# Patient Record
Sex: FEMALE | Race: WHITE | NOT HISPANIC OR LATINO | Employment: FULL TIME | URBAN - METROPOLITAN AREA
[De-identification: names, ages, dates, MRNs, and addresses within clinical notes are randomized per-mention and may not be internally consistent; named-entity substitution may affect disease eponyms.]

---

## 2021-06-19 ENCOUNTER — OFFICE VISIT (OUTPATIENT)
Dept: URGENT CARE | Facility: CLINIC | Age: 56
End: 2021-06-19
Payer: COMMERCIAL

## 2021-06-19 VITALS
OXYGEN SATURATION: 95 % | RESPIRATION RATE: 20 BRPM | HEIGHT: 70 IN | WEIGHT: 220 LBS | DIASTOLIC BLOOD PRESSURE: 90 MMHG | TEMPERATURE: 97 F | HEART RATE: 84 BPM | BODY MASS INDEX: 31.5 KG/M2 | SYSTOLIC BLOOD PRESSURE: 125 MMHG

## 2021-06-19 DIAGNOSIS — H60.502 ACUTE OTITIS EXTERNA OF LEFT EAR, UNSPECIFIED TYPE: Primary | ICD-10-CM

## 2021-06-19 PROCEDURE — 3008F BODY MASS INDEX DOCD: CPT | Mod: CPTII,S$GLB,, | Performed by: NURSE PRACTITIONER

## 2021-06-19 PROCEDURE — 99203 OFFICE O/P NEW LOW 30 MIN: CPT | Mod: S$GLB,,, | Performed by: NURSE PRACTITIONER

## 2021-06-19 PROCEDURE — 99203 PR OFFICE/OUTPT VISIT, NEW, LEVL III, 30-44 MIN: ICD-10-PCS | Mod: S$GLB,,, | Performed by: NURSE PRACTITIONER

## 2021-06-19 PROCEDURE — 3008F PR BODY MASS INDEX (BMI) DOCUMENTED: ICD-10-PCS | Mod: CPTII,S$GLB,, | Performed by: NURSE PRACTITIONER

## 2021-06-19 RX ORDER — METFORMIN HYDROCHLORIDE EXTENDED-RELEASE TABLETS 500 MG/1
500 TABLET, FILM COATED, EXTENDED RELEASE ORAL
COMMUNITY
End: 2022-01-13 | Stop reason: SDUPTHER

## 2021-06-19 RX ORDER — CIPROFLOXACIN AND DEXAMETHASONE 3; 1 MG/ML; MG/ML
4 SUSPENSION/ DROPS AURICULAR (OTIC) 2 TIMES DAILY
Qty: 7.5 ML | Refills: 0 | Status: SHIPPED | OUTPATIENT
Start: 2021-06-19 | End: 2021-06-26

## 2021-06-19 RX ORDER — SEMAGLUTIDE 1.34 MG/ML
1 INJECTION, SOLUTION SUBCUTANEOUS
COMMUNITY
End: 2022-01-18 | Stop reason: SDUPTHER

## 2021-06-19 RX ORDER — ROSUVASTATIN CALCIUM 10 MG/1
5 TABLET, COATED ORAL DAILY
COMMUNITY
End: 2021-11-08 | Stop reason: SDUPTHER

## 2021-06-19 RX ORDER — ALBUTEROL SULFATE 0.63 MG/3ML
0.63 SOLUTION RESPIRATORY (INHALATION) EVERY 6 HOURS PRN
COMMUNITY
End: 2022-01-18 | Stop reason: SDUPTHER

## 2021-06-19 RX ORDER — BISOPROLOL FUMARATE 10 MG/1
10 TABLET, FILM COATED ORAL DAILY
COMMUNITY
End: 2021-07-30

## 2021-07-30 ENCOUNTER — OFFICE VISIT (OUTPATIENT)
Dept: INTERNAL MEDICINE | Facility: CLINIC | Age: 56
End: 2021-07-30
Payer: COMMERCIAL

## 2021-07-30 VITALS
WEIGHT: 226 LBS | BODY MASS INDEX: 32.35 KG/M2 | DIASTOLIC BLOOD PRESSURE: 78 MMHG | HEART RATE: 98 BPM | HEIGHT: 70 IN | SYSTOLIC BLOOD PRESSURE: 116 MMHG

## 2021-07-30 DIAGNOSIS — Z12.39 ENCOUNTER FOR SCREENING FOR MALIGNANT NEOPLASM OF BREAST, UNSPECIFIED SCREENING MODALITY: ICD-10-CM

## 2021-07-30 DIAGNOSIS — Z76.89 ENCOUNTER TO ESTABLISH CARE: ICD-10-CM

## 2021-07-30 DIAGNOSIS — E11.9 TYPE 2 DIABETES MELLITUS WITHOUT COMPLICATION, WITHOUT LONG-TERM CURRENT USE OF INSULIN: ICD-10-CM

## 2021-07-30 DIAGNOSIS — I10 ESSENTIAL HYPERTENSION: Primary | ICD-10-CM

## 2021-07-30 PROBLEM — D12.2 ADENOMATOUS POLYP OF ASCENDING COLON: Status: ACTIVE | Noted: 2020-06-22

## 2021-07-30 PROBLEM — E78.49 OTHER HYPERLIPIDEMIA: Status: ACTIVE | Noted: 2020-06-22

## 2021-07-30 PROCEDURE — 99204 PR OFFICE/OUTPT VISIT, NEW, LEVL IV, 45-59 MIN: ICD-10-PCS | Mod: S$GLB,,, | Performed by: STUDENT IN AN ORGANIZED HEALTH CARE EDUCATION/TRAINING PROGRAM

## 2021-07-30 PROCEDURE — 3078F PR MOST RECENT DIASTOLIC BLOOD PRESSURE < 80 MM HG: ICD-10-PCS | Mod: CPTII,S$GLB,, | Performed by: STUDENT IN AN ORGANIZED HEALTH CARE EDUCATION/TRAINING PROGRAM

## 2021-07-30 PROCEDURE — 3074F PR MOST RECENT SYSTOLIC BLOOD PRESSURE < 130 MM HG: ICD-10-PCS | Mod: CPTII,S$GLB,, | Performed by: STUDENT IN AN ORGANIZED HEALTH CARE EDUCATION/TRAINING PROGRAM

## 2021-07-30 PROCEDURE — 3008F PR BODY MASS INDEX (BMI) DOCUMENTED: ICD-10-PCS | Mod: CPTII,S$GLB,, | Performed by: STUDENT IN AN ORGANIZED HEALTH CARE EDUCATION/TRAINING PROGRAM

## 2021-07-30 PROCEDURE — 99204 OFFICE O/P NEW MOD 45 MIN: CPT | Mod: S$GLB,,, | Performed by: STUDENT IN AN ORGANIZED HEALTH CARE EDUCATION/TRAINING PROGRAM

## 2021-07-30 PROCEDURE — 1159F PR MEDICATION LIST DOCUMENTED IN MEDICAL RECORD: ICD-10-PCS | Mod: CPTII,S$GLB,, | Performed by: STUDENT IN AN ORGANIZED HEALTH CARE EDUCATION/TRAINING PROGRAM

## 2021-07-30 PROCEDURE — 1160F PR REVIEW ALL MEDS BY PRESCRIBER/CLIN PHARMACIST DOCUMENTED: ICD-10-PCS | Mod: CPTII,S$GLB,, | Performed by: STUDENT IN AN ORGANIZED HEALTH CARE EDUCATION/TRAINING PROGRAM

## 2021-07-30 PROCEDURE — 1160F RVW MEDS BY RX/DR IN RCRD: CPT | Mod: CPTII,S$GLB,, | Performed by: STUDENT IN AN ORGANIZED HEALTH CARE EDUCATION/TRAINING PROGRAM

## 2021-07-30 PROCEDURE — 99999 PR PBB SHADOW E&M-EST. PATIENT-LVL III: ICD-10-PCS | Mod: PBBFAC,,, | Performed by: STUDENT IN AN ORGANIZED HEALTH CARE EDUCATION/TRAINING PROGRAM

## 2021-07-30 PROCEDURE — 3008F BODY MASS INDEX DOCD: CPT | Mod: CPTII,S$GLB,, | Performed by: STUDENT IN AN ORGANIZED HEALTH CARE EDUCATION/TRAINING PROGRAM

## 2021-07-30 PROCEDURE — 1126F AMNT PAIN NOTED NONE PRSNT: CPT | Mod: CPTII,S$GLB,, | Performed by: STUDENT IN AN ORGANIZED HEALTH CARE EDUCATION/TRAINING PROGRAM

## 2021-07-30 PROCEDURE — 3074F SYST BP LT 130 MM HG: CPT | Mod: CPTII,S$GLB,, | Performed by: STUDENT IN AN ORGANIZED HEALTH CARE EDUCATION/TRAINING PROGRAM

## 2021-07-30 PROCEDURE — 3078F DIAST BP <80 MM HG: CPT | Mod: CPTII,S$GLB,, | Performed by: STUDENT IN AN ORGANIZED HEALTH CARE EDUCATION/TRAINING PROGRAM

## 2021-07-30 PROCEDURE — 1159F MED LIST DOCD IN RCRD: CPT | Mod: CPTII,S$GLB,, | Performed by: STUDENT IN AN ORGANIZED HEALTH CARE EDUCATION/TRAINING PROGRAM

## 2021-07-30 PROCEDURE — 1126F PR PAIN SEVERITY QUANTIFIED, NO PAIN PRESENT: ICD-10-PCS | Mod: CPTII,S$GLB,, | Performed by: STUDENT IN AN ORGANIZED HEALTH CARE EDUCATION/TRAINING PROGRAM

## 2021-07-30 PROCEDURE — 99999 PR PBB SHADOW E&M-EST. PATIENT-LVL III: CPT | Mod: PBBFAC,,, | Performed by: STUDENT IN AN ORGANIZED HEALTH CARE EDUCATION/TRAINING PROGRAM

## 2021-07-30 RX ORDER — LOSARTAN POTASSIUM 25 MG/1
1 TABLET ORAL DAILY
COMMUNITY
Start: 2020-10-29 | End: 2021-07-30

## 2021-07-30 RX ORDER — CARVEDILOL 25 MG/1
25 TABLET ORAL 2 TIMES DAILY WITH MEALS
Qty: 180 TABLET | Refills: 3 | Status: SHIPPED | OUTPATIENT
Start: 2021-07-30 | End: 2022-07-30 | Stop reason: SDUPTHER

## 2021-08-03 ENCOUNTER — PATIENT OUTREACH (OUTPATIENT)
Dept: ADMINISTRATIVE | Facility: HOSPITAL | Age: 56
End: 2021-08-03

## 2021-08-04 ENCOUNTER — PATIENT MESSAGE (OUTPATIENT)
Dept: INTERNAL MEDICINE | Facility: CLINIC | Age: 56
End: 2021-08-04

## 2021-08-04 ENCOUNTER — HOSPITAL ENCOUNTER (OUTPATIENT)
Dept: RADIOLOGY | Facility: OTHER | Age: 56
Discharge: HOME | End: 2021-08-04
Attending: STUDENT IN AN ORGANIZED HEALTH CARE EDUCATION/TRAINING PROGRAM
Payer: COMMERCIAL

## 2021-08-04 DIAGNOSIS — Z12.31 ENCOUNTER FOR SCREENING MAMMOGRAM FOR BREAST CANCER: ICD-10-CM

## 2021-08-04 DIAGNOSIS — Z00.00 HEALTH CARE MAINTENANCE: Primary | ICD-10-CM

## 2021-08-04 DIAGNOSIS — Z12.39 ENCOUNTER FOR SCREENING FOR MALIGNANT NEOPLASM OF BREAST, UNSPECIFIED SCREENING MODALITY: ICD-10-CM

## 2021-08-04 PROCEDURE — 77067 SCR MAMMO BI INCL CAD: CPT | Mod: TC

## 2021-08-04 PROCEDURE — 77067 MAMMO DIGITAL SCREENING BILAT WITH TOMO: ICD-10-PCS | Mod: 26,,, | Performed by: RADIOLOGY

## 2021-08-04 PROCEDURE — 77067 SCR MAMMO BI INCL CAD: CPT | Mod: 26,,, | Performed by: RADIOLOGY

## 2021-08-04 PROCEDURE — 77063 BREAST TOMOSYNTHESIS BI: CPT | Mod: 26,,, | Performed by: RADIOLOGY

## 2021-08-04 PROCEDURE — 77063 MAMMO DIGITAL SCREENING BILAT WITH TOMO: ICD-10-PCS | Mod: 26,,, | Performed by: RADIOLOGY

## 2021-08-12 ENCOUNTER — PATIENT MESSAGE (OUTPATIENT)
Dept: INTERNAL MEDICINE | Facility: CLINIC | Age: 56
End: 2021-08-12

## 2021-08-12 ENCOUNTER — OFFICE VISIT (OUTPATIENT)
Dept: OPTOMETRY | Facility: CLINIC | Age: 56
End: 2021-08-12
Payer: COMMERCIAL

## 2021-08-12 DIAGNOSIS — H52.4 PRESBYOPIA OF BOTH EYES: ICD-10-CM

## 2021-08-12 DIAGNOSIS — E11.9 TYPE 2 DIABETES MELLITUS WITHOUT COMPLICATION, WITHOUT LONG-TERM CURRENT USE OF INSULIN: ICD-10-CM

## 2021-08-12 DIAGNOSIS — H35.363 MACULAR DRUSEN, BILATERAL: ICD-10-CM

## 2021-08-12 DIAGNOSIS — E11.9 DIABETES MELLITUS TYPE 2 WITHOUT RETINOPATHY: Primary | ICD-10-CM

## 2021-08-12 DIAGNOSIS — H25.13 NUCLEAR SCLEROTIC CATARACT, BILATERAL: ICD-10-CM

## 2021-08-12 PROCEDURE — 92134 CPTRZ OPH DX IMG PST SGM RTA: CPT | Mod: S$GLB,,, | Performed by: OPTOMETRIST

## 2021-08-12 PROCEDURE — 3044F PR MOST RECENT HEMOGLOBIN A1C LEVEL <7.0%: ICD-10-PCS | Mod: CPTII,S$GLB,, | Performed by: OPTOMETRIST

## 2021-08-12 PROCEDURE — 1159F PR MEDICATION LIST DOCUMENTED IN MEDICAL RECORD: ICD-10-PCS | Mod: CPTII,S$GLB,, | Performed by: OPTOMETRIST

## 2021-08-12 PROCEDURE — 1159F MED LIST DOCD IN RCRD: CPT | Mod: CPTII,S$GLB,, | Performed by: OPTOMETRIST

## 2021-08-12 PROCEDURE — 1160F PR REVIEW ALL MEDS BY PRESCRIBER/CLIN PHARMACIST DOCUMENTED: ICD-10-PCS | Mod: CPTII,S$GLB,, | Performed by: OPTOMETRIST

## 2021-08-12 PROCEDURE — 2023F DILAT RTA XM W/O RTNOPTHY: CPT | Mod: CPTII,S$GLB,, | Performed by: OPTOMETRIST

## 2021-08-12 PROCEDURE — 92004 PR EYE EXAM, NEW PATIENT,COMPREHESV: ICD-10-PCS | Mod: S$GLB,,, | Performed by: OPTOMETRIST

## 2021-08-12 PROCEDURE — 1160F RVW MEDS BY RX/DR IN RCRD: CPT | Mod: CPTII,S$GLB,, | Performed by: OPTOMETRIST

## 2021-08-12 PROCEDURE — 99999 PR PBB SHADOW E&M-EST. PATIENT-LVL II: ICD-10-PCS | Mod: PBBFAC,,, | Performed by: OPTOMETRIST

## 2021-08-12 PROCEDURE — 3044F HG A1C LEVEL LT 7.0%: CPT | Mod: CPTII,S$GLB,, | Performed by: OPTOMETRIST

## 2021-08-12 PROCEDURE — 2023F PR DILATED RETINAL EXAM W/O EVID OF RETINOPATHY: ICD-10-PCS | Mod: CPTII,S$GLB,, | Performed by: OPTOMETRIST

## 2021-08-12 PROCEDURE — 92134 OCT, RETINA - OU - BOTH EYES: ICD-10-PCS | Mod: S$GLB,,, | Performed by: OPTOMETRIST

## 2021-08-12 PROCEDURE — 99999 PR PBB SHADOW E&M-EST. PATIENT-LVL II: CPT | Mod: PBBFAC,,, | Performed by: OPTOMETRIST

## 2021-08-12 PROCEDURE — 92004 COMPRE OPH EXAM NEW PT 1/>: CPT | Mod: S$GLB,,, | Performed by: OPTOMETRIST

## 2021-08-12 RX ORDER — CEFDINIR 300 MG/1
300 CAPSULE ORAL EVERY 12 HOURS
COMMUNITY
Start: 2021-06-01 | End: 2022-01-31

## 2021-08-12 RX ORDER — IBUPROFEN 200 MG
200 TABLET ORAL
COMMUNITY
End: 2022-09-16

## 2021-08-12 RX ORDER — EPINEPHRINE 0.22MG
200 AEROSOL WITH ADAPTER (ML) INHALATION
COMMUNITY
End: 2023-07-12

## 2021-08-17 ENCOUNTER — PATIENT MESSAGE (OUTPATIENT)
Dept: OPTOMETRY | Facility: CLINIC | Age: 56
End: 2021-08-17

## 2021-09-20 ENCOUNTER — TELEPHONE (OUTPATIENT)
Dept: INTERNAL MEDICINE | Facility: CLINIC | Age: 56
End: 2021-09-20

## 2021-09-30 ENCOUNTER — IMMUNIZATION (OUTPATIENT)
Dept: INTERNAL MEDICINE | Facility: CLINIC | Age: 56
End: 2021-09-30
Payer: COMMERCIAL

## 2021-09-30 DIAGNOSIS — Z23 NEED FOR VACCINATION: Primary | ICD-10-CM

## 2021-09-30 PROCEDURE — 91300 COVID-19, MRNA, LNP-S, PF, 30 MCG/0.3 ML DOSE VACCINE: CPT | Mod: PBBFAC | Performed by: INTERNAL MEDICINE

## 2021-09-30 PROCEDURE — 0003A COVID-19, MRNA, LNP-S, PF, 30 MCG/0.3 ML DOSE VACCINE: CPT | Mod: PBBFAC | Performed by: INTERNAL MEDICINE

## 2021-10-01 ENCOUNTER — IMMUNIZATION (OUTPATIENT)
Dept: PHARMACY | Facility: CLINIC | Age: 56
End: 2021-10-01
Payer: COMMERCIAL

## 2021-10-13 DIAGNOSIS — E11.9 TYPE 2 DIABETES MELLITUS WITHOUT COMPLICATION: ICD-10-CM

## 2021-10-13 DIAGNOSIS — Z12.11 COLON CANCER SCREENING: ICD-10-CM

## 2021-11-08 ENCOUNTER — PATIENT MESSAGE (OUTPATIENT)
Dept: INTERNAL MEDICINE | Facility: CLINIC | Age: 56
End: 2021-11-08
Payer: COMMERCIAL

## 2021-11-08 DIAGNOSIS — E78.2 MIXED HYPERLIPIDEMIA: Primary | ICD-10-CM

## 2021-11-08 RX ORDER — ROSUVASTATIN CALCIUM 5 MG/1
5 TABLET, COATED ORAL DAILY
Qty: 90 TABLET | Refills: 3 | Status: SHIPPED | OUTPATIENT
Start: 2021-11-08 | End: 2022-07-30 | Stop reason: SDUPTHER

## 2021-11-23 ENCOUNTER — PATIENT MESSAGE (OUTPATIENT)
Dept: INTERNAL MEDICINE | Facility: CLINIC | Age: 56
End: 2021-11-23
Payer: COMMERCIAL

## 2021-11-23 DIAGNOSIS — Z12.11 COLON CANCER SCREENING: Primary | ICD-10-CM

## 2021-12-29 ENCOUNTER — PATIENT MESSAGE (OUTPATIENT)
Dept: INTERNAL MEDICINE | Facility: CLINIC | Age: 56
End: 2021-12-29
Payer: COMMERCIAL

## 2022-01-04 DIAGNOSIS — Z12.11 COLON CANCER SCREENING: Primary | ICD-10-CM

## 2022-01-04 LAB — NONINV COLON CA DNA+OCC BLD SCRN STL QL: NORMAL

## 2022-01-13 ENCOUNTER — PATIENT MESSAGE (OUTPATIENT)
Dept: INTERNAL MEDICINE | Facility: CLINIC | Age: 57
End: 2022-01-13
Payer: COMMERCIAL

## 2022-01-13 DIAGNOSIS — E11.9 TYPE 2 DIABETES MELLITUS WITHOUT COMPLICATION, WITHOUT LONG-TERM CURRENT USE OF INSULIN: Primary | ICD-10-CM

## 2022-01-13 RX ORDER — METFORMIN HYDROCHLORIDE EXTENDED-RELEASE TABLETS 500 MG/1
500 TABLET, FILM COATED, EXTENDED RELEASE ORAL
Qty: 90 TABLET | Refills: 3 | Status: SHIPPED | OUTPATIENT
Start: 2022-01-13 | End: 2022-07-30 | Stop reason: SDUPTHER

## 2022-01-16 ENCOUNTER — PATIENT MESSAGE (OUTPATIENT)
Dept: INTERNAL MEDICINE | Facility: CLINIC | Age: 57
End: 2022-01-16
Payer: COMMERCIAL

## 2022-01-18 ENCOUNTER — PATIENT MESSAGE (OUTPATIENT)
Dept: INTERNAL MEDICINE | Facility: CLINIC | Age: 57
End: 2022-01-18
Payer: COMMERCIAL

## 2022-01-18 RX ORDER — ALBUTEROL SULFATE 0.63 MG/3ML
0.63 SOLUTION RESPIRATORY (INHALATION) EVERY 6 HOURS PRN
Qty: 75 ML | Refills: 2 | Status: SHIPPED | OUTPATIENT
Start: 2022-01-18 | End: 2022-01-31

## 2022-01-18 NOTE — TELEPHONE ENCOUNTER
No new care gaps identified.  Powered by [employer]. Reference number: [id number].   1/18/2022 4:34:04 PM CST

## 2022-01-18 NOTE — TELEPHONE ENCOUNTER
Care Due:                  Date            Visit Type   Department     Provider  --------------------------------------------------------------------------------                                             [facility] INTERNAL  Last Visit: 07-      None         MEDICINE       Tawana Staton                                           [facility] INTERNAL  Next Visit: 01-      [facility]       Tawana Staton                                                            Last  Test          Frequency    Reason                     Performed    Due Date  --------------------------------------------------------------------------------    HBA1C.......  6 months...  metFORMIN................  08- 01-    Powered by [employer]. Reference number: [id number].   1/18/2022 2:01:23 PM CST

## 2022-01-19 ENCOUNTER — PATIENT MESSAGE (OUTPATIENT)
Dept: ADMINISTRATIVE | Facility: HOSPITAL | Age: 57
End: 2022-01-19
Payer: COMMERCIAL

## 2022-01-19 RX ORDER — SEMAGLUTIDE 1.34 MG/ML
1 INJECTION, SOLUTION SUBCUTANEOUS
Qty: 1 PEN | Refills: 3 | Status: SHIPPED | OUTPATIENT
Start: 2022-01-19 | End: 2022-01-20

## 2022-01-20 ENCOUNTER — TELEPHONE (OUTPATIENT)
Dept: INTERNAL MEDICINE | Facility: CLINIC | Age: 57
End: 2022-01-20
Payer: COMMERCIAL

## 2022-01-20 DIAGNOSIS — E11.9 TYPE 2 DIABETES MELLITUS WITHOUT COMPLICATION, WITHOUT LONG-TERM CURRENT USE OF INSULIN: Primary | ICD-10-CM

## 2022-01-20 NOTE — TELEPHONE ENCOUNTER
[facility] missing[facility]illegible information on prescription.    [facility] Ozempic dosage either 0.25 or 0.50 and how much to inject once a week.

## 2022-01-24 ENCOUNTER — PATIENT MESSAGE (OUTPATIENT)
Dept: INTERNAL MEDICINE | Facility: CLINIC | Age: 57
End: 2022-01-24
Payer: COMMERCIAL

## 2022-01-24 ENCOUNTER — TELEPHONE (OUTPATIENT)
Dept: INTERNAL MEDICINE | Facility: CLINIC | Age: 57
End: 2022-01-24
Payer: COMMERCIAL

## 2022-01-24 RX ORDER — ALBUTEROL SULFATE 90 UG/1
2 AEROSOL, METERED RESPIRATORY (INHALATION) EVERY 6 HOURS PRN
Qty: 18 G | Refills: 0 | Status: SHIPPED | OUTPATIENT
Start: 2022-01-24 | End: 2022-01-31 | Stop reason: SDUPTHER

## 2022-01-24 NOTE — TELEPHONE ENCOUNTER
----- Message from Nataliia Soliman sent at 1/24/2022  2:55 PM CST -----  Contact: [facility]/Chris/  Chris said that she is calling in regards to the doctor sent a rx for albuterol (ACCUNEB) 0.63 mg/3 mL Nebu but pt does not use this the pharmacy needs a new rx for the rescue inhaler for pt pt is asking if the rx can be sent today because she has been waiting for the inhaler for a couple of days. Please advise

## 2022-01-24 NOTE — TELEPHONE ENCOUNTER
Such confusion she wants rx for albuterol inhaler not for the nebulizer. I don't see this on her list?

## 2022-01-25 RX ORDER — ALBUTEROL SULFATE 0.63 MG/3ML
0.63 SOLUTION RESPIRATORY (INHALATION) EVERY 6 HOURS PRN
Qty: 75 ML | Refills: 2 | OUTPATIENT
Start: 2022-01-25

## 2022-01-26 ENCOUNTER — HOSPITAL ENCOUNTER (EMERGENCY)
Facility: OTHER | Age: 57
Discharge: HOME | End: 2022-01-26
Attending: EMERGENCY MEDICINE
Payer: COMMERCIAL

## 2022-01-26 ENCOUNTER — NURSE TRIAGE (OUTPATIENT)
Dept: ADMINISTRATIVE | Facility: CLINIC | Age: 57
End: 2022-01-26
Payer: COMMERCIAL

## 2022-01-26 VITALS
TEMPERATURE: 98 F | BODY MASS INDEX: 31.84 KG/M2 | WEIGHT: 215 LBS | DIASTOLIC BLOOD PRESSURE: 95 MMHG | HEART RATE: 75 BPM | HEIGHT: 69 IN | RESPIRATION RATE: 20 BRPM | OXYGEN SATURATION: 96 % | SYSTOLIC BLOOD PRESSURE: 162 MMHG

## 2022-01-26 DIAGNOSIS — R06.02 SHORTNESS OF BREATH: ICD-10-CM

## 2022-01-26 DIAGNOSIS — R91.1 LUNG NODULE: ICD-10-CM

## 2022-01-26 DIAGNOSIS — J20.9 ACUTE BRONCHITIS, UNSPECIFIED ORGANISM: Primary | ICD-10-CM

## 2022-01-26 LAB
ALBUMIN SERPL BCP-MCNC: 3.7 G/DL (ref 3.5–5.2)
ALP SERPL-CCNC: 99 U/L (ref 55–135)
ALT SERPL W/O P-5'-P-CCNC: 21 U/L (ref 10–44)
ANION GAP SERPL CALC-SCNC: 10 MMOL/L (ref 8–16)
AST SERPL-CCNC: 22 U/L (ref 10–40)
BASOPHILS # BLD AUTO: 0.03 K/UL (ref 0–0.2)
BASOPHILS NFR BLD: 0.4 % (ref 0–1.9)
BILIRUB SERPL-MCNC: 0.6 MG/DL (ref 0.1–1)
BNP SERPL-MCNC: <10 PG/ML (ref 0–99)
BUN SERPL-MCNC: 11 MG/DL (ref 6–20)
CALCIUM SERPL-MCNC: 9.7 MG/DL (ref 8.7–10.5)
CHLORIDE SERPL-SCNC: 105 MMOL/L (ref 95–110)
CO2 SERPL-SCNC: 24 MMOL/L (ref 23–29)
CREAT SERPL-MCNC: 0.7 MG/DL (ref 0.5–1.4)
CTP QC/QA: YES
CTP QC/QA: YES
DIFFERENTIAL METHOD: NORMAL
EOSINOPHIL # BLD AUTO: 0.3 K/UL (ref 0–0.5)
EOSINOPHIL NFR BLD: 3.5 % (ref 0–8)
ERYTHROCYTE [DISTWIDTH] IN BLOOD BY AUTOMATED COUNT: 12.1 % (ref 11.5–14.5)
EST. GFR  (AFRICAN AMERICAN): >60 ML/MIN/1.73 M^2
EST. GFR  (NON AFRICAN AMERICAN): >60 ML/MIN/1.73 M^2
GLUCOSE SERPL-MCNC: 173 MG/DL (ref 70–110)
HCT VFR BLD AUTO: 41.9 % (ref 37–48.5)
HCV AB SERPL QL IA: NEGATIVE
HGB BLD-MCNC: 14 G/DL (ref 12–16)
HIV 1+2 AB+HIV1 P24 AG SERPL QL IA: NEGATIVE
IMM GRANULOCYTES # BLD AUTO: 0.02 K/UL (ref 0–0.04)
IMM GRANULOCYTES NFR BLD AUTO: 0.3 % (ref 0–0.5)
LYMPHOCYTES # BLD AUTO: 1.7 K/UL (ref 1–4.8)
LYMPHOCYTES NFR BLD: 22.2 % (ref 18–48)
MCH RBC QN AUTO: 30.6 PG (ref 27–31)
MCHC RBC AUTO-ENTMCNC: 33.4 G/DL (ref 32–36)
MCV RBC AUTO: 92 FL (ref 82–98)
MONOCYTES # BLD AUTO: 0.7 K/UL (ref 0.3–1)
MONOCYTES NFR BLD: 8.8 % (ref 4–15)
NEUTROPHILS # BLD AUTO: 5 K/UL (ref 1.8–7.7)
NEUTROPHILS NFR BLD: 64.8 % (ref 38–73)
NRBC BLD-RTO: 0 /100 WBC
PLATELET # BLD AUTO: 277 K/UL (ref 150–450)
PMV BLD AUTO: 9.3 FL (ref 9.2–12.9)
POC MOLECULAR INFLUENZA A AGN: NEGATIVE
POC MOLECULAR INFLUENZA B AGN: NEGATIVE
POTASSIUM SERPL-SCNC: 3.9 MMOL/L (ref 3.5–5.1)
PROT SERPL-MCNC: 6.5 G/DL (ref 6–8.4)
RBC # BLD AUTO: 4.58 M/UL (ref 4–5.4)
SARS-COV-2 RDRP RESP QL NAA+PROBE: NEGATIVE
SODIUM SERPL-SCNC: 139 MMOL/L (ref 136–145)
TROPONIN I SERPL DL<=0.01 NG/ML-MCNC: <0.006 NG/ML (ref 0–0.03)
WBC # BLD AUTO: 7.71 K/UL (ref 3.9–12.7)

## 2022-01-26 PROCEDURE — 85025 COMPLETE CBC W/AUTO DIFF WBC: CPT | Performed by: EMERGENCY MEDICINE

## 2022-01-26 PROCEDURE — 83880 ASSAY OF NATRIURETIC PEPTIDE: CPT | Performed by: EMERGENCY MEDICINE

## 2022-01-26 PROCEDURE — 63600175 PHARM REV CODE 636 W HCPCS: Performed by: EMERGENCY MEDICINE

## 2022-01-26 PROCEDURE — 96374 THER/PROPH/DIAG INJ IV PUSH: CPT | Mod: 59

## 2022-01-26 PROCEDURE — 25500020 PHARM REV CODE 255: Performed by: EMERGENCY MEDICINE

## 2022-01-26 PROCEDURE — 80053 COMPREHEN METABOLIC PANEL: CPT | Performed by: EMERGENCY MEDICINE

## 2022-01-26 PROCEDURE — U0002 COVID-19 LAB TEST NON-CDC: HCPCS | Performed by: EMERGENCY MEDICINE

## 2022-01-26 PROCEDURE — 93005 ELECTROCARDIOGRAM TRACING: CPT

## 2022-01-26 PROCEDURE — 84484 ASSAY OF TROPONIN QUANT: CPT | Performed by: EMERGENCY MEDICINE

## 2022-01-26 PROCEDURE — 87389 HIV-1 AG W/HIV-1&-2 AB AG IA: CPT | Performed by: EMERGENCY MEDICINE

## 2022-01-26 PROCEDURE — 86803 HEPATITIS C AB TEST: CPT | Performed by: EMERGENCY MEDICINE

## 2022-01-26 PROCEDURE — 99285 EMERGENCY DEPT VISIT HI MDM: CPT | Mod: 25

## 2022-01-26 PROCEDURE — 25000242 PHARM REV CODE 250 ALT 637 W/ HCPCS: Performed by: EMERGENCY MEDICINE

## 2022-01-26 RX ORDER — ALBUTEROL SULFATE 90 UG/1
1-2 AEROSOL, METERED RESPIRATORY (INHALATION) EVERY 4 HOURS PRN
Qty: 6.7 G | Refills: 11 | Status: SHIPPED | OUTPATIENT
Start: 2022-01-26 | End: 2022-04-04 | Stop reason: SDUPTHER

## 2022-01-26 RX ORDER — PREDNISONE 20 MG/1
40 TABLET ORAL DAILY
Qty: 6 TABLET | Refills: 0 | Status: SHIPPED | OUTPATIENT
Start: 2022-01-26 | End: 2022-01-29

## 2022-01-26 RX ORDER — METHYLPREDNISOLONE SOD SUCC 125 MG
125 VIAL (EA) INJECTION
Status: COMPLETED | OUTPATIENT
Start: 2022-01-26 | End: 2022-01-26

## 2022-01-26 RX ORDER — IPRATROPIUM BROMIDE AND ALBUTEROL SULFATE 2.5; .5 MG/3ML; MG/3ML
3 SOLUTION RESPIRATORY (INHALATION)
Status: COMPLETED | OUTPATIENT
Start: 2022-01-26 | End: 2022-01-26

## 2022-01-26 RX ADMIN — IOHEXOL 100 ML: 350 INJECTION, SOLUTION INTRAVENOUS at 02:01

## 2022-01-26 RX ADMIN — IPRATROPIUM BROMIDE AND ALBUTEROL SULFATE 3 ML: 2.5; .5 SOLUTION RESPIRATORY (INHALATION) at 11:01

## 2022-01-26 RX ADMIN — METHYLPREDNISOLONE SODIUM SUCCINATE 125 MG: 125 INJECTION, POWDER, FOR SOLUTION INTRAMUSCULAR; INTRAVENOUS at 11:01

## 2022-01-26 RX ADMIN — IPRATROPIUM BROMIDE AND ALBUTEROL SULFATE 3 ML: 2.5; .5 SOLUTION RESPIRATORY (INHALATION) at 10:01

## 2022-01-26 NOTE — ED PROVIDER NOTES
I, Blake Soliman, scribed for, and in the presence of, Dr. Costa. I performed the scribed service and the documentation accurately describes the services I performed. I attest to the accuracy of the note.       Source of History:  The patient. Wife.     Chief complaint:  Shortness of Breath (Patient complains of shortness of breath accompanied by chest tightness for the past week. Patient states that she noticed her pulse oximetry read at 88 this morning and was advised by a triage nurse over the phone to come to the emergency room. )      HPI:  Nathalie Krueger is a 56 y.o. female with a history of type II DM and HTN presenting with SOB onset this week. The patient called triage nurse when she noticed her pulse ox of 88 PTA. She states having a persistent cough from 2 weeks ago that has worsened with light mucous. No rhinorrhea. She mentions moving from a home with mold issues. She has been using her inhaler more now--every 6 hours without relief. The patient reports associated chest tightness that exacerbates with inspiration, waking her up at night. Her symptoms worsen when wearing a mask and walking at an incline. No wheezing or fever. She has mild nausea but no abdominal pain, vomiting, or diarrhea. Her wife mentions she has leg cramps often. No leg swelling, trauma or injury. The patient is a former smoker and quit January 2020. She is vaccinated against COVID-19 with booster. No known sick contacts.       This is the extent to the patients complaints today here in the emergency department.    ROS: As per HPI and below:  General: No fever.  No chills.  Eyes: No visual changes.  ENT: No sore throat. No ear pain. No rhinorrhea.   Head: No headache.    Respiratory: Positive shortness of breath and cough. No wheezing.   Cardiovascular: Positive chest tightness. No leg swelling.   Abdomen: No abdominal pain.  Positive nausea. No vomiting. No diarrhea.  Genito-Urinary: No abnormal urination.  Neurologic: No focal  "weakness.  No numbness.  MSK: Positive leg cramps. no back pain.  Integument: No rashes or lesions.  Hematologic: No easy bruising.  Endocrine: No excessive thirst or urination.      Review of patient's allergies indicates:   Allergen Reactions    Atorvastatin Other (See Comments)     Dry cough      Lisinopril      Other reaction(s): Cough    Meperidine hcl Nausea Only       PMH:  As per HPI and below:  Past Medical History:   Diagnosis Date    Diabetes mellitus, type 2     Hypertension      Past Surgical History:   Procedure Laterality Date    ABLATION, FIBROID, UTERUS, LAPAROSCOPIC, WITH US GUIDANCE      APPENDECTOMY  1982    right rotator cuff  1998    TONSILLECTOMY  1977       Social History     Tobacco Use    Smoking status: Never Smoker    Smokeless tobacco: Never Used   Substance Use Topics    Alcohol use: Never    Drug use: Never       Physical Exam:    BP (!) 162/95 (BP Location: Left arm, Patient Position: Lying)   Pulse 75   Temp 97.5 °F (36.4 °C) (Oral)   Resp 20   Ht 5' 9" (1.753 m)   Wt 97.5 kg (215 lb)   SpO2 96%   BMI 31.75 kg/m²   Nursing note and vital signs reviewed.    Appearance: No acute distress.  Eyes: No conjunctival injection.  Neck: No deformity.   ENT: Oropharynx clear.  No stridor.   Chest/ Respiratory: Frequent cough and diminished breath sounds throughout.  No wheezes.  No rhonchi. No rales. No accessory muscle use.  Cardiovascular: Regular rate and rhythm.  No murmurs. No gallops. No rubs.  Abdomen: Soft.  Not distended.  Nontender.  No guarding.  No rebound. Non-peritoneal.  Musculoskeletal: Good range of motion all joints.  No deformities.  Neck supple.  No meningismus.  Skin: No rashes seen.  Good turgor.  No abrasions.  No ecchymoses.  Neurologic: Motor intact.  Sensation intact.  Cerebellar intact.  Cranial nerves intact.  Mental Status:  Alert and oriented x 3.  Appropriate, conversant.      EKG:  I independently reviewed and interpreted the EKG and my " findings are as follows:     Normal sinus rhythm with a rate of 90. No ST abnormality.     CXR Interpretation:  CXR independently interpreted by myself shows normal heart size. No infiltrate. No bony deformity. No acute disease.       Initial Impression  56 y.o. female with frequent cough, increasing SOB, and hypoxia. History of long standing cigarette use. Suspect COPD. Plan: labs, including cardiac enzymes, BMP, chest x-ray, rule out for COVID-19 and flu.     Differential Dx:  Bronchitis, URI, allergies, irritants, pulmonary edema, GERD, laryngitis, tracheitis, asthma, sinusitis, pneumonia, viral, COPD, medication side effect      MDM:    Results for orders placed or performed during the hospital encounter of 01/26/22   HIV 1/2 Ag/Ab (4th Gen)   Result Value Ref Range    HIV 1/2 Ag/Ab Negative Negative   Hepatitis C Antibody   Result Value Ref Range    Hepatitis C Ab Negative Negative   CBC auto differential   Result Value Ref Range    WBC 7.71 3.90 - 12.70 K/uL    RBC 4.58 4.00 - 5.40 M/uL    Hemoglobin 14.0 12.0 - 16.0 g/dL    Hematocrit 41.9 37.0 - 48.5 %    MCV 92 82 - 98 fL    MCH 30.6 27.0 - 31.0 pg    MCHC 33.4 32.0 - 36.0 g/dL    RDW 12.1 11.5 - 14.5 %    Platelets 277 150 - 450 K/uL    MPV 9.3 9.2 - 12.9 fL    Immature Granulocytes 0.3 0.0 - 0.5 %    Gran # (ANC) 5.0 1.8 - 7.7 K/uL    Immature Grans (Abs) 0.02 0.00 - 0.04 K/uL    Lymph # 1.7 1.0 - 4.8 K/uL    Mono # 0.7 0.3 - 1.0 K/uL    Eos # 0.3 0.0 - 0.5 K/uL    Baso # 0.03 0.00 - 0.20 K/uL    nRBC 0 0 /100 WBC    Gran % 64.8 38.0 - 73.0 %    Lymph % 22.2 18.0 - 48.0 %    Mono % 8.8 4.0 - 15.0 %    Eosinophil % 3.5 0.0 - 8.0 %    Basophil % 0.4 0.0 - 1.9 %    Differential Method Automated    Comprehensive metabolic panel   Result Value Ref Range    Sodium 139 136 - 145 mmol/L    Potassium 3.9 3.5 - 5.1 mmol/L    Chloride 105 95 - 110 mmol/L    CO2 24 23 - 29 mmol/L    Glucose 173 (H) 70 - 110 mg/dL    BUN 11 6 - 20 mg/dL    Creatinine 0.7 0.5 - 1.4  mg/dL    Calcium 9.7 8.7 - 10.5 mg/dL    Total Protein 6.5 6.0 - 8.4 g/dL    Albumin 3.7 3.5 - 5.2 g/dL    Total Bilirubin 0.6 0.1 - 1.0 mg/dL    Alkaline Phosphatase 99 55 - 135 U/L    AST 22 10 - 40 U/L    ALT 21 10 - 44 U/L    Anion Gap 10 8 - 16 mmol/L    eGFR if African American >60 >60 mL/min/1.73 m^2    eGFR if non African American >60 >60 mL/min/1.73 m^2   Troponin I   Result Value Ref Range    Troponin I <0.006 0.000 - 0.026 ng/mL   Brain natriuretic peptide   Result Value Ref Range    BNP <10 0 - 99 pg/mL   POCT COVID-19 Rapid Screening   Result Value Ref Range    POC Rapid COVID Negative Negative     Acceptable Yes    POCT Influenza A/B Molecular   Result Value Ref Range    POC Molecular Influenza A Ag Negative Negative, Not Reported    POC Molecular Influenza B Ag Negative Negative, Not Reported     Acceptable Yes        CTA Chest Non-Coronary (PE Study)   Final Result      1. No evidence of pulmonary thromboembolism or pulmonary infarction.   2. Bilateral mild nonspecific bronchiectasis without associated significant peribronchial thickening, which can be seen with recent or remote small airways process.  No consolidation.   3. Right upper lobe 3 mm ground-glass nodule.  For a ground glass nodule <6 mm, Fleischner Society 2017 guidelines recommend no routine follow up. However, suspicious features could warrant follow up with non-contrast chest CT at 2 years and 4 years after discovery.   4. Few additional incidental findings as above.         Electronically signed by: Alexi Ball MD   Date:    01/26/2022   Time:    15:57      X-Ray Chest 1 View   Final Result      No convincing evidence of acute cardiopulmonary disease.         Electronically signed by: Gino Shetty   Date:    01/26/2022   Time:    10:01               Patient improved with treatment in the emergency department and comfortable going home. Discussed reasons to return and importance of followup.  Patient  understands that the emergency visit today is primarily to address immediate concerns and to rule out emergent cause of symptoms and that they may require further workup and evaluation as an outpatient. All questions addressed and patient given discharge instructions and followup information.               Physician Attestation for scribe, I Mackenzie Costa MD, reviewed documentation as scribed in my presence, which is both accurate and complete.    Diagnostic Impression:    1. Acute bronchitis, unspecified organism    2. Shortness of breath    3. Lung nodule         ED Disposition Condition    Discharge Good          ED Prescriptions     Medication Sig Dispense Start Date End Date Auth. Provider    albuterol (PROVENTIL/VENTOLIN HFA) 90 mcg/actuation inhaler Inhale 1-2 puffs into the lungs every 4 (four) hours as needed for Wheezing or Shortness of Breath. Rescue 6.7 g 2022 Efra Torres MD    predniSONE (DELTASONE) 20 MG tablet () Take 2 tablets (40 mg total) by mouth once daily. for 3 days 6 tablet 2022 Efra Torres MD        Follow-up Information     Follow up With Specialties Details Why Contact Info    Tawana Staton MD Internal Medicine In 2 days  [address] [facility] [address]  [phone]      [facility] PULMONARY MEDICINE Pulmonology In 2 days  [facility] [facility] [id number] [phone]             Mackenzie Costa MD  [id number]     smokes cannabis several times a month, last time was 2 weeks ago

## 2022-01-26 NOTE — TELEPHONE ENCOUNTER
Pt calling with SOB, coughing fits, chest tightness and pulse ox hovering at 88-92% despite taking her inhaler. Per protocol advised of ED NOW. Verbalized understanding.     Reason for Disposition   Difficulty breathing    Additional Information   Negative: SEVERE difficulty breathing (e.g., struggling for each breath, speaks in single words)   Negative: Passed out (i.e., fainted, collapsed and was not responding)   Negative: Difficult to awaken or acting confused (e.g., disoriented, slurred speech)   Negative: Shock suspected (e.g., cold/pale/clammy skin, too weak to stand, low BP, rapid pulse)   Negative: Chest pain lasting longer than 5 minutes and ANY of the following:* Over 44 years old* Over 30 years old and at least one cardiac risk factor (e.g., diabetes mellitus, high blood pressure, high cholesterol, smoker, or strong family history of heart disease)* History of heart disease (i.e., angina, heart attack, heart failure, bypass surgery, takes nitroglycerin)* Pain is crushing, pressure-like, or heavy   Negative: Heart beating < 50 beats per minute OR > 140 beats per minute   Negative: Visible sweat on face or sweat dripping down face   Negative: Sounds like a life-threatening emergency to the triager   Negative: SEVERE chest pain   Negative: Pain also in shoulder(s) or arm(s) or jaw    Protocols used: CHEST PAIN-A-OH

## 2022-01-26 NOTE — ED NOTES
Care patient taken over by me at 3:00 p.m..  Patient is a 56-year-old female with complaint of shortness of breath.  Patient is afebrile, nontoxic-appearing stable vital signs.  Patient in no acute respiratory distress.  Concern for possible pulmonary embolus.  No acute findings on CTA of the chest PE protocol except for 3 mm lung nodule and bilateral mild nonspecific bronchiectasis.  No evidence of pulmonary embolus.  The patient is extensively counseled on their diagnosis and treatment including all diagnostic, laboratory and physical exam findings.  The patient is discharged good condition and directed follow-up with their PCP and pulmonology in the next 24-48 hours.     Efra Torres MD  01/26/22 [id number]

## 2022-01-27 LAB — NONINV COLON CA DNA+OCC BLD SCRN STL QL: NEGATIVE

## 2022-01-28 DIAGNOSIS — R06.02 SHORTNESS OF BREATH: ICD-10-CM

## 2022-01-28 DIAGNOSIS — R06.02 SOB (SHORTNESS OF BREATH): Primary | ICD-10-CM

## 2022-01-31 ENCOUNTER — OFFICE VISIT (OUTPATIENT)
Dept: INTERNAL MEDICINE | Facility: CLINIC | Age: 57
End: 2022-01-31
Payer: COMMERCIAL

## 2022-01-31 ENCOUNTER — LAB VISIT (OUTPATIENT)
Dept: LAB | Facility: HOSPITAL | Age: 57
End: 2022-01-31
Attending: STUDENT IN AN ORGANIZED HEALTH CARE EDUCATION/TRAINING PROGRAM
Payer: COMMERCIAL

## 2022-01-31 ENCOUNTER — PATIENT OUTREACH (OUTPATIENT)
Dept: ADMINISTRATIVE | Facility: HOSPITAL | Age: 57
End: 2022-01-31
Payer: COMMERCIAL

## 2022-01-31 VITALS
DIASTOLIC BLOOD PRESSURE: 90 MMHG | HEIGHT: 69 IN | OXYGEN SATURATION: 93 % | SYSTOLIC BLOOD PRESSURE: 130 MMHG | WEIGHT: 225.31 LBS | BODY MASS INDEX: 33.37 KG/M2 | HEART RATE: 82 BPM | RESPIRATION RATE: 14 BRPM

## 2022-01-31 DIAGNOSIS — I10 ESSENTIAL HYPERTENSION: ICD-10-CM

## 2022-01-31 DIAGNOSIS — J40 BRONCHITIS: Primary | ICD-10-CM

## 2022-01-31 DIAGNOSIS — E11.9 TYPE 2 DIABETES MELLITUS WITHOUT COMPLICATION, WITHOUT LONG-TERM CURRENT USE OF INSULIN: ICD-10-CM

## 2022-01-31 LAB
ESTIMATED AVG GLUCOSE: 140 MG/DL (ref 68–131)
HBA1C MFR BLD: 6.5 % (ref 4–5.6)

## 2022-01-31 PROCEDURE — 1159F MED LIST DOCD IN RCRD: CPT | Mod: CPTII,S$GLB,, | Performed by: STUDENT IN AN ORGANIZED HEALTH CARE EDUCATION/TRAINING PROGRAM

## 2022-01-31 PROCEDURE — 99214 PR OFFICE/OUTPT VISIT, EST, LEVL IV, 30-39 MIN: ICD-10-PCS | Mod: S$GLB,,, | Performed by: STUDENT IN AN ORGANIZED HEALTH CARE EDUCATION/TRAINING PROGRAM

## 2022-01-31 PROCEDURE — 3075F PR MOST RECENT SYSTOLIC BLOOD PRESS GE 130-139MM HG: ICD-10-PCS | Mod: CPTII,S$GLB,, | Performed by: STUDENT IN AN ORGANIZED HEALTH CARE EDUCATION/TRAINING PROGRAM

## 2022-01-31 PROCEDURE — 1159F PR MEDICATION LIST DOCUMENTED IN MEDICAL RECORD: ICD-10-PCS | Mod: CPTII,S$GLB,, | Performed by: STUDENT IN AN ORGANIZED HEALTH CARE EDUCATION/TRAINING PROGRAM

## 2022-01-31 PROCEDURE — 1160F PR REVIEW ALL MEDS BY PRESCRIBER/CLIN PHARMACIST DOCUMENTED: ICD-10-PCS | Mod: CPTII,S$GLB,, | Performed by: STUDENT IN AN ORGANIZED HEALTH CARE EDUCATION/TRAINING PROGRAM

## 2022-01-31 PROCEDURE — 3075F SYST BP GE 130 - 139MM HG: CPT | Mod: CPTII,S$GLB,, | Performed by: STUDENT IN AN ORGANIZED HEALTH CARE EDUCATION/TRAINING PROGRAM

## 2022-01-31 PROCEDURE — 3080F DIAST BP >= 90 MM HG: CPT | Mod: CPTII,S$GLB,, | Performed by: STUDENT IN AN ORGANIZED HEALTH CARE EDUCATION/TRAINING PROGRAM

## 2022-01-31 PROCEDURE — 99214 OFFICE O/P EST MOD 30 MIN: CPT | Mod: S$GLB,,, | Performed by: STUDENT IN AN ORGANIZED HEALTH CARE EDUCATION/TRAINING PROGRAM

## 2022-01-31 PROCEDURE — 99999 PR PBB SHADOW E&M-EST. PATIENT-LVL III: CPT | Mod: PBBFAC,,, | Performed by: STUDENT IN AN ORGANIZED HEALTH CARE EDUCATION/TRAINING PROGRAM

## 2022-01-31 PROCEDURE — 3080F PR MOST RECENT DIASTOLIC BLOOD PRESSURE >= 90 MM HG: ICD-10-PCS | Mod: CPTII,S$GLB,, | Performed by: STUDENT IN AN ORGANIZED HEALTH CARE EDUCATION/TRAINING PROGRAM

## 2022-01-31 PROCEDURE — 3072F PR LOW RISK FOR RETINOPATHY: ICD-10-PCS | Mod: CPTII,S$GLB,, | Performed by: STUDENT IN AN ORGANIZED HEALTH CARE EDUCATION/TRAINING PROGRAM

## 2022-01-31 PROCEDURE — 36415 COLL VENOUS BLD VENIPUNCTURE: CPT | Performed by: STUDENT IN AN ORGANIZED HEALTH CARE EDUCATION/TRAINING PROGRAM

## 2022-01-31 PROCEDURE — 3008F PR BODY MASS INDEX (BMI) DOCUMENTED: ICD-10-PCS | Mod: CPTII,S$GLB,, | Performed by: STUDENT IN AN ORGANIZED HEALTH CARE EDUCATION/TRAINING PROGRAM

## 2022-01-31 PROCEDURE — 99999 PR PBB SHADOW E&M-EST. PATIENT-LVL III: ICD-10-PCS | Mod: PBBFAC,,, | Performed by: STUDENT IN AN ORGANIZED HEALTH CARE EDUCATION/TRAINING PROGRAM

## 2022-01-31 PROCEDURE — 83036 HEMOGLOBIN GLYCOSYLATED A1C: CPT | Performed by: STUDENT IN AN ORGANIZED HEALTH CARE EDUCATION/TRAINING PROGRAM

## 2022-01-31 PROCEDURE — 3072F LOW RISK FOR RETINOPATHY: CPT | Mod: CPTII,S$GLB,, | Performed by: STUDENT IN AN ORGANIZED HEALTH CARE EDUCATION/TRAINING PROGRAM

## 2022-01-31 PROCEDURE — 3008F BODY MASS INDEX DOCD: CPT | Mod: CPTII,S$GLB,, | Performed by: STUDENT IN AN ORGANIZED HEALTH CARE EDUCATION/TRAINING PROGRAM

## 2022-01-31 PROCEDURE — 1160F RVW MEDS BY RX/DR IN RCRD: CPT | Mod: CPTII,S$GLB,, | Performed by: STUDENT IN AN ORGANIZED HEALTH CARE EDUCATION/TRAINING PROGRAM

## 2022-01-31 NOTE — PROGRESS NOTES
Health Maintenance Due   Topic Date Due    Diabetes Urine Screening  Never done    Foot Exam  Never done    Shingles Vaccine (2 of 2) 12/24/2020    Hemoglobin A1c  02/04/2022         HM updated. Immunizations reconciled.      Anne Patel LPN   Clinical Care Coordinator  Primary Care and Wellness

## 2022-01-31 NOTE — PROGRESS NOTES
INTERNAL MEDICINE ESTABLISHED PATIENT VISIT NOTE        Assessment/Plan     1. Bronchitis  Reviewed recent ER visit and combed through past paper records. Patient with rare inhaler need - seems to be triggered by pollen, other environmental factors (mold). Doing well with PRN inhaler. Last PFTs from outside hospital with mild restrictive pattern. Encouraged patient to bring to pulmonology appt.     2. Type 2 diabetes mellitus without complication, without long-term current use of insulin  Well controlled on current therapy. Due for follow up.   - Hemoglobin A1C; Future  - MICROALBUMIN / CREATININE RATIO URINE; Future    3. Essential hypertension  Well controlled, at goal.     Health Maintenance reviewed and discussed with patient.   RTC in 1 yr, sooner if needed.    Subjective:     Chief Complaint: Follow-up and Cough       Patient ID: Nathalie Krueger is a 56 y.o. female with HTN, TIIDM, HLD, who is here to discuss follow up.       Was in the ER last week for bronchitis - reports her breathing is much better today. Had spent some time in a house with mold she was renting from. Has since moved, and has been using her inhaler once on Thursday and once on Friday. None since then. No nighttime symptoms. She has since finished the PO steroids.     Took COVID tests and was negative.     She had gotten PFTs done a few years ago, but all I can see is the comment about her having mild restrictive lung disease from 10/2020.     She comes in today with a large stack of outside records.   Reviewed all in detail - PFTs and several mammograms were found and will be scanned into her chart. Also has discs of prior imaging, will need sent to medical records.     Past Medical History:  Past Medical History:   Diagnosis Date    Diabetes mellitus, type 2     Hypertension        Home Medications:  Prior to Admission medications    Medication Sig Start Date End Date Taking? Authorizing Provider   albuterol (PROVENTIL/VENTOLIN HFA) 90  mcg/actuation inhaler Inhale 1-2 puffs into the lungs every 4 (four) hours as needed for Wheezing or Shortness of Breath. Rescue 1/26/22 1/26/23 Yes Efra Torres MD   carvediloL (COREG) 25 MG tablet Take 1 tablet (25 mg total) by mouth 2 (two) times daily with meals. 7/30/21 7/25/22 Yes Tawana Staton MD   coenzyme Q10 100 mg capsule Take 200 mg by mouth.   Yes Historical Provider   ibuprofen (ADVIL,MOTRIN) 200 MG tablet Take 200 mg by mouth.   Yes Historical Provider   metFORMIN (FORTAMET) 500 mg 24hr tablet Take 1 tablet (500 mg total) by mouth daily with breakfast. 1/13/22  Yes Tawana Staton MD   multivitamin capsule Take 1 capsule by mouth once daily.   Yes Historical Provider   rosuvastatin (CRESTOR) 5 MG tablet Take 1 tablet (5 mg total) by mouth once daily. 11/8/21  Yes Tawana Staton MD   semaglutide (OZEMPIC) 1 mg/dose (2 mg/1.5 mL) PnIj Inject 1 mg into the skin every 7 days. 1/20/22 1/20/23 Yes Tawana Staton MD   albuterol (ACCUNEB) 0.63 mg/3 mL Nebu Take 3 mLs (0.63 mg total) by nebulization every 6 (six) hours as needed (wheezing, shortness of breath). Rescue 1/18/22   Tawana Staton MD   albuterol (VENTOLIN HFA) 90 mcg/actuation inhaler Inhale 2 puffs into the lungs every 6 (six) hours as needed for Wheezing. Rescue 1/24/22 1/24/23  Tawana Staton MD   cefdinir (OMNICEF) 300 MG capsule Take 300 mg by mouth every 12 (twelve) hours. 6/1/21   Historical Provider       Allergies:  Review of patient's allergies indicates:   Allergen Reactions    Atorvastatin Other (See Comments)     Dry cough      Lisinopril      Other reaction(s): Cough    Meperidine hcl Nausea Only       Social History:  Social History     Tobacco Use    Smoking status: Never Smoker    Smokeless tobacco: Never Used   Substance Use Topics    Alcohol use: Never    Drug use: Never        Review of Systems   Constitutional: Negative for fever and unexpected weight change.   HENT: Negative for sinus pressure and sore  "throat.    Eyes: Negative for visual disturbance.   Respiratory: Negative for cough and shortness of breath.    Cardiovascular: Negative for chest pain and palpitations.   Gastrointestinal: Negative for constipation, diarrhea, nausea and vomiting.   Endocrine: Negative for polyuria.   Genitourinary: Negative for dysuria and urgency.   Musculoskeletal: Negative for arthralgias and myalgias.   Skin: Negative for rash.   Allergic/Immunologic: Negative for environmental allergies.   Neurological: Negative for dizziness, light-headedness and headaches.   Hematological: Does not bruise/bleed easily.   Psychiatric/Behavioral: Negative for agitation and decreased concentration. The patient is not nervous/anxious.          Health Maintenance:     Health Maintenance Due   Topic Date Due    Diabetes Urine Screening  Never done    Foot Exam  Never done    Hemoglobin A1c  02/04/2022       Objective:   BP (!) 130/90 (BP Location: Right arm, Patient Position: Sitting, BP Method: Large (Automatic))   Pulse 82   Resp 14   Ht 5' 9" (1.753 m)   Wt 102.2 kg (225 lb 5 oz)   SpO2 (!) 93%   BMI 33.27 kg/m²        General: AAO x3, no apparent distress  HEENT: PERRL, OP clear  Neck: Supple   CV: RRR, no m/r/g  Pulm: Lungs CTAB, no crackles, no wheezes  Abd: s/NT/ND +BS  Extremities: appears warm and well-perfused  Skin: no rashes, lesions, or tears    Labs:     Lab Results   Component Value Date    WBC 7.71 01/26/2022    HGB 14.0 01/26/2022    HCT 41.9 01/26/2022     01/26/2022    CHOL 122 08/04/2021    TRIG 154 (H) 08/04/2021    HDL 39 (L) 08/04/2021    ALT 21 01/26/2022    AST 22 01/26/2022     01/26/2022    K 3.9 01/26/2022     01/26/2022    CREATININE 0.7 01/26/2022    BUN 11 01/26/2022    CO2 24 01/26/2022    HGBA1C 6.3 (H) 08/04/2021      CMP  Sodium   Date Value Ref Range Status   01/26/2022 139 136 - 145 mmol/L Final     Potassium   Date Value Ref Range Status   01/26/2022 3.9 3.5 - 5.1 mmol/L Final "     Chloride   Date Value Ref Range Status   01/26/2022 105 95 - 110 mmol/L Final     CO2   Date Value Ref Range Status   01/26/2022 24 23 - 29 mmol/L Final     Glucose   Date Value Ref Range Status   01/26/2022 173 (H) 70 - 110 mg/dL Final     BUN   Date Value Ref Range Status   01/26/2022 11 6 - 20 mg/dL Final     Creatinine   Date Value Ref Range Status   01/26/2022 0.7 0.5 - 1.4 mg/dL Final     Calcium   Date Value Ref Range Status   01/26/2022 9.7 8.7 - 10.5 mg/dL Final     Total Protein   Date Value Ref Range Status   01/26/2022 6.5 6.0 - 8.4 g/dL Final     Albumin   Date Value Ref Range Status   01/26/2022 3.7 3.5 - 5.2 g/dL Final     Total Bilirubin   Date Value Ref Range Status   01/26/2022 0.6 0.1 - 1.0 mg/dL Final     Comment:     For infants and newborns, interpretation of results should be based  on gestational age, weight and in agreement with clinical  observations.    Premature Infant recommended reference ranges:  Up to 24 hours.............<8.0 mg/dL  Up to 48 hours............<12.0 mg/dL  3-5 days..................<15.0 mg/dL  6-29 days.................<15.0 mg/dL       Alkaline Phosphatase   Date Value Ref Range Status   01/26/2022 99 55 - 135 U/L Final     AST   Date Value Ref Range Status   01/26/2022 22 10 - 40 U/L Final     ALT   Date Value Ref Range Status   01/26/2022 21 10 - 44 U/L Final     Anion Gap   Date Value Ref Range Status   01/26/2022 10 8 - 16 mmol/L Final     eGFR if    Date Value Ref Range Status   01/26/2022 >60 >60 mL/min/1.73 m^2 Final     eGFR if non    Date Value Ref Range Status   01/26/2022 >60 >60 mL/min/1.73 m^2 Final     Comment:     Calculation used to obtain the estimated glomerular filtration  rate (eGFR) is the CKD-EPI equation.        Lab Results   Component Value Date    HGBA1C 6.3 (H) 08/04/2021     No results found for: TSH          Tawana Staton MD   [facility]  Department of Internal Medicine    01/31/2022

## 2022-02-01 ENCOUNTER — OFFICE VISIT (OUTPATIENT)
Dept: PULMONOLOGY | Facility: CLINIC | Age: 57
End: 2022-02-01
Payer: COMMERCIAL

## 2022-02-01 VITALS
DIASTOLIC BLOOD PRESSURE: 84 MMHG | OXYGEN SATURATION: 92 % | BODY MASS INDEX: 33.5 KG/M2 | WEIGHT: 226.19 LBS | HEART RATE: 107 BPM | SYSTOLIC BLOOD PRESSURE: 130 MMHG | HEIGHT: 69 IN

## 2022-02-01 DIAGNOSIS — R91.1 LUNG NODULE: ICD-10-CM

## 2022-02-01 DIAGNOSIS — J20.9 ACUTE BRONCHITIS, UNSPECIFIED ORGANISM: ICD-10-CM

## 2022-02-01 DIAGNOSIS — R06.02 SHORTNESS OF BREATH: ICD-10-CM

## 2022-02-01 PROCEDURE — 99999 PR PBB SHADOW E&M-EST. PATIENT-LVL III: ICD-10-PCS | Mod: PBBFAC,,, | Performed by: INTERNAL MEDICINE

## 2022-02-01 PROCEDURE — 99203 OFFICE O/P NEW LOW 30 MIN: CPT | Mod: S$GLB,,, | Performed by: INTERNAL MEDICINE

## 2022-02-01 PROCEDURE — 99999 PR PBB SHADOW E&M-EST. PATIENT-LVL III: CPT | Mod: PBBFAC,,, | Performed by: INTERNAL MEDICINE

## 2022-02-01 PROCEDURE — 99203 PR OFFICE/OUTPT VISIT, NEW, LEVL III, 30-44 MIN: ICD-10-PCS | Mod: S$GLB,,, | Performed by: INTERNAL MEDICINE

## 2022-02-01 NOTE — PROGRESS NOTES
[facility] - 9th Floor Pulmonology [facility]    Subjective:       Patient ID: Nathalie Krueger is a 56 y.o. female.    Chief Complaint: Cough and fatigue for 1 week, improved    Ms. Nathalie Krueger is a 56 year old woman with a history of HTN, HLD, DM2 who presents to clinic today after being evaluated in the ED for fatigue and cough for a week. At that time, reportedly symptoms worsened over a week. Described the cough as nonproductive, associated with fatigue and limitations in her activity. Baseline she can perform ADLs without assistance, works, can exercise without limitations. She stated that she took 3 days of prednisone and PRN albuterol with improvement to her baseline. 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 She had a formal workup with PFTs done at that time, however no further imaging or workup had been done since. She stated those symptoms lasted for 1-2 days and she reportedly returned to baseline. Smoked for ~25 years on and off (total 10pack years). She quit prior to COVID.    She was able to bring her records from [place], reviewed her PFTs and discussed with her the results. Discussed repeating the PFTs as the diffusing capacity was moderately reduced and concerns for potential underlying process that can/is causing her any issues. Overall we discussed options and she is electing to pursue waiting if she becomes symptomatic again prior to workup (due to cost). We also discussed monitoring her for any symptoms, any signs of fatigue, decreased activity, cough, productive sputum which can be new and different which would prompt us to do further evaluation/workup due to her previous history and she agreed with the plan. Understands the risk of deferring PFTs at this time and that it may find something out sooner that can be treated or potentially reversible.    Review of Systems   Constitutional: Negative for  "fever, activity change, appetite change, fatigue and weakness.   HENT: Negative for sinus pressure, sore throat, trouble swallowing, voice change, congestion and hearing loss.    Eyes: Negative for redness and itching.   Respiratory: Negative for cough, choking, shortness of breath, wheezing and dyspnea on extertion.    Cardiovascular: Negative for chest pain, palpitations and leg swelling.   Genitourinary: Negative for difficulty urinating and hematuria.   Endocrine: Negative for cold intolerance and heat intolerance.    Musculoskeletal: Negative for arthralgias and back pain.   Skin: Negative for rash.   Gastrointestinal: Negative for nausea, abdominal pain and acid reflux.   Neurological: Negative for dizziness and headaches.   Hematological: Negative for adenopathy. Does not bruise/bleed easily and no excessive bruising.   Psychiatric/Behavioral: Negative for confusion and sleep disturbance. The patient is not nervous/anxious.        Objective:       Vitals:    02/01/22 1519   BP: 130/84   BP Location: Right arm   Patient Position: Sitting   Pulse: 107   SpO2: (!) 92%   Weight: 102.6 kg (226 lb 3.1 oz)   Height: 5' 9" (1.753 m)   Ambulated in clinic without issues, saturations maintained 93%    Physical Exam   Constitutional: She is oriented to person, place, and time. She appears well-developed and well-nourished. No distress.   HENT:   Head: Normocephalic.   Nose: Nose normal.   Mouth/Throat: Oropharynx is clear and moist. Normal dentition.   Neck: No thyromegaly present.   Cardiovascular: Normal rate, regular rhythm, normal heart sounds and intact distal pulses.   No murmur heard.  Pulmonary/Chest: Normal expansion, symmetric chest wall expansion, effort normal and breath sounds normal. She exhibits no tenderness.   Abdominal: Soft. Bowel sounds are normal. She exhibits no distension. There is no abdominal tenderness.   Musculoskeletal:         General: No edema. Normal range of motion.      Cervical back: " Normal range of motion and neck supple.   Lymphadenopathy: No supraclavicular adenopathy is present.     She has no cervical adenopathy.     She has no axillary adenopathy.   Neurological: She is alert and oriented to person, place, and time. She has normal reflexes. Gait normal.   Skin: Skin is warm and dry. No rash noted. She is not diaphoretic. No erythema.   Psychiatric: She has a normal mood and affect. Her behavior is normal. Judgment and thought content normal.     Personal Diagnostic Review  10/30/2020 PFT (performed at OSH)  FVC 2.91 (72%)  FEV1 2.51 (79%)  FEV1/FVC 86  TLC 4.48 (75%)  DLCO 17.07 (52%)    1/26/2022 CT Chest (PE Study)  1. No evidence of pulmonary thromboembolism or pulmonary infarction.  2. Bilateral mild nonspecific bronchiectasis without associated significant peribronchial thickening, which can be seen with recent or remote small airways process.  No consolidation.  3. Right upper lobe 3 mm ground-glass nodule.  For a ground glass nodule <6 mm, Fleischner Society 2017 guidelines recommend no routine follow up. However, suspicious features could warrant follow up with non-contrast chest CT at 2 years and 4 years after discovery.    I have personally reviewed the above labs and imaging      Assessment:       Ms. Nathalie Krueger is a 56 year old woman with a history of HTN, HLD, DM2, former tobacco use who presents to clinic today after being evaluated in the ED for fatigue and cough for a week, found to have questionable bronchiectasis and a 3mm nodule. She also reportedly had an episode similar to this after exposure to cleaning a chicken coop and had a formal workup done at that time which showed mild restriction and moderate reduction in diffusing capacity. Currently feeling asymptomatic.    Plan:       #Acute Bronchiectasis with dyspnea  #History of moderately reduced diffusing capacity and mild restriction on PFTs  - Reportedly 2 episodes of dyspnea, nonproductive sputum, and fatigue.  Once occurring in 2020 after exposure to cleaning a chicken coop/corn harvesting season, symptoms lasted ~1-2 days without needing intervention. Most recently was 1/2022 (a week prior) where she endorsed progressive dyspnea for a week that improved with 3 days of prednisone  - Reviewed PFT and noted mild restriction with moderate DLCO, noted that this was obtained when she was asymptomatic and a week after her 2020 incident. These findings could be seen in patients with CTEPH, early fibrosis/ILD, emphysema  - Recommended repeating 6MWT, PFT however patient declined at this time. Expressed concerns with cost, we discussed that understanding that in her situation she is currently asymptomatic --- ideally it would be the best time to obtain these tests when she is at her baseline. Discussed that re-evaluation can potentially provide us some answers in regards to her symptoms and ultimately can provide us the opportunity for early intervention. She is aware, wants to defer unless she becomes symptomatic again. Expressed that if she were to have any symptomatology at all such as fatigue, dyspnea, cough, decreased activity or not able to do as much from her baseline, please notify us.  - Will plan to obtain PFT and 6MWT, potentially ECHO if this is unrevealing if she is to return to clinic or request workup.    #Pulmonary Nodule  #Former Tobacco Use  - former smoker (quit in early 2020), smoked ~10 pack years (roughly over 25 years)   - CT imaging noted 3mm nodule, discussed repeat imaging in 1 year.    Nacho Swanson MD  Pulmonary & Critical Care Medicine Fellow

## 2022-02-04 ENCOUNTER — PATIENT OUTREACH (OUTPATIENT)
Dept: ADMINISTRATIVE | Facility: HOSPITAL | Age: 57
End: 2022-02-04
Payer: COMMERCIAL

## 2022-03-16 ENCOUNTER — PATIENT MESSAGE (OUTPATIENT)
Dept: ADMINISTRATIVE | Facility: HOSPITAL | Age: 57
End: 2022-03-16
Payer: COMMERCIAL

## 2022-04-04 ENCOUNTER — PATIENT MESSAGE (OUTPATIENT)
Dept: INTERNAL MEDICINE | Facility: CLINIC | Age: 57
End: 2022-04-04
Payer: COMMERCIAL

## 2022-04-04 RX ORDER — ALBUTEROL SULFATE 90 UG/1
1-2 AEROSOL, METERED RESPIRATORY (INHALATION) EVERY 4 HOURS PRN
Qty: 6.7 G | Refills: 1 | Status: SHIPPED | OUTPATIENT
Start: 2022-04-04 | End: 2022-05-06

## 2022-04-04 RX ORDER — ALBUTEROL SULFATE 90 UG/1
1-2 AEROSOL, METERED RESPIRATORY (INHALATION) EVERY 4 HOURS PRN
Qty: 6.7 G | Refills: 1 | OUTPATIENT
Start: 2022-04-04 | End: 2022-04-04 | Stop reason: SDUPTHER

## 2022-04-04 NOTE — TELEPHONE ENCOUNTER
No new care gaps identified.  Powered by [employer] Reference number: [id number].   4/04/2022 8:50:07 AM CDT

## 2022-05-26 ENCOUNTER — TELEPHONE (OUTPATIENT)
Dept: INTERNAL MEDICINE | Facility: CLINIC | Age: 57
End: 2022-05-26
Payer: COMMERCIAL

## 2022-05-26 VITALS — SYSTOLIC BLOOD PRESSURE: 134 MMHG | DIASTOLIC BLOOD PRESSURE: 80 MMHG

## 2022-08-01 ENCOUNTER — PATIENT MESSAGE (OUTPATIENT)
Dept: INTERNAL MEDICINE | Facility: CLINIC | Age: 57
End: 2022-08-01
Payer: COMMERCIAL

## 2022-08-01 ENCOUNTER — TELEPHONE (OUTPATIENT)
Dept: PULMONOLOGY | Facility: CLINIC | Age: 57
End: 2022-08-01
Payer: COMMERCIAL

## 2022-08-01 NOTE — TELEPHONE ENCOUNTER
----- Message from Joshua Churchill sent at 8/1/2022 10:22 AM CDT -----  Type: Patient Call Back    Who called:Self    What is the request in detail: Pt is requesting to be seen with Dr. Rodas. Pt was informed that Dr. Rodas does not have any availability. Pt would like to speak with nurse. Please call. Pt states it is not covid related.     Can the clinic reply by [facility]? no    Would the patient rather a call back or a response via [facility]? Call back    Best call back number: [phone] (home)

## 2022-08-01 NOTE — TELEPHONE ENCOUNTER
I spoke with patient to let her know at this time I do not have any availability for Dr Rodas. I can follow up with patient once September, 2022 calendar is available and/or if Dr Rodas has any cancellations. Patient verbalized understanding.

## 2022-08-02 RX ORDER — METFORMIN HYDROCHLORIDE 500 MG/1
500 TABLET ORAL
Qty: 90 TABLET | Refills: 3 | Status: SHIPPED | OUTPATIENT
Start: 2022-08-02 | End: 2022-09-16 | Stop reason: SDUPTHER

## 2022-08-02 RX ORDER — METFORMIN HYDROCHLORIDE 500 MG/1
TABLET ORAL
Qty: 90 TABLET | Refills: 3 | OUTPATIENT
Start: 2022-08-02

## 2022-08-02 NOTE — TELEPHONE ENCOUNTER
No new care gaps identified.  [facility] Embedded Care Gaps. Reference number: [id number]. 8/02/2022   8:39:40 AM CDT

## 2022-08-02 NOTE — TELEPHONE ENCOUNTER
Refill Decision Note   Nathalie Krueger  is requesting a refill authorization.  Brief Assessment and Rationale for Refill:  Quick Discontinue     Medication Therapy Plan:  ORDERED 8/2/22    Medication Reconciliation Completed: No   Comments:     No Care Gaps recommended.     Note composed:8:55 AM 08/02/2022

## 2022-08-04 ENCOUNTER — PATIENT MESSAGE (OUTPATIENT)
Dept: INTERNAL MEDICINE | Facility: CLINIC | Age: 57
End: 2022-08-04
Payer: COMMERCIAL

## 2022-08-08 ENCOUNTER — PATIENT MESSAGE (OUTPATIENT)
Dept: PULMONOLOGY | Facility: CLINIC | Age: 57
End: 2022-08-08

## 2022-08-08 ENCOUNTER — PATIENT MESSAGE (OUTPATIENT)
Dept: INTERNAL MEDICINE | Facility: CLINIC | Age: 57
End: 2022-08-08
Payer: COMMERCIAL

## 2022-08-08 ENCOUNTER — OFFICE VISIT (OUTPATIENT)
Dept: PULMONOLOGY | Facility: CLINIC | Age: 57
End: 2022-08-08
Payer: COMMERCIAL

## 2022-08-08 VITALS
BODY MASS INDEX: 32.92 KG/M2 | HEIGHT: 69 IN | DIASTOLIC BLOOD PRESSURE: 86 MMHG | WEIGHT: 222.25 LBS | SYSTOLIC BLOOD PRESSURE: 119 MMHG | OXYGEN SATURATION: 87 % | HEART RATE: 91 BPM

## 2022-08-08 DIAGNOSIS — J47.1 BRONCHIECTASIS WITH ACUTE EXACERBATION: Primary | ICD-10-CM

## 2022-08-08 DIAGNOSIS — G47.30 SLEEP-RELATED BREATHING DISORDER: ICD-10-CM

## 2022-08-08 DIAGNOSIS — K21.9 GASTROESOPHAGEAL REFLUX DISEASE, UNSPECIFIED WHETHER ESOPHAGITIS PRESENT: ICD-10-CM

## 2022-08-08 DIAGNOSIS — J96.11 CHRONIC RESPIRATORY FAILURE WITH HYPOXIA: ICD-10-CM

## 2022-08-08 PROCEDURE — 3079F DIAST BP 80-89 MM HG: CPT | Mod: CPTII,S$GLB,, | Performed by: NURSE PRACTITIONER

## 2022-08-08 PROCEDURE — 1159F PR MEDICATION LIST DOCUMENTED IN MEDICAL RECORD: ICD-10-PCS | Mod: CPTII,S$GLB,, | Performed by: NURSE PRACTITIONER

## 2022-08-08 PROCEDURE — 3044F HG A1C LEVEL LT 7.0%: CPT | Mod: CPTII,S$GLB,, | Performed by: NURSE PRACTITIONER

## 2022-08-08 PROCEDURE — 3074F SYST BP LT 130 MM HG: CPT | Mod: CPTII,S$GLB,, | Performed by: NURSE PRACTITIONER

## 2022-08-08 PROCEDURE — 3079F PR MOST RECENT DIASTOLIC BLOOD PRESSURE 80-89 MM HG: ICD-10-PCS | Mod: CPTII,S$GLB,, | Performed by: NURSE PRACTITIONER

## 2022-08-08 PROCEDURE — 1160F RVW MEDS BY RX/DR IN RCRD: CPT | Mod: CPTII,S$GLB,, | Performed by: NURSE PRACTITIONER

## 2022-08-08 PROCEDURE — 3008F BODY MASS INDEX DOCD: CPT | Mod: CPTII,S$GLB,, | Performed by: NURSE PRACTITIONER

## 2022-08-08 PROCEDURE — 3061F NEG MICROALBUMINURIA REV: CPT | Mod: CPTII,S$GLB,, | Performed by: NURSE PRACTITIONER

## 2022-08-08 PROCEDURE — 99999 PR PBB SHADOW E&M-EST. PATIENT-LVL IV: CPT | Mod: PBBFAC,,, | Performed by: NURSE PRACTITIONER

## 2022-08-08 PROCEDURE — 3066F PR DOCUMENTATION OF TREATMENT FOR NEPHROPATHY: ICD-10-PCS | Mod: CPTII,S$GLB,, | Performed by: NURSE PRACTITIONER

## 2022-08-08 PROCEDURE — 3061F PR NEG MICROALBUMINURIA RESULT DOCUMENTED/REVIEW: ICD-10-PCS | Mod: CPTII,S$GLB,, | Performed by: NURSE PRACTITIONER

## 2022-08-08 PROCEDURE — 3044F PR MOST RECENT HEMOGLOBIN A1C LEVEL <7.0%: ICD-10-PCS | Mod: CPTII,S$GLB,, | Performed by: NURSE PRACTITIONER

## 2022-08-08 PROCEDURE — 3072F PR LOW RISK FOR RETINOPATHY: ICD-10-PCS | Mod: CPTII,S$GLB,, | Performed by: NURSE PRACTITIONER

## 2022-08-08 PROCEDURE — 1159F MED LIST DOCD IN RCRD: CPT | Mod: CPTII,S$GLB,, | Performed by: NURSE PRACTITIONER

## 2022-08-08 PROCEDURE — 99214 PR OFFICE/OUTPT VISIT, EST, LEVL IV, 30-39 MIN: ICD-10-PCS | Mod: S$GLB,,, | Performed by: NURSE PRACTITIONER

## 2022-08-08 PROCEDURE — 1160F PR REVIEW ALL MEDS BY PRESCRIBER/CLIN PHARMACIST DOCUMENTED: ICD-10-PCS | Mod: CPTII,S$GLB,, | Performed by: NURSE PRACTITIONER

## 2022-08-08 PROCEDURE — 99999 PR PBB SHADOW E&M-EST. PATIENT-LVL IV: ICD-10-PCS | Mod: PBBFAC,,, | Performed by: NURSE PRACTITIONER

## 2022-08-08 PROCEDURE — 99214 OFFICE O/P EST MOD 30 MIN: CPT | Mod: S$GLB,,, | Performed by: NURSE PRACTITIONER

## 2022-08-08 PROCEDURE — 3074F PR MOST RECENT SYSTOLIC BLOOD PRESSURE < 130 MM HG: ICD-10-PCS | Mod: CPTII,S$GLB,, | Performed by: NURSE PRACTITIONER

## 2022-08-08 PROCEDURE — 3008F PR BODY MASS INDEX (BMI) DOCUMENTED: ICD-10-PCS | Mod: CPTII,S$GLB,, | Performed by: NURSE PRACTITIONER

## 2022-08-08 PROCEDURE — 3072F LOW RISK FOR RETINOPATHY: CPT | Mod: CPTII,S$GLB,, | Performed by: NURSE PRACTITIONER

## 2022-08-08 PROCEDURE — 3066F NEPHROPATHY DOC TX: CPT | Mod: CPTII,S$GLB,, | Performed by: NURSE PRACTITIONER

## 2022-08-08 RX ORDER — PREDNISONE 50 MG/1
50 TABLET ORAL DAILY
Qty: 5 TABLET | Refills: 0 | Status: SHIPPED | OUTPATIENT
Start: 2022-08-08 | End: 2022-08-29

## 2022-08-08 RX ORDER — DOXYCYCLINE 100 MG/1
100 CAPSULE ORAL EVERY 12 HOURS
Qty: 20 CAPSULE | Refills: 0 | Status: SHIPPED | OUTPATIENT
Start: 2022-08-08 | End: 2022-08-18

## 2022-08-08 RX ORDER — SODIUM CHLORIDE FOR INHALATION 3 %
VIAL, NEBULIZER (ML) INHALATION
Qty: 360 ML | Refills: 3 | Status: SHIPPED | OUTPATIENT
Start: 2022-08-08 | End: 2022-10-02

## 2022-08-08 RX ORDER — OMEPRAZOLE 20 MG/1
20 CAPSULE, DELAYED RELEASE ORAL 2 TIMES DAILY
Qty: 60 CAPSULE | Refills: 5 | Status: SHIPPED | OUTPATIENT
Start: 2022-08-08 | End: 2022-08-30

## 2022-08-08 RX ORDER — IPRATROPIUM BROMIDE AND ALBUTEROL SULFATE 2.5; .5 MG/3ML; MG/3ML
3 SOLUTION RESPIRATORY (INHALATION) 2 TIMES DAILY
Qty: 540 ML | Refills: 1 | Status: SHIPPED | OUTPATIENT
Start: 2022-08-08 | End: 2024-01-05

## 2022-08-08 NOTE — PROGRESS NOTES
CHIEF COMPLAINT:    Chief Complaint   Patient presents with    bad cough       HISTORY OF PRESENT ILLNESS: Nathalie Krueger is a 57 y.o. female with  has a past medical history of Bronchiectasis, Diabetes mellitus, type 2, GERD (gastroesophageal reflux disease), and Hypertension. is here for pulmonary evaluation. Patient with symptoms of progressive worsening cough and shortness of breath on exertion and when she gets hot.  Symptoms was noted January 2022 and has since progressed.  She reports a feeling of something in her upper chest that she cannot get out.  No prior history of lung problem except for isolated brief episode that occurred 2-3 years ago when she helped family clean out a chicken coop. Cough and SHANNON has been worsening past 1 month.    SpO2 93% rest on RA. Patient was walked in the clinic for 6 minutes with SpO2 oxygen desaturation  lowest 83% improved 90% on 2 L NC during walk. Pt's oxygen greater 92% on RA at rest.     Hospitalization: ED 1/26/2022 improved following treatment with prednisone and nebulizer treatments, CTA without PE, nonspecific bronchiectasis  Past treatments:ventolin twice daily without significant improvement  Diuretics: no  Controller use:no  Occupation mentor [employer]      REVIEW OF SYSTEMS:   Review of Systems   Constitutional: Negative for fatigue.   HENT: Positive for congestion (flonase prn helps).    Respiratory: Positive for snoring (wake up snorting when on back), cough (wake up at night during sleep, few times a week), chest tightness (upper chest), shortness of breath (warm must have air), wheezing (tslking wheezing), dyspnea on extertion (walk, warm, 1 flight of steps winded and coughing) and use of rescue inhaler (no sig improvement twice daily). Negative for somnolence and Paroxysmal Nocturnal Dyspnea.         Restricted when cough upper sternum   Cardiovascular: Positive for leg swelling (ankles rare). Negative for chest pain and palpitations.    Gastrointestinal: Positive for acid reflux (raere tums).   Neurological: Positive for headaches (rare).   Psychiatric/Behavioral: Positive for sleep disturbance (sleep mostly through night, rested).         DATA  PERSONALLY REVIEWED:    PFT 10/30/2020: ratio 86 fev1 2.51 (79) fvc 2.91 (72) tlc 4.48 (75) dl/va 4.13 (76%) dlco 17 (52%)     CTA chest 1/26/2022:Heart is mildly enlarged without significant pericardial fluid.  No cardiac thrombus seen.     Proximal airways are patent.  The lungs are well expanded.  Minimal dependent atelectasis.  No consolidation, pleural effusion or pneumothorax.  There is a 3 mm ground-glass nodule within the lateral aspect of the right upper lobe.  There is mild bronchiectasis noted bilaterally without significant peribronchial thickening, which can be seen with recent or remote small airways process.  Small pulmonary cyst within the superior segment right lower lobe and suspected small pulmonary cyst within the left lower lobe.    Sleep study:NA    ECHO:NA    Cardiac stress:NA    Labs:  Eos:0.3%      PAST MEDICAL HISTORY:    Active Ambulatory Problems     Diagnosis Date Noted    Adenomatous polyp of ascending colon 06/22/2020    Essential hypertension 06/22/2020    Other hyperlipidemia 06/22/2020    Type 2 diabetes mellitus without complication, without long-term current use of insulin 06/22/2020    Lung nodule 02/01/2022    Acute bronchitis 02/01/2022    Shortness of breath 02/01/2022    Chronic respiratory failure with hypoxia 08/09/2022    Bronchiectasis with acute exacerbation 08/09/2022    Gastroesophageal reflux disease 08/09/2022    Sleep-related breathing disorder 08/09/2022     Resolved Ambulatory Problems     Diagnosis Date Noted    No Resolved Ambulatory Problems     Past Medical History:   Diagnosis Date    Bronchiectasis     Diabetes mellitus, type 2     GERD (gastroesophageal reflux disease)     Hypertension                 PAST SURGICAL HISTORY:     Past Surgical History:   Procedure Laterality Date    ABLATION, FIBROID, UTERUS, LAPAROSCOPIC, WITH US GUIDANCE      APPENDECTOMY  1982    right rotator cuff  1998    TONSILLECTOMY  1977         FAMILY HISTORY:                Family History   Problem Relation Age of Onset    Diabetes Mother     Hypertension Mother     Heart disease Mother     Diabetes Father        SOCIAL HISTORY:          Tobacco:   Social History     Tobacco Use   Smoking Status Never Smoker   Smokeless Tobacco Never Used       alcohol use:    Social History     Substance and Sexual Activity   Alcohol Use Never                   ALLERGIES:    Review of patient's allergies indicates:   Allergen Reactions    Atorvastatin Other (See Comments)     Dry cough      Lisinopril      Other reaction(s): Cough    Meperidine hcl Nausea Only       CURRENT MEDICATIONS:    Current Outpatient Medications   Medication Sig Dispense Refill    albuterol (PROVENTIL/VENTOLIN HFA) 90 mcg/actuation inhaler Inhale 1-2 puffs into the lungs every 4 (four) hours as needed for Wheezing or Shortness of Breath. Rescue 54 g 5    carvediloL (COREG) 25 MG tablet Take 1 tablet (25 mg total) by mouth 2 (two) times daily with meals. 180 tablet 3    coenzyme Q10 100 mg capsule Take 200 mg by mouth.      ibuprofen (ADVIL,MOTRIN) 200 MG tablet Take 200 mg by mouth.      metFORMIN (GLUCOPHAGE) 500 MG tablet Take 1 tablet (500 mg total) by mouth daily with breakfast. 90 tablet 3    multivitamin capsule Take 1 capsule by mouth once daily.      rosuvastatin (CRESTOR) 5 MG tablet Take 1 tablet (5 mg total) by mouth once daily. 90 tablet 3    semaglutide (OZEMPIC) 1 mg/dose (2 mg/1.5 mL) PnIj Inject 1 mg into the skin every 7 days. 2 pen 11    albuterol-ipratropium (DUO-NEB) 2.5 mg-0.5 mg/3 mL nebulizer solution Take 3 mLs by nebulization 2 (two) times daily. Rescue 540 mL 1    doxycycline (VIBRAMYCIN) 100 MG Cap Take 1 capsule (100 mg total) by mouth every 12 (twelve)  "hours. for 10 days 20 capsule 0    omeprazole (PRILOSEC) 20 MG capsule Take 1 capsule (20 mg total) by mouth 2 (two) times daily. 60 capsule 5    predniSONE (DELTASONE) 50 MG Tab Take 1 tablet (50 mg total) by mouth once daily. for 5 days 5 tablet 0    sodium chloride 3% 3 % nebulizer solution 3 cc of 3% saline into nebs twice daily prn 360 mL 3     No current facility-administered medications for this visit.                       PHYSICAL EXAM:  Vitals:    08/08/22 1126   BP: 119/86   Pulse: 91   SpO2: (!) 87% after walking to clnic, 90s at rest on RA   Weight: 100.8 kg (222 lb 3.6 oz)   Height: 5' 9" (1.753 m)   PainSc: 0-No pain     Body mass index is 32.82 kg/m².   Physical Exam   Constitutional: She is oriented to person, place, and time. She appears well-developed. No distress. She is obese.   HENT:   Head: Normocephalic.   Cardiovascular: Normal rate, regular rhythm and normal heart sounds.   Pulmonary/Chest: Normal expansion, effort normal and breath sounds normal. No respiratory distress.   Musculoskeletal:         General: No edema.      Cervical back: Neck supple.   Neurological: She is alert and oriented to person, place, and time. Gait normal.   Skin: Skin is warm. She is not diaphoretic.   Psychiatric: She has a normal mood and affect. Her behavior is normal. Judgment and thought content normal.            No results found for: TSH   Lab Results   Component Value Date    WBC 7.71 01/26/2022    HGB 14.0 01/26/2022    HCT 41.9 01/26/2022    MCV 92 01/26/2022     01/26/2022     BMP  Lab Results   Component Value Date     01/26/2022    K 3.9 01/26/2022     01/26/2022    CO2 24 01/26/2022    BUN 11 01/26/2022    CREATININE 0.7 01/26/2022    CALCIUM 9.7 01/26/2022    ANIONGAP 10 01/26/2022    ESTGFRAFRICA >60 01/26/2022    EGFRNONAA >60 01/26/2022     Lab Results   Component Value Date    HGBA1C 6.5 (H) 01/31/2022        ASSESSMENT    ICD-10-CM ICD-9-CM    1. Bronchiectasis with acute " exacerbation  J47.1 494.1 predniSONE (DELTASONE) 50 MG Tab      doxycycline (VIBRAMYCIN) 100 MG Cap      albuterol-ipratropium (DUO-NEB) 2.5 mg-0.5 mg/3 mL nebulizer solution      sodium chloride 3% 3 % nebulizer solution      NEBULIZER FOR HOME USE      OXYGEN FOR HOME USE      NEBULIZER KIT (SUPPLIES) FOR HOME USE    tx with prednisone  and doxy for acute exacerbation, f/u in 2 weeks  airway clearance- nebs follow by saline follow aerobika   2. Chronic respiratory failure with hypoxia  J96.11 518.83 OXYGEN FOR HOME USE     799.02    3. Gastroesophageal reflux disease, unspecified whether esophagitis present  K21.9 530.81 omeprazole (PRILOSEC) 20 MG capsule   4. Sleep-related breathing disorder  G47.30 780.59 Polysomnogram (CPAP will be added if patient meets diagnostic criteria.)       PLAN:    Problem List Items Addressed This Visit        Unprioritized    Bronchiectasis with acute exacerbation - Primary    Overview     tx with prednisone  and doxy for acute exacerbation, f/u in 2 weeks  airway clearance- nebs follow by saline follow aerobika           Relevant Medications    predniSONE (DELTASONE) 50 MG Tab    doxycycline (VIBRAMYCIN) 100 MG Cap    albuterol-ipratropium (DUO-NEB) 2.5 mg-0.5 mg/3 mL nebulizer solution    sodium chloride 3% 3 % nebulizer solution    Other Relevant Orders    NEBULIZER FOR HOME USE    OXYGEN FOR HOME USE    NEBULIZER KIT (SUPPLIES) FOR HOME USE    Chronic respiratory failure with hypoxia    Overview     Patient was walked in the clinic for 6 minutes with SpO2 oxygen desaturation  lowest 83% improved 90% on 2 L NC during walk. Pt's oxygen greater 92% on RA at rest.              Relevant Orders    OXYGEN FOR HOME USE    Gastroesophageal reflux disease    Overview     Recommend PPI, weight loss, avoid eating close to bedtime, laying down after eating, raising HOB, eval ORLIN, diet modification,  on potential RAD trigger. Consider GI or ENT workup           Relevant Medications     omeprazole (PRILOSEC) 20 MG capsule    Sleep-related breathing disorder    Overview     Per wife, patient snores and snorts, coughs during the night, recommend in lab sleep evaluation           Relevant Orders    Polysomnogram (CPAP will be added if patient meets diagnostic criteria.)          Patient will Follow up in about 2 weeks (around 8/22/2022), or if symptoms worsen or fail to improve in 3-5 days.

## 2022-08-09 ENCOUNTER — TELEPHONE (OUTPATIENT)
Dept: INTERNAL MEDICINE | Facility: CLINIC | Age: 57
End: 2022-08-09
Payer: COMMERCIAL

## 2022-08-09 PROBLEM — J96.11 CHRONIC RESPIRATORY FAILURE WITH HYPOXIA: Status: ACTIVE | Noted: 2022-08-09

## 2022-08-09 PROBLEM — G47.30 SLEEP-RELATED BREATHING DISORDER: Status: ACTIVE | Noted: 2022-08-09

## 2022-08-09 PROBLEM — K21.9 GASTROESOPHAGEAL REFLUX DISEASE: Status: ACTIVE | Noted: 2022-08-09

## 2022-08-09 PROBLEM — J47.1 BRONCHIECTASIS WITH ACUTE EXACERBATION: Status: ACTIVE | Noted: 2022-08-09

## 2022-08-09 NOTE — TELEPHONE ENCOUNTER
----- Message from Bhavesh Bynum sent at 8/9/2022  2:47 PM CDT -----  Contact: Patient  The pt called and would like to schedule an appt     She was referred by Dr. Staton    The pt can be reached at [phone]

## 2022-08-10 DIAGNOSIS — E11.9 TYPE 2 DIABETES MELLITUS WITHOUT COMPLICATION: ICD-10-CM

## 2022-08-11 ENCOUNTER — PATIENT MESSAGE (OUTPATIENT)
Dept: PULMONOLOGY | Facility: CLINIC | Age: 57
End: 2022-08-11
Payer: COMMERCIAL

## 2022-08-12 ENCOUNTER — PATIENT MESSAGE (OUTPATIENT)
Dept: PULMONOLOGY | Facility: CLINIC | Age: 57
End: 2022-08-12
Payer: COMMERCIAL

## 2022-08-15 ENCOUNTER — TELEPHONE (OUTPATIENT)
Dept: SLEEP MEDICINE | Facility: OTHER | Age: 57
End: 2022-08-15
Payer: COMMERCIAL

## 2022-08-17 ENCOUNTER — PATIENT MESSAGE (OUTPATIENT)
Dept: PULMONOLOGY | Facility: CLINIC | Age: 57
End: 2022-08-17
Payer: COMMERCIAL

## 2022-08-19 ENCOUNTER — TELEPHONE (OUTPATIENT)
Dept: PULMONOLOGY | Facility: CLINIC | Age: 57
End: 2022-08-19
Payer: COMMERCIAL

## 2022-08-19 NOTE — TELEPHONE ENCOUNTER
Spoke with patient about this matter already.                  ----- Message from Radha Teresa MA sent at 8/16/2022 11:11 AM CDT -----  Farz[facility] Monzon Staff  Caller: Unspecified (Today, 11:07 AM)  .Type: Patient Call Back     Who called: Kayleen     What is the request in detail: needs info regarding oxygen and nebulizer prior auth. If pre auth is required please call number below. Can we reach out to the patient and let her know status [phone] Nathalie. Please call     [facility]     Would the patient rather a call back or a response via [facility]?  Call     Best call back number: [phone]

## 2022-08-22 ENCOUNTER — TELEPHONE (OUTPATIENT)
Dept: SLEEP MEDICINE | Facility: OTHER | Age: 57
End: 2022-08-22
Payer: COMMERCIAL

## 2022-08-22 ENCOUNTER — OFFICE VISIT (OUTPATIENT)
Dept: PULMONOLOGY | Facility: CLINIC | Age: 57
End: 2022-08-22
Payer: COMMERCIAL

## 2022-08-22 VITALS
DIASTOLIC BLOOD PRESSURE: 110 MMHG | BODY MASS INDEX: 33.39 KG/M2 | HEIGHT: 69 IN | OXYGEN SATURATION: 90 % | HEART RATE: 112 BPM | SYSTOLIC BLOOD PRESSURE: 163 MMHG | WEIGHT: 225.44 LBS

## 2022-08-22 DIAGNOSIS — R06.02 SOBOE (SHORTNESS OF BREATH ON EXERTION): ICD-10-CM

## 2022-08-22 DIAGNOSIS — J47.9 BRONCHIECTASIS WITHOUT COMPLICATION: ICD-10-CM

## 2022-08-22 DIAGNOSIS — J96.11 CHRONIC RESPIRATORY FAILURE WITH HYPOXIA: Primary | ICD-10-CM

## 2022-08-22 PROCEDURE — 3066F PR DOCUMENTATION OF TREATMENT FOR NEPHROPATHY: ICD-10-PCS | Mod: CPTII,S$GLB,, | Performed by: NURSE PRACTITIONER

## 2022-08-22 PROCEDURE — 3072F LOW RISK FOR RETINOPATHY: CPT | Mod: CPTII,S$GLB,, | Performed by: NURSE PRACTITIONER

## 2022-08-22 PROCEDURE — 3008F PR BODY MASS INDEX (BMI) DOCUMENTED: ICD-10-PCS | Mod: CPTII,S$GLB,, | Performed by: NURSE PRACTITIONER

## 2022-08-22 PROCEDURE — 3072F PR LOW RISK FOR RETINOPATHY: ICD-10-PCS | Mod: CPTII,S$GLB,, | Performed by: NURSE PRACTITIONER

## 2022-08-22 PROCEDURE — 1159F MED LIST DOCD IN RCRD: CPT | Mod: CPTII,S$GLB,, | Performed by: NURSE PRACTITIONER

## 2022-08-22 PROCEDURE — 3077F SYST BP >= 140 MM HG: CPT | Mod: CPTII,S$GLB,, | Performed by: NURSE PRACTITIONER

## 2022-08-22 PROCEDURE — 99214 OFFICE O/P EST MOD 30 MIN: CPT | Mod: S$GLB,,, | Performed by: NURSE PRACTITIONER

## 2022-08-22 PROCEDURE — 3044F PR MOST RECENT HEMOGLOBIN A1C LEVEL <7.0%: ICD-10-PCS | Mod: CPTII,S$GLB,, | Performed by: NURSE PRACTITIONER

## 2022-08-22 PROCEDURE — 3061F PR NEG MICROALBUMINURIA RESULT DOCUMENTED/REVIEW: ICD-10-PCS | Mod: CPTII,S$GLB,, | Performed by: NURSE PRACTITIONER

## 2022-08-22 PROCEDURE — 99999 PR PBB SHADOW E&M-EST. PATIENT-LVL IV: CPT | Mod: PBBFAC,,, | Performed by: NURSE PRACTITIONER

## 2022-08-22 PROCEDURE — 3080F DIAST BP >= 90 MM HG: CPT | Mod: CPTII,S$GLB,, | Performed by: NURSE PRACTITIONER

## 2022-08-22 PROCEDURE — 99214 PR OFFICE/OUTPT VISIT, EST, LEVL IV, 30-39 MIN: ICD-10-PCS | Mod: S$GLB,,, | Performed by: NURSE PRACTITIONER

## 2022-08-22 PROCEDURE — 3044F HG A1C LEVEL LT 7.0%: CPT | Mod: CPTII,S$GLB,, | Performed by: NURSE PRACTITIONER

## 2022-08-22 PROCEDURE — 3066F NEPHROPATHY DOC TX: CPT | Mod: CPTII,S$GLB,, | Performed by: NURSE PRACTITIONER

## 2022-08-22 PROCEDURE — 3061F NEG MICROALBUMINURIA REV: CPT | Mod: CPTII,S$GLB,, | Performed by: NURSE PRACTITIONER

## 2022-08-22 PROCEDURE — 3077F PR MOST RECENT SYSTOLIC BLOOD PRESSURE >= 140 MM HG: ICD-10-PCS | Mod: CPTII,S$GLB,, | Performed by: NURSE PRACTITIONER

## 2022-08-22 PROCEDURE — 1159F PR MEDICATION LIST DOCUMENTED IN MEDICAL RECORD: ICD-10-PCS | Mod: CPTII,S$GLB,, | Performed by: NURSE PRACTITIONER

## 2022-08-22 PROCEDURE — 3080F PR MOST RECENT DIASTOLIC BLOOD PRESSURE >= 90 MM HG: ICD-10-PCS | Mod: CPTII,S$GLB,, | Performed by: NURSE PRACTITIONER

## 2022-08-22 PROCEDURE — 99999 PR PBB SHADOW E&M-EST. PATIENT-LVL IV: ICD-10-PCS | Mod: PBBFAC,,, | Performed by: NURSE PRACTITIONER

## 2022-08-22 PROCEDURE — 3008F BODY MASS INDEX DOCD: CPT | Mod: CPTII,S$GLB,, | Performed by: NURSE PRACTITIONER

## 2022-08-22 RX ORDER — FLUTICASONE PROPIONATE 110 UG/1
1 AEROSOL, METERED RESPIRATORY (INHALATION) 2 TIMES DAILY
Qty: 12 G | Refills: 5 | Status: SHIPPED | OUTPATIENT
Start: 2022-08-22 | End: 2022-09-15

## 2022-08-22 NOTE — PROGRESS NOTES
CHIEF COMPLAINT:    Chief Complaint   Patient presents with    Bronchiectasis       HISTORY OF PRESENT ILLNESS: Nathalie Krueger is a 57 y.o. female with  has a past medical history of Bronchiectasis, Diabetes mellitus, type 2, GERD (gastroesophageal reflux disease), and Hypertension. is here for follow up SOB.  Following our last visit on 8/8/2022, patient reports significant symptom improvement after taking the prednisone.    Patient with symptoms of progressive worsening cough and shortness of breath on exertion and when she gets hot.  Symptoms was noted January 2022 and has since progressed.  She reports a feeling of something in her upper chest that she cannot get out.  No prior history of lung problem except for isolated brief episode that occurred 2-3 years ago when she helped family clean out a chicken coop. Cough and SHANNON has been worsening past 1 month.  She has been walking on a treadmill for 30 minutes a day in gets a bit sweaty.  She was walk today for 6 minutes and her oxygen  continued to desaturate after a couple minutes to 87 %.  Her oxygen is 95% at rest.  She will obtain her home oxygen tomorrow.  She has received her nebulizer and does find that it is helpful.  She has used her albuterol MDI twice a week and now finds it helpful as well.  Reports a lot of productive cough which was dry prior to her treatment    Hospitalization: ED 1/26/2022 improved following treatment with prednisone and nebulizer treatments, CTA without PE, nonspecific bronchiectasis  Past treatments:ventolin twice a week, helps  Diuretics: no  Controller use:no  Occupation [employer]      REVIEW OF SYSTEMS:   Review of Systems   Constitutional: Negative for fatigue.   HENT: Positive for sore throat and congestion (flonase prn helps).    Respiratory: Positive for snoring (wake up snorting when on back), cough (wake up at night during sleep, few times a week, coughingmore dry , wet productive), sputum production,  wheezing (tslking wheezing) and use of rescue inhaler (1-2 week which seems to help now). Negative for chest tightness, shortness of breath (warm must have air), dyspnea on extertion (walk, warm, 1 flight of steps winded and coughing, on treadmill starting sweat), somnolence and Paroxysmal Nocturnal Dyspnea.         Sore chest   Cardiovascular: Positive for leg swelling (ankles rare). Negative for chest pain and palpitations.   Gastrointestinal: Positive for acid reflux (raere tums).   Neurological: Positive for headaches (rare).   Psychiatric/Behavioral: Positive for sleep disturbance (sleep mostly through night, rested).         DATA  PERSONALLY REVIEWED:    PFT 10/30/2020: ratio 86 fev1 2.51 (79) fvc 2.91 (72) tlc 4.48 (75) dl/va 4.13 (76%) dlco 17 (52%)     CTA chest 1/26/2022:Heart is mildly enlarged without significant pericardial fluid.  No cardiac thrombus seen.     Proximal airways are patent.  The lungs are well expanded.  Minimal dependent atelectasis.  No consolidation, pleural effusion or pneumothorax.  There is a 3 mm ground-glass nodule within the lateral aspect of the right upper lobe.  There is mild bronchiectasis noted bilaterally without significant peribronchial thickening, which can be seen with recent or remote small airways process.  Small pulmonary cyst within the superior segment right lower lobe and suspected small pulmonary cyst within the left lower lobe.    Sleep study:NA    ECHO:NA    Cardiac stress:NA    Labs:  Eos:0.3%      PAST MEDICAL HISTORY:    Active Ambulatory Problems     Diagnosis Date Noted    Adenomatous polyp of ascending colon 06/22/2020    Essential hypertension 06/22/2020    Other hyperlipidemia 06/22/2020    Type 2 diabetes mellitus without complication, without long-term current use of insulin 06/22/2020    Lung nodule 02/01/2022    Acute bronchitis 02/01/2022    SOBOE (shortness of breath on exertion) 02/01/2022    Chronic respiratory failure with hypoxia  08/09/2022    Bronchiectasis without complication 08/09/2022    Gastroesophageal reflux disease 08/09/2022    Sleep-related breathing disorder 08/09/2022     Resolved Ambulatory Problems     Diagnosis Date Noted    No Resolved Ambulatory Problems     Past Medical History:   Diagnosis Date    Bronchiectasis     Diabetes mellitus, type 2     GERD (gastroesophageal reflux disease)     Hypertension                 PAST SURGICAL HISTORY:    Past Surgical History:   Procedure Laterality Date    ABLATION, FIBROID, UTERUS, LAPAROSCOPIC, WITH US GUIDANCE      APPENDECTOMY  1982    right rotator cuff  1998    TONSILLECTOMY  1977         FAMILY HISTORY:                Family History   Problem Relation Age of Onset    Diabetes Mother     Hypertension Mother     Heart disease Mother     Diabetes Father        SOCIAL HISTORY:          Tobacco:   Social History     Tobacco Use   Smoking Status Never Smoker   Smokeless Tobacco Never Used       alcohol use:    Social History     Substance and Sexual Activity   Alcohol Use Never                   ALLERGIES:    Review of patient's allergies indicates:   Allergen Reactions    Atorvastatin Other (See Comments)     Dry cough      Lisinopril      Other reaction(s): Cough    Meperidine hcl Nausea Only       CURRENT MEDICATIONS:    Current Outpatient Medications   Medication Sig Dispense Refill    albuterol (PROVENTIL/VENTOLIN HFA) 90 mcg/actuation inhaler Inhale 1-2 puffs into the lungs every 4 (four) hours as needed for Wheezing or Shortness of Breath. Rescue 54 g 5    albuterol-ipratropium (DUO-NEB) 2.5 mg-0.5 mg/3 mL nebulizer solution Take 3 mLs by nebulization 2 (two) times daily. Rescue 540 mL 1    carvediloL (COREG) 25 MG tablet Take 1 tablet (25 mg total) by mouth 2 (two) times daily with meals. 180 tablet 3    coenzyme Q10 100 mg capsule Take 200 mg by mouth.      ibuprofen (ADVIL,MOTRIN) 200 MG tablet Take 200 mg by mouth.      metFORMIN (GLUCOPHAGE) 500  "MG tablet Take 1 tablet (500 mg total) by mouth daily with breakfast. 90 tablet 3    multivitamin capsule Take 1 capsule by mouth once daily.      omeprazole (PRILOSEC) 20 MG capsule Take 1 capsule (20 mg total) by mouth 2 (two) times daily. 60 capsule 5    rosuvastatin (CRESTOR) 5 MG tablet Take 1 tablet (5 mg total) by mouth once daily. 90 tablet 3    semaglutide (OZEMPIC) 1 mg/dose (2 mg/1.5 mL) PnIj Inject 1 mg into the skin every 7 days. 2 pen 11    sodium chloride 3% 3 % nebulizer solution 3 cc of 3% saline into nebs twice daily prn 360 mL 3    fluticasone propionate (FLOVENT HFA) 110 mcg/actuation inhaler Inhale 1 puff into the lungs 2 (two) times daily. Controller 12 g 5     No current facility-administered medications for this visit.                       PHYSICAL EXAM:  Vitals:    08/22/22 1611   BP: (!) 163/110   Pulse: (!) 112   SpO2: (!) 90%   Weight: 102.3 kg (225 lb 6.7 oz)   Height: 5' 9" (1.753 m)     Body mass index is 33.29 kg/m².   Physical Exam   Constitutional: She is oriented to person, place, and time. She appears well-developed. No distress. She is obese.   HENT:   Head: Normocephalic.   Cardiovascular: Normal rate, regular rhythm and normal heart sounds.   Pulmonary/Chest: Normal expansion, effort normal and breath sounds normal. No respiratory distress.   Musculoskeletal:         General: No edema.      Cervical back: Neck supple.   Neurological: She is alert and oriented to person, place, and time. Gait normal.   Skin: Skin is warm. She is not diaphoretic.   Psychiatric: She has a normal mood and affect. Her behavior is normal. Judgment and thought content normal.        Walk 87%     No results found for: TSH   Lab Results   Component Value Date    WBC 7.71 01/26/2022    HGB 14.0 01/26/2022    HCT 41.9 01/26/2022    MCV 92 01/26/2022     01/26/2022     BMP  Lab Results   Component Value Date     01/26/2022    K 3.9 01/26/2022     01/26/2022    CO2 24 01/26/2022    " BUN 11 01/26/2022    CREATININE 0.7 01/26/2022    CALCIUM 9.7 01/26/2022    ANIONGAP 10 01/26/2022    ESTGFRAFRICA >60 01/26/2022    EGFRNONAA >60 01/26/2022     Lab Results   Component Value Date    HGBA1C 6.5 (H) 01/31/2022        ASSESSMENT    ICD-10-CM ICD-9-CM    1. Chronic respiratory failure with hypoxia  J96.11 518.83 CT Chest Without Contrast     799.02    2. SOBOE (shortness of breath on exertion)  R06.02 786.05 Echo      CT Chest Without Contrast   3. Bronchiectasis without complication  J47.9 494.0 Complete PFT with bronchodilator      CT Chest Without Contrast      fluticasone propionate (FLOVENT HFA) 110 mcg/actuation inhaler       PLAN:    Problem List Items Addressed This Visit        Unprioritized    Bronchiectasis without complication    Overview     Continue airway clearance- nebs follow by saline follow aerobika  flovent for maintenance tx           Relevant Medications    fluticasone propionate (FLOVENT HFA) 110 mcg/actuation inhaler    Other Relevant Orders    Complete PFT with bronchodilator    CT Chest Without Contrast    Chronic respiratory failure with hypoxia - Primary    Overview     Patient was walked in the clinic for 6 minutes with SpO2 oxygen desaturation  lowest 83% improved 90% on 2 L NC during walk. Pt's oxygen greater 92% on RA at rest.     Pt continues to desaturate on exertion. Recommend supplemental oxygen with activity             Relevant Orders    CT Chest Without Contrast    SOBOE (shortness of breath on exertion)    Overview     Will repeat CT to assess disease progression and obtain PFT and ECHO for further evaluation           Relevant Orders    Echo    CT Chest Without Contrast          Patient will Follow up for will contact you to return via [facility].

## 2022-08-23 ENCOUNTER — HOSPITAL ENCOUNTER (OUTPATIENT)
Dept: SLEEP MEDICINE | Facility: OTHER | Age: 57
Discharge: HOME | End: 2022-08-23
Payer: COMMERCIAL

## 2022-08-23 ENCOUNTER — TELEPHONE (OUTPATIENT)
Dept: SLEEP MEDICINE | Facility: OTHER | Age: 57
End: 2022-08-23
Payer: COMMERCIAL

## 2022-08-23 DIAGNOSIS — G47.30 SLEEP-RELATED BREATHING DISORDER: ICD-10-CM

## 2022-08-23 PROBLEM — J47.9 BRONCHIECTASIS WITHOUT COMPLICATION: Status: ACTIVE | Noted: 2022-08-09

## 2022-08-23 PROCEDURE — 95810 POLYSOM 6/> YRS 4/> PARAM: CPT | Mod: 26,,, | Performed by: INTERNAL MEDICINE

## 2022-08-23 PROCEDURE — 95810 PR POLYSOMNOGRAPHY, 4 OR MORE: ICD-10-PCS | Mod: 26,,, | Performed by: INTERNAL MEDICINE

## 2022-08-23 PROCEDURE — 95810 POLYSOM 6/> YRS 4/> PARAM: CPT

## 2022-08-24 NOTE — PROGRESS NOTES
Patient was educated about the purpose of the wires and what data was being collected.   Patient informed that the technician may need to enter the room during the night to fix leads or make adjustments to the equipment.              Follow up instructions were provided to the patient

## 2022-08-30 DIAGNOSIS — K21.9 GASTROESOPHAGEAL REFLUX DISEASE, UNSPECIFIED WHETHER ESOPHAGITIS PRESENT: ICD-10-CM

## 2022-08-30 RX ORDER — OMEPRAZOLE 20 MG/1
CAPSULE, DELAYED RELEASE ORAL
Qty: 90 CAPSULE | Refills: 1 | Status: SHIPPED | OUTPATIENT
Start: 2022-08-30 | End: 2022-10-04

## 2022-08-30 NOTE — PROCEDURES
"Dear Provider,     You have ordered sleep LAB services to perform the sleep study for Nathalie Krueger.  The sleep study that you ordered is complete.      Please find Sleep Study result in "Chart Review" under the "Media tab."      As the ordering provider, you are responsible for reviewing the results and implementing a treatment plan with your patient.    If you need a Sleep Medicine provider to explain the sleep study findings and arrange treatment for the patient, please refer patient for consultation to our Sleep Clinic via [facility] with Ambulatory Consult Sleep.    To do that please place an order for an  "Ambulatory Consult Sleep" - it will go to our clinic work queue for our Medical Assistant to contact the patient for an appointment.     For any questions, please contact our clinic staff at [phone] to talk to clinical staff.   "

## 2022-08-31 ENCOUNTER — HOSPITAL ENCOUNTER (OUTPATIENT)
Dept: CARDIOLOGY | Facility: HOSPITAL | Age: 57
Discharge: HOME | End: 2022-08-31
Attending: NURSE PRACTITIONER
Payer: COMMERCIAL

## 2022-08-31 ENCOUNTER — HOSPITAL ENCOUNTER (OUTPATIENT)
Dept: RESPIRATORY THERAPY | Facility: HOSPITAL | Age: 57
Discharge: HOME | End: 2022-08-31
Attending: NURSE PRACTITIONER
Payer: COMMERCIAL

## 2022-08-31 ENCOUNTER — PATIENT MESSAGE (OUTPATIENT)
Dept: PULMONOLOGY | Facility: CLINIC | Age: 57
End: 2022-08-31
Payer: COMMERCIAL

## 2022-08-31 ENCOUNTER — HOSPITAL ENCOUNTER (OUTPATIENT)
Dept: RADIOLOGY | Facility: HOSPITAL | Age: 57
Discharge: HOME | End: 2022-08-31
Attending: NURSE PRACTITIONER
Payer: COMMERCIAL

## 2022-08-31 VITALS — OXYGEN SATURATION: 98 % | RESPIRATION RATE: 18 BRPM | HEART RATE: 98 BPM

## 2022-08-31 DIAGNOSIS — R06.02 SOBOE (SHORTNESS OF BREATH ON EXERTION): ICD-10-CM

## 2022-08-31 DIAGNOSIS — J47.9 BRONCHIECTASIS WITHOUT COMPLICATION: ICD-10-CM

## 2022-08-31 DIAGNOSIS — J96.11 CHRONIC RESPIRATORY FAILURE WITH HYPOXIA: ICD-10-CM

## 2022-08-31 LAB
ASCENDING AORTA: 3.66 CM
AV INDEX (PROSTH): 0.7
AV MEAN GRADIENT: 5 MMHG
AV PEAK GRADIENT: 7 MMHG
AV VALVE AREA: 3 CM2
AV VELOCITY RATIO: 0.64
BRPFT: NORMAL
CV ECHO LV RWT: 0.53 CM
DLCO ADJ PRE: 9.22 ML/(MIN*MMHG)
DLCO SINGLE BREATH LLN: 20.34
DLCO SINGLE BREATH PRE REF: 35.3 %
DLCO SINGLE BREATH REF: 26.08
DLCOC SBVA LLN: 3.26
DLCOC SBVA PRE REF: 69.2 %
DLCOC SBVA REF: 4.53
DLCOC SINGLE BREATH LLN: 20.34
DLCOC SINGLE BREATH PRE REF: 35.3 %
DLCOC SINGLE BREATH REF: 26.08
DLCOVA LLN: 3.26
DLCOVA PRE REF: 69.2 %
DLCOVA PRE: 3.13 ML/(MIN*MMHG*L)
DLCOVA REF: 4.53
DLVAADJ PRE: 3.13 ML/(MIN*MMHG*L)
DOP CALC AO PEAK VEL: 1.36 M/S
DOP CALC AO VTI: 24.7 CM
DOP CALC LVOT AREA: 4.3 CM2
DOP CALC LVOT DIAMETER: 2.34 CM
DOP CALC LVOT PEAK VEL: 0.87 M/S
DOP CALC LVOT STROKE VOLUME: 74.1 CM3
DOP CALCLVOT PEAK VEL VTI: 17.24 CM
E WAVE DECELERATION TIME: 176.73 MSEC
E/A RATIO: 1.21
E/E' RATIO: 13.08 M/S
ECHO LV POSTERIOR WALL: 1.16 CM (ref 0.6–1.1)
EJECTION FRACTION: 65 %
ERVN2 LLN: -16449.12
ERVN2 PRE REF: 83.9 %
ERVN2 PRE: 0.74 L
ERVN2 REF: 0.88
FEF 25 75 CHG: -1.6 %
FEF 25 75 LLN: 1.36
FEF 25 75 POST REF: 99.6 %
FEF 25 75 PRE REF: 101.2 %
FEF 25 75 REF: 2.61
FET100 CHG: 30.9 %
FEV1 CHG: 7.5 %
FEV1 FVC CHG: -1.7 %
FEV1 FVC LLN: 67
FEV1 FVC POST REF: 105 %
FEV1 FVC PRE REF: 106.9 %
FEV1 FVC REF: 79
FEV1 LLN: 2.27
FEV1 POST REF: 67.2 %
FEV1 PRE REF: 62.5 %
FEV1 REF: 2.98
FRACTIONAL SHORTENING: 40 % (ref 28–44)
FRCN2 LLN: 2.16
FRCN2 PRE REF: 57.8 %
FRCN2 REF: 2.98
FVC CHG: 9.4 %
FVC LLN: 2.91
FVC POST REF: 63.4 %
FVC PRE REF: 58 %
FVC REF: 3.81
INTERVENTRICULAR SEPTUM: 1.21 CM (ref 0.6–1.1)
IVC PRE: 2.1 L
IVC SINGLE BREATH LLN: 2.91
IVC SINGLE BREATH PRE REF: 55.1 %
IVC SINGLE BREATH REF: 3.81
IVRT: 83.73 MSEC
LA MAJOR: 5.31 CM
LA MINOR: 5.31 CM
LA WIDTH: 4.29 CM
LEFT ATRIUM SIZE: 4.57 CM
LEFT ATRIUM VOLUME: 88.49 CM3
LEFT INTERNAL DIMENSION IN SYSTOLE: 2.65 CM (ref 2.1–4)
LEFT VENTRICLE DIASTOLIC VOLUME: 88.18 ML
LEFT VENTRICLE SYSTOLIC VOLUME: 25.84 ML
LEFT VENTRICULAR INTERNAL DIMENSION IN DIASTOLE: 4.41 CM (ref 3.5–6)
LEFT VENTRICULAR MASS: 188.55 G
LV LATERAL E/E' RATIO: 14.17 M/S
LV SEPTAL E/E' RATIO: 12.14 M/S
MV PEAK A VEL: 0.7 M/S
MV PEAK E VEL: 0.85 M/S
MV STENOSIS PRESSURE HALF TIME: 51.25 MS
MV VALVE AREA P 1/2 METHOD: 4.29 CM2
PEF CHG: -8.2 %
PEF LLN: 5.2
PEF POST REF: 102 %
PEF PRE REF: 111.1 %
PEF REF: 7.18
PISA TR MAX VEL: 2.43 M/S
POST FEF 25 75: 2.6 L/S
POST FET 100: 10.41 SEC
POST FEV1 FVC: 82.87 %
POST FEV1: 2 L
POST FVC: 2.42 L
POST PEF: 7.33 L/S
PRE DLCO: 9.22 ML/(MIN*MMHG)
PRE FEF 25 75: 2.64 L/S
PRE FET 100: 7.95 SEC
PRE FEV1 FVC: 84.34 %
PRE FEV1: 1.86 L
PRE FRC N2: 1.72 L
PRE FVC: 2.21 L
PRE PEF: 7.98 L/S
PULM VEIN S/D RATIO: 1.47
PV PEAK D VEL: 0.38 M/S
PV PEAK S VEL: 0.56 M/S
PV PEAK VELOCITY: 0.91 CM/S
RA MAJOR: 4.85 CM
RA PRESSURE: 3 MMHG
RA WIDTH: 3.36 CM
RIGHT VENTRICULAR END-DIASTOLIC DIMENSION: 3.38 CM
RV TISSUE DOPPLER FREE WALL SYSTOLIC VELOCITY 1 (APICAL 4 CHAMBER VIEW): 11.26 CM/S
RVN2 LLN: 1.52
RVN2 PRE REF: 46.8 %
RVN2 PRE: 0.98 L
RVN2 REF: 2.1
RVN2TLCN2 LLN: 29.09
RVN2TLCN2 PRE REF: 78.2 %
RVN2TLCN2 PRE: 30.25 %
RVN2TLCN2 REF: 38.68
STJ: 2.81 CM
TDI LATERAL: 0.06 M/S
TDI SEPTAL: 0.07 M/S
TDI: 0.07 M/S
TLCN2 LLN: 4.77
TLCN2 PRE REF: 56.3 %
TLCN2 PRE: 3.24 L
TLCN2 REF: 5.76
TR MAX PG: 24 MMHG
TRICUSPID ANNULAR PLANE SYSTOLIC EXCURSION: 2.08 CM
TV REST PULMONARY ARTERY PRESSURE: 27 MMHG
VA PRE: 2.94 L
VA SINGLE BREATH LLN: 5.61
VA SINGLE BREATH PRE REF: 52.5 %
VA SINGLE BREATH REF: 5.61
VCMAXN2 LLN: 2.91
VCMAXN2 PRE REF: 59.3 %
VCMAXN2 PRE: 2.26 L
VCMAXN2 REF: 3.81

## 2022-08-31 PROCEDURE — 71250 CT CHEST WITHOUT CONTRAST: ICD-10-PCS | Mod: 26,,, | Performed by: RADIOLOGY

## 2022-08-31 PROCEDURE — 93306 ECHO (CUPID ONLY): ICD-10-PCS | Mod: 26,,, | Performed by: INTERNAL MEDICINE

## 2022-08-31 PROCEDURE — 93306 TTE W/DOPPLER COMPLETE: CPT | Mod: 26,,, | Performed by: INTERNAL MEDICINE

## 2022-08-31 PROCEDURE — 25000242 PHARM REV CODE 250 ALT 637 W/ HCPCS: Performed by: NURSE PRACTITIONER

## 2022-08-31 PROCEDURE — 71250 CT THORAX DX C-: CPT | Mod: TC

## 2022-08-31 PROCEDURE — 71250 CT THORAX DX C-: CPT | Mod: 26,,, | Performed by: RADIOLOGY

## 2022-08-31 PROCEDURE — 93306 TTE W/DOPPLER COMPLETE: CPT

## 2022-08-31 RX ORDER — ALBUTEROL SULFATE 2.5 MG/.5ML
2.5 SOLUTION RESPIRATORY (INHALATION) ONCE
Status: COMPLETED | OUTPATIENT
Start: 2022-08-31 | End: 2022-08-31

## 2022-08-31 RX ADMIN — ALBUTEROL SULFATE 2.5 MG: 2.5 SOLUTION RESPIRATORY (INHALATION) at 09:08

## 2022-09-02 ENCOUNTER — PATIENT MESSAGE (OUTPATIENT)
Dept: PULMONOLOGY | Facility: CLINIC | Age: 57
End: 2022-09-02
Payer: COMMERCIAL

## 2022-09-04 ENCOUNTER — PATIENT MESSAGE (OUTPATIENT)
Dept: PULMONOLOGY | Facility: CLINIC | Age: 57
End: 2022-09-04
Payer: COMMERCIAL

## 2022-09-06 ENCOUNTER — PATIENT MESSAGE (OUTPATIENT)
Dept: PULMONOLOGY | Facility: CLINIC | Age: 57
End: 2022-09-06
Payer: COMMERCIAL

## 2022-09-07 ENCOUNTER — TELEPHONE (OUTPATIENT)
Dept: PULMONOLOGY | Facility: CLINIC | Age: 57
End: 2022-09-07
Payer: COMMERCIAL

## 2022-09-07 DIAGNOSIS — J96.11 CHRONIC RESPIRATORY FAILURE WITH HYPOXIA: ICD-10-CM

## 2022-09-07 DIAGNOSIS — G47.33 OSA (OBSTRUCTIVE SLEEP APNEA): ICD-10-CM

## 2022-09-07 DIAGNOSIS — J47.9 ACQUIRED BRONCHIECTASIS: Primary | ICD-10-CM

## 2022-09-07 DIAGNOSIS — G47.34 NOCTURNAL HYPOXIA: ICD-10-CM

## 2022-09-07 RX ORDER — TIOTROPIUM BROMIDE AND OLODATEROL 3.124; 2.736 UG/1; UG/1
2 SPRAY, METERED RESPIRATORY (INHALATION) DAILY
Qty: 4 G | Refills: 0 | Status: SHIPPED | OUTPATIENT
Start: 2022-09-07 | End: 2022-10-02

## 2022-09-07 RX ORDER — DOXYCYCLINE 100 MG/1
100 CAPSULE ORAL EVERY 12 HOURS
Qty: 20 CAPSULE | Refills: 0 | Status: SHIPPED | OUTPATIENT
Start: 2022-09-07 | End: 2022-09-17

## 2022-09-07 RX ORDER — PREDNISONE 50 MG/1
50 TABLET ORAL DAILY
Qty: 5 TABLET | Refills: 0 | Status: SHIPPED | OUTPATIENT
Start: 2022-09-07 | End: 2022-09-12

## 2022-09-07 NOTE — TELEPHONE ENCOUNTER
Pt with SHANNON with hypoxia. Pt reports duoneb + aerobika helpful and very productive. Results of test reviewed with pt. Recommend pt obtains a D-dimer,will also check systemic autoimmune disease, change flovent to stiolto, obtain AFB cultures, and consult cardiology due to severe reduced gas diffusion capacity and hypoxia to evaluate comorbid cardiac problems. Pt also needs cpap titration for sleep apnea. Pt verbalize understanding of plan of care. Pt also aware of dilated aorta and will need to monitor this and recommended to control her BP. Pt will f/u after testing. Pt reports significant improvement symptoms after steroid/antibiotic burst treatment. Will reorder for flare up after tx. Pt to take after she completes collection AFB/respiratory cultures. Pt to  paperwork Friday and will  cups

## 2022-09-09 ENCOUNTER — LAB VISIT (OUTPATIENT)
Dept: LAB | Facility: HOSPITAL | Age: 57
End: 2022-09-09
Attending: NURSE PRACTITIONER
Payer: COMMERCIAL

## 2022-09-09 ENCOUNTER — TELEPHONE (OUTPATIENT)
Dept: PULMONOLOGY | Facility: CLINIC | Age: 57
End: 2022-09-09
Payer: COMMERCIAL

## 2022-09-09 DIAGNOSIS — J96.11 CHRONIC RESPIRATORY FAILURE WITH HYPOXIA: ICD-10-CM

## 2022-09-09 LAB — D DIMER PPP IA.FEU-MCNC: 0.46 MG/L FEU

## 2022-09-09 PROCEDURE — 86235 NUCLEAR ANTIGEN ANTIBODY: CPT | Mod: 59 | Performed by: NURSE PRACTITIONER

## 2022-09-09 PROCEDURE — 86038 ANTINUCLEAR ANTIBODIES: CPT | Performed by: NURSE PRACTITIONER

## 2022-09-09 PROCEDURE — 86235 NUCLEAR ANTIGEN ANTIBODY: CPT | Performed by: NURSE PRACTITIONER

## 2022-09-09 PROCEDURE — 85379 FIBRIN DEGRADATION QUANT: CPT | Performed by: NURSE PRACTITIONER

## 2022-09-09 PROCEDURE — 86039 ANTINUCLEAR ANTIBODIES (ANA): CPT | Performed by: NURSE PRACTITIONER

## 2022-09-09 PROCEDURE — 86431 RHEUMATOID FACTOR QUANT: CPT | Performed by: NURSE PRACTITIONER

## 2022-09-09 NOTE — TELEPHONE ENCOUNTER
Patient has made an appt for a cardio doctor on 09/15 at [facility].                  ----- Message from Lacho Melton MA sent at 9/8/2022  7:20 AM CDT -----  Please have pt follow up for cardiology evaluation. Thanks Na Martinez NP 13 hours ago (5:20 PM)    Pt with SHANNON with hypoxia. Pt reports duoneb + aerobika helpful and very productive. Results of test reviewed with pt. Recommend pt obtains a D-dimer,will also check systemic autoimmune disease, change flovent to stiolto, obtain AFB cultures, and consult cardiology due to severe reduced gas diffusion capacity and hypoxia to evaluate comorbid cardiac problems. Pt also needs cpap titration for sleep apnea. Pt verbalize understanding of plan of care. Pt also aware of dilated aorta and will need to monitor this and recommended to control her BP. Pt will f/u after testing. Pt reports significant improvement symptoms after steroid/antibiotic burst treatment. Will reorder for flare up after tx. Pt to take after she completes collection AFB/respiratory cultures. Pt to  paperwork Friday and will  cups

## 2022-09-12 ENCOUNTER — LAB VISIT (OUTPATIENT)
Dept: LAB | Facility: OTHER | Age: 57
End: 2022-09-12
Attending: NURSE PRACTITIONER
Payer: COMMERCIAL

## 2022-09-12 DIAGNOSIS — J47.9 ACQUIRED BRONCHIECTASIS: ICD-10-CM

## 2022-09-12 LAB
ANA PATTERN 1: NORMAL
ANA SER QL IF: POSITIVE
ANA TITR SER IF: NORMAL {TITER}
RHEUMATOID FACT SERPL-ACNC: <13 IU/ML (ref 0–15)

## 2022-09-12 PROCEDURE — 87205 SMEAR GRAM STAIN: CPT | Performed by: NURSE PRACTITIONER

## 2022-09-12 PROCEDURE — 87070 CULTURE OTHR SPECIMN AEROBIC: CPT | Performed by: NURSE PRACTITIONER

## 2022-09-13 ENCOUNTER — TELEPHONE (OUTPATIENT)
Dept: PULMONOLOGY | Facility: CLINIC | Age: 57
End: 2022-09-13
Payer: COMMERCIAL

## 2022-09-13 ENCOUNTER — LAB VISIT (OUTPATIENT)
Dept: LAB | Facility: OTHER | Age: 57
End: 2022-09-13
Attending: NURSE PRACTITIONER
Payer: COMMERCIAL

## 2022-09-13 DIAGNOSIS — J47.9 ACQUIRED BRONCHIECTASIS: ICD-10-CM

## 2022-09-13 LAB
ANTI SM ANTIBODY: 0.07 RATIO (ref 0–0.99)
ANTI SM/RNP ANTIBODY: 0.05 RATIO (ref 0–0.99)
ANTI-SM INTERPRETATION: NEGATIVE
ANTI-SM/RNP INTERPRETATION: NEGATIVE
ANTI-SSA ANTIBODY: 0.06 RATIO (ref 0–0.99)
ANTI-SSA ANTIBODY: 0.06 RATIO (ref 0–0.99)
ANTI-SSA INTERPRETATION: NEGATIVE
ANTI-SSA INTERPRETATION: NEGATIVE
ANTI-SSB ANTIBODY: 0.04 RATIO (ref 0–0.99)
ANTI-SSB ANTIBODY: 0.04 RATIO (ref 0–0.99)
ANTI-SSB INTERPRETATION: NEGATIVE
ANTI-SSB INTERPRETATION: NEGATIVE
DSDNA AB SER-ACNC: NORMAL [IU]/ML

## 2022-09-13 PROCEDURE — 87116 MYCOBACTERIA CULTURE: CPT | Performed by: NURSE PRACTITIONER

## 2022-09-13 PROCEDURE — 87206 SMEAR FLUORESCENT/ACID STAI: CPT | Performed by: NURSE PRACTITIONER

## 2022-09-13 PROCEDURE — 87015 SPECIMEN INFECT AGNT CONCNTJ: CPT | Performed by: NURSE PRACTITIONER

## 2022-09-13 NOTE — TELEPHONE ENCOUNTER
"Returned call back to number provided no answer.                      ----- Message from Lacho Melton MA sent at 9/13/2022 10:34 AM CDT -----  Allyn Monzon Staff  Caller: Unspecified (Today, 10:18 AM)  .Type: Patient Call Back     Who called: Zandra "[facility]"     What is the request in detail: Requesting to get the two lab order placed back in the system A&B culture     Can the clinic reply by [facility]?     Would the patient rather a call back or a response via [facility]? Call back     Best call back number: [phone]       "

## 2022-09-14 ENCOUNTER — TELEPHONE (OUTPATIENT)
Dept: SLEEP MEDICINE | Facility: OTHER | Age: 57
End: 2022-09-14
Payer: COMMERCIAL

## 2022-09-14 ENCOUNTER — PATIENT MESSAGE (OUTPATIENT)
Dept: PULMONOLOGY | Facility: CLINIC | Age: 57
End: 2022-09-14
Payer: COMMERCIAL

## 2022-09-14 LAB — ENA SCL70 AB SER-ACNC: 3 UNITS

## 2022-09-15 ENCOUNTER — OFFICE VISIT (OUTPATIENT)
Dept: CARDIOLOGY | Facility: CLINIC | Age: 57
End: 2022-09-15
Payer: COMMERCIAL

## 2022-09-15 ENCOUNTER — PATIENT MESSAGE (OUTPATIENT)
Dept: PULMONOLOGY | Facility: CLINIC | Age: 57
End: 2022-09-15
Payer: COMMERCIAL

## 2022-09-15 VITALS
DIASTOLIC BLOOD PRESSURE: 82 MMHG | BODY MASS INDEX: 33.48 KG/M2 | HEART RATE: 105 BPM | SYSTOLIC BLOOD PRESSURE: 128 MMHG | OXYGEN SATURATION: 94 % | WEIGHT: 226.69 LBS

## 2022-09-15 DIAGNOSIS — J96.11 CHRONIC RESPIRATORY FAILURE WITH HYPOXIA: ICD-10-CM

## 2022-09-15 DIAGNOSIS — I20.89 STABLE ANGINA: Primary | ICD-10-CM

## 2022-09-15 LAB
BACTERIA SPEC AEROBE CULT: NORMAL
BACTERIA SPEC AEROBE CULT: NORMAL
GRAM STN SPEC: NORMAL

## 2022-09-15 PROCEDURE — 3066F PR DOCUMENTATION OF TREATMENT FOR NEPHROPATHY: ICD-10-PCS | Mod: CPTII,S$GLB,, | Performed by: INTERNAL MEDICINE

## 2022-09-15 PROCEDURE — 1160F RVW MEDS BY RX/DR IN RCRD: CPT | Mod: CPTII,S$GLB,, | Performed by: INTERNAL MEDICINE

## 2022-09-15 PROCEDURE — 3072F LOW RISK FOR RETINOPATHY: CPT | Mod: CPTII,S$GLB,, | Performed by: INTERNAL MEDICINE

## 2022-09-15 PROCEDURE — 3079F PR MOST RECENT DIASTOLIC BLOOD PRESSURE 80-89 MM HG: ICD-10-PCS | Mod: CPTII,S$GLB,, | Performed by: INTERNAL MEDICINE

## 2022-09-15 PROCEDURE — 3066F NEPHROPATHY DOC TX: CPT | Mod: CPTII,S$GLB,, | Performed by: INTERNAL MEDICINE

## 2022-09-15 PROCEDURE — 3044F HG A1C LEVEL LT 7.0%: CPT | Mod: CPTII,S$GLB,, | Performed by: INTERNAL MEDICINE

## 2022-09-15 PROCEDURE — 1159F PR MEDICATION LIST DOCUMENTED IN MEDICAL RECORD: ICD-10-PCS | Mod: CPTII,S$GLB,, | Performed by: INTERNAL MEDICINE

## 2022-09-15 PROCEDURE — 1159F MED LIST DOCD IN RCRD: CPT | Mod: CPTII,S$GLB,, | Performed by: INTERNAL MEDICINE

## 2022-09-15 PROCEDURE — 3008F PR BODY MASS INDEX (BMI) DOCUMENTED: ICD-10-PCS | Mod: CPTII,S$GLB,, | Performed by: INTERNAL MEDICINE

## 2022-09-15 PROCEDURE — 99999 PR PBB SHADOW E&M-EST. PATIENT-LVL III: ICD-10-PCS | Mod: PBBFAC,,, | Performed by: INTERNAL MEDICINE

## 2022-09-15 PROCEDURE — 3061F NEG MICROALBUMINURIA REV: CPT | Mod: CPTII,S$GLB,, | Performed by: INTERNAL MEDICINE

## 2022-09-15 PROCEDURE — 3072F PR LOW RISK FOR RETINOPATHY: ICD-10-PCS | Mod: CPTII,S$GLB,, | Performed by: INTERNAL MEDICINE

## 2022-09-15 PROCEDURE — 99999 PR PBB SHADOW E&M-EST. PATIENT-LVL III: CPT | Mod: PBBFAC,,, | Performed by: INTERNAL MEDICINE

## 2022-09-15 PROCEDURE — 3061F PR NEG MICROALBUMINURIA RESULT DOCUMENTED/REVIEW: ICD-10-PCS | Mod: CPTII,S$GLB,, | Performed by: INTERNAL MEDICINE

## 2022-09-15 PROCEDURE — 3044F PR MOST RECENT HEMOGLOBIN A1C LEVEL <7.0%: ICD-10-PCS | Mod: CPTII,S$GLB,, | Performed by: INTERNAL MEDICINE

## 2022-09-15 PROCEDURE — 93010 EKG 12-LEAD: ICD-10-PCS | Mod: S$GLB,,, | Performed by: INTERNAL MEDICINE

## 2022-09-15 PROCEDURE — 93005 ELECTROCARDIOGRAM TRACING: CPT

## 2022-09-15 PROCEDURE — 99205 PR OFFICE/OUTPT VISIT, NEW, LEVL V, 60-74 MIN: ICD-10-PCS | Mod: S$GLB,,, | Performed by: INTERNAL MEDICINE

## 2022-09-15 PROCEDURE — 3074F PR MOST RECENT SYSTOLIC BLOOD PRESSURE < 130 MM HG: ICD-10-PCS | Mod: CPTII,S$GLB,, | Performed by: INTERNAL MEDICINE

## 2022-09-15 PROCEDURE — 93010 ELECTROCARDIOGRAM REPORT: CPT | Mod: S$GLB,,, | Performed by: INTERNAL MEDICINE

## 2022-09-15 PROCEDURE — 1160F PR REVIEW ALL MEDS BY PRESCRIBER/CLIN PHARMACIST DOCUMENTED: ICD-10-PCS | Mod: CPTII,S$GLB,, | Performed by: INTERNAL MEDICINE

## 2022-09-15 PROCEDURE — 3008F BODY MASS INDEX DOCD: CPT | Mod: CPTII,S$GLB,, | Performed by: INTERNAL MEDICINE

## 2022-09-15 PROCEDURE — 3079F DIAST BP 80-89 MM HG: CPT | Mod: CPTII,S$GLB,, | Performed by: INTERNAL MEDICINE

## 2022-09-15 PROCEDURE — 99205 OFFICE O/P NEW HI 60 MIN: CPT | Mod: S$GLB,,, | Performed by: INTERNAL MEDICINE

## 2022-09-15 PROCEDURE — 3074F SYST BP LT 130 MM HG: CPT | Mod: CPTII,S$GLB,, | Performed by: INTERNAL MEDICINE

## 2022-09-15 NOTE — PROGRESS NOTES
[facility]    Chief Complaint  Chief Complaint   Patient presents with    Shortness of Breath       HPI:    Patient presents for cardiac evaluation because of dyspnea.  It has been present since January.  Worsened and around May.  Pulmonary workup has been unrevealing thus far.  She reports she is dyspnea both so with exertion.  Reports her oxygen saturations drop.  Had a 6 minute walk test which showed saturations greater than 92% at rest and also with good recovery.  May be some fullness in her chest with this sensation.  No paroxysmal nocturnal dyspnea or orthopnea.  Chest imaging has not shown any pulmonary edema.  Echocardiogram shows normal left ventricular contractility.  A BNP in January at onset of her symptoms was normal.  She feels better when she is on steroids and antibiotics.    Medications  Current Outpatient Medications   Medication Sig Dispense Refill    albuterol (PROVENTIL/VENTOLIN HFA) 90 mcg/actuation inhaler Inhale 1-2 puffs into the lungs every 4 (four) hours as needed for Wheezing or Shortness of Breath. Rescue 54 g 5    albuterol-ipratropium (DUO-NEB) 2.5 mg-0.5 mg/3 mL nebulizer solution Take 3 mLs by nebulization 2 (two) times daily. Rescue 540 mL 1    calcium carbonate/vitamin D3 (CALCIUM WITH VITAMIN D ORAL) Take 1 tablet by mouth once daily.      carvediloL (COREG) 25 MG tablet Take 1 tablet (25 mg total) by mouth 2 (two) times daily with meals. 180 tablet 3    coenzyme Q10 100 mg capsule Take 200 mg by mouth.      doxycycline (VIBRAMYCIN) 100 MG Cap Take 1 capsule (100 mg total) by mouth every 12 (twelve) hours. for 10 days 20 capsule 0    ibuprofen (ADVIL,MOTRIN) 200 MG tablet Take 200 mg by mouth.      metFORMIN (GLUCOPHAGE) 500 MG tablet Take 1 tablet (500 mg total) by mouth daily with breakfast. 90 tablet 3    multivitamin capsule Take 1 capsule by mouth once daily.      omeprazole (PRILOSEC) 20 MG capsule TAKE 1 CAPSULE BY MOUTH TWICE A DAY 90 capsule 1     rosuvastatin (CRESTOR) 5 MG tablet Take 1 tablet (5 mg total) by mouth once daily. 90 tablet 3    semaglutide (OZEMPIC) 1 mg/dose (2 mg/1.5 mL) PnIj Inject 1 mg into the skin every 7 days. 2 pen 11    sodium chloride 3% 3 % nebulizer solution 3 cc of 3% saline into nebs twice daily prn 360 mL 3    tiotropium-olodateroL (STIOLTO RESPIMAT) 2.5-2.5 mcg/actuation Mist Inhale 2 puffs into the lungs once daily. Controller (hold flovent) 4 g 0     No current facility-administered medications for this visit.        History  Past Medical History:   Diagnosis Date    Bronchiectasis     Diabetes mellitus, type 2     GERD (gastroesophageal reflux disease)     Hypertension      Past Surgical History:   Procedure Laterality Date    ABLATION, FIBROID, UTERUS, LAPAROSCOPIC, WITH US GUIDANCE      APPENDECTOMY  1982    right rotator cuff  1998    TONSILLECTOMY  1977     Social History     Socioeconomic History    Marital status:    Tobacco Use    Smoking status: Former     Years: 25.00     Types: Cigarettes     Quit date: 2020     Years since quittin.7    Smokeless tobacco: Never   Substance and Sexual Activity    Alcohol use: Never    Drug use: Never    Sexual activity: Yes     Partners: Female     Birth control/protection: None     Comment: spouse -  14 years      Family History   Problem Relation Age of Onset    Diabetes Mother     Hypertension Mother     Heart disease Mother     Diabetes Father         Allergies  Review of patient's allergies indicates:   Allergen Reactions    Atorvastatin Other (See Comments)     Dry cough      Lisinopril      Other reaction(s): Cough    Meperidine hcl Nausea Only       Review of Systems   Review of Systems   Constitutional: Negative for malaise/fatigue, weight gain and weight loss.   Eyes:  Negative for visual disturbance.   Cardiovascular:  Positive for dyspnea on exertion. Negative for chest pain, claudication, cyanosis, irregular heartbeat, leg swelling, near-syncope,  orthopnea, palpitations, paroxysmal nocturnal dyspnea and syncope.   Respiratory:  Positive for cough and shortness of breath. Negative for hemoptysis, sleep disturbances due to breathing and wheezing.    Hematologic/Lymphatic: Negative for bleeding problem. Does not bruise/bleed easily.   Skin:  Negative for poor wound healing.   Musculoskeletal:  Negative for muscle cramps and myalgias.   Gastrointestinal:  Negative for abdominal pain, anorexia, diarrhea, heartburn, hematemesis, hematochezia, melena, nausea and vomiting.   Genitourinary:  Negative for hematuria and nocturia.   Neurological:  Negative for excessive daytime sleepiness, dizziness, focal weakness, light-headedness and weakness.     Physical Exam  Vitals:    09/15/22 1430   BP: 128/82   Pulse: 105     Wt Readings from Last 1 Encounters:   09/15/22 102.8 kg (226 lb 11.2 oz)     Physical Exam  Vitals and nursing note reviewed.   Constitutional:       General: She is not in acute distress.     Appearance: She is not toxic-appearing or diaphoretic.   HENT:      Head: Normocephalic and atraumatic.      Mouth/Throat:      Lips: Pink.      Mouth: Mucous membranes are moist.   Eyes:      General: No scleral icterus.     Conjunctiva/sclera: Conjunctivae normal.   Neck:      Thyroid: No thyromegaly.      Vascular: No carotid bruit, hepatojugular reflux or JVD.      Trachea: Trachea normal.   Cardiovascular:      Rate and Rhythm: Normal rate and regular rhythm. No extrasystoles are present.     Chest Wall: PMI is not displaced.      Pulses:           Carotid pulses are 2+ on the right side and 2+ on the left side.       Radial pulses are 2+ on the right side and 2+ on the left side.        Dorsalis pedis pulses are 2+ on the right side and 2+ on the left side.        Posterior tibial pulses are 2+ on the right side and 2+ on the left side.      Heart sounds: S1 normal and S2 normal. No murmur heard.    No friction rub. No S3 or S4 sounds.   Pulmonary:       Effort: Pulmonary effort is normal. No accessory muscle usage or respiratory distress.      Breath sounds: Normal breath sounds and air entry. No decreased breath sounds, wheezing, rhonchi or rales.   Abdominal:      General: Bowel sounds are normal. There is no distension or abdominal bruit.      Palpations: Abdomen is soft. There is no hepatomegaly, splenomegaly or pulsatile mass.      Tenderness: There is no abdominal tenderness.   Musculoskeletal:         General: No tenderness or deformity.      Right lower leg: No edema.      Left lower leg: No edema.   Skin:     General: Skin is warm and dry.      Capillary Refill: Capillary refill takes less than 2 seconds.      Coloration: Skin is not cyanotic or pale.      Nails: There is no clubbing.   Neurological:      General: No focal deficit present.      Mental Status: She is alert and oriented to person, place, and time.   Psychiatric:         Attention and Perception: Attention normal.         Mood and Affect: Mood normal.         Speech: Speech normal.         Behavior: Behavior normal. Behavior is cooperative.       Labs  Lab Visit on 09/13/2022   Component Date Value Ref Range Status    AFB Culture & Smear 09/13/2022 Culture in progress   Preliminary    AFB CULTURE STAIN 09/13/2022 No acid fast bacilli seen.   Final   Lab Visit on 09/12/2022   Component Date Value Ref Range Status    Respiratory Culture 09/12/2022 Normal respiratory mayra   Final    Respiratory Culture 09/12/2022 No S aureus or Pseudomonas isolated.   Final    Gram Stain (Respiratory) 09/12/2022 <10 epithelial cells per low power field.   Final    Gram Stain (Respiratory) 09/12/2022 Few WBC's   Final    Gram Stain (Respiratory) 09/12/2022 Few Gram positive cocci   Final    Gram Stain (Respiratory) 09/12/2022 Rare Gram negative rods   Final   Lab Visit on 09/09/2022   Component Date Value Ref Range Status    D-Dimer 09/09/2022 0.46  <0.50 mg/L FEU Final    Comment: The quantitative D-dimer assay  should be used as an aid in   the diagnosis of deep vein thrombosis and pulmonary embolism  in patients with the appropriate presentation and clinical  history. The upper limit of the reference interval and the clinical   cut off   point are identical. Causes of a positive (>0.50 mg/L FEU) D-Dimer   test  include, but are not limited to: DVT, PE, DIC, thrombolytic   therapy, anticoagulant therapy, recent surgery, trauma, or   pregnancy, disseminated malignancy, aortic aneurysm, cirrhosis,  and severe infection. False negative results may occur in   patients with distal DVT.      DAYTON Screen 09/09/2022 Positive (A)  Negative <1:80 Final    DAYTON test was performed by Immunofluorescence on HEP2 cells.       Anti-SSA Antibody 09/09/2022 0.06  0.00 - 0.99 Ratio Final    Anti-SSA Interpretation 09/09/2022 Negative  Negative Final    Scleroderma SCL- 09/09/2022 3  <20 UNITS Final    Comment: Interpretation: Negative    Test performed at [place],  [address] [id number] [phone]  Delfino Barrow MD  - Medical Director      Anti-SSB Antibody 09/09/2022 0.04  0.00 - 0.99 Ratio Final    Anti-SSB Interpretation 09/09/2022 Negative  Negative Final    Rheumatoid Factor 09/09/2022 <13.0  0.0 - 15.0 IU/mL Final    DAYTON PATTERN 1 09/09/2022 Homogeneous   Final    Anti Sm Antibody 09/09/2022 0.07  0.00 - 0.99 Ratio Final    Anti-Sm Interpretation 09/09/2022 Negative  Negative Final    Anti-SSA Antibody 09/09/2022 0.06  0.00 - 0.99 Ratio Final    Anti-SSA Interpretation 09/09/2022 Negative  Negative Final    Anti-SSB Antibody 09/09/2022 0.04  0.00 - 0.99 Ratio Final    Anti-SSB Interpretation 09/09/2022 Negative  Negative Final    ds DNA Ab 09/09/2022 Negative 1:10  Negative 1:10 Final    Performed by fluorescent crithidia assay.    Anti Sm/RNP Antibody 09/09/2022 0.05  0.00 - 0.99 Ratio Final    Anti-Sm/RNP Interpretation 09/09/2022 Negative  Negative Final    DAYTON Titer 1 09/09/2022 1:80    Final   Hospital Outpatient Visit on 08/31/2022   Component Date Value Ref Range Status    Interpretation 08/31/2022    Final                    Value:Spirometry shows a reduced vital capacity suggesting restriction. Spirometry remains unimproved following bronchodilator. Lung volumes show moderate restriction is present. DLCO is severely decreased.   Â   Notes:  Â   The failure to demonstrate improvement in spirometry does not preclude a clinical response to a trial of bronchodilators. DLCO interpretation assumes a normal hemoglobin level.      Post FVC 08/31/2022 2.42  L Final    Post FEV1 08/31/2022 2.00  L Final    Post FEV1 FVC 08/31/2022 82.87  % Final    Post FEF 25 75 08/31/2022 2.60  L/s Final    Post PEF 08/31/2022 7.33  L/s Final    Post  08/31/2022 10.41  sec Final    Pre DLCO 08/31/2022 9.22  ml/(min*mmHg) Final    DLCO ADJ PRE 08/31/2022 9.22  ml/(min*mmHg) Final    DLCOVA PRE 08/31/2022 3.13  ml/(min*mmHg*L) Final    DLVAAdj PRE 08/31/2022 3.13  ml/(min*mmHg*L) Final    VA PRE 08/31/2022 2.94  L Final    IVC PRE 08/31/2022 2.10  L Final    TLCN2 PRE 08/31/2022 3.24  L Final    VCMAXN2 PRE 08/31/2022 2.26  L Final    Pre FRC N2 08/31/2022 1.72  L Final    ERVN2 PRE 08/31/2022 0.74  L Final    RVN2 PRE 08/31/2022 0.98  L Final    [id number] PRE 08/31/2022 30.25  % Final    Pre FVC 08/31/2022 2.21  L Final    Pre FEV1 08/31/2022 1.86  L Final    Pre FEV1 FVC 08/31/2022 84.34  % Final    Pre FEF 25 75 08/31/2022 2.64  L/s Final    Pre PEF 08/31/2022 7.98  L/s Final    Pre  08/31/2022 7.95  sec Final    FVC Ref 08/31/2022 3.81   Final    FVC LLN 08/31/2022 2.91   Final    FVC Pre Ref 08/31/2022 58.0  % Final    FVC Post Ref 08/31/2022 63.4  % Final    FVC Chg 08/31/2022 9.4  % Final    FEV1 Ref 08/31/2022 2.98   Final    FEV1 N 08/31/2022 2.27   Final    FEV1 Pre Ref 08/31/2022 62.5  % Final    FEV1 Post Ref 08/31/2022 67.2  % Final    FEV1 Chg 08/31/2022 7.5  % Final    FEV1 FVC Ref  08/31/2022 79   Final    FEV1 FVC LLN 08/31/2022 67   Final    FEV1 FVC Pre Ref 08/31/2022 106.9  % Final    FEV1 FVC Post Ref 08/31/2022 105.0  % Final    FEV1 FVC Chg 08/31/2022 -1.7  % Final    FEF 25 75 Ref 08/31/2022 2.61   Final    FEF 25 75 LLN 08/31/2022 1.36   Final    FEF 25 75 Pre Ref 08/31/2022 101.2  % Final    FEF 25 75 Post Ref 08/31/2022 99.6  % Final    FEF 25 75 Chg 08/31/2022 -1.6  % Final    PEF Ref 08/31/2022 7.18   Final    PEF LLN 08/31/2022 5.20   Final    PEF Pre Ref 08/31/2022 111.1  % Final    PEF Post Ref 08/31/2022 102.0  % Final    PEF Chg 08/31/2022 -8.2  % Final    VYR426 Chg 08/31/2022 30.9  % Final    TLCN2 Ref 08/31/2022 5.76   Final    TLCN2 LLN 08/31/2022 4.77   Final    TLCN2 Pre Ref 08/31/2022 56.3  % Final    VCMAXN2 Ref 08/31/2022 3.81   Final    VCMAXN2 LLN 08/31/2022 2.91   Final    VCMAXN2 Pre Ref 08/31/2022 59.3  % Final    FRCN2 Ref 08/31/2022 2.98   Final    FRCN2 LLN 08/31/2022 2.16   Final    FRCN2 Pre Ref 08/31/2022 57.8  % Final    ERVN2 Ref 08/31/2022 0.88   Final    ERVN2 LLN 08/31/2022 -66245.12   Final    ERVN2 Pre Ref 08/31/2022 83.9  % Final    RVN2 Ref 08/31/2022 2.10   Final    RVN2 LLN 08/31/2022 1.52   Final    RVN2 Pre Ref 08/31/2022 46.8  % Final    PYP5PNFO4 Ref 08/31/2022 38.68   Final    [id number] LLN 08/31/2022 29.09   Final    [id number] Pre Ref 08/31/2022 78.2  % Final    DLCO Single Breath Ref 08/31/2022 26.08   Final    DLCO Single Breath LLN 08/31/2022 20.34   Final    DLCO Single Breath Pre Ref 08/31/2022 35.3  % Final    DLCOc Single Breath Ref 08/31/2022 26.08   Final    DLCOc Single Breath LLN 08/31/2022 20.34   Final    DLCOc Single Breath Pre Ref 08/31/2022 35.3  % Final    DLCOVA Ref 08/31/2022 4.53   Final    DLCOVA LLN 08/31/2022 3.26   Final    DLCOVA Pre Ref 08/31/2022 69.2  % Final    DLCOc SBVA Ref 08/31/2022 4.53   Final    DLCOc SBVA LLN 08/31/2022 3.26   Final    DLCOc SBVA Pre Ref 08/31/2022 69.2  % Final    VA Single Breath Ref  08/31/2022 5.61   Final    VA Single Breath LLN 08/31/2022 5.61   Final    VA Single Breath Pre Ref 08/31/2022 52.5  % Final    IVC Single Breath Ref 08/31/2022 3.81   Final    IVC Single Breath LLN 08/31/2022 2.91   Final    IVC Single Breath Pre Ref 08/31/2022 55.1  % Final   Hospital Outpatient Visit on 08/31/2022   Component Date Value Ref Range Status    Ascending aorta 08/31/2022 3.66  cm Final    STJ 08/31/2022 2.81  cm Final    AV mean gradient 08/31/2022 5  mmHg Final    Ao peak elvin 08/31/2022 1.36  m/s Final    Ao VTI 08/31/2022 24.70  cm Final    IVRT 08/31/2022 83.73  msec Final    IVS 08/31/2022 1.21 (A)  0.6 - 1.1 cm Final    LA size 08/31/2022 4.57  cm Final    Left Atrium Major Axis 08/31/2022 5.31  cm Final    Left Atrium Minor Axis 08/31/2022 5.31  cm Final    LVIDd 08/31/2022 4.41  3.5 - 6.0 cm Final    LVIDs 08/31/2022 2.65  2.1 - 4.0 cm Final    LVOT diameter 08/31/2022 2.34  cm Final    LVOT peak VTI 08/31/2022 17.24  cm Final    Posterior Wall 08/31/2022 1.16 (A)  0.6 - 1.1 cm Final    MV Peak A Elvin 08/31/2022 0.70  m/s Final    E wave deceleration time 08/31/2022 176.73  msec Final    MV Peak E Elvin 08/31/2022 0.85  m/s Final    PV Peak D Elvin 08/31/2022 0.38  m/s Final    PV Peak S Elvin 08/31/2022 0.56  m/s Final    RA Major Axis 08/31/2022 4.85  cm Final    RA Width 08/31/2022 3.36  cm Final    RVDD 08/31/2022 3.38  cm Final    TAPSE 08/31/2022 2.08  cm Final    TR Max Elvin 08/31/2022 2.43  m/s Final    LA WIDTH 08/31/2022 4.29  cm Final    PV PEAK VELOCITY 08/31/2022 0.91  cm/s Final    MV stenosis pressure 1/2 time 08/31/2022 51.25  ms Final    LV Diastolic Volume 08/31/2022 88.18  mL Final    LV Systolic Volume 08/31/2022 25.84  mL Final    LVOT peak elvin 08/31/2022 0.87  m/s Final    TDI LATERAL 08/31/2022 0.06  m/s Final    TDI SEPTAL 08/31/2022 0.07  m/s Final    RV S' 08/31/2022 11.26  cm/s Final    LV LATERAL E/E' RATIO 08/31/2022 14.17  m/s Final    LV SEPTAL E/E' RATIO 08/31/2022 12.14  " m/s Final    FS 08/31/2022 40  % Final    LA volume 08/31/2022 88.49  cm3 Final    LV mass 08/31/2022 188.55  g Final    Left Ventricle Relative Wall Thick* 08/31/2022 0.53  cm Final    AV valve area 08/31/2022 3.00  cm2 Final    AV Velocity Ratio 08/31/2022 0.64   Final    AV index (prosthetic) 08/31/2022 0.70   Final    MV valve area p 1/2 method 08/31/2022 4.29  cm2 Final    E/A ratio 08/31/2022 1.21   Final    Mean e' 08/31/2022 0.07  m/s Final    Pulm vein S/D ratio 08/31/2022 1.47   Final    LVOT area 08/31/2022 4.3  cm2 Final    LVOT stroke volume 08/31/2022 74.10  cm3 Final    AV peak gradient 08/31/2022 7  mmHg Final    E/E' ratio 08/31/2022 13.08  m/s Final    Triscuspid Valve Regurgitation Pea* 08/31/2022 24  mmHg Final    Right Atrial Pressure (from IVC) 08/31/2022 3  mmHg Final    EF 08/31/2022 65  % Final    TV rest pulmonary artery pressure 08/31/2022 27  mmHg Final       Imaging  CT Chest Without Contrast    Result Date: 8/31/2022  EXAMINATION: CT CHEST WITHOUT CONTRAST CLINICAL HISTORY: "Dyspnea, chronic, unclear etiology;" COMPARISON: 01/26/2022 CTA chest TECHNIQUE: Volumetric data acquisition of the chest from the lung apices to the adrenals was obtained without intravenous contrast. Sagittal and coronal multiplanar reconstructions were performed. Lack of IV contrast material limits the assessment of mediastinal and abdominal structures. FINDINGS: Lungs and large airways: Trachea and proximal airways are patent. 3 mm more solid-appearing nodule, right upper lobe series 4, image 174, previously subsolid 3 mm.  3 mm solid nodule lingular region series 4, image 336, in retrospect, unchanged.  No new nodule.  No focal airspace disease. Subtle diffuse bronchiectasis, unchanged.  No acute inflammatory change seen. Pleura: No pleural effusion or thickening. Heart and pericardium: Heart size is normal. No pericardial effusion.  No significant calcified atherosclerosis of the coronaries. Mediastinum and " jenny: No lymphadenopathy.  The thyroid gland is homogeneous, normal in size. Chest wall and lower neck: Unremarkable. Vessels: The ascending aorta is mildly dilated measuring 4.1 cm series 2, image 54, the descending thoracic aorta measures 2.4 cm, this could be associated with chronic hypertension.  There is no significant atherosclerotic plaque of the thoracic aorta. Bones: Degenerative changes.  No acute fracture.  No suspicious lytic or sclerotic lesion. Upper abdomen: The visualized upper abdominal structures appear grossly normal.     Two unchanged small pulmonary nodules, For multiple solid nodules all <6 mm, Fleischner Society 2017 guidelines recommend no routine follow up for a low risk patient, or follow up with non-contrast chest CT at 12 months after discovery in a high risk patient. Electronically signed by: Nataliia Gillette MD Date:    08/31/2022 Time:    09:23    Echo    Result Date: 8/31/2022  · The left ventricle is normal in size with mild concentric hypertrophy and normal systolic function. · The estimated ejection fraction is 65%. · Normal left ventricular diastolic function. · Normal right ventricular size with normal right ventricular systolic function. · Normal central venous pressure (3 mmHg). · The estimated PA systolic pressure is 27 mmHg. · There is no pulmonary hypertension. · The ascending aorta is mildly dilated. 3.7cm.        Assessment  1. Chronic respiratory failure with hypoxia  - Ambulatory referral/consult to Cardiology    2. Stable angina  His exertional dyspnea anginal equivalent?  - Exercise Stress - EKG; Future      Plan and Discussion    Seems unlikely to be cardiac in etiology.  No evidence of heart failure.  But given exertional component and her risk factors, worthwhile ruling out coronary artery disease.  Will get a stress test.  Further planning based on those results.    The ASCVD Risk score (Sterling DK, et al., 2019) failed to calculate for the following reasons:     The valid total cholesterol range is 130 to 320 mg/dL     Follow Up  Follow up if symptoms worsen or fail to improve.      Lex Christopher MD

## 2022-09-16 ENCOUNTER — OFFICE VISIT (OUTPATIENT)
Dept: FAMILY MEDICINE | Facility: CLINIC | Age: 57
End: 2022-09-16
Payer: COMMERCIAL

## 2022-09-16 ENCOUNTER — LAB VISIT (OUTPATIENT)
Dept: LAB | Facility: HOSPITAL | Age: 57
End: 2022-09-16
Payer: COMMERCIAL

## 2022-09-16 VITALS
DIASTOLIC BLOOD PRESSURE: 72 MMHG | OXYGEN SATURATION: 96 % | SYSTOLIC BLOOD PRESSURE: 124 MMHG | BODY MASS INDEX: 33.79 KG/M2 | HEART RATE: 74 BPM | WEIGHT: 228.81 LBS

## 2022-09-16 DIAGNOSIS — Z78.9 ON SUPPLEMENTAL OXYGEN BY NASAL CANNULA: ICD-10-CM

## 2022-09-16 DIAGNOSIS — E11.9 TYPE 2 DIABETES MELLITUS WITHOUT COMPLICATION, WITHOUT LONG-TERM CURRENT USE OF INSULIN: ICD-10-CM

## 2022-09-16 DIAGNOSIS — J47.9 BRONCHIECTASIS WITHOUT COMPLICATION: ICD-10-CM

## 2022-09-16 DIAGNOSIS — R10.9 RIGHT FLANK PAIN: ICD-10-CM

## 2022-09-16 DIAGNOSIS — E11.9 TYPE 2 DIABETES MELLITUS WITHOUT COMPLICATION, WITHOUT LONG-TERM CURRENT USE OF INSULIN: Primary | ICD-10-CM

## 2022-09-16 DIAGNOSIS — I10 ESSENTIAL HYPERTENSION: ICD-10-CM

## 2022-09-16 PROBLEM — J20.9 ACUTE BRONCHITIS: Status: RESOLVED | Noted: 2022-02-01 | Resolved: 2022-09-16

## 2022-09-16 LAB
ALBUMIN SERPL BCP-MCNC: 4 G/DL (ref 3.5–5.2)
ALBUMIN/CREAT UR: 9 UG/MG (ref 0–30)
ALP SERPL-CCNC: 116 U/L (ref 55–135)
ALT SERPL W/O P-5'-P-CCNC: 17 U/L (ref 10–44)
ANION GAP SERPL CALC-SCNC: 6 MMOL/L (ref 8–16)
AST SERPL-CCNC: 17 U/L (ref 10–40)
BILIRUB SERPL-MCNC: 0.3 MG/DL (ref 0.1–1)
BUN SERPL-MCNC: 12 MG/DL (ref 6–20)
CALCIUM SERPL-MCNC: 9.8 MG/DL (ref 8.7–10.5)
CHLORIDE SERPL-SCNC: 103 MMOL/L (ref 95–110)
CO2 SERPL-SCNC: 29 MMOL/L (ref 23–29)
CREAT SERPL-MCNC: 0.8 MG/DL (ref 0.5–1.4)
CREAT UR-MCNC: 111 MG/DL (ref 15–325)
EST. GFR  (NO RACE VARIABLE): >60 ML/MIN/1.73 M^2
ESTIMATED AVG GLUCOSE: 166 MG/DL (ref 68–131)
GLUCOSE SERPL-MCNC: 181 MG/DL (ref 70–110)
HBA1C MFR BLD: 7.4 % (ref 4–5.6)
MICROALBUMIN UR DL<=1MG/L-MCNC: 10 UG/ML
POTASSIUM SERPL-SCNC: 3.8 MMOL/L (ref 3.5–5.1)
PROT SERPL-MCNC: 7 G/DL (ref 6–8.4)
SODIUM SERPL-SCNC: 138 MMOL/L (ref 136–145)

## 2022-09-16 PROCEDURE — 36415 COLL VENOUS BLD VENIPUNCTURE: CPT | Mod: PO | Performed by: STUDENT IN AN ORGANIZED HEALTH CARE EDUCATION/TRAINING PROGRAM

## 2022-09-16 PROCEDURE — 3066F PR DOCUMENTATION OF TREATMENT FOR NEPHROPATHY: ICD-10-PCS | Mod: CPTII,S$GLB,, | Performed by: STUDENT IN AN ORGANIZED HEALTH CARE EDUCATION/TRAINING PROGRAM

## 2022-09-16 PROCEDURE — 83036 HEMOGLOBIN GLYCOSYLATED A1C: CPT | Performed by: STUDENT IN AN ORGANIZED HEALTH CARE EDUCATION/TRAINING PROGRAM

## 2022-09-16 PROCEDURE — 3008F PR BODY MASS INDEX (BMI) DOCUMENTED: ICD-10-PCS | Mod: CPTII,S$GLB,, | Performed by: STUDENT IN AN ORGANIZED HEALTH CARE EDUCATION/TRAINING PROGRAM

## 2022-09-16 PROCEDURE — 3061F NEG MICROALBUMINURIA REV: CPT | Mod: CPTII,S$GLB,, | Performed by: STUDENT IN AN ORGANIZED HEALTH CARE EDUCATION/TRAINING PROGRAM

## 2022-09-16 PROCEDURE — 3074F SYST BP LT 130 MM HG: CPT | Mod: CPTII,S$GLB,, | Performed by: STUDENT IN AN ORGANIZED HEALTH CARE EDUCATION/TRAINING PROGRAM

## 2022-09-16 PROCEDURE — 3074F PR MOST RECENT SYSTOLIC BLOOD PRESSURE < 130 MM HG: ICD-10-PCS | Mod: CPTII,S$GLB,, | Performed by: STUDENT IN AN ORGANIZED HEALTH CARE EDUCATION/TRAINING PROGRAM

## 2022-09-16 PROCEDURE — 3044F HG A1C LEVEL LT 7.0%: CPT | Mod: CPTII,S$GLB,, | Performed by: STUDENT IN AN ORGANIZED HEALTH CARE EDUCATION/TRAINING PROGRAM

## 2022-09-16 PROCEDURE — 3072F LOW RISK FOR RETINOPATHY: CPT | Mod: CPTII,S$GLB,, | Performed by: STUDENT IN AN ORGANIZED HEALTH CARE EDUCATION/TRAINING PROGRAM

## 2022-09-16 PROCEDURE — 1159F MED LIST DOCD IN RCRD: CPT | Mod: CPTII,S$GLB,, | Performed by: STUDENT IN AN ORGANIZED HEALTH CARE EDUCATION/TRAINING PROGRAM

## 2022-09-16 PROCEDURE — 1159F PR MEDICATION LIST DOCUMENTED IN MEDICAL RECORD: ICD-10-PCS | Mod: CPTII,S$GLB,, | Performed by: STUDENT IN AN ORGANIZED HEALTH CARE EDUCATION/TRAINING PROGRAM

## 2022-09-16 PROCEDURE — 3008F BODY MASS INDEX DOCD: CPT | Mod: CPTII,S$GLB,, | Performed by: STUDENT IN AN ORGANIZED HEALTH CARE EDUCATION/TRAINING PROGRAM

## 2022-09-16 PROCEDURE — 80053 COMPREHEN METABOLIC PANEL: CPT | Performed by: STUDENT IN AN ORGANIZED HEALTH CARE EDUCATION/TRAINING PROGRAM

## 2022-09-16 PROCEDURE — 99999 PR PBB SHADOW E&M-EST. PATIENT-LVL III: ICD-10-PCS | Mod: PBBFAC,,, | Performed by: STUDENT IN AN ORGANIZED HEALTH CARE EDUCATION/TRAINING PROGRAM

## 2022-09-16 PROCEDURE — 99999 PR PBB SHADOW E&M-EST. PATIENT-LVL III: CPT | Mod: PBBFAC,,, | Performed by: STUDENT IN AN ORGANIZED HEALTH CARE EDUCATION/TRAINING PROGRAM

## 2022-09-16 PROCEDURE — 3044F PR MOST RECENT HEMOGLOBIN A1C LEVEL <7.0%: ICD-10-PCS | Mod: CPTII,S$GLB,, | Performed by: STUDENT IN AN ORGANIZED HEALTH CARE EDUCATION/TRAINING PROGRAM

## 2022-09-16 PROCEDURE — 99214 OFFICE O/P EST MOD 30 MIN: CPT | Mod: S$GLB,,, | Performed by: STUDENT IN AN ORGANIZED HEALTH CARE EDUCATION/TRAINING PROGRAM

## 2022-09-16 PROCEDURE — 3072F PR LOW RISK FOR RETINOPATHY: ICD-10-PCS | Mod: CPTII,S$GLB,, | Performed by: STUDENT IN AN ORGANIZED HEALTH CARE EDUCATION/TRAINING PROGRAM

## 2022-09-16 PROCEDURE — 82570 ASSAY OF URINE CREATININE: CPT | Performed by: STUDENT IN AN ORGANIZED HEALTH CARE EDUCATION/TRAINING PROGRAM

## 2022-09-16 PROCEDURE — 82043 UR ALBUMIN QUANTITATIVE: CPT | Performed by: STUDENT IN AN ORGANIZED HEALTH CARE EDUCATION/TRAINING PROGRAM

## 2022-09-16 PROCEDURE — 3061F PR NEG MICROALBUMINURIA RESULT DOCUMENTED/REVIEW: ICD-10-PCS | Mod: CPTII,S$GLB,, | Performed by: STUDENT IN AN ORGANIZED HEALTH CARE EDUCATION/TRAINING PROGRAM

## 2022-09-16 PROCEDURE — 3078F DIAST BP <80 MM HG: CPT | Mod: CPTII,S$GLB,, | Performed by: STUDENT IN AN ORGANIZED HEALTH CARE EDUCATION/TRAINING PROGRAM

## 2022-09-16 PROCEDURE — 3078F PR MOST RECENT DIASTOLIC BLOOD PRESSURE < 80 MM HG: ICD-10-PCS | Mod: CPTII,S$GLB,, | Performed by: STUDENT IN AN ORGANIZED HEALTH CARE EDUCATION/TRAINING PROGRAM

## 2022-09-16 PROCEDURE — 3066F NEPHROPATHY DOC TX: CPT | Mod: CPTII,S$GLB,, | Performed by: STUDENT IN AN ORGANIZED HEALTH CARE EDUCATION/TRAINING PROGRAM

## 2022-09-16 PROCEDURE — 99214 PR OFFICE/OUTPT VISIT, EST, LEVL IV, 30-39 MIN: ICD-10-PCS | Mod: S$GLB,,, | Performed by: STUDENT IN AN ORGANIZED HEALTH CARE EDUCATION/TRAINING PROGRAM

## 2022-09-16 RX ORDER — METFORMIN HYDROCHLORIDE 500 MG/1
500 TABLET ORAL
Qty: 90 TABLET | Refills: 3 | Status: SHIPPED | OUTPATIENT
Start: 2022-09-16 | End: 2023-10-02

## 2022-09-16 NOTE — PROGRESS NOTES
"Name: Nathalie Krueger  MRN: [id number]  : 1965    HPI  Here to establish care. Moving here from [facility] in December.   Established with pulmonology for chronic respiratory of unknown etiology - current working diagnosis is autoimmune vs infectious.   Has DM2 - checks blood sugars in morning after coffee and a scone - usually range 140-150.     Review of Systems   Constitutional:  Positive for fatigue (improved on oxygen). Negative for chills and fever.   HENT:  Negative for hearing loss.    Eyes:  Negative for visual disturbance.   Respiratory:  Positive for cough, chest tightness and shortness of breath.    Cardiovascular:  Negative for chest pain.   Gastrointestinal:  Positive for constipation (improved with miralax). Negative for diarrhea, nausea and vomiting.   Musculoskeletal:  Negative for arthralgias and back pain.        Flank pain for a week and a half. Feels as if "something is pressing on something else." Unresponsive to yoga, icing, massage. Associated with a feeling of having to go to the bathroom.      Patient Active Problem List   Diagnosis    Adenomatous polyp of ascending colon    Essential hypertension    Other hyperlipidemia    Type 2 diabetes mellitus without complication, without long-term current use of insulin    Lung nodule    Acute bronchitis    SOBOE (shortness of breath on exertion)    Chronic respiratory failure with hypoxia    Bronchiectasis without complication    Gastroesophageal reflux disease    Sleep-related breathing disorder       Current Outpatient Medications on File Prior to Visit   Medication Sig Dispense Refill    albuterol (PROVENTIL/VENTOLIN HFA) 90 mcg/actuation inhaler Inhale 1-2 puffs into the lungs every 4 (four) hours as needed for Wheezing or Shortness of Breath. Rescue 54 g 5    albuterol-ipratropium (DUO-NEB) 2.5 mg-0.5 mg/3 mL nebulizer solution Take 3 mLs by nebulization 2 (two) times daily. Rescue 540 mL 1    calcium carbonate/vitamin D3 (CALCIUM WITH VITAMIN D " ORAL) Take 1 tablet by mouth once daily.      carvediloL (COREG) 25 MG tablet Take 1 tablet (25 mg total) by mouth 2 (two) times daily with meals. 180 tablet 3    coenzyme Q10 100 mg capsule Take 200 mg by mouth.      doxycycline (VIBRAMYCIN) 100 MG Cap Take 1 capsule (100 mg total) by mouth every 12 (twelve) hours. for 10 days 20 capsule 0    ibuprofen (ADVIL,MOTRIN) 200 MG tablet Take 200 mg by mouth.      metFORMIN (GLUCOPHAGE) 500 MG tablet Take 1 tablet (500 mg total) by mouth daily with breakfast. 90 tablet 3    multivitamin capsule Take 1 capsule by mouth once daily.      omeprazole (PRILOSEC) 20 MG capsule TAKE 1 CAPSULE BY MOUTH TWICE A DAY 90 capsule 1    rosuvastatin (CRESTOR) 5 MG tablet Take 1 tablet (5 mg total) by mouth once daily. 90 tablet 3    semaglutide (OZEMPIC) 1 mg/dose (2 mg/1.5 mL) PnIj Inject 1 mg into the skin every 7 days. 2 pen 11    sodium chloride 3% 3 % nebulizer solution 3 cc of 3% saline into nebs twice daily prn 360 mL 3    tiotropium-olodateroL (STIOLTO RESPIMAT) 2.5-2.5 mcg/actuation Mist Inhale 2 puffs into the lungs once daily. Controller (hold flovent) 4 g 0     No current facility-administered medications on file prior to visit.       Past Medical History:   Diagnosis Date    Bronchiectasis     Diabetes mellitus, type 2     GERD (gastroesophageal reflux disease)     Hypertension        Past Surgical History:   Procedure Laterality Date    ABLATION, FIBROID, UTERUS, LAPAROSCOPIC, WITH US GUIDANCE      APPENDECTOMY  1982    right rotator cuff  1998    TONSILLECTOMY  1977        Family History   Problem Relation Age of Onset    Diabetes Mother     Hypertension Mother     Heart disease Mother     Diabetes Father        Social History     Socioeconomic History    Marital status:    Tobacco Use    Smoking status: Former     Years: 25.00     Types: Cigarettes     Quit date: 2020     Years since quittin.7    Smokeless tobacco: Never   Substance and Sexual Activity     Alcohol use: Never    Drug use: Never    Sexual activity: Yes     Partners: Female     Birth control/protection: None     Comment: spouse -  14 years        Review of patient's allergies indicates:   Allergen Reactions    Atorvastatin Other (See Comments)     Dry cough      Lisinopril      Other reaction(s): Cough    Meperidine hcl Nausea Only        Vitals:    09/16/22 1500   BP: 124/72   Pulse: 74        Physical Exam  Constitutional:       General: She is not in acute distress.     Appearance: Normal appearance. She is well-developed.   HENT:      Head: Normocephalic and atraumatic.      Right Ear: External ear normal.      Left Ear: External ear normal.   Eyes:      Conjunctiva/sclera: Conjunctivae normal.   Cardiovascular:      Rate and Rhythm: Normal rate and regular rhythm.      Heart sounds: No murmur heard.    No friction rub. No gallop.   Pulmonary:      Effort: Pulmonary effort is normal. No respiratory distress.      Breath sounds: No wheezing, rhonchi or rales.   Abdominal:      General: Abdomen is flat. There is no distension.      Tenderness: There is abdominal tenderness in the right upper quadrant. There is no right CVA tenderness or left CVA tenderness.   Musculoskeletal:         General: No swelling or deformity.      Right lower leg: No edema.      Left lower leg: No edema.   Skin:     General: Skin is warm and dry.      Coloration: Skin is not jaundiced.   Neurological:      Mental Status: She is alert and oriented to person, place, and time. Mental status is at baseline.   Psychiatric:         Attention and Perception: Attention and perception normal.         Mood and Affect: Mood normal.         Speech: Speech normal.         Behavior: Behavior normal. Behavior is cooperative.         Thought Content: Thought content normal.         Cognition and Memory: Cognition normal.         Judgment: Judgment normal.      Protective Sensation (w/ 10 gram monofilament):  Right: Intact  Left:  Intact    Visual Inspection:  Normal -  Bilateral    Pedal Pulses:   Right: Present  Left: Present    Posterior tibialis:   Right:Present  Left: Present      1. Type 2 diabetes mellitus without complication, without long-term current use of insulin  Assessment & Plan:  Stable based on previous labs. New lab work today.    Orders:  -     Hemoglobin A1C  -     Microalbumin/Creatinine Ratio, Urine  -     Ambulatory referral/consult to Ophthalmology  -     semaglutide (OZEMPIC) 1 mg/dose (2 mg/1.5 mL) PnIj  -     metFORMIN (GLUCOPHAGE) 500 MG tablet    2. On supplemental oxygen by nasal cannula    3. Right flank pain  Assessment & Plan:  Likely MSK. Will get basic labwork to assure no liver or gallbladder pathology.    Orders:  -     COMPREHENSIVE METABOLIC PANEL    4. Bronchiectasis without complication  Assessment & Plan:  Currently stable. Continue to follow with pulmonology.      5. Essential hypertension  Assessment & Plan:  Stable. Continue carvedilol.        Follow up in 6 months.    Tyron Flannery MD  09/16/2022

## 2022-09-19 ENCOUNTER — HOSPITAL ENCOUNTER (OUTPATIENT)
Dept: SLEEP MEDICINE | Facility: OTHER | Age: 57
Discharge: HOME | End: 2022-09-19
Payer: COMMERCIAL

## 2022-09-19 ENCOUNTER — PATIENT MESSAGE (OUTPATIENT)
Dept: SLEEP MEDICINE | Facility: OTHER | Age: 57
End: 2022-09-19

## 2022-09-19 ENCOUNTER — TELEPHONE (OUTPATIENT)
Dept: SLEEP MEDICINE | Facility: OTHER | Age: 57
End: 2022-09-19
Payer: COMMERCIAL

## 2022-09-19 ENCOUNTER — TELEPHONE (OUTPATIENT)
Dept: FAMILY MEDICINE | Facility: CLINIC | Age: 57
End: 2022-09-19
Payer: COMMERCIAL

## 2022-09-19 DIAGNOSIS — G47.34 NOCTURNAL HYPOXIA: ICD-10-CM

## 2022-09-19 DIAGNOSIS — G47.33 OSA (OBSTRUCTIVE SLEEP APNEA): ICD-10-CM

## 2022-09-19 DIAGNOSIS — J96.11 CHRONIC RESPIRATORY FAILURE WITH HYPOXIA: ICD-10-CM

## 2022-09-19 PROCEDURE — 95811 PR POLYSOMNOGRAPHY W/CPAP: ICD-10-PCS | Mod: 26,,, | Performed by: INTERNAL MEDICINE

## 2022-09-19 PROCEDURE — 95811 POLYSOM 6/>YRS CPAP 4/> PARM: CPT | Mod: 26,,, | Performed by: INTERNAL MEDICINE

## 2022-09-19 PROCEDURE — 95811 POLYSOM 6/>YRS CPAP 4/> PARM: CPT

## 2022-09-20 NOTE — PROGRESS NOTES
Nathalie Krueger to [facility] on 9/19/2022 for an overnight CPAP titration study. Tech went over night time bed routine with patient. Then educated pt on patient set up procedures: the electrodes,EMG wires,pulse oximeter, RIP belts, nasal cannula+thermistor duties and their placement. Pt also educated on CPAP therapy. All of patients questions were answered prior  to the start of the study.       Pt on continuous 02/2L at home, started study without 02.Pt did not qualify to use supplemental 02.  Pt wearing a M nasal mask, HH @ 4, no chin strap,  Post study information along with Thank you letter given to pt in AM

## 2022-09-21 ENCOUNTER — PATIENT MESSAGE (OUTPATIENT)
Dept: PULMONOLOGY | Facility: CLINIC | Age: 57
End: 2022-09-21
Payer: COMMERCIAL

## 2022-09-22 ENCOUNTER — IMMUNIZATION (OUTPATIENT)
Dept: INTERNAL MEDICINE | Facility: CLINIC | Age: 57
End: 2022-09-22
Payer: COMMERCIAL

## 2022-09-22 DIAGNOSIS — Z23 NEED FOR VACCINATION: Primary | ICD-10-CM

## 2022-09-22 PROCEDURE — 91312 COVID-19, MRNA, LNP-S, BIVALENT BOOSTER, PF, 30 MCG/0.3 ML DOSE: CPT | Mod: S$GLB,,, | Performed by: INTERNAL MEDICINE

## 2022-09-22 PROCEDURE — 0124A COVID-19, MRNA, LNP-S, BIVALENT BOOSTER, PF, 30 MCG/0.3 ML DOSE: CPT | Mod: PBBFAC | Performed by: INTERNAL MEDICINE

## 2022-09-22 PROCEDURE — 91312 COVID-19, MRNA, LNP-S, BIVALENT BOOSTER, PF, 30 MCG/0.3 ML DOSE: ICD-10-PCS | Mod: S$GLB,,, | Performed by: INTERNAL MEDICINE

## 2022-09-28 ENCOUNTER — PATIENT MESSAGE (OUTPATIENT)
Dept: PULMONOLOGY | Facility: CLINIC | Age: 57
End: 2022-09-28
Payer: COMMERCIAL

## 2022-09-28 ENCOUNTER — TELEPHONE (OUTPATIENT)
Dept: PULMONOLOGY | Facility: CLINIC | Age: 57
End: 2022-09-28
Payer: COMMERCIAL

## 2022-09-28 NOTE — TELEPHONE ENCOUNTER
----- Message from Lacho Melton MA sent at 9/27/2022  9:13 AM CDT -----  Please put in order for a cpap machine.

## 2022-09-30 ENCOUNTER — PATIENT MESSAGE (OUTPATIENT)
Dept: PULMONOLOGY | Facility: CLINIC | Age: 57
End: 2022-09-30
Payer: COMMERCIAL

## 2022-10-02 ENCOUNTER — PATIENT MESSAGE (OUTPATIENT)
Dept: PULMONOLOGY | Facility: CLINIC | Age: 57
End: 2022-10-02
Payer: COMMERCIAL

## 2022-10-04 RX ORDER — OMEPRAZOLE 40 MG/1
40 CAPSULE, DELAYED RELEASE ORAL DAILY
Qty: 30 CAPSULE | Refills: 11 | Status: SHIPPED | OUTPATIENT
Start: 2022-10-04 | End: 2022-11-05

## 2022-10-05 ENCOUNTER — HOSPITAL ENCOUNTER (OUTPATIENT)
Dept: CARDIOLOGY | Facility: OTHER | Age: 57
Discharge: HOME | End: 2022-10-05
Attending: INTERNAL MEDICINE
Payer: COMMERCIAL

## 2022-10-05 VITALS
HEIGHT: 69 IN | BODY MASS INDEX: 33.77 KG/M2 | WEIGHT: 228 LBS | DIASTOLIC BLOOD PRESSURE: 80 MMHG | SYSTOLIC BLOOD PRESSURE: 122 MMHG | HEART RATE: 77 BPM

## 2022-10-05 DIAGNOSIS — I20.89 STABLE ANGINA: ICD-10-CM

## 2022-10-05 LAB
CV STRESS BASE HR: 77 BPM
DIASTOLIC BLOOD PRESSURE: 80 MMHG
OHS CV CPX 85 PERCENT MAX PREDICTED HEART RATE MALE: 132
OHS CV CPX ESTIMATED METS: 7
OHS CV CPX MAX PREDICTED HEART RATE: 155
OHS CV CPX PATIENT IS FEMALE: 1
OHS CV CPX PATIENT IS MALE: 0
OHS CV CPX PEAK DIASTOLIC BLOOD PRESSURE: 94 MMHG
OHS CV CPX PEAK HEAR RATE: 123 BPM
OHS CV CPX PEAK RATE PRESSURE PRODUCT: NORMAL
OHS CV CPX PEAK SYSTOLIC BLOOD PRESSURE: 218 MMHG
OHS CV CPX PERCENT MAX PREDICTED HEART RATE ACHIEVED: 79
OHS CV CPX RATE PRESSURE PRODUCT PRESENTING: 9394
STRESS ECHO POST EXERCISE DUR MIN: 6 MINUTES
STRESS ECHO POST EXERCISE DUR SEC: 3 SECONDS
SYSTOLIC BLOOD PRESSURE: 122 MMHG

## 2022-10-05 PROCEDURE — 93017 CV STRESS TEST TRACING ONLY: CPT

## 2022-10-05 PROCEDURE — 93018 EXERCISE STRESS - EKG (CUPID ONLY): ICD-10-PCS | Mod: ,,, | Performed by: INTERNAL MEDICINE

## 2022-10-05 PROCEDURE — 93016 EXERCISE STRESS - EKG (CUPID ONLY): ICD-10-PCS | Mod: ,,, | Performed by: INTERNAL MEDICINE

## 2022-10-05 PROCEDURE — 93016 CV STRESS TEST SUPVJ ONLY: CPT | Mod: ,,, | Performed by: INTERNAL MEDICINE

## 2022-10-05 PROCEDURE — 93018 CV STRESS TEST I&R ONLY: CPT | Mod: ,,, | Performed by: INTERNAL MEDICINE

## 2022-10-07 ENCOUNTER — PATIENT MESSAGE (OUTPATIENT)
Dept: PULMONOLOGY | Facility: CLINIC | Age: 57
End: 2022-10-07
Payer: COMMERCIAL

## 2022-10-10 NOTE — PROCEDURES
[facility]  Sleep Center  Tel: [phone]    CPAP TITRATION       Patient Name: [facility] #: [id number]   Sex: Female Study Date: 2022   : 1965 Clinic #: [id number]   Age: 57 Referring Physician: MD HO   Height: 69.0 in Referring Physician #    Weight: 225.0 lbs Sleep Specialist:    B.M.I.: 33.2 Sleep Specialist #    Hypopnea rule: AASM 1A Scoring Tech: THAIS BeckerGT   Total AHI: 2.2 Recording Tech OLIMPIA LINT   Lowest O2 sat: 86.0% Recording Location: [facility]     Sleep architecture: This is a CPAP titration study. At light's out, the patient fell asleep in 20.0 minutes. Sleep efficiency was 69.0%. Total sleep time (TST) was 276.6 minutes. Slow wave sleep proportion of TST was reduced and REM stage sleep proportion of TST was reduced. REM latency was 150.0 minutes.    Motor movement / Parasomnia: There were no significant limb movements of sleep noted.    Cardiac: single lead EKG revealed normal sinus rhythm    CPAP titration:  The mask used in the study was nasal mask, size medium  CPAP pressures 5 to 11 cwp were explored.  CPAP = 7 cwp was largely effective in supine NREM sleep.  CPAP = 10 cwp was largely effective in lateral stage REM sleep.    LIMITATIONS: No stage REM sleep was seen in the supine position.    Oxygenation:  At therapeutic levels of PAP therapy, there was no baseline hypoxemia on room air.    Impression:  -obstructive sleep apnea     Recommendations:    -auto-CPAP with CPAP min = 7 cwp  and CPAP max =  20 cwp is recommended  -pressures should be adjusted to CPAP min = 10 cwp depending on pressure tolerance  -the patient has follow up with Sleep Medicine        Herbert Sutton MD    (This Sleep Study was interpreted by a Board Certified Sleep Specialist who conducted an epoch-by-epoch review of the entire raw data recording.)  (The indication for this sleep study was reviewed and deemed appropriate by AASM Practice Parameters or  other reasons by a Board Certified Sleep Specialist.)        TABLES

## 2022-10-11 ENCOUNTER — PATIENT MESSAGE (OUTPATIENT)
Dept: PULMONOLOGY | Facility: CLINIC | Age: 57
End: 2022-10-11
Payer: COMMERCIAL

## 2022-10-11 ENCOUNTER — TELEPHONE (OUTPATIENT)
Dept: PULMONOLOGY | Facility: CLINIC | Age: 57
End: 2022-10-11
Payer: COMMERCIAL

## 2022-10-11 DIAGNOSIS — G47.33 OBSTRUCTIVE SLEEP APNEA: Primary | ICD-10-CM

## 2022-10-13 ENCOUNTER — PATIENT MESSAGE (OUTPATIENT)
Dept: PULMONOLOGY | Facility: CLINIC | Age: 57
End: 2022-10-13
Payer: COMMERCIAL

## 2022-10-17 ENCOUNTER — PATIENT MESSAGE (OUTPATIENT)
Dept: ADMINISTRATIVE | Facility: HOSPITAL | Age: 57
End: 2022-10-17
Payer: COMMERCIAL

## 2022-10-31 ENCOUNTER — PATIENT MESSAGE (OUTPATIENT)
Dept: PULMONOLOGY | Facility: CLINIC | Age: 57
End: 2022-10-31
Payer: COMMERCIAL

## 2022-10-31 DIAGNOSIS — Z78.9 ON SUPPLEMENTAL OXYGEN BY NASAL CANNULA: Primary | ICD-10-CM

## 2022-11-01 ENCOUNTER — OFFICE VISIT (OUTPATIENT)
Dept: PULMONOLOGY | Facility: CLINIC | Age: 57
End: 2022-11-01
Payer: COMMERCIAL

## 2022-11-01 ENCOUNTER — LAB VISIT (OUTPATIENT)
Dept: LAB | Facility: HOSPITAL | Age: 57
End: 2022-11-01
Payer: COMMERCIAL

## 2022-11-01 VITALS
HEIGHT: 69 IN | BODY MASS INDEX: 33.57 KG/M2 | SYSTOLIC BLOOD PRESSURE: 122 MMHG | HEART RATE: 96 BPM | DIASTOLIC BLOOD PRESSURE: 85 MMHG | OXYGEN SATURATION: 95 % | WEIGHT: 226.63 LBS

## 2022-11-01 DIAGNOSIS — J47.9 BRONCHIECTASIS WITHOUT COMPLICATION: ICD-10-CM

## 2022-11-01 DIAGNOSIS — J96.11 CHRONIC RESPIRATORY FAILURE WITH HYPOXIA: ICD-10-CM

## 2022-11-01 DIAGNOSIS — J96.11 CHRONIC RESPIRATORY FAILURE WITH HYPOXIA: Primary | ICD-10-CM

## 2022-11-01 LAB
ACID FAST MOD KINY STN SPEC: NORMAL
BASOPHILS # BLD AUTO: 0.04 K/UL (ref 0–0.2)
BASOPHILS NFR BLD: 0.4 % (ref 0–1.9)
CRP SERPL-MCNC: 0.5 MG/L (ref 0–8.2)
DIFFERENTIAL METHOD: NORMAL
EOSINOPHIL # BLD AUTO: 0.3 K/UL (ref 0–0.5)
EOSINOPHIL NFR BLD: 3 % (ref 0–8)
ERYTHROCYTE [DISTWIDTH] IN BLOOD BY AUTOMATED COUNT: 11.8 % (ref 11.5–14.5)
ERYTHROCYTE [SEDIMENTATION RATE] IN BLOOD BY PHOTOMETRIC METHOD: 8 MM/HR (ref 0–36)
HCT VFR BLD AUTO: 42.7 % (ref 37–48.5)
HGB BLD-MCNC: 14.1 G/DL (ref 12–16)
IGA SERPL-MCNC: 205 MG/DL (ref 40–350)
IGE SERPL-ACNC: <35 IU/ML (ref 0–100)
IGG SERPL-MCNC: 882 MG/DL (ref 650–1600)
IGM SERPL-MCNC: 17 MG/DL (ref 50–300)
IMM GRANULOCYTES # BLD AUTO: 0.03 K/UL (ref 0–0.04)
IMM GRANULOCYTES NFR BLD AUTO: 0.3 % (ref 0–0.5)
LYMPHOCYTES # BLD AUTO: 2 K/UL (ref 1–4.8)
LYMPHOCYTES NFR BLD: 21.1 % (ref 18–48)
MCH RBC QN AUTO: 30.7 PG (ref 27–31)
MCHC RBC AUTO-ENTMCNC: 33 G/DL (ref 32–36)
MCV RBC AUTO: 93 FL (ref 82–98)
MONOCYTES # BLD AUTO: 0.8 K/UL (ref 0.3–1)
MONOCYTES NFR BLD: 8.1 % (ref 4–15)
MYCOBACTERIUM SPEC QL CULT: NORMAL
NEUTROPHILS # BLD AUTO: 6.3 K/UL (ref 1.8–7.7)
NEUTROPHILS NFR BLD: 67.1 % (ref 38–73)
NRBC BLD-RTO: 0 /100 WBC
PLATELET # BLD AUTO: 288 K/UL (ref 150–450)
PMV BLD AUTO: 10.3 FL (ref 9.2–12.9)
RBC # BLD AUTO: 4.59 M/UL (ref 4–5.4)
WBC # BLD AUTO: 9.39 K/UL (ref 3.9–12.7)

## 2022-11-01 PROCEDURE — 3072F PR LOW RISK FOR RETINOPATHY: ICD-10-PCS | Mod: CPTII,S$GLB,, | Performed by: INTERNAL MEDICINE

## 2022-11-01 PROCEDURE — 1159F PR MEDICATION LIST DOCUMENTED IN MEDICAL RECORD: ICD-10-PCS | Mod: CPTII,S$GLB,, | Performed by: INTERNAL MEDICINE

## 2022-11-01 PROCEDURE — 82784 ASSAY IGA/IGD/IGG/IGM EACH: CPT | Performed by: SURGERY

## 2022-11-01 PROCEDURE — 86140 C-REACTIVE PROTEIN: CPT | Performed by: SURGERY

## 2022-11-01 PROCEDURE — 99215 OFFICE O/P EST HI 40 MIN: CPT | Mod: S$GLB,,, | Performed by: INTERNAL MEDICINE

## 2022-11-01 PROCEDURE — 3061F NEG MICROALBUMINURIA REV: CPT | Mod: CPTII,S$GLB,, | Performed by: INTERNAL MEDICINE

## 2022-11-01 PROCEDURE — 85025 COMPLETE CBC W/AUTO DIFF WBC: CPT | Performed by: SURGERY

## 2022-11-01 PROCEDURE — 3051F HG A1C>EQUAL 7.0%<8.0%: CPT | Mod: CPTII,S$GLB,, | Performed by: INTERNAL MEDICINE

## 2022-11-01 PROCEDURE — 82785 ASSAY OF IGE: CPT | Performed by: SURGERY

## 2022-11-01 PROCEDURE — 3066F PR DOCUMENTATION OF TREATMENT FOR NEPHROPATHY: ICD-10-PCS | Mod: CPTII,S$GLB,, | Performed by: INTERNAL MEDICINE

## 2022-11-01 PROCEDURE — 85652 RBC SED RATE AUTOMATED: CPT | Performed by: SURGERY

## 2022-11-01 PROCEDURE — 99215 PR OFFICE/OUTPT VISIT, EST, LEVL V, 40-54 MIN: ICD-10-PCS | Mod: S$GLB,,, | Performed by: INTERNAL MEDICINE

## 2022-11-01 PROCEDURE — 3061F PR NEG MICROALBUMINURIA RESULT DOCUMENTED/REVIEW: ICD-10-PCS | Mod: CPTII,S$GLB,, | Performed by: INTERNAL MEDICINE

## 2022-11-01 PROCEDURE — 3051F PR MOST RECENT HEMOGLOBIN A1C LEVEL 7.0 - < 8.0%: ICD-10-PCS | Mod: CPTII,S$GLB,, | Performed by: INTERNAL MEDICINE

## 2022-11-01 PROCEDURE — 3072F LOW RISK FOR RETINOPATHY: CPT | Mod: CPTII,S$GLB,, | Performed by: INTERNAL MEDICINE

## 2022-11-01 PROCEDURE — 3008F PR BODY MASS INDEX (BMI) DOCUMENTED: ICD-10-PCS | Mod: CPTII,S$GLB,, | Performed by: INTERNAL MEDICINE

## 2022-11-01 PROCEDURE — 3008F BODY MASS INDEX DOCD: CPT | Mod: CPTII,S$GLB,, | Performed by: INTERNAL MEDICINE

## 2022-11-01 PROCEDURE — 3066F NEPHROPATHY DOC TX: CPT | Mod: CPTII,S$GLB,, | Performed by: INTERNAL MEDICINE

## 2022-11-01 PROCEDURE — 3074F PR MOST RECENT SYSTOLIC BLOOD PRESSURE < 130 MM HG: ICD-10-PCS | Mod: CPTII,S$GLB,, | Performed by: INTERNAL MEDICINE

## 2022-11-01 PROCEDURE — 3079F DIAST BP 80-89 MM HG: CPT | Mod: CPTII,S$GLB,, | Performed by: INTERNAL MEDICINE

## 2022-11-01 PROCEDURE — 1159F MED LIST DOCD IN RCRD: CPT | Mod: CPTII,S$GLB,, | Performed by: INTERNAL MEDICINE

## 2022-11-01 PROCEDURE — 3074F SYST BP LT 130 MM HG: CPT | Mod: CPTII,S$GLB,, | Performed by: INTERNAL MEDICINE

## 2022-11-01 PROCEDURE — 3079F PR MOST RECENT DIASTOLIC BLOOD PRESSURE 80-89 MM HG: ICD-10-PCS | Mod: CPTII,S$GLB,, | Performed by: INTERNAL MEDICINE

## 2022-11-01 PROCEDURE — 36415 COLL VENOUS BLD VENIPUNCTURE: CPT | Performed by: SURGERY

## 2022-11-01 RX ORDER — PREDNISONE 20 MG/1
TABLET ORAL
Qty: 25 TABLET | Refills: 0 | Status: SHIPPED | OUTPATIENT
Start: 2022-11-01 | End: 2022-11-21

## 2022-11-01 NOTE — PROGRESS NOTES
Subjective:       Patient ID: Nathalie Krueger is a 57 y.o. female.    Chief Complaint: Shortness of Breath    56 year old woman with a history of HTN, HLD, DM2 who presents to pulmonary follow up for persistent cough and need for oxygen. She had an acute decompensation early January 2022 and has been on oxygen since August 2022 for persistent hypoxemia. Initial PFTs showed restriction with diffusion deficit and CT scans were noted for mild bronchiectasis.    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 She had a formal workup with PFTs done at that time, however no further imaging or workup had been done since. She stated those symptoms lasted for 1-2 days and she reportedly returned to baseline. Smoked for ~25 years on and off (total 10pack years). She quit prior to COVID.    The patient has been undergoing work-up with Juan due to her persistent hypoxemia and continues to be frustrated at her need for oxygen. She remains on DuoNebs BID, Stiolto daily, hypertonic saline nebs, Aerobika. In the interim she has also been diagnosed with sleep apnea with AHI 13 (CPAP is being shipped to her). TTE was also done that did not show elevated right sided pressures. PFTs done on 8/31 show worsening diffusion.   Review of Systems   Constitutional:  Positive for fatigue. Negative for weight loss.   Respiratory:  Positive for sputum production (clear, mild), shortness of breath, asthma nighttime symptoms and dyspnea on extertion. Negative for cough and wheezing.    Cardiovascular:  Negative for chest pain and leg swelling.   Endocrine: endocrine negative    Neurological: Negative.    Psychiatric/Behavioral: Negative.       Objective:      Physical Exam   Constitutional: She is oriented to person, place, and time. She appears well-developed and well-nourished.   95% on 2L NC  90% resting on RA   HENT:   Head: Normocephalic.    Cardiovascular: Normal rate, regular rhythm and normal heart sounds.   No murmur heard.  Pulmonary/Chest: Normal expansion, symmetric chest wall expansion, effort normal and breath sounds normal. She has no wheezes. She has no rhonchi. She has no rales.   Neurological: She is alert and oriented to person, place, and time.   Skin: Skin is warm and dry.   Psychiatric: She has a normal mood and affect. Her behavior is normal.   Nursing note and vitals reviewed.  Personal Diagnostic Review    CT of chest performed on 8/31/22 without contrast revealed FINDINGS:  Lungs and large airways: Trachea and proximal airways are patent.  3 mm more solid-appearing nodule, right upper lobe series 4, image 174, previously subsolid 3 mm.  3 mm solid nodule lingular region series 4, image 336, in retrospect, unchanged.  No new nodule.  No focal airspace disease.  Subtle diffuse bronchiectasis, unchanged.  No acute inflammatory change seen.  Pleura: No pleural effusion or thickening.  Heart and pericardium: Heart size is normal. No pericardial effusion.  No significant calcified atherosclerosis of the coronaries.  Mediastinum and jenny: No lymphadenopathy.  The thyroid gland is homogeneous, normal in size.  Chest wall and lower neck: Unremarkable.  Vessels: The ascending aorta is mildly dilated measuring 4.1 cm series 2, image 54, the descending thoracic aorta measures 2.4 cm, this could be associated with chronic hypertension.  There is no significant atherosclerotic plaque of the thoracic aorta..    Echocardiogram 8/31/22:  The left ventricle is normal in size with mild concentric hypertrophy and normal systolic function.  The estimated ejection fraction is 65%.  Normal left ventricular diastolic function.  Normal right ventricular size with normal right ventricular systolic function.  Normal central venous pressure (3 mmHg).  The estimated PA systolic pressure is 27 mmHg.  There is no pulmonary hypertension.  The ascending aorta is  mildly dilated. 3.7cm.    PFTs   22   FVC 2.21   FEV % 58%   FEV 1 1.86   FEV 1 % 62.5%       FEV1/FVC 84   LLN 67   TLC 3.24   TLC % 56%   DLCO 9.22   DLCO % 35.3%   VA 2.94   IVC 2.10     Pulse oximetry, restin% on 2L NC; 90% on RA    No flowsheet data found.      Assessment:       No diagnosis found.    Outpatient Encounter Medications as of 2022   Medication Sig Dispense Refill    albuterol (PROVENTIL/VENTOLIN HFA) 90 mcg/actuation inhaler Inhale 1-2 puffs into the lungs every 4 (four) hours as needed for Wheezing or Shortness of Breath. Rescue 54 g 5    albuterol-ipratropium (DUO-NEB) 2.5 mg-0.5 mg/3 mL nebulizer solution Take 3 mLs by nebulization 2 (two) times daily. Rescue 540 mL 1    calcium carbonate/vitamin D3 (CALCIUM WITH VITAMIN D ORAL) Take 1 tablet by mouth once daily.      carvediloL (COREG) 25 MG tablet Take 1 tablet (25 mg total) by mouth 2 (two) times daily with meals. 180 tablet 3    coenzyme Q10 100 mg capsule Take 200 mg by mouth.      metFORMIN (GLUCOPHAGE) 500 MG tablet Take 1 tablet (500 mg total) by mouth daily with breakfast. 90 tablet 3    multivitamin capsule Take 1 capsule by mouth once daily.      omeprazole (PRILOSEC) 40 MG capsule Take 1 capsule (40 mg total) by mouth once daily. 30 capsule 11    rosuvastatin (CRESTOR) 5 MG tablet Take 1 tablet (5 mg total) by mouth once daily. 90 tablet 3    semaglutide (OZEMPIC) 1 mg/dose (2 mg/1.5 mL) PnIj Inject 1 mg into the skin every 7 days. 2 pen 11    sodium chloride 3% 3 % nebulizer solution 3 CC OF 3% SALINE INTO NEBS TWICE DAILY AS NEEDED 750 mL 1    tiotropium-olodateroL (STIOLTO RESPIMAT) 2.5-2.5 mcg/actuation Mist INHALE 2 PUFFS INTO THE LUNGS ONCE DAILY. CONTROLLER (HOLD FLOVENT) 4 g 11     No facility-administered encounter medications on file as of 2022.     No orders of the defined types were placed in this encounter.      Plan:       Problem List Items Addressed This Visit          Pulmonary    Chronic  respiratory failure with hypoxia - Primary    Overview     Pt now requires oxygen with exertion and occasionally with rest. Currently 2L NC with exertion. Unknown etiology. Moderate to severe diffusion deficit.            Current Assessment & Plan     Still unknown etiology but a severe deficit in diffusion capacity with mild restriction again noted on her PFTs. We continue the workup with immunoglobulin levels, IgE, and CBC with eosinophilia for bronchiectasis. Will start a trial with inhaled steroids (Flovent) and do a steroid taper (20-day taper) followed by PFTs at the end of the steroid taper. If there is no improvement in her symptoms and/or decrease in her PFTs again, we will consider referral to the advanced lung disease service.   - ESR, CRP, Immunoglobulins, IgE, CBC  - start PO Prednisone Taper (40/30/20/10 over 20 days)  - start inhaled steroids Flovent (patient already has meds at home)  - [place] [facility] [place]         Relevant Medications    predniSONE (DELTASONE) 20 MG tablet    Other Relevant Orders    Complete PFT without bronchodilator - Hospital    Bronchiectasis without complication    Overview     Repeated CT scans note mild bronchiectasis         Current Assessment & Plan     Pt with very indiscrete bronchiectasis on repeat CT scan non-contrast. She has only mild sputum production and has had no real change with aggressive pulmonary toilet at home (hypertonic saline, Duo Nebs, Aerobika). She has also had no significant clinical changes with inhaled PAYAL, and inhaled LAMA-LABA. Etiology at the moment remains unclear but could represent early bronchiolitis with initial inciting exposure noted in 2020. Will continue to monitor as above and continue current therapy to prevent infection.  - hypertonic saline BID  - DuoNebs BID  - Aerobika following the above treatments         Relevant Medications    predniSONE (DELTASONE) 20 MG tablet    Other Relevant Orders    Complete PFT  without bronchodilator - Hospital

## 2022-11-02 ENCOUNTER — PATIENT MESSAGE (OUTPATIENT)
Dept: PULMONOLOGY | Facility: CLINIC | Age: 57
End: 2022-11-02
Payer: COMMERCIAL

## 2022-11-02 NOTE — ASSESSMENT & PLAN NOTE
Still unknown etiology but a severe deficit in diffusion capacity with mild restriction again noted on her PFTs. We continue the workup with immunoglobulin levels, IgE, and CBC with eosinophilia for bronchiectasis. Will start a trial with inhaled steroids (Flovent) and do a steroid taper (20-day taper) followed by PFTs at the end of the steroid taper. If there is no improvement in her symptoms and/or decrease in her PFTs again, we will consider referral to the advanced lung disease service.   - ESR, CRP, Immunoglobulins, IgE, CBC  - start PO Prednisone Taper (40/30/20/10 over 20 days)  - start inhaled steroids Flovent (patient already has meds at home)  - follow up PFTs in 1 month (at [facility] [place])

## 2022-11-02 NOTE — ASSESSMENT & PLAN NOTE
Pt with very indiscrete bronchiectasis on repeat CT scan non-contrast. She has only mild sputum production and has had no real change with aggressive pulmonary toilet at home (hypertonic saline, Duo Nebs, Aerobika). She has also had no significant clinical changes with inhaled PAYAL, and inhaled LAMA-LABA. Etiology at the moment remains unclear but could represent early bronchiolitis with initial inciting exposure noted in 2020. Will continue to monitor as above and continue current therapy to prevent infection.  - hypertonic saline BID  - DuoNebs BID  - Aerobika following the above treatments

## 2022-11-03 LAB
ACID FAST MOD KINY STN SPEC: NORMAL
ACID FAST MOD KINY STN SPEC: NORMAL
MYCOBACTERIUM SPEC QL CULT: NORMAL
MYCOBACTERIUM SPEC QL CULT: NORMAL

## 2022-11-08 ENCOUNTER — PATIENT MESSAGE (OUTPATIENT)
Dept: PULMONOLOGY | Facility: CLINIC | Age: 57
End: 2022-11-08
Payer: COMMERCIAL

## 2022-11-15 ENCOUNTER — PATIENT MESSAGE (OUTPATIENT)
Dept: PULMONOLOGY | Facility: CLINIC | Age: 57
End: 2022-11-15
Payer: COMMERCIAL

## 2022-11-17 ENCOUNTER — HOSPITAL ENCOUNTER (OUTPATIENT)
Dept: RESPIRATORY THERAPY | Facility: HOSPITAL | Age: 57
Discharge: HOME | End: 2022-11-17
Attending: NURSE PRACTITIONER
Payer: COMMERCIAL

## 2022-11-17 DIAGNOSIS — J96.11 CHRONIC RESPIRATORY FAILURE WITH HYPOXIA: ICD-10-CM

## 2022-11-17 DIAGNOSIS — J47.9 BRONCHIECTASIS WITHOUT COMPLICATION: ICD-10-CM

## 2022-11-17 PROCEDURE — 94727 GAS DIL/WSHOT DETER LNG VOL: CPT

## 2022-11-17 PROCEDURE — 94729 DIFFUSING CAPACITY: CPT | Mod: 26,,, | Performed by: INTERNAL MEDICINE

## 2022-11-17 PROCEDURE — 94729 DIFFUSING CAPACITY: CPT

## 2022-11-17 PROCEDURE — 94727 PR PULM FUNCTION TEST BY GAS: ICD-10-PCS | Mod: 26,,, | Performed by: INTERNAL MEDICINE

## 2022-11-17 PROCEDURE — 94729 PR C02/MEMBANE DIFFUSE CAPACITY: ICD-10-PCS | Mod: 26,,, | Performed by: INTERNAL MEDICINE

## 2022-11-17 PROCEDURE — 94010 BREATHING CAPACITY TEST: ICD-10-PCS | Mod: 26,,, | Performed by: INTERNAL MEDICINE

## 2022-11-17 PROCEDURE — 94010 BREATHING CAPACITY TEST: CPT

## 2022-11-17 PROCEDURE — 94010 BREATHING CAPACITY TEST: CPT | Mod: 26,,, | Performed by: INTERNAL MEDICINE

## 2022-11-17 PROCEDURE — 94727 GAS DIL/WSHOT DETER LNG VOL: CPT | Mod: 26,,, | Performed by: INTERNAL MEDICINE

## 2022-11-19 LAB
BRPFT: NORMAL
DLCO ADJ PRE: 9.1 ML/(MIN*MMHG)
DLCO SINGLE BREATH LLN: 20.34
DLCO SINGLE BREATH PRE REF: 35.6 %
DLCO SINGLE BREATH REF: 26.08
DLCOC SBVA LLN: 3.26
DLCOC SBVA PRE REF: 62.7 %
DLCOC SBVA REF: 4.53
DLCOC SINGLE BREATH LLN: 20.34
DLCOC SINGLE BREATH PRE REF: 34.9 %
DLCOC SINGLE BREATH REF: 26.08
DLCOVA LLN: 3.26
DLCOVA PRE REF: 64 %
DLCOVA PRE: 2.9 ML/(MIN*MMHG*L)
DLCOVA REF: 4.53
DLVAADJ PRE: 2.84 ML/(MIN*MMHG*L)
ERVN2 LLN: -16449.12
ERVN2 PRE REF: 53.3 %
ERVN2 PRE: 0.47 L
ERVN2 REF: 0.88
FEF 25 75 LLN: 1.36
FEF 25 75 PRE REF: 93.4 %
FEF 25 75 REF: 2.6
FEV1 FVC LLN: 67
FEV1 FVC PRE REF: 105.9 %
FEV1 FVC REF: 79
FEV1 LLN: 2.26
FEV1 PRE REF: 67.3 %
FEV1 REF: 2.97
FRCN2 LLN: 2.16
FRCN2 PRE REF: 47 %
FRCN2 REF: 2.98
FVC LLN: 2.9
FVC PRE REF: 63 %
FVC REF: 3.8
IVC PRE: 2.31 L
IVC SINGLE BREATH LLN: 2.9
IVC SINGLE BREATH PRE REF: 60.7 %
IVC SINGLE BREATH REF: 3.8
PEF LLN: 5.2
PEF PRE REF: 91.8 %
PEF REF: 7.18
PRE DLCO: 9.29 ML/(MIN*MMHG)
PRE FEF 25 75: 2.43 L/S
PRE FET 100: 8.31 SEC
PRE FEV1 FVC: 83.52 %
PRE FEV1: 2 L
PRE FRC N2: 1.4 L
PRE FVC: 2.4 L
PRE PEF: 6.59 L/S
RVN2 LLN: 1.52
RVN2 PRE REF: 44.4 %
RVN2 PRE: 0.93 L
RVN2 REF: 2.1
RVN2TLCN2 LLN: 29.09
RVN2TLCN2 PRE REF: 73.1 %
RVN2TLCN2 PRE: 28.27 %
RVN2TLCN2 REF: 38.68
TLCN2 LLN: 4.77
TLCN2 PRE REF: 57.1 %
TLCN2 PRE: 3.29 L
TLCN2 REF: 5.76
VA PRE: 3.21 L
VA SINGLE BREATH LLN: 5.61
VA SINGLE BREATH PRE REF: 57.3 %
VA SINGLE BREATH REF: 5.61
VCMAXN2 LLN: 2.9
VCMAXN2 PRE REF: 62.1 %
VCMAXN2 PRE: 2.36 L
VCMAXN2 REF: 3.8

## 2022-11-21 ENCOUNTER — PATIENT MESSAGE (OUTPATIENT)
Dept: PULMONOLOGY | Facility: CLINIC | Age: 57
End: 2022-11-21
Payer: COMMERCIAL

## 2022-11-22 ENCOUNTER — TELEPHONE (OUTPATIENT)
Dept: PULMONOLOGY | Facility: CLINIC | Age: 57
End: 2022-11-22
Payer: COMMERCIAL

## 2022-11-22 DIAGNOSIS — R06.02 SOB (SHORTNESS OF BREATH): Primary | ICD-10-CM

## 2022-11-25 ENCOUNTER — PATIENT MESSAGE (OUTPATIENT)
Dept: PULMONOLOGY | Facility: CLINIC | Age: 57
End: 2022-11-25
Payer: COMMERCIAL

## 2022-11-29 DIAGNOSIS — J47.9 BRONCHIECTASIS WITHOUT COMPLICATION: Primary | ICD-10-CM

## 2022-11-29 RX ORDER — AZITHROMYCIN 250 MG/1
TABLET, FILM COATED ORAL
Qty: 6 TABLET | Refills: 0 | Status: SHIPPED | OUTPATIENT
Start: 2022-11-29 | End: 2022-12-06

## 2022-12-06 ENCOUNTER — HOSPITAL ENCOUNTER (OUTPATIENT)
Dept: PULMONOLOGY | Facility: CLINIC | Age: 57
Discharge: HOME | End: 2022-12-06
Payer: COMMERCIAL

## 2022-12-06 ENCOUNTER — OFFICE VISIT (OUTPATIENT)
Dept: PULMONOLOGY | Facility: CLINIC | Age: 57
End: 2022-12-06
Payer: COMMERCIAL

## 2022-12-06 VITALS
SYSTOLIC BLOOD PRESSURE: 145 MMHG | DIASTOLIC BLOOD PRESSURE: 84 MMHG | WEIGHT: 225 LBS | OXYGEN SATURATION: 98 % | WEIGHT: 225 LBS | HEIGHT: 69 IN | HEART RATE: 83 BPM | BODY MASS INDEX: 33.33 KG/M2 | BODY MASS INDEX: 33.33 KG/M2 | HEIGHT: 69 IN

## 2022-12-06 DIAGNOSIS — J47.9 BRONCHIECTASIS WITHOUT COMPLICATION: ICD-10-CM

## 2022-12-06 DIAGNOSIS — J96.11 CHRONIC RESPIRATORY FAILURE WITH HYPOXIA: ICD-10-CM

## 2022-12-06 DIAGNOSIS — R06.02 SOB (SHORTNESS OF BREATH): ICD-10-CM

## 2022-12-06 DIAGNOSIS — G47.33 MILD OBSTRUCTIVE SLEEP APNEA: ICD-10-CM

## 2022-12-06 PROCEDURE — 3008F PR BODY MASS INDEX (BMI) DOCUMENTED: ICD-10-PCS | Mod: CPTII,S$GLB,, | Performed by: INTERNAL MEDICINE

## 2022-12-06 PROCEDURE — 3079F PR MOST RECENT DIASTOLIC BLOOD PRESSURE 80-89 MM HG: ICD-10-PCS | Mod: CPTII,S$GLB,, | Performed by: INTERNAL MEDICINE

## 2022-12-06 PROCEDURE — 3077F PR MOST RECENT SYSTOLIC BLOOD PRESSURE >= 140 MM HG: ICD-10-PCS | Mod: CPTII,S$GLB,, | Performed by: INTERNAL MEDICINE

## 2022-12-06 PROCEDURE — 99999 PR PBB SHADOW E&M-EST. PATIENT-LVL III: CPT | Mod: PBBFAC,,, | Performed by: INTERNAL MEDICINE

## 2022-12-06 PROCEDURE — 99213 PR OFFICE/OUTPT VISIT, EST, LEVL III, 20-29 MIN: ICD-10-PCS | Mod: 25,S$GLB,, | Performed by: INTERNAL MEDICINE

## 2022-12-06 PROCEDURE — 99213 OFFICE O/P EST LOW 20 MIN: CPT | Mod: 25,S$GLB,, | Performed by: INTERNAL MEDICINE

## 2022-12-06 PROCEDURE — 3077F SYST BP >= 140 MM HG: CPT | Mod: CPTII,S$GLB,, | Performed by: INTERNAL MEDICINE

## 2022-12-06 PROCEDURE — 3008F BODY MASS INDEX DOCD: CPT | Mod: CPTII,S$GLB,, | Performed by: INTERNAL MEDICINE

## 2022-12-06 PROCEDURE — 94618 PULMONARY STRESS TESTING: ICD-10-PCS | Mod: S$GLB,,, | Performed by: INTERNAL MEDICINE

## 2022-12-06 PROCEDURE — 94618 PULMONARY STRESS TESTING: CPT | Mod: S$GLB,,, | Performed by: INTERNAL MEDICINE

## 2022-12-06 PROCEDURE — 99999 PR PBB SHADOW E&M-EST. PATIENT-LVL III: ICD-10-PCS | Mod: PBBFAC,,, | Performed by: INTERNAL MEDICINE

## 2022-12-06 PROCEDURE — 3079F DIAST BP 80-89 MM HG: CPT | Mod: CPTII,S$GLB,, | Performed by: INTERNAL MEDICINE

## 2022-12-06 RX ORDER — AZITHROMYCIN 250 MG/1
250 TABLET, FILM COATED ORAL
Qty: 15 TABLET | Refills: 6 | Status: SHIPPED | OUTPATIENT
Start: 2022-12-06 | End: 2022-12-09 | Stop reason: SDUPTHER

## 2022-12-06 NOTE — PROCEDURES
Nathalie Krueger is a 57 y.o.  female patient, who presents for a 6 minute walk test ordered by MD Adrianna.  The diagnosis is Shortness of Breath; Bronchiectasis.  The patient's BMI is 33.2 kg/m2.  Predicted distance (lower limit of normal) is 332.69 meters.      Test Results:    The test was completed without stopping.  The total time walked was 360 seconds.  During walking, the patient reported:  No complaints.  The patient used supplemental oxygen during testing.     12/06/2022---------Distance: 365.76 meters (1200 feet)     O2 Sat % Supplemental Oxygen Heart Rate Blood Pressure Ashlie Scale   Pre-exercise  (Resting) 98 % 2 L/M 83 bpm 145/84 mmHg 0   During Exercise 84 % 2 L/M 96 bpm 160/79 mmHg 0.5   Post-exercise  (Recovery) 95 % 2 L/M  87 bpm       Recovery Time: 90 seconds    Performing nurse/tech: Veronica TOBAR      PREVIOUS STUDY:   The patient has not had a previous study.      CLINICAL INTERPRETATION:  Six minute walk distance is 365.76 meters (1200 feet) with very, very light dyspnea.  During exercise, there was significant desaturation while breathing supplemental oxygen.  Both blood pressure and heart rate remained stable with walking.  The patient did not report non-pulmonary symptoms during exercise.  No previous study performed.  Based upon age and body mass index, exercise capacity is normal.

## 2022-12-13 PROBLEM — E66.09 CLASS 1 OBESITY DUE TO EXCESS CALORIES WITH BODY MASS INDEX (BMI) OF 34.0 TO 34.9 IN ADULT: Status: ACTIVE | Noted: 2022-12-13

## 2022-12-13 PROBLEM — E66.811 CLASS 1 OBESITY DUE TO EXCESS CALORIES WITH BODY MASS INDEX (BMI) OF 34.0 TO 34.9 IN ADULT: Status: ACTIVE | Noted: 2022-12-13

## 2022-12-13 PROBLEM — G47.33 MILD OBSTRUCTIVE SLEEP APNEA: Status: ACTIVE | Noted: 2022-12-13

## 2022-12-26 ENCOUNTER — PATIENT MESSAGE (OUTPATIENT)
Dept: FAMILY MEDICINE | Facility: CLINIC | Age: 57
End: 2022-12-26
Payer: COMMERCIAL

## 2022-12-26 DIAGNOSIS — E11.9 TYPE 2 DIABETES MELLITUS WITHOUT COMPLICATION, WITHOUT LONG-TERM CURRENT USE OF INSULIN: Primary | ICD-10-CM

## 2022-12-28 RX ORDER — DULAGLUTIDE 1.5 MG/.5ML
1.5 INJECTION, SOLUTION SUBCUTANEOUS
Qty: 4 PEN | Refills: 11 | Status: SHIPPED | OUTPATIENT
Start: 2022-12-28 | End: 2023-03-16

## 2023-01-02 ENCOUNTER — PATIENT MESSAGE (OUTPATIENT)
Dept: FAMILY MEDICINE | Facility: CLINIC | Age: 58
End: 2023-01-02
Payer: COMMERCIAL

## 2023-01-04 NOTE — PROGRESS NOTES
Subjective:       Patient ID: Nathalie Krueger is a 57 y.o. female.    Chief Complaint: No chief complaint on file.    HPI   The patient was seen/evaluated in person at this visit on 12/06/2022.      Ms. Krueger completed the course of steroids started at her last Pulmonary Clinic visit in early November.  She reported increased cough/congestion around Thanksgiving at the same time that her partner was having similar respiratory symptoms.  I sent her a prescription for a Z-janna last week for possible bronchitis.  Since that time, she feels that she is returning to her respiratory baseline with improvement in cough and less dyspnea.  She is still using CPAP and prn oxygen.  She returns at this visit for interval follow up and reassessment of pulmonary physiology.  Please see my addendum to that clinic visit note for further details regarding her past pulmonary history.  Based upon the prior test results, I wonder if she has an element of bronchiectasis or bronchiolitis from her past inhalational exposure.    She is moving to the [facility] and has noted some increase in dyspnea with the physical activity associated with packing/moving boxes.  She also reports being ready to ramp up exercise activity in hopes of improving her respiratory reserve.  She is planning to follow with Pulmonary on the [facility].  She was seen by Dr. Haque on 12/13/2022.          Review of Systems   Constitutional:  Negative for fever, chills, activity change and fatigue.   Respiratory:  Positive for cough, shortness of breath and dyspnea on extertion. Negative for hemoptysis, wheezing and asthma nighttime symptoms.    Cardiovascular:  Negative for chest pain, palpitations and leg swelling.       Objective:      Physical Exam   Constitutional: She is oriented to person, place, and time. She appears well-developed and well-nourished. No distress.   Cardiovascular: Normal rate, regular rhythm and normal heart sounds. Exam reveals no gallop.   No  murmur heard.  Pulmonary/Chest: Normal expansion, symmetric chest wall expansion, effort normal and breath sounds normal. No stridor. No respiratory distress. She has no decreased breath sounds. She has no wheezes. She has no rhonchi. She has no rales.   Musculoskeletal:         General: No edema.   Neurological: She is alert and oriented to person, place, and time. Gait normal.   Skin: No cyanosis. Nails show no clubbing.   Psychiatric: She has a normal mood and affect. Judgment normal.   Nursing note and vitals reviewed.    Personal Diagnostic Review    PFTs repeated after course of steroids show no change in restriction or DLCO impairment.  6MWT shows exercise capacity above the predicted LLN but with significant oxygen desaturation with exertion despite supplemental oxygen at 2 L/M.  HR and BP responded appropriately to exercise.    PFTs 10/30/2020  [place], [place] 8/31/2022  [facility] 11/17/2022  [facility]   FVC  (pre-BD) 2.91 2.21 2.40   FVC%  72% 58% 63%   FEV1 (pre-BD) 2.51 1.86 2.00   FEV1%  79% 63% 67%   FEV1/FVC  86 84 84   FVC (post-BD) 3.13 2.42    FVC% 77% 63%    FEV1 (post-BD) 2.69 2.00    FEV1% 85% 67%    FEV1/FVC 86 83    SVC 3.01 2.26 2.36   SVC % 74% 59% 62%   TLC  4.48 3.24 3.29   TLC%  75% 56% 57%   RV  1.47 0.98 0.93   RV%  66% 47% 44%   DLCO   (uncorr) 17.07 9.22 9.29   DLCO   (john)   9.10   DLCO%  52% 35% 35%   VA 4.13 2.94 3.21   IVC   2.10 2.31   6MWT   12/06/2022   Distance   366 meters   SpO2 bartolome   84 %   SpO2 delta   -14%   Oxygen   2 L/M       Assessment:       1. Bronchiectasis without complication    2. Chronic respiratory failure with hypoxia    3. Mild obstructive sleep apnea          Outpatient Encounter Medications as of 12/6/2022   Medication Sig Dispense Refill    albuterol (PROVENTIL/VENTOLIN HFA) 90 mcg/actuation inhaler Inhale 1-2 puffs into the lungs every 4 (four) hours as needed for Wheezing or Shortness of Breath. Rescue 54 g 5    albuterol-ipratropium (DUO-NEB) 2.5 mg-0.5  mg/3 mL nebulizer solution Take 3 mLs by nebulization 2 (two) times daily. Rescue 540 mL 1    calcium carbonate/vitamin D3 (CALCIUM WITH VITAMIN D ORAL) Take 1 tablet by mouth once daily.      carvediloL (COREG) 25 MG tablet Take 1 tablet (25 mg total) by mouth 2 (two) times daily with meals. 180 tablet 3    coenzyme Q10 100 mg capsule Take 200 mg by mouth.      metFORMIN (GLUCOPHAGE) 500 MG tablet Take 1 tablet (500 mg total) by mouth daily with breakfast. 90 tablet 3    multivitamin capsule Take 1 capsule by mouth once daily.      omeprazole (PRILOSEC) 40 MG capsule TAKE 1 CAPSULE BY MOUTH EVERY DAY 90 capsule 0    rosuvastatin (CRESTOR) 5 MG tablet Take 1 tablet (5 mg total) by mouth once daily. 90 tablet 3    sodium chloride 3% 3 % nebulizer solution 3 CC OF 3% SALINE INTO NEBS TWICE DAILY AS NEEDED 750 mL 1    tiotropium-olodateroL (STIOLTO RESPIMAT) 2.5-2.5 mcg/actuation Mist INHALE 2 PUFFS INTO THE LUNGS ONCE DAILY. CONTROLLER (HOLD FLOVENT) 4 g 11    [DISCONTINUED] azithromycin (Z-MASTER) 250 MG tablet Take 2 tablets by mouth on day 1; Take 1 tablet by mouth on days 2-5 6 tablet 0    [DISCONTINUED] semaglutide (OZEMPIC) 1 mg/dose (2 mg/1.5 mL) PnIj Inject 1 mg into the skin every 7 days. 3 pen 1    [DISCONTINUED] azithromycin (Z-MASTER) 250 MG tablet Take 1 tablet (250 mg total) by mouth 3 (three) times a week. Take 2 tablets by mouth on day 1; Take 1 tablet by mouth on days 2-5 15 tablet 6     No facility-administered encounter medications on file as of 12/6/2022.     No orders of the defined types were placed in this encounter.      Plan:     Problem List Items Addressed This Visit       Bronchiectasis without complication    Overview     Repeated CT scans note mild bronchiectasis         Current Assessment & Plan     Immunoglobulins normal except for reduced IgM => doubtful that this contributor to current pulmonary status.  Repeat PFTs show no change in lung physiology despite recent course of  steroids/antibiotics.  6MWT with exercise-associated desaturation  Continue bronchodilator  Continued justification for supplemental oxygen during exertion         Chronic respiratory failure with hypoxia    Overview     Pt now requires oxygen with exertion and occasionally with rest. Currently 2L NC with exertion. Unknown etiology. Moderate to severe diffusion deficit.            Current Assessment & Plan     Continues to desaturate with 6MWT         Mild obstructive sleep apnea    Overview     8/23/22 AHI 13, desats to 76%         Current Assessment & Plan     Compliant with CPAP based upon August 2022 polysomnogram.            Should be okay to resume more physical activity for re-conditioning.  Will need ongoing monitoring of PFTs and 6MWT.      Patient will be transferring Pulmonary care to [facility] after moving to the [facility].        Enmanuel Rodas MD  Pulmonary/Critical Care Medicine

## 2023-01-10 ENCOUNTER — PATIENT MESSAGE (OUTPATIENT)
Dept: OPHTHALMOLOGY | Facility: CLINIC | Age: 58
End: 2023-01-10
Payer: COMMERCIAL

## 2023-01-10 PROBLEM — J98.4 RESTRICTIVE LUNG DISEASE: Status: ACTIVE | Noted: 2023-01-10

## 2023-01-11 ENCOUNTER — OFFICE VISIT (OUTPATIENT)
Dept: OPHTHALMOLOGY | Facility: CLINIC | Age: 58
End: 2023-01-11
Payer: COMMERCIAL

## 2023-01-11 DIAGNOSIS — E11.9 TYPE 2 DIABETES MELLITUS WITHOUT COMPLICATION, WITHOUT LONG-TERM CURRENT USE OF INSULIN: ICD-10-CM

## 2023-01-11 DIAGNOSIS — E11.36 DIABETIC CATARACT OF BOTH EYES: ICD-10-CM

## 2023-01-11 DIAGNOSIS — E11.9 DIABETES MELLITUS TYPE 2 WITHOUT RETINOPATHY: Primary | ICD-10-CM

## 2023-01-11 PROCEDURE — 1159F PR MEDICATION LIST DOCUMENTED IN MEDICAL RECORD: ICD-10-PCS | Mod: CPTII,S$GLB,, | Performed by: OPHTHALMOLOGY

## 2023-01-11 PROCEDURE — 99999 PR PBB SHADOW E&M-EST. PATIENT-LVL III: ICD-10-PCS | Mod: PBBFAC,,, | Performed by: OPHTHALMOLOGY

## 2023-01-11 PROCEDURE — 1159F MED LIST DOCD IN RCRD: CPT | Mod: CPTII,S$GLB,, | Performed by: OPHTHALMOLOGY

## 2023-01-11 PROCEDURE — 1160F RVW MEDS BY RX/DR IN RCRD: CPT | Mod: CPTII,S$GLB,, | Performed by: OPHTHALMOLOGY

## 2023-01-11 PROCEDURE — 1160F PR REVIEW ALL MEDS BY PRESCRIBER/CLIN PHARMACIST DOCUMENTED: ICD-10-PCS | Mod: CPTII,S$GLB,, | Performed by: OPHTHALMOLOGY

## 2023-01-11 PROCEDURE — 2023F PR DILATED RETINAL EXAM W/O EVID OF RETINOPATHY: ICD-10-PCS | Mod: CPTII,S$GLB,, | Performed by: OPHTHALMOLOGY

## 2023-01-11 PROCEDURE — 92004 PR EYE EXAM, NEW PATIENT,COMPREHESV: ICD-10-PCS | Mod: S$GLB,,, | Performed by: OPHTHALMOLOGY

## 2023-01-11 PROCEDURE — 92015 DETERMINE REFRACTIVE STATE: CPT | Mod: S$GLB,,, | Performed by: OPHTHALMOLOGY

## 2023-01-11 PROCEDURE — 92015 PR REFRACTION: ICD-10-PCS | Mod: S$GLB,,, | Performed by: OPHTHALMOLOGY

## 2023-01-11 PROCEDURE — 2023F DILAT RTA XM W/O RTNOPTHY: CPT | Mod: CPTII,S$GLB,, | Performed by: OPHTHALMOLOGY

## 2023-01-11 PROCEDURE — 99999 PR PBB SHADOW E&M-EST. PATIENT-LVL III: CPT | Mod: PBBFAC,,, | Performed by: OPHTHALMOLOGY

## 2023-01-11 PROCEDURE — 92004 COMPRE OPH EXAM NEW PT 1/>: CPT | Mod: S$GLB,,, | Performed by: OPHTHALMOLOGY

## 2023-01-11 NOTE — PROGRESS NOTES
HPI     Diabetic Eye Exam     Additional comments: DM eye exam           Comments    DLE: x 18 months    Pt states some changes in dist va x 2 months. + 1 floater OD x 1 month, no   flashes.     Gtts: Systane PRN OU    LBSL: does not ck  Hemoglobin A1C       Date                     Value               Ref Range             Status                09/16/2022               7.4 (H)             4.0 - 5.6 %           Final                 01/31/2022               6.5 (H)             4.0 - 5.6 %           Final                 08/04/2021               6.3 (H)             4.0 - 5.6 %           Final                    Last edited by Cheryle Quintana on 1/11/2023  4:12 PM.            Assessment /Plan     For exam results, see Encounter Report.    Diabetes mellitus type 2 without retinopathy    Diabetic cataract of both eyes    Type 2 diabetes mellitus without complication, without long-term current use of insulin      -no retinopathy seen on DFE today  -continue good blood pressure and glucose control  -no decrease in ADLS, no significant glare, and functionality is satisfactory  -counseled on what to expect if the cataracts worsening and how it can effect the vision  -continue to monitor and if vision changes patient can call for another appointment  -continue with OTC readers  Follow up in about 1 year (around 1/11/2024) for Dilated Diabetic Fundus Exam.

## 2023-01-12 NOTE — ASSESSMENT & PLAN NOTE
· Immunoglobulins normal except for reduced IgM => doubtful that this contributor to current pulmonary status.  · Repeat PFTs show no change in lung physiology despite recent course of steroids/antibiotics.  · 6MWT with exercise-associated desaturation  · Continue bronchodilator  · Continued justification for supplemental oxygen during exertion

## 2023-01-17 ENCOUNTER — PATIENT MESSAGE (OUTPATIENT)
Dept: ADMINISTRATIVE | Facility: HOSPITAL | Age: 58
End: 2023-01-17
Payer: COMMERCIAL

## 2023-02-06 PROBLEM — J96.11 CHRONIC RESPIRATORY FAILURE WITH HYPOXIA: Status: RESOLVED | Noted: 2022-08-09 | Resolved: 2023-02-06

## 2023-02-08 ENCOUNTER — PATIENT MESSAGE (OUTPATIENT)
Dept: FAMILY MEDICINE | Facility: CLINIC | Age: 58
End: 2023-02-08
Payer: COMMERCIAL

## 2023-02-09 ENCOUNTER — PATIENT MESSAGE (OUTPATIENT)
Dept: FAMILY MEDICINE | Facility: CLINIC | Age: 58
End: 2023-02-09
Payer: COMMERCIAL

## 2023-02-24 ENCOUNTER — TELEPHONE (OUTPATIENT)
Dept: FAMILY MEDICINE | Facility: CLINIC | Age: 58
End: 2023-02-24
Payer: COMMERCIAL

## 2023-02-24 ENCOUNTER — TELEPHONE (OUTPATIENT)
Dept: PULMONOLOGY | Facility: CLINIC | Age: 58
End: 2023-02-24
Payer: COMMERCIAL

## 2023-02-24 DIAGNOSIS — J96.11 CHRONIC HYPOXEMIC RESPIRATORY FAILURE: ICD-10-CM

## 2023-02-24 DIAGNOSIS — J47.9 BRONCHIECTASIS WITHOUT COMPLICATION: Primary | ICD-10-CM

## 2023-02-24 NOTE — TELEPHONE ENCOUNTER
----- Message from Kwaku Ledesma sent at 2/24/2023 10:51 AM CST -----  Contact: Self  Type:  Patient Requesting Referral    Who Called:  Patient    Referral to What Specialty:  Pulm  Reason for Referral:  COPD  Does the patient want the referral with a specific physician?:  [facility]  [facility] the [facility]  Patient Requesting a Call Back?:  Call  Best Call Back Number:  [phone]   Additional Information:   States she would prefer to see provider in [facility]/[place]

## 2023-02-28 ENCOUNTER — LAB VISIT (OUTPATIENT)
Dept: LAB | Facility: HOSPITAL | Age: 58
End: 2023-02-28
Attending: INTERNAL MEDICINE
Payer: COMMERCIAL

## 2023-02-28 ENCOUNTER — OFFICE VISIT (OUTPATIENT)
Dept: PULMONOLOGY | Facility: CLINIC | Age: 58
End: 2023-02-28
Payer: COMMERCIAL

## 2023-02-28 VITALS
HEART RATE: 89 BPM | DIASTOLIC BLOOD PRESSURE: 86 MMHG | OXYGEN SATURATION: 92 % | HEIGHT: 69 IN | BODY MASS INDEX: 34.93 KG/M2 | SYSTOLIC BLOOD PRESSURE: 124 MMHG | WEIGHT: 235.81 LBS

## 2023-02-28 DIAGNOSIS — J98.4 RESTRICTIVE LUNG DISEASE: Primary | ICD-10-CM

## 2023-02-28 DIAGNOSIS — J47.9 BRONCHIECTASIS WITHOUT COMPLICATION: ICD-10-CM

## 2023-02-28 DIAGNOSIS — I27.20 PULMONARY HTN: ICD-10-CM

## 2023-02-28 DIAGNOSIS — J96.11 CHRONIC HYPOXEMIC RESPIRATORY FAILURE: ICD-10-CM

## 2023-02-28 DIAGNOSIS — G47.33 MILD OBSTRUCTIVE SLEEP APNEA: ICD-10-CM

## 2023-02-28 DIAGNOSIS — R91.1 LUNG NODULE: ICD-10-CM

## 2023-02-28 DIAGNOSIS — D80.4 IGM DEFICIENCY: ICD-10-CM

## 2023-02-28 DIAGNOSIS — I27.81 COR PULMONALE: ICD-10-CM

## 2023-02-28 PROBLEM — R06.02 SOB (SHORTNESS OF BREATH): Status: RESOLVED | Noted: 2022-02-01 | Resolved: 2023-02-28

## 2023-02-28 PROBLEM — Z78.9 ON SUPPLEMENTAL OXYGEN BY NASAL CANNULA: Status: RESOLVED | Noted: 2022-09-16 | Resolved: 2023-02-28

## 2023-02-28 LAB
BNP SERPL-MCNC: <10 PG/ML (ref 0–99)
C3 SERPL-MCNC: 151 MG/DL (ref 50–180)
C4 SERPL-MCNC: 30 MG/DL (ref 11–44)
HIV 1+2 AB+HIV1 P24 AG SERPL QL IA: NORMAL

## 2023-02-28 PROCEDURE — 3079F DIAST BP 80-89 MM HG: CPT | Mod: CPTII,S$GLB,, | Performed by: INTERNAL MEDICINE

## 2023-02-28 PROCEDURE — 99215 PR OFFICE/OUTPT VISIT, EST, LEVL V, 40-54 MIN: ICD-10-PCS | Mod: S$GLB,,, | Performed by: INTERNAL MEDICINE

## 2023-02-28 PROCEDURE — 1159F MED LIST DOCD IN RCRD: CPT | Mod: CPTII,S$GLB,, | Performed by: INTERNAL MEDICINE

## 2023-02-28 PROCEDURE — 83880 ASSAY OF NATRIURETIC PEPTIDE: CPT | Performed by: INTERNAL MEDICINE

## 2023-02-28 PROCEDURE — 3008F PR BODY MASS INDEX (BMI) DOCUMENTED: ICD-10-PCS | Mod: CPTII,S$GLB,, | Performed by: INTERNAL MEDICINE

## 2023-02-28 PROCEDURE — 87389 HIV-1 AG W/HIV-1&-2 AB AG IA: CPT | Performed by: INTERNAL MEDICINE

## 2023-02-28 PROCEDURE — 1159F PR MEDICATION LIST DOCUMENTED IN MEDICAL RECORD: ICD-10-PCS | Mod: CPTII,S$GLB,, | Performed by: INTERNAL MEDICINE

## 2023-02-28 PROCEDURE — 3074F PR MOST RECENT SYSTOLIC BLOOD PRESSURE < 130 MM HG: ICD-10-PCS | Mod: CPTII,S$GLB,, | Performed by: INTERNAL MEDICINE

## 2023-02-28 PROCEDURE — 99999 PR PBB SHADOW E&M-EST. PATIENT-LVL IV: CPT | Mod: PBBFAC,,, | Performed by: INTERNAL MEDICINE

## 2023-02-28 PROCEDURE — 86162 COMPLEMENT TOTAL (CH50): CPT | Performed by: INTERNAL MEDICINE

## 2023-02-28 PROCEDURE — 86160 COMPLEMENT ANTIGEN: CPT | Mod: 59 | Performed by: INTERNAL MEDICINE

## 2023-02-28 PROCEDURE — 3074F SYST BP LT 130 MM HG: CPT | Mod: CPTII,S$GLB,, | Performed by: INTERNAL MEDICINE

## 2023-02-28 PROCEDURE — 3008F BODY MASS INDEX DOCD: CPT | Mod: CPTII,S$GLB,, | Performed by: INTERNAL MEDICINE

## 2023-02-28 PROCEDURE — 3079F PR MOST RECENT DIASTOLIC BLOOD PRESSURE 80-89 MM HG: ICD-10-PCS | Mod: CPTII,S$GLB,, | Performed by: INTERNAL MEDICINE

## 2023-02-28 PROCEDURE — 99999 PR PBB SHADOW E&M-EST. PATIENT-LVL IV: ICD-10-PCS | Mod: PBBFAC,,, | Performed by: INTERNAL MEDICINE

## 2023-02-28 PROCEDURE — 86160 COMPLEMENT ANTIGEN: CPT | Performed by: INTERNAL MEDICINE

## 2023-02-28 PROCEDURE — 99215 OFFICE O/P EST HI 40 MIN: CPT | Mod: S$GLB,,, | Performed by: INTERNAL MEDICINE

## 2023-02-28 NOTE — PROGRESS NOTES
"2/28/2023    Nathalie Krueger  New Patient Consult    Chief Complaint   Patient presents with    Bronchiectasis       HPI:Pt is a 58 yo female with DM2, GERD, HTN, bronchiectasis, ORLIN and chronic hypoxemic resp failure presenting to establish new pulmonologist. She has previously been seen by pulmonology offices at [facility], [facility], and [facility]. Her wife vilma is present and assists w/ history.  Pt lived in [place], moved to  2 yr ago. . Since moving here she has experienced worsening SOB, fatigue, noted low sats and went to ED 1/2022. Aug 2022 she had a "flare up" and started requiring 2L oxygen w/ exertion. For 2 mos she felt very bad, reports steroids may have helped- gave her energy.    Used to have hacking cough attributed to lisinopril, now different type of cough w/ mucous coming up. She may have had respiratory infection which lasted about 2d in December, resolved spontaneously. She has been on azithromycin 4x/week since suppressive tx.  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   Nebulizer: albuterol every other day. Doesn't use duo neb or saline. Uses stiolto and pulmicort inhalers daily.  Pt goes on walks, goes swimming, functions much better since move to [place] in 12/2022. After exercise has to bump up O2 to 3-4L to recover.  She has mild ORLIN, always slept well, has CPAP w/ bleed in O2.  Pt quit smoking 3-4 yr ago, smoked about 25-30 yrs.  FH father had lung problems, was a diver in the [employer] and was forced to stop.  Pt used to be an athlete- played basketball college and soccer.  She would like to travel, asks about overseas and plane travel w/ oxygen    The chief complaint problem is new to me.    PFSH:  Past Medical History:   Diagnosis Date    Acute bronchitis 2/1/2022    Bronchiectasis     Diabetes mellitus, type 2     GERD (gastroesophageal reflux disease)     Hypertension          Past Surgical " "History:   Procedure Laterality Date    ABLATION, FIBROID, UTERUS, LAPAROSCOPIC, WITH US GUIDANCE      APPENDECTOMY  1982    right rotator cuff  1998    TONSILLECTOMY  1977     Social History     Tobacco Use    Smoking status: Former     Years: 25.00     Types: Cigarettes     Quit date: 01/2020     Years since quitting: 3.1    Smokeless tobacco: Never   Substance Use Topics    Alcohol use: Never    Drug use: Never     Family History   Problem Relation Age of Onset    Diabetes Mother     Hypertension Mother     Heart disease Mother     Diabetes Father     Glaucoma Neg Hx     Macular degeneration Neg Hx      Review of patient's allergies indicates:   Allergen Reactions    Atorvastatin Other (See Comments)     Dry cough      Lisinopril      Other reaction(s): Cough    Meperidine hcl Nausea Only       Performance Status:The patient's activity level is regular exercise.      Review of Systems:  a review of eleven systems covering constitutional, Eye, HEENT, Psych, Respiratory, Cardiac, GI, , Musculoskeletal, Endocrine, Dermatologic was negative except for pertinent findings as listed ABOVE and below:  Dry eyes, dry mouth  Rash right AC- red bumps, now resolved       Exam:Comprehensive exam done. /86 (BP Location: Right arm, Patient Position: Sitting, BP Method: Large (Automatic))   Pulse 89   Ht 5' 9" (1.753 m)   Wt 106.9 kg (235 lb 12.5 oz)   SpO2 (!) 92% Comment: at rest on room air  BMI 34.82 kg/m²   Exam included Vitals as listed, and patient's appearance and affect and alertness and mood, oral exam for yeast and hygiene and pharynx lesions and Mallapatti (M) score, neck with inspection for jvd and masses and thyroid abnormalities and lymph nodes (supraclavicular and infraclavicular nodes and axillary also examined and noted if abn), chest exam included symmetry and effort and fremitus and percussion and auscultation, cardiac exam included rhythm and gallops and murmur and rubs and jvd and edema, " abdominal exam for mass and hepatosplenomegaly and tenderness and hernias and bowel sounds, Musculoskeletal exam with muscle tone and posture and mobility/gait and  strength, and skin for rashes and cyanosis and pallor and turgor, extremity for clubbing.  Findings were normal except for pertinent findings listed below:  M3, oropharynx clear and slightly dry  HR regular, fixed split S2  Breath sounds clear bilaterally  No joint tenderness, rashes  No edema/clubbing    Radiographs (ct chest and cxr) reviewed: view by direct vision   CT chest 8/31/22- mild diffuse bronchiectasis  3 mm more solid-appearing nodule, right upper lobe series 4, image 174, previously subsolid 3 mm.  3 mm solid nodule lingular region series 4, image 336, in retrospect, unchanged.  No new nodule.  No focal airspace disease.    TTE 1/3/23-   The left ventricle is normal in size with mild concentric hypertrophy and normal systolic function.  The estimated ejection fraction is 60%.  Grade I left ventricular diastolic dysfunction.  Moderate right ventricular enlargement with low normal right ventricular systolic function.  Mild left atrial enlargement.  The estimated PA systolic pressure is 29 mmHg.  The aortic root is mildly dilated at 3.9 cm  There is no evidence of intracardiac shunting (negative bubble study)    Labs reviewed    Sputum cultures AFB and routine 9/2022- no growth     Latest Reference Range & Units 11/01/22 15:36   IgG 650 - 1600 mg/dL 882   IgM 50 - 300 mg/dL 17 (L)   IgA 40 - 350 mg/dL 205   IgE 0 - 100 IU/mL <35      Latest Reference Range & Units 09/09/22 15:20   DAYTON Screen Negative <1:80  Positive !   DAYTON Titer 1  1:80   DAYTON PATTERN 1  Homogeneous   ds DNA Ab Negative 1:10  Negative 1:10   Anti-SSA Antibody 0.00 - 0.99 Ratio  0.00 - 0.99 Ratio 0.06  0.06   Anti-SSA Interpretation Negative   Negative  Negative  Negative   Anti-SSB Antibody 0.00 - 0.99 Ratio  0.00 - 0.99 Ratio 0.04  0.04   Anti-SSB Interpretation Negative    Negative  Negative  Negative   Anti Sm Antibody 0.00 - 0.99 Ratio 0.07   Anti-Sm Interpretation Negative  Negative   Anti Sm/RNP Antibody 0.00 - 0.99 Ratio 0.05   Anti-Sm/RNP Interpretation Negative  Negative   Scleroderma SCL- <20 UNITS 3      Latest Reference Range & Units 09/09/22 15:20   Rheumatoid Factor 0.0 - 15.0 IU/mL <13.0       PFT results reviewed  11/19/22- moderate restriction, NO obstruction, dlco severely reduced- stable from previous PFT 3 mos prior      6mwt 12/6/22- 366m, min sat 84% req 2L oxygen    Plan:  Clinical impression is reasonably certain and repeated evaluation prn +/- follow up will be needed as below. Chronic bronchiectasis, pulm HTN, cor pulmonale (new latest echo), chronic resp failure establishing care. IgM deficiency may play a role in her bronchiectasis but she is asymptomatic and without recurrent infections. PH/RV failure has been unrecognized until now- sending for RHC, vasoreactivity challenge and further workup.    Nathalie was seen today for bronchiectasis.    Diagnoses and all orders for this visit:    Restrictive lung disease    Bronchiectasis without complication  -     Ambulatory referral/consult to Pulmonology    Chronic hypoxemic respiratory failure  -     Ambulatory referral/consult to Pulmonology    Mild obstructive sleep apnea    Pulmonary HTN  -     HIV 1/2 Ag/Ab (4th Gen); Future  -     B-TYPE NATRIURETIC PEPTIDE; Future  -     PULSE OXIMETRY OVERNIGHT; Future  -     Ambulatory referral/consult to Transplant Cardiologist; Future  -     C3 COMPLEMENT; Future  -     C4 COMPLEMENT; Future  -     CH50 COMPLEMENT (TOTAL); Future  -     DAYTON; Future  -     BETA-2 GLYCOPROTEIN ANTIBODIES; Future  -     LUPUS ANTICOAGULANT (DRVVT); Future  -     ANTIPHOSPHOLIPID AB (ANTICARDIOLIPIN); Future  -     Sedimentation rate; Future  -     C-REACTIVE PROTEIN; Future    Cor pulmonale    Lung nodule    IgM deficiency        Follow up in about 2 months (around 4/28/2023).    Discussed  with patient above for education the following:      Patient Instructions   I suspect you have pulmonary hypertension causing shortness of breath, oxygen requirement  No COPD  Bronchiectasis is mild- may cause cough, mucous build up, may be prone to respiratory infections  Stop stiolto inhaler, continue pulmicort inhaler and albuterol nebulizer as needed  Stop azithromycin  Overnight pulse ox, complete off cpap and off oxygen  Continue oxygen to keep sats >92%    VQ scan to rule out blood clot  Blood work to rule out HIV, Lupus  Referral to Dr. Maye Hernandez for right heart cath  Fingerstick test to rule out A1AT deficiency    Eval took 64 min. Discussed above and multiple questions addressed.

## 2023-02-28 NOTE — PATIENT INSTRUCTIONS
I suspect you have pulmonary hypertension causing shortness of breath, oxygen requirement  No COPD  Bronchiectasis is mild- may cause cough, mucous build up, may be prone to respiratory infections  Stop stiolto inhaler, continue pulmicort inhaler and albuterol nebulizer as needed  Stop azithromycin  Overnight pulse ox, complete off cpap and off oxygen  Continue oxygen to keep sats >92%    VQ scan to rule out blood clot  Blood work to rule out HIV, Lupus  Referral to Dr. Maye Hernandez for right heart cath  Fingerstick test to rule out A1AT deficiency

## 2023-03-01 ENCOUNTER — PATIENT MESSAGE (OUTPATIENT)
Dept: PULMONOLOGY | Facility: CLINIC | Age: 58
End: 2023-03-01
Payer: COMMERCIAL

## 2023-03-01 DIAGNOSIS — J47.9 BRONCHIECTASIS WITHOUT COMPLICATION: Primary | ICD-10-CM

## 2023-03-01 RX ORDER — BUDESONIDE 90 UG/1
2 AEROSOL, POWDER RESPIRATORY (INHALATION) 2 TIMES DAILY
Qty: 3 EACH | Refills: 3 | Status: SHIPPED | OUTPATIENT
Start: 2023-03-01 | End: 2023-07-12

## 2023-03-03 ENCOUNTER — PATIENT MESSAGE (OUTPATIENT)
Dept: PULMONOLOGY | Facility: CLINIC | Age: 58
End: 2023-03-03
Payer: COMMERCIAL

## 2023-03-03 LAB — CH50 SERPL-ACNC: 69 U/ML (ref 42–95)

## 2023-03-07 ENCOUNTER — TELEPHONE (OUTPATIENT)
Dept: TRANSPLANT | Facility: CLINIC | Age: 58
End: 2023-03-07
Payer: COMMERCIAL

## 2023-03-07 DIAGNOSIS — R06.82 TACHYPNEA: Primary | ICD-10-CM

## 2023-03-07 DIAGNOSIS — Z79.899 POLYPHARMACY: ICD-10-CM

## 2023-03-07 DIAGNOSIS — I27.9 CHRONIC PULMONARY HEART DISEASE: ICD-10-CM

## 2023-03-16 ENCOUNTER — TELEPHONE (OUTPATIENT)
Dept: PULMONOLOGY | Facility: CLINIC | Age: 58
End: 2023-03-16
Payer: COMMERCIAL

## 2023-03-16 ENCOUNTER — PATIENT MESSAGE (OUTPATIENT)
Dept: PULMONOLOGY | Facility: CLINIC | Age: 58
End: 2023-03-16
Payer: COMMERCIAL

## 2023-03-16 ENCOUNTER — LAB VISIT (OUTPATIENT)
Dept: LAB | Facility: HOSPITAL | Age: 58
End: 2023-03-16
Attending: STUDENT IN AN ORGANIZED HEALTH CARE EDUCATION/TRAINING PROGRAM
Payer: COMMERCIAL

## 2023-03-16 ENCOUNTER — OFFICE VISIT (OUTPATIENT)
Dept: FAMILY MEDICINE | Facility: CLINIC | Age: 58
End: 2023-03-16
Payer: COMMERCIAL

## 2023-03-16 VITALS
BODY MASS INDEX: 35.17 KG/M2 | WEIGHT: 237.44 LBS | SYSTOLIC BLOOD PRESSURE: 150 MMHG | OXYGEN SATURATION: 94 % | DIASTOLIC BLOOD PRESSURE: 84 MMHG | HEIGHT: 69 IN | HEART RATE: 83 BPM

## 2023-03-16 DIAGNOSIS — E11.9 TYPE 2 DIABETES MELLITUS WITHOUT COMPLICATION, WITHOUT LONG-TERM CURRENT USE OF INSULIN: ICD-10-CM

## 2023-03-16 DIAGNOSIS — I27.20 PULMONARY HTN: Primary | ICD-10-CM

## 2023-03-16 DIAGNOSIS — I10 ESSENTIAL HYPERTENSION: ICD-10-CM

## 2023-03-16 DIAGNOSIS — E11.9 TYPE 2 DIABETES MELLITUS WITHOUT COMPLICATION, WITHOUT LONG-TERM CURRENT USE OF INSULIN: Primary | ICD-10-CM

## 2023-03-16 DIAGNOSIS — E66.01 CLASS 2 SEVERE OBESITY DUE TO EXCESS CALORIES WITH SERIOUS COMORBIDITY AND BODY MASS INDEX (BMI) OF 35.0 TO 35.9 IN ADULT: ICD-10-CM

## 2023-03-16 PROBLEM — E66.812 CLASS 2 OBESITY DUE TO EXCESS CALORIES WITH BODY MASS INDEX (BMI) OF 35.0 TO 35.9 IN ADULT: Status: ACTIVE | Noted: 2022-12-13

## 2023-03-16 PROBLEM — D12.2 ADENOMATOUS POLYP OF ASCENDING COLON: Status: RESOLVED | Noted: 2020-06-22 | Resolved: 2023-03-16

## 2023-03-16 PROBLEM — G47.30 SLEEP-RELATED BREATHING DISORDER: Status: RESOLVED | Noted: 2022-08-09 | Resolved: 2023-03-16

## 2023-03-16 PROCEDURE — 3077F SYST BP >= 140 MM HG: CPT | Mod: CPTII,S$GLB,, | Performed by: STUDENT IN AN ORGANIZED HEALTH CARE EDUCATION/TRAINING PROGRAM

## 2023-03-16 PROCEDURE — 1159F MED LIST DOCD IN RCRD: CPT | Mod: CPTII,S$GLB,, | Performed by: STUDENT IN AN ORGANIZED HEALTH CARE EDUCATION/TRAINING PROGRAM

## 2023-03-16 PROCEDURE — 3077F PR MOST RECENT SYSTOLIC BLOOD PRESSURE >= 140 MM HG: ICD-10-PCS | Mod: CPTII,S$GLB,, | Performed by: STUDENT IN AN ORGANIZED HEALTH CARE EDUCATION/TRAINING PROGRAM

## 2023-03-16 PROCEDURE — 3008F PR BODY MASS INDEX (BMI) DOCUMENTED: ICD-10-PCS | Mod: CPTII,S$GLB,, | Performed by: STUDENT IN AN ORGANIZED HEALTH CARE EDUCATION/TRAINING PROGRAM

## 2023-03-16 PROCEDURE — 99214 PR OFFICE/OUTPT VISIT, EST, LEVL IV, 30-39 MIN: ICD-10-PCS | Mod: S$GLB,,, | Performed by: STUDENT IN AN ORGANIZED HEALTH CARE EDUCATION/TRAINING PROGRAM

## 2023-03-16 PROCEDURE — 1159F PR MEDICATION LIST DOCUMENTED IN MEDICAL RECORD: ICD-10-PCS | Mod: CPTII,S$GLB,, | Performed by: STUDENT IN AN ORGANIZED HEALTH CARE EDUCATION/TRAINING PROGRAM

## 2023-03-16 PROCEDURE — 83036 HEMOGLOBIN GLYCOSYLATED A1C: CPT | Performed by: STUDENT IN AN ORGANIZED HEALTH CARE EDUCATION/TRAINING PROGRAM

## 2023-03-16 PROCEDURE — 99999 PR PBB SHADOW E&M-EST. PATIENT-LVL IV: CPT | Mod: PBBFAC,,, | Performed by: STUDENT IN AN ORGANIZED HEALTH CARE EDUCATION/TRAINING PROGRAM

## 2023-03-16 PROCEDURE — 3079F DIAST BP 80-89 MM HG: CPT | Mod: CPTII,S$GLB,, | Performed by: STUDENT IN AN ORGANIZED HEALTH CARE EDUCATION/TRAINING PROGRAM

## 2023-03-16 PROCEDURE — 3008F BODY MASS INDEX DOCD: CPT | Mod: CPTII,S$GLB,, | Performed by: STUDENT IN AN ORGANIZED HEALTH CARE EDUCATION/TRAINING PROGRAM

## 2023-03-16 PROCEDURE — 36415 COLL VENOUS BLD VENIPUNCTURE: CPT | Mod: PO | Performed by: STUDENT IN AN ORGANIZED HEALTH CARE EDUCATION/TRAINING PROGRAM

## 2023-03-16 PROCEDURE — 3079F PR MOST RECENT DIASTOLIC BLOOD PRESSURE 80-89 MM HG: ICD-10-PCS | Mod: CPTII,S$GLB,, | Performed by: STUDENT IN AN ORGANIZED HEALTH CARE EDUCATION/TRAINING PROGRAM

## 2023-03-16 PROCEDURE — 99214 OFFICE O/P EST MOD 30 MIN: CPT | Mod: S$GLB,,, | Performed by: STUDENT IN AN ORGANIZED HEALTH CARE EDUCATION/TRAINING PROGRAM

## 2023-03-16 PROCEDURE — 99999 PR PBB SHADOW E&M-EST. PATIENT-LVL IV: ICD-10-PCS | Mod: PBBFAC,,, | Performed by: STUDENT IN AN ORGANIZED HEALTH CARE EDUCATION/TRAINING PROGRAM

## 2023-03-16 RX ORDER — DULAGLUTIDE 3 MG/.5ML
3 INJECTION, SOLUTION SUBCUTANEOUS
Qty: 4 PEN | Refills: 11 | Status: SHIPPED | OUTPATIENT
Start: 2023-03-16 | End: 2023-04-05 | Stop reason: ALTCHOICE

## 2023-03-16 NOTE — PROGRESS NOTES
Name: Nathalie Krueger  MRN: [id number]  : 1965  PCP: Tyron Flannery MD    HPI  Patient presents for follow up. Pulmonologist wants to perform right hearrt cath for further evaluation of lung disease. Set to meet cardiologist soon.    Patient has been exercising 4 times per week. Believes she is gaining muscle. Also hoping to lose more fat. Feels she has been unsuccessful with dieting thus far. Currently on Trulicity.     Review of Systems   Respiratory:  Positive for shortness of breath (with exertion).      Patient Active Problem List   Diagnosis    Essential hypertension    Other hyperlipidemia    Type 2 diabetes mellitus without complication, without long-term current use of insulin    Lung nodule    Chronic hypoxemic respiratory failure    Bronchiectasis without complication    Gastroesophageal reflux disease    Right flank pain    Class 1 obesity due to excess calories with body mass index (BMI) of 34.0 to 34.9 in adult    Mild obstructive sleep apnea    Restrictive lung disease    Pulmonary HTN    Cor pulmonale    IgM deficiency       Vitals:    23 1418   BP: (!) 150/84   Pulse: 83       Physical Exam  Constitutional:       General: She is not in acute distress.     Appearance: Normal appearance. She is well-developed.      Interventions: Nasal cannula in place.   HENT:      Head: Normocephalic and atraumatic.      Right Ear: External ear normal.      Left Ear: External ear normal.   Eyes:      Conjunctiva/sclera: Conjunctivae normal.   Cardiovascular:      Rate and Rhythm: Normal rate and regular rhythm.      Heart sounds: No murmur heard.    No friction rub. No gallop.   Pulmonary:      Effort: Pulmonary effort is normal. No respiratory distress.      Breath sounds: No wheezing, rhonchi or rales.   Abdominal:      General: Abdomen is flat. There is no distension.   Musculoskeletal:         General: No swelling or deformity.      Right lower leg: No edema.      Left lower leg: No edema.    Skin:     General: Skin is warm and dry.      Coloration: Skin is not jaundiced.   Neurological:      Mental Status: She is alert and oriented to person, place, and time. Mental status is at baseline.   Psychiatric:         Attention and Perception: Attention and perception normal.         Mood and Affect: Mood normal.         Speech: Speech normal.         Behavior: Behavior normal. Behavior is cooperative.         Thought Content: Thought content normal.         Cognition and Memory: Cognition normal.         Judgment: Judgment normal.       1. Type 2 diabetes mellitus without complication, without long-term current use of insulin  Assessment & Plan:  At goal based on A1C 6 months ago. Increase dulaglutide today. Recheck A1C.    Orders:  -     dulaglutide (TRULICITY) 3 mg/0.5 mL pen injector; Inject 3 mg into the skin every 7 days.  Dispense: 4 pen; Refill: 11  -     Hemoglobin A1C; Future    2. Essential hypertension  Assessment & Plan:  Poorly controlled today. However, previous pressures have been normal. Additionally, patient has taken up exercise and will be trying to lose weight, both of which should help BP. Will re-assess at next visit.      3. Class 2 severe obesity due to excess calories with serious comorbidity and body mass index (BMI) of 35.0 to 35.9 in adult  Assessment & Plan:  Worsening. Discussed dietary approach to weight loss - keep a food diary three times per week and use that information to cut 500 calories per day from daily caloric intake. Patient can expect 1-2 lbs of weight loss per week for as long as six months with these changes. Additionally, we are increasing dulaglutide.            Follow up in 3 months to assess weight. Will also need Pap smear at that time.    Tyron Flannery MD  03/16/2023

## 2023-03-16 NOTE — ASSESSMENT & PLAN NOTE
Poorly controlled today. However, previous pressures have been normal. Additionally, patient has taken up exercise and will be trying to lose weight, both of which should help BP. Will re-assess at next visit.

## 2023-03-16 NOTE — ASSESSMENT & PLAN NOTE
Worsening. Discussed dietary approach to weight loss - keep a food diary three times per week and use that information to cut 500 calories per day from daily caloric intake. Patient can expect 1-2 lbs of weight loss per week for as long as six months with these changes. Additionally, we are increasing dulaglutide.

## 2023-03-17 ENCOUNTER — TELEPHONE (OUTPATIENT)
Dept: PULMONOLOGY | Facility: CLINIC | Age: 58
End: 2023-03-17
Payer: COMMERCIAL

## 2023-03-17 LAB
ESTIMATED AVG GLUCOSE: 177 MG/DL (ref 68–131)
HBA1C MFR BLD: 7.8 % (ref 4–5.6)

## 2023-03-21 ENCOUNTER — HOSPITAL ENCOUNTER (OUTPATIENT)
Dept: PULMONOLOGY | Facility: CLINIC | Age: 58
Discharge: HOME | End: 2023-03-21
Payer: COMMERCIAL

## 2023-03-21 ENCOUNTER — OFFICE VISIT (OUTPATIENT)
Dept: TRANSPLANT | Facility: CLINIC | Age: 58
End: 2023-03-21
Payer: COMMERCIAL

## 2023-03-21 ENCOUNTER — HOSPITAL ENCOUNTER (OUTPATIENT)
Dept: RADIOLOGY | Facility: HOSPITAL | Age: 58
Discharge: HOME | End: 2023-03-21
Attending: INTERNAL MEDICINE
Payer: COMMERCIAL

## 2023-03-21 ENCOUNTER — HOSPITAL ENCOUNTER (OUTPATIENT)
Dept: CARDIOLOGY | Facility: HOSPITAL | Age: 58
Discharge: HOME | End: 2023-03-21
Attending: INTERNAL MEDICINE
Payer: COMMERCIAL

## 2023-03-21 VITALS — BODY MASS INDEX: 33.18 KG/M2 | WEIGHT: 224 LBS | HEIGHT: 69 IN

## 2023-03-21 VITALS
DIASTOLIC BLOOD PRESSURE: 76 MMHG | BODY MASS INDEX: 35.75 KG/M2 | HEART RATE: 85 BPM | WEIGHT: 241.38 LBS | SYSTOLIC BLOOD PRESSURE: 122 MMHG | OXYGEN SATURATION: 95 % | HEIGHT: 69 IN

## 2023-03-21 VITALS
DIASTOLIC BLOOD PRESSURE: 76 MMHG | SYSTOLIC BLOOD PRESSURE: 122 MMHG | WEIGHT: 241 LBS | HEIGHT: 69 IN | BODY MASS INDEX: 35.7 KG/M2 | HEART RATE: 78 BPM

## 2023-03-21 DIAGNOSIS — E66.01 CLASS 2 SEVERE OBESITY DUE TO EXCESS CALORIES WITH SERIOUS COMORBIDITY AND BODY MASS INDEX (BMI) OF 35.0 TO 35.9 IN ADULT: ICD-10-CM

## 2023-03-21 DIAGNOSIS — E11.9 TYPE 2 DIABETES MELLITUS WITHOUT COMPLICATION, WITHOUT LONG-TERM CURRENT USE OF INSULIN: ICD-10-CM

## 2023-03-21 DIAGNOSIS — I51.89 DIASTOLIC DYSFUNCTION: ICD-10-CM

## 2023-03-21 DIAGNOSIS — J96.11 CHRONIC HYPOXEMIC RESPIRATORY FAILURE: Primary | ICD-10-CM

## 2023-03-21 DIAGNOSIS — I10 ESSENTIAL HYPERTENSION: ICD-10-CM

## 2023-03-21 DIAGNOSIS — I27.20 PULMONARY HTN: ICD-10-CM

## 2023-03-21 DIAGNOSIS — I27.9 CHRONIC PULMONARY HEART DISEASE: ICD-10-CM

## 2023-03-21 LAB
ASCENDING AORTA: 3.8 CM
AV INDEX (PROSTH): 0.94
AV MEAN GRADIENT: 3 MMHG
AV PEAK GRADIENT: 5 MMHG
AV VALVE AREA: 3.87 CM2
AV VELOCITY RATIO: 0.77
BSA FOR ECHO PROCEDURE: 2.31 M2
CV ECHO LV RWT: 0.39 CM
DOP CALC AO PEAK VEL: 1.17 M/S
DOP CALC AO VTI: 21.82 CM
DOP CALC LVOT AREA: 4.1 CM2
DOP CALC LVOT DIAMETER: 2.29 CM
DOP CALC LVOT PEAK VEL: 0.9 M/S
DOP CALC LVOT STROKE VOLUME: 84.35 CM3
DOP CALCLVOT PEAK VEL VTI: 20.49 CM
E WAVE DECELERATION TIME: 185.72 MSEC
E/A RATIO: 0.88
E/E' RATIO: 8.13 M/S
ECHO LV POSTERIOR WALL: 0.9 CM (ref 0.6–1.1)
EJECTION FRACTION: 65 %
FRACTIONAL SHORTENING: 30 % (ref 28–44)
INTERVENTRICULAR SEPTUM: 1.05 CM (ref 0.6–1.1)
LA MAJOR: 4.71 CM
LA MINOR: 4.44 CM
LA WIDTH: 3.31 CM
LEFT ATRIUM SIZE: 3.88 CM
LEFT ATRIUM VOLUME INDEX MOD: 13 ML/M2
LEFT ATRIUM VOLUME INDEX: 22.3 ML/M2
LEFT ATRIUM VOLUME MOD: 29.23 CM3
LEFT ATRIUM VOLUME: 49.9 CM3
LEFT INTERNAL DIMENSION IN SYSTOLE: 3.23 CM (ref 2.1–4)
LEFT VENTRICLE DIASTOLIC VOLUME INDEX: 43.58 ML/M2
LEFT VENTRICLE DIASTOLIC VOLUME: 97.63 ML
LEFT VENTRICLE MASS INDEX: 69 G/M2
LEFT VENTRICLE SYSTOLIC VOLUME INDEX: 18.7 ML/M2
LEFT VENTRICLE SYSTOLIC VOLUME: 41.85 ML
LEFT VENTRICULAR INTERNAL DIMENSION IN DIASTOLE: 4.61 CM (ref 3.5–6)
LEFT VENTRICULAR MASS: 153.96 G
LV LATERAL E/E' RATIO: 6.78 M/S
LV SEPTAL E/E' RATIO: 10.17 M/S
MV A" WAVE DURATION": 8.28 MSEC
MV PEAK A VEL: 0.69 M/S
MV PEAK E VEL: 0.61 M/S
PISA TR MAX VEL: 2.79 M/S
PULM VEIN S/D RATIO: 1.13
PV PEAK D VEL: 0.4 M/S
PV PEAK S VEL: 0.45 M/S
RA MAJOR: 5.29 CM
RA PRESSURE: 3 MMHG
RA WIDTH: 3.06 CM
RIGHT VENTRICULAR END-DIASTOLIC DIMENSION: 4.6 CM
RV TISSUE DOPPLER FREE WALL SYSTOLIC VELOCITY 1 (APICAL 4 CHAMBER VIEW): 12.04 CM/S
SINUS: 3.47 CM
STJ: 3.04 CM
TDI LATERAL: 0.09 M/S
TDI SEPTAL: 0.06 M/S
TDI: 0.08 M/S
TR MAX PG: 31 MMHG
TRICUSPID ANNULAR PLANE SYSTOLIC EXCURSION: 1.95 CM
TV REST PULMONARY ARTERY PRESSURE: 34 MMHG

## 2023-03-21 PROCEDURE — 3074F SYST BP LT 130 MM HG: CPT | Mod: CPTII,S$GLB,, | Performed by: INTERNAL MEDICINE

## 2023-03-21 PROCEDURE — 99205 PR OFFICE/OUTPT VISIT, NEW, LEVL V, 60-74 MIN: ICD-10-PCS | Mod: S$GLB,,, | Performed by: INTERNAL MEDICINE

## 2023-03-21 PROCEDURE — 94618 PULMONARY STRESS TESTING: CPT | Mod: S$GLB,,, | Performed by: INTERNAL MEDICINE

## 2023-03-21 PROCEDURE — 99999 PR PBB SHADOW E&M-EST. PATIENT-LVL V: ICD-10-PCS | Mod: PBBFAC,,, | Performed by: INTERNAL MEDICINE

## 2023-03-21 PROCEDURE — 94618 PULMONARY STRESS TESTING: ICD-10-PCS | Mod: S$GLB,,, | Performed by: INTERNAL MEDICINE

## 2023-03-21 PROCEDURE — 3051F PR MOST RECENT HEMOGLOBIN A1C LEVEL 7.0 - < 8.0%: ICD-10-PCS | Mod: CPTII,S$GLB,, | Performed by: INTERNAL MEDICINE

## 2023-03-21 PROCEDURE — 78582 LUNG VENTILAT&PERFUS IMAGING: CPT | Mod: 26,,, | Performed by: RADIOLOGY

## 2023-03-21 PROCEDURE — 1159F MED LIST DOCD IN RCRD: CPT | Mod: CPTII,S$GLB,, | Performed by: INTERNAL MEDICINE

## 2023-03-21 PROCEDURE — 3074F PR MOST RECENT SYSTOLIC BLOOD PRESSURE < 130 MM HG: ICD-10-PCS | Mod: CPTII,S$GLB,, | Performed by: INTERNAL MEDICINE

## 2023-03-21 PROCEDURE — 99205 OFFICE O/P NEW HI 60 MIN: CPT | Mod: S$GLB,,, | Performed by: INTERNAL MEDICINE

## 2023-03-21 PROCEDURE — 78582 NM LUNG VENTILATION AND PERFUSION IMAGING: ICD-10-PCS | Mod: 26,,, | Performed by: RADIOLOGY

## 2023-03-21 PROCEDURE — 1159F PR MEDICATION LIST DOCUMENTED IN MEDICAL RECORD: ICD-10-PCS | Mod: CPTII,S$GLB,, | Performed by: INTERNAL MEDICINE

## 2023-03-21 PROCEDURE — 3008F PR BODY MASS INDEX (BMI) DOCUMENTED: ICD-10-PCS | Mod: CPTII,S$GLB,, | Performed by: INTERNAL MEDICINE

## 2023-03-21 PROCEDURE — 3078F DIAST BP <80 MM HG: CPT | Mod: CPTII,S$GLB,, | Performed by: INTERNAL MEDICINE

## 2023-03-21 PROCEDURE — 1160F PR REVIEW ALL MEDS BY PRESCRIBER/CLIN PHARMACIST DOCUMENTED: ICD-10-PCS | Mod: CPTII,S$GLB,, | Performed by: INTERNAL MEDICINE

## 2023-03-21 PROCEDURE — 3008F BODY MASS INDEX DOCD: CPT | Mod: CPTII,S$GLB,, | Performed by: INTERNAL MEDICINE

## 2023-03-21 PROCEDURE — 3051F HG A1C>EQUAL 7.0%<8.0%: CPT | Mod: CPTII,S$GLB,, | Performed by: INTERNAL MEDICINE

## 2023-03-21 PROCEDURE — 93306 TTE W/DOPPLER COMPLETE: CPT | Mod: 26,,, | Performed by: INTERNAL MEDICINE

## 2023-03-21 PROCEDURE — 1160F RVW MEDS BY RX/DR IN RCRD: CPT | Mod: CPTII,S$GLB,, | Performed by: INTERNAL MEDICINE

## 2023-03-21 PROCEDURE — A9567 TECHNETIUM TC-99M AEROSOL: HCPCS

## 2023-03-21 PROCEDURE — 3078F PR MOST RECENT DIASTOLIC BLOOD PRESSURE < 80 MM HG: ICD-10-PCS | Mod: CPTII,S$GLB,, | Performed by: INTERNAL MEDICINE

## 2023-03-21 PROCEDURE — 99999 PR PBB SHADOW E&M-EST. PATIENT-LVL V: CPT | Mod: PBBFAC,,, | Performed by: INTERNAL MEDICINE

## 2023-03-21 PROCEDURE — 93306 ECHO (CUPID ONLY): ICD-10-PCS | Mod: 26,,, | Performed by: INTERNAL MEDICINE

## 2023-03-21 PROCEDURE — 93306 TTE W/DOPPLER COMPLETE: CPT

## 2023-03-21 NOTE — PROCEDURES
Nathalie Krueger is a 57 y.o.  female patient, who presents for a 6 minute walk test ordered by MD David.  The diagnosis is Qualify for Oxygen; Pulmonary Hypertension; Bronchiectasis.  The patient's BMI is 33.1 kg/m2. Predicted distance (lower limit of normal) is 333.32 meters.    Test Results:    The test was completed without stopping.  The total time walked was 360 seconds.  During walking, the patient reported:  No complaints.  The patient used no assistive devices during testing.     03/21/2023---------Distance: 304.8 meters (1000 feet)     O2 Sat % Supplemental Oxygen Heart Rate Blood Pressure Ashlie Scale   Pre-exercise  (Resting) 82 % Room Air 96 bpm 134/81 mmHg 0   Pre-exercise  (Resting) 97 % 2 L/M 85 bpm 134/81 mmHg 0   During Exercise 86 % 2 L/M 94 bpm 135/81 mmHg 2   Post-exercise   97 % 2 L/M  91 bpm       Recovery Time: 120 seconds    Oxygen Qualification:     O2 Sat % Supplemental Oxygen Heart Rate Blood Pressure Ashlie Scale   Pre-exercise  (Resting) 82 % Room Air  96 bpm  134/81 mmHg 0      Performing nurse/tech: AMADOU Castillo      PREVIOUS STUDY:   12/06/2022---------Distance: 365.76 meters (1200 feet)       O2 Sat % Supplemental Oxygen Heart Rate Blood Pressure Ashlie Scale   Pre-exercise  (Resting) 98 % 2 L/M 83 bpm 145/84 mmHg 0   During Exercise 84 % 2 L/M 96 bpm 160/79 mmHg 0.5   Post-exercise  (Recovery) 95 % 2 L/M  87 bpm           CLINICAL INTERPRETATION:  Six minute walk distance is 304.8 meters (1000 feet) with light dyspnea.  At rest, there was significant desaturation while breathing room air.  During exercise, there was significant desaturation while breathing supplemental oxygen.  Both blood pressure and heart rate remained stable with walking.  The patient did not report non-pulmonary symptoms during exercise.  The patient may benefit from using supplemental oxygen.  Since the previous study in December 2022, exercise capacity is significantly worse.  Based upon age and body  mass index, exercise capacity is less than predicted.   Oxygen saturation did improve while breathing supplemental oxygen.

## 2023-03-21 NOTE — H&P (VIEW-ONLY)
Subjective:   Initial evaluation of pulmonary hypertension.    HPI:  Ms. Krueger is a 57 y.o. year old White female who has presents to be considered for pulmonary hypertension. Pt has DM2, GERD, HTN, bronchiectasis, ORLIN and chronic hypoxemic resp failure.   Had V/Q scan today for which results are pending. Patient symptoms started 2 years ago in [place]. Uses O2 with exertion but not all the time. No fluid issues, no RV overload symptoms. No leg swelling. No chest pain. + cough, + wheezing some.     6MWT today:  6MW 3/21/2023   6MWT Status completed without stopping   Patient Reported No complaints   Was O2 used? Yes   Delivery Method Cannula;Pull Tank;Continuous Flow   6MW Distance walked (feet) 1000   Distance walked (meters) 304.8   Did patient stop? No   Oxygen Saturation 97   Supplemental Oxygen 2 L/M   Heart Rate 85   Blood Pressure 134/81   Ashlie Dyspnea Rating  nothing at all   Oxygen Saturation 86   Supplemental Oxygen 2 L/M   Heart Rate 99   Blood Pressure 135/81   Ashlie Dyspnea Rating  light   Recovery Time (seconds) 120   Oxygen Saturation 97   Supplemental Oxygen 2 L/M   Heart Rate 91       TTE 01/03/2023:  The left ventricle is normal in size with mild concentric hypertrophy and normal systolic function.  The estimated ejection fraction is 60%.  Grade I left ventricular diastolic dysfunction.  Moderate right ventricular enlargement with low normal right ventricular systolic function.  Mild left atrial enlargement.  The estimated PA systolic pressure is 29 mmHg.  The aortic root is mildly dilated at 3.9 cm  There is no evidence of intracardiac shunting (negative bubble study)    EKG Stress test 10/05/2022: negative for ischemia however HR not achieved (got to 79%)    EKG 09/2022 sinus tachycardia, normal QRS voltage    CT chest 08/31/22: sublte diffuse bronchiectasis, unchanged    CTA 01/26/22:  . No evidence of pulmonary thromboembolism or pulmonary infarction.  2. Bilateral mild nonspecific bronchiectasis  without associated significant peribronchial thickening, which can be seen with recent or remote small airways process.  No consolidation.  3. Right upper lobe 3 mm ground-glass nodule.  For a ground glass nodule <6 mm, Fleischner Society 2017 guidelines recommend no routine follow up. However, suspicious features could warrant follow up with non-contrast chest CT at 2 years and 4 years after discovery.  4. Few additional incidental findings as above.      6MW 3/21/2023   6MWT Status completed without stopping   Patient Reported No complaints   Was O2 used? Yes   Delivery Method Cannula;Pull Tank;Continuous Flow   6MW Distance walked (feet) 1000   Distance walked (meters) 304.8   Did patient stop? No   Oxygen Saturation 97   Supplemental Oxygen 2 L/M   Heart Rate 85   Blood Pressure 134/81   Ashlie Dyspnea Rating  nothing at all   Oxygen Saturation 86   Supplemental Oxygen 2 L/M   Heart Rate 99   Blood Pressure 135/81   Ashlie Dyspnea Rating  light   Recovery Time (seconds) 120   Oxygen Saturation 97   Supplemental Oxygen 2 L/M   Heart Rate 91     6MW 12/6/2022   6MWT Status completed without stopping   Patient Reported No complaints   Was O2 used? Yes   Delivery Method Cannula;Demand Flow;Shoulder Carry   6MW Distance walked (feet) 1200   Distance walked (meters) 365.76   Did patient stop? No   Oxygen Saturation 98   Supplemental Oxygen 2 L/M   Heart Rate 83   Blood Pressure 145/84   Ashlie Dyspnea Rating  nothing at all   Oxygen Saturation 84   Supplemental Oxygen 2 L/M   Heart Rate 96   Blood Pressure 160/79   Ashlie Dyspnea Rating  very, very light (just noticeable)   Recovery Time (seconds) 90   Oxygen Saturation 95   Supplemental Oxygen 2 L/M   Heart Rate 87       Past Medical History:   Diagnosis Date    Acute bronchitis 02/01/2022    Adenomatous polyp of ascending colon 06/22/2020     Past Surgical History:   Procedure Laterality Date    ABLATION, FIBROID, UTERUS, LAPAROSCOPIC, WITH US GUIDANCE      APPENDECTOMY   "1982    ROTATOR CUFF REPAIR Right 1998    TONSILLECTOMY  1977       Family History   Problem Relation Age of Onset    Diabetes Mother     Hypertension Mother     Heart disease Mother     Diabetes Father     Glaucoma Neg Hx     Macular degeneration Neg Hx        Review of Systems   Constitutional: Positive for weight gain. Negative for chills, decreased appetite, diaphoresis, fever and malaise/fatigue.   HENT:  Negative for congestion.    Eyes:  Negative for blurred vision and visual disturbance.   Cardiovascular:  Positive for dyspnea on exertion. Negative for chest pain, irregular heartbeat, leg swelling, near-syncope, orthopnea, palpitations, paroxysmal nocturnal dyspnea and syncope.   Respiratory:  Negative for cough, shortness of breath, sleep disturbances due to breathing, snoring and wheezing.    Hematologic/Lymphatic: Negative for bleeding problem. Does not bruise/bleed easily.   Skin:  Negative for poor wound healing and rash.   Musculoskeletal:  Negative for arthritis, joint pain and muscle weakness.   Gastrointestinal:  Negative for bloating, abdominal pain, anorexia, constipation, diarrhea, hematemesis, hematochezia, melena, nausea and vomiting.   Genitourinary:  Negative for frequency and urgency.   Neurological:  Negative for difficulty with concentration, excessive daytime sleepiness, dizziness, headaches, light-headedness and weakness.   Psychiatric/Behavioral:  Negative for depression. The patient does not have insomnia and is not nervous/anxious.    All other systems reviewed and are negative.    Objective:   Blood pressure 122/76, pulse 85, height 5' 9" (1.753 m), weight 109.5 kg (241 lb 6.5 oz), SpO2 95 %.body mass index is 35.65 kg/m².    Physical Exam  Vitals and nursing note reviewed.   Constitutional:       General: She is not in acute distress.     Appearance: She is well-developed. She is not diaphoretic.   HENT:      Head: Normocephalic and atraumatic.   Eyes:      General:         Right " eye: No discharge.         Left eye: No discharge.      Conjunctiva/sclera: Conjunctivae normal.   Neck:      Vascular: No JVD.   Cardiovascular:      Rate and Rhythm: Normal rate and regular rhythm.      Heart sounds: No murmur heard.    No friction rub. No gallop.      Comments:    Pulmonary:      Effort: Pulmonary effort is normal.      Breath sounds: Normal breath sounds. No wheezing or rales.   Chest:      Chest wall: No tenderness.   Abdominal:      General: Bowel sounds are normal. There is no distension.      Palpations: Abdomen is soft. There is no mass.      Tenderness: There is no abdominal tenderness. There is no guarding or rebound.   Musculoskeletal:         General: No tenderness. Normal range of motion.      Cervical back: Normal range of motion and neck supple.   Skin:     General: Skin is warm and dry.      Findings: No erythema or rash.   Neurological:      Mental Status: She is alert and oriented to person, place, and time.   Psychiatric:         Behavior: Behavior normal.         Thought Content: Thought content normal.         Judgment: Judgment normal.       Labs:      Chemistry        Component Value Date/Time     03/21/2023 0932    K 4.1 03/21/2023 0932     03/21/2023 0932    CO2 26 03/21/2023 0932    BUN 8 03/21/2023 0932    CREATININE 0.8 03/21/2023 0932     (H) 03/21/2023 0932        Component Value Date/Time    CALCIUM 9.5 03/21/2023 0932    ALKPHOS 129 03/21/2023 0932    AST 23 03/21/2023 0932    ALT 25 03/21/2023 0932    BILITOT 0.5 03/21/2023 0932    ESTGFRAFRICA >60 01/26/2022 0946    EGFRNONAA >60 01/26/2022 0946          Magnesium   Date Value Ref Range Status   03/21/2023 1.6 1.6 - 2.6 mg/dL Final     Lab Results   Component Value Date    WBC 8.69 03/21/2023    HGB 14.3 03/21/2023    HCT 44.3 03/21/2023    MCV 93 03/21/2023     03/21/2023     BNP   Date Value Ref Range Status   03/21/2023 <10 0 - 99 pg/mL Final     Comment:     Values of less than 100  pg/ml are consistent with non-CHF populations.   02/28/2023 <10 0 - 99 pg/mL Final     Comment:     Values of less than 100 pg/ml are consistent with non-CHF populations.   01/26/2022 <10 0 - 99 pg/mL Final     Comment:     Values of less than 100 pg/ml are consistent with non-CHF populations.     No results found for this or any previous visit.      Labs were reviewed with the patient.    Assessment:      1. Chronic hypoxemic respiratory failure    2. Pulmonary HTN    3. Essential hypertension    4. Type 2 diabetes mellitus without complication, without long-term current use of insulin    5. Class 2 severe obesity due to excess calories with serious comorbidity and body mass index (BMI) of 35.0 to 35.9 in adult        Plan:   V/Q scan pending.   Will proceed with RHC.   I have explained the risks, benefits, and alternatives of the procedure in detail.  The patient voices understanding and all questions have been answered.  The patient agrees to proceed as planned.  Will see what results are. If PA pressures not elevated, would like to do with exercise.   Please do both Fletcher and Thermodilution on RHC.       Maye Hernandez MD

## 2023-03-29 ENCOUNTER — PATIENT MESSAGE (OUTPATIENT)
Dept: PULMONOLOGY | Facility: CLINIC | Age: 58
End: 2023-03-29
Payer: COMMERCIAL

## 2023-03-29 ENCOUNTER — HOSPITAL ENCOUNTER (OUTPATIENT)
Facility: HOSPITAL | Age: 58
Discharge: HOME | End: 2023-03-29
Attending: INTERNAL MEDICINE | Admitting: INTERNAL MEDICINE
Payer: COMMERCIAL

## 2023-03-29 VITALS
TEMPERATURE: 98 F | OXYGEN SATURATION: 92 % | SYSTOLIC BLOOD PRESSURE: 133 MMHG | HEART RATE: 70 BPM | BODY MASS INDEX: 35.28 KG/M2 | WEIGHT: 238.19 LBS | HEIGHT: 69 IN | RESPIRATION RATE: 20 BRPM | DIASTOLIC BLOOD PRESSURE: 75 MMHG

## 2023-03-29 DIAGNOSIS — I27.20 PULMONARY HTN: ICD-10-CM

## 2023-03-29 DIAGNOSIS — J96.11 CHRONIC HYPOXEMIC RESPIRATORY FAILURE: ICD-10-CM

## 2023-03-29 PROCEDURE — C1751 CATH, INF, PER/CENT/MIDLINE: HCPCS | Performed by: INTERNAL MEDICINE

## 2023-03-29 PROCEDURE — 93451 PR RIGHT HEART CATH O2 SATURATION & CARDIAC OUTPUT: ICD-10-PCS | Mod: 26,,, | Performed by: INTERNAL MEDICINE

## 2023-03-29 PROCEDURE — 93451 RIGHT HEART CATH: CPT | Mod: 26,,, | Performed by: INTERNAL MEDICINE

## 2023-03-29 PROCEDURE — C1894 INTRO/SHEATH, NON-LASER: HCPCS | Performed by: INTERNAL MEDICINE

## 2023-03-29 PROCEDURE — 93451 RIGHT HEART CATH: CPT | Performed by: INTERNAL MEDICINE

## 2023-03-29 PROCEDURE — 25000003 PHARM REV CODE 250: Performed by: INTERNAL MEDICINE

## 2023-03-29 RX ORDER — LIDOCAINE HYDROCHLORIDE 20 MG/ML
INJECTION, SOLUTION EPIDURAL; INFILTRATION; INTRACAUDAL; PERINEURAL
Status: DISCONTINUED | OUTPATIENT
Start: 2023-03-29 | End: 2023-03-29 | Stop reason: HOSPADM

## 2023-03-29 NOTE — NURSING
Received report from Mariah HART RN. Patient s/p RHC, AAOx3. VSS, no c/o pain or discomfort at this time, resp even and unlabored. Gauze/tegaderm dressing to RIJ is CDI. No active bleeding. No hematoma noted. Post procedure protocol reviewed with patient and patient's family. Understanding verbalized. Family members at bedside. Nurse call bell within reach. Will continue to monitor per post procedure protocol.

## 2023-03-29 NOTE — DISCHARGE SUMMARY
[facility] - Cath Lab  Discharge Note  Short Stay    Procedure(s) (LRB):  INSERTION, CATHETER, RIGHT HEART (Right)      OUTCOME: Patient tolerated treatment/procedure well without complication and is now ready for discharge.    DISPOSITION: Home or Self Care    FINAL DIAGNOSIS:  <principal problem not specified>    FOLLOWUP: In clinic    DISCHARGE INSTRUCTIONS:  No discharge procedures on file.     TIME SPENT ON DISCHARGE: 10 minutes

## 2023-03-29 NOTE — PLAN OF CARE
Patient arrived to room.  Admit assessment completed. Plan of care discussed with patient. Wife at bedside. Nurse call bell within reach. Will monitor

## 2023-03-29 NOTE — Clinical Note
The PA catheter was repositioned to the main pulmonary artery. Hemodynamics were performed. O2 saturation was measured at 70%.

## 2023-03-29 NOTE — NURSING
Patient discharged per MD orders. Instructions given on medications, wound care, activity, signs of infection, when to call MD, and follow up appointments. Pt verbalized understanding.  Patient AAOx3, VSS, no c/o pain or discomfort at this time. Patient left unit ambulatory with family.

## 2023-03-29 NOTE — BRIEF OP NOTE
Patient tolerated the procedure well. Please see complete procedure note for full details and results. Stable to return from cath lab to their room.  Procedure: Right heart catheterization   Procedure date: 03/29/2023  Discharge date: 03/29/2023  Estimated blood loss: less than 10 cc  Complications: none  Condition at discharge: stable  Discharge instructions: no heavy lifting greater than 5 lbs and no bearing down/coughing without holding pressure over incision site.

## 2023-03-29 NOTE — DISCHARGE INSTRUCTIONS
AFTER THE PROCEDURE:   -DO NOT DRINK OR EAT ANYTHING UNTIL NO LONGER HOARSE   -You may remove the bandage in 24 hours and wash with soap and water.   -You may shower, but do not soak in a tub for three days.   PRECAUTIONS FOR THE NEXT 24 HOURS:   -If you need to cough, sneeze, have a bowel movement, or bear down, hold pressure over your bandage.   -Do not  anything heavier than a gallon of milk(about 5 pounds)   -Avoid excessive bending over.   SYMPTOMS TO WATCH FOR AND REPORT TO YOUR DOCTOR:   -BLEEDING: hold pressure over the site until bleeding stops. Proceed to Emergency Room by ambulance (do not drive yourself) if unable to stop bleeding. Notify your doctor.   -HEMATOMA(hard bruise under the skin): Mauricio around the bruise if one develops. Call your doctor if it increases in size or if you have difficulty talking, swallowing, breathing or anything unusual.   SIGNS OF INFECTION:Fever (temperature over 100.5 F), pus or redness   -RASH   -CHEST PAIN OR SHORTNESS OF BREATH   You may call you coordinator in the Heart Failure/Heart Transplant/Pulmonary Hypertension Clinic at [phone] during normal business hours(Monday through Friday from 8 A.M. to 5 P.M.) After hours, call the Heart Transplant Service doctor on call at [phone].

## 2023-03-30 ENCOUNTER — PATIENT MESSAGE (OUTPATIENT)
Dept: TRANSPLANT | Facility: CLINIC | Age: 58
End: 2023-03-30
Payer: COMMERCIAL

## 2023-04-04 ENCOUNTER — TELEPHONE (OUTPATIENT)
Dept: TRANSPLANT | Facility: CLINIC | Age: 58
End: 2023-04-04
Payer: COMMERCIAL

## 2023-04-04 NOTE — TELEPHONE ENCOUNTER
Nurse navigator called patient. Per Dr. Hernandez, the patient does not have PH acccroding to [facility]. Patient is to go back to referring MD.

## 2023-04-04 NOTE — TELEPHONE ENCOUNTER
Patient called back and Nurse navigator spoke with patient and informed her that based on RHC and after Dr. Hernandez reviewing it, patient does not have PH. Patient was concerned because she was told and saw that she had diastolic dysfunction and at her RHC she was also told that her pressures were high. Patient states that she has an appointment scheduled with Dr. Lemon at the end of April and will keep that appointment to see what has to happen next. Patient states that she does not have a cardiologist to follow her for the heart problems she was told she had. Nurse navigator will follow up with Dr. Hernandez to see if she has any recommendations.

## 2023-04-05 ENCOUNTER — OFFICE VISIT (OUTPATIENT)
Dept: PULMONOLOGY | Facility: CLINIC | Age: 58
End: 2023-04-05
Payer: COMMERCIAL

## 2023-04-05 ENCOUNTER — PATIENT MESSAGE (OUTPATIENT)
Dept: PULMONOLOGY | Facility: CLINIC | Age: 58
End: 2023-04-05

## 2023-04-05 VITALS
WEIGHT: 236.56 LBS | OXYGEN SATURATION: 93 % | SYSTOLIC BLOOD PRESSURE: 116 MMHG | HEART RATE: 81 BPM | DIASTOLIC BLOOD PRESSURE: 82 MMHG | HEIGHT: 69 IN | BODY MASS INDEX: 35.04 KG/M2

## 2023-04-05 DIAGNOSIS — J98.4 RESTRICTIVE LUNG DISEASE: ICD-10-CM

## 2023-04-05 DIAGNOSIS — J96.11 CHRONIC HYPOXEMIC RESPIRATORY FAILURE: Primary | ICD-10-CM

## 2023-04-05 DIAGNOSIS — J47.9 BRONCHIECTASIS WITHOUT COMPLICATION: ICD-10-CM

## 2023-04-05 DIAGNOSIS — D80.4 IGM DEFICIENCY: ICD-10-CM

## 2023-04-05 DIAGNOSIS — G47.33 MILD OBSTRUCTIVE SLEEP APNEA: ICD-10-CM

## 2023-04-05 PROBLEM — I27.20 PULMONARY HTN: Status: RESOLVED | Noted: 2023-02-28 | Resolved: 2023-04-05

## 2023-04-05 PROBLEM — I27.81 COR PULMONALE: Status: RESOLVED | Noted: 2023-02-28 | Resolved: 2023-04-05

## 2023-04-05 PROCEDURE — 3051F HG A1C>EQUAL 7.0%<8.0%: CPT | Mod: CPTII,S$GLB,, | Performed by: INTERNAL MEDICINE

## 2023-04-05 PROCEDURE — 99214 OFFICE O/P EST MOD 30 MIN: CPT | Mod: S$GLB,,, | Performed by: INTERNAL MEDICINE

## 2023-04-05 PROCEDURE — 3008F BODY MASS INDEX DOCD: CPT | Mod: CPTII,S$GLB,, | Performed by: INTERNAL MEDICINE

## 2023-04-05 PROCEDURE — 3079F PR MOST RECENT DIASTOLIC BLOOD PRESSURE 80-89 MM HG: ICD-10-PCS | Mod: CPTII,S$GLB,, | Performed by: INTERNAL MEDICINE

## 2023-04-05 PROCEDURE — 3051F PR MOST RECENT HEMOGLOBIN A1C LEVEL 7.0 - < 8.0%: ICD-10-PCS | Mod: CPTII,S$GLB,, | Performed by: INTERNAL MEDICINE

## 2023-04-05 PROCEDURE — 99999 PR PBB SHADOW E&M-EST. PATIENT-LVL III: CPT | Mod: PBBFAC,,, | Performed by: INTERNAL MEDICINE

## 2023-04-05 PROCEDURE — 99999 PR PBB SHADOW E&M-EST. PATIENT-LVL III: ICD-10-PCS | Mod: PBBFAC,,, | Performed by: INTERNAL MEDICINE

## 2023-04-05 PROCEDURE — 99214 PR OFFICE/OUTPT VISIT, EST, LEVL IV, 30-39 MIN: ICD-10-PCS | Mod: S$GLB,,, | Performed by: INTERNAL MEDICINE

## 2023-04-05 PROCEDURE — 1159F PR MEDICATION LIST DOCUMENTED IN MEDICAL RECORD: ICD-10-PCS | Mod: CPTII,S$GLB,, | Performed by: INTERNAL MEDICINE

## 2023-04-05 PROCEDURE — 3008F PR BODY MASS INDEX (BMI) DOCUMENTED: ICD-10-PCS | Mod: CPTII,S$GLB,, | Performed by: INTERNAL MEDICINE

## 2023-04-05 PROCEDURE — 3079F DIAST BP 80-89 MM HG: CPT | Mod: CPTII,S$GLB,, | Performed by: INTERNAL MEDICINE

## 2023-04-05 PROCEDURE — 1159F MED LIST DOCD IN RCRD: CPT | Mod: CPTII,S$GLB,, | Performed by: INTERNAL MEDICINE

## 2023-04-05 PROCEDURE — 3074F PR MOST RECENT SYSTOLIC BLOOD PRESSURE < 130 MM HG: ICD-10-PCS | Mod: CPTII,S$GLB,, | Performed by: INTERNAL MEDICINE

## 2023-04-05 PROCEDURE — 3074F SYST BP LT 130 MM HG: CPT | Mod: CPTII,S$GLB,, | Performed by: INTERNAL MEDICINE

## 2023-04-05 RX ORDER — SEMAGLUTIDE 1.34 MG/ML
1 INJECTION, SOLUTION SUBCUTANEOUS
COMMUNITY
Start: 2023-03-22 | End: 2023-04-25 | Stop reason: SDUPTHER

## 2023-04-05 NOTE — PATIENT INSTRUCTIONS
Continue oxygen to keep sats >90%  Continue cpap nightly use  No evidence of pulmonary hypertension  Nebulizer/aerobika only as needed for mucous/congestion  Repeat CT chest- high resolution   Pulmonary function tests repeat  May have interstitial lung disease? May be very subtle and hard to see but seems stable/improving with time  Continue exercise and diet, weight loss  May consider advanced lung disease referral in the future to see Dr. Barry

## 2023-04-05 NOTE — PROGRESS NOTES
"4/5/2023    Nathalie Krueger  Follow up    Chief Complaint   Patient presents with    restrictive lung disease       HPI:  04/05/2023- pt feeling well, here with wife who assists w/ history. Had RHC with no evidence of PH. Still using 3L O2 with exertion, unchanged. She gets a little winded w/ climbing stairs. Every day has cough- very mild, intermittent, clear mucous couple times a day. Not needing nebulizer. Uses CPAP nightly w/ benefit. Able to exercise in pool w/o dyspnea. Sometimes standing in kitchen partner will notice her lips are blue- has to use oxygen.  Working on losing wt- mediterranean diet. Has lost 3# so far.    2/28/23-   I suspect you have pulmonary hypertension causing shortness of breath, oxygen requirement  No COPD  Bronchiectasis is mild- may cause cough, mucous build up, may be prone to respiratory infections  Stop stiolto inhaler, continue pulmicort inhaler and albuterol nebulizer as needed  Stop azithromycin  Overnight pulse ox, complete off cpap and off oxygen  Continue oxygen to keep sats >92%    VQ scan to rule out blood clot  Blood work to rule out HIV, Lupus  Referral to Dr. Maye Hernandez for right heart cath  Fingerstick test to rule out A1AT deficiency  Pt is a 56 yo female with DM2, GERD, HTN, bronchiectasis, ORLIN and chronic hypoxemic resp failure presenting to establish new pulmonologist. She has previously been seen by pulmonology offices at [facility], [facility], and [facility]. Her wife vilma is present and assists w/ history.  Pt lived in [place], moved to  2 yr ago. . Since moving here she has experienced worsening SOB, fatigue, noted low sats and went to ED 1/2022. Aug 2022 she had a "flare up" and started requiring 2L oxygen w/ exertion. For 2 mos she felt very bad, reports steroids may have helped- gave her energy.    Used to have hacking cough attributed to lisinopril, now different type of cough w/ mucous coming up. She may have had respiratory infection which lasted " about 2d in December, resolved spontaneously. She has been on azithromycin 4x/week since suppressive tx.  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   Nebulizer: albuterol every other day. Doesn't use duo neb or saline. Uses stiolto and pulmicort inhalers daily.  Pt goes on walks, goes swimming, functions much better since move to [place] in 12/2022. After exercise has to bump up O2 to 3-4L to recover.  She has mild ORLIN, always slept well, has CPAP w/ bleed in O2.  Pt quit smoking 3-4 yr ago, smoked about 25-30 yrs.  FH father had lung problems, was a diver in the [employer] and was forced to stop.  Pt used to be an athlete- played basketball college and soccer.  She would like to travel, asks about overseas and plane travel w/ oxygen    The chief complaint problem is new to me.    PFSH:  Past Medical History:   Diagnosis Date    Acute bronchitis 02/01/2022    Adenomatous polyp of ascending colon 06/22/2020         Past Surgical History:   Procedure Laterality Date    ABLATION, FIBROID, UTERUS, LAPAROSCOPIC, WITH US GUIDANCE      APPENDECTOMY  1982    RIGHT HEART CATHETERIZATION Right 3/29/2023    Procedure: INSERTION, CATHETER, RIGHT HEART;  Surgeon: Yulisa Yung DO;  Location: [facility] CATH LAB;  Service: Cardiology;  Laterality: Right;    ROTATOR CUFF REPAIR Right 1998    TONSILLECTOMY  1977     Social History     Tobacco Use    Smoking status: Former     Packs/day: 0.50     Years: 25.00     Pack years: 12.50     Types: Cigarettes     Quit date: 12/16/2019     Years since quitting: 3.3    Smokeless tobacco: Never    Tobacco comments:     Smoked additive free tobacco   Substance Use Topics    Alcohol use: Not Currently    Drug use: Never     Family History   Problem Relation Age of Onset    Diabetes Mother     Hypertension Mother     Heart disease Mother     Diabetes Father     Glaucoma Neg Hx     Macular degeneration Neg Hx   "    Review of patient's allergies indicates:   Allergen Reactions    Atorvastatin Other (See Comments)     Dry cough      Lisinopril      Other reaction(s): Cough    Meperidine hcl Nausea Only       Performance Status:The patient's activity level is regular exercise.      Review of Systems:  a review of eleven systems covering constitutional, Eye, HEENT, Psych, Respiratory, Cardiac, GI, , Musculoskeletal, Endocrine, Dermatologic was negative except for pertinent findings as listed ABOVE and below:  All negative with pertinent positives as above       Exam:Comprehensive exam done. /82 (BP Location: Right arm, Patient Position: Sitting, BP Method: Large (Automatic))   Pulse 81   Ht 5' 9" (1.753 m)   Wt 107.3 kg (236 lb 8.9 oz)   SpO2 (!) 93% Comment: at rest on room air  BMI 34.93 kg/m²   Exam included Vitals as listed, and patient's appearance and affect and alertness and mood, oral exam for yeast and hygiene and pharynx lesions and Mallapatti (M) score, neck with inspection for jvd and masses and thyroid abnormalities and lymph nodes (supraclavicular and infraclavicular nodes and axillary also examined and noted if abn), chest exam included symmetry and effort and fremitus and percussion and auscultation, cardiac exam included rhythm and gallops and murmur and rubs and jvd and edema, abdominal exam for mass and hepatosplenomegaly and tenderness and hernias and bowel sounds, Musculoskeletal exam with muscle tone and posture and mobility/gait and  strength, and skin for rashes and cyanosis and pallor and turgor, extremity for clubbing.  Findings were normal except for pertinent findings listed below:  M3, oropharynx clear  HR regular  Breath sounds clear bilaterally  No edema/clubbing    Radiographs (ct chest and cxr) reviewed: view by direct vision   CT chest 8/31/22- mild diffuse bronchiectasis, subtle reticulations upper lobes?  3 mm more solid-appearing nodule, right upper lobe series 4, image " 174, previously subsolid 3 mm.  3 mm solid nodule lingular region series 4, image 336, in retrospect, unchanged.  No new nodule.  No focal airspace disease.    TTE 3/21/23-   The left ventricle is normal in size with normal systolic function.  The estimated ejection fraction is 65%.  Normal left ventricular diastolic function.  Moderate right ventricular enlargement with low normal to mildly reduced right ventricular systolic function.  The estimated PA systolic pressure is 34 mmHg.  Normal central venous pressure (3 mmHg).  The ascending aorta is mildly dilated.    TTE 1/3/23-   The left ventricle is normal in size with mild concentric hypertrophy and normal systolic function.  The estimated ejection fraction is 60%.  Grade I left ventricular diastolic dysfunction.  Moderate right ventricular enlargement with low normal right ventricular systolic function.  Mild left atrial enlargement.  The estimated PA systolic pressure is 29 mmHg.  The aortic root is mildly dilated at 3.9 cm  There is no evidence of intracardiac shunting (negative bubble study)    Labs reviewed    Sputum cultures AFB and routine 9/2022- no growth   Latest Reference Range & Units 01/03/23 10:46   POC PH 7.35 - 7.45  7.47 (H)   POC PCO2 35.0 - 45.0 mmHg 42   POC PO2 80.0 - 100.0 mmHg 75 (L)   POC SATURATED O2 94.0 - 100.0 % 96.0   Allens Test  POS   POC pAO2 mmHg 97   POC A-aDO2 mmHg 22   POC HCO3-(c) 22.0 - 26.0 mmol/L 30.6 (H)   POC THb 12.0 - 18.0 g/dL 15.9   POC O2Hb 94.0 - 100.0 % 93.4 (L)   POC COHb <1.6 % 1.7 (H)   POC MetHb <3.0 % 0.9   POC BE(B) -2.0 - 2.0 mmol/L 6.2 (H)      Latest Reference Range & Units 11/01/22 15:36   IgG 650 - 1600 mg/dL 882   IgM 50 - 300 mg/dL 17 (L)   IgA 40 - 350 mg/dL 205   IgE 0 - 100 IU/mL <35      Latest Reference Range & Units 09/09/22 15:20   DAYTON Screen Negative <1:80  Positive !   DAYTON Titer 1  1:80   DAYTON PATTERN 1  Homogeneous   ds DNA Ab Negative 1:10  Negative 1:10   Anti-SSA Antibody 0.00 - 0.99  Ratio  0.00 - 0.99 Ratio 0.06  0.06   Anti-SSA Interpretation Negative   Negative  Negative  Negative   Anti-SSB Antibody 0.00 - 0.99 Ratio  0.00 - 0.99 Ratio 0.04  0.04   Anti-SSB Interpretation Negative   Negative  Negative  Negative   Anti Sm Antibody 0.00 - 0.99 Ratio 0.07   Anti-Sm Interpretation Negative  Negative   Anti Sm/RNP Antibody 0.00 - 0.99 Ratio 0.05   Anti-Sm/RNP Interpretation Negative  Negative   Scleroderma SCL- <20 UNITS 3      Latest Reference Range & Units 09/09/22 15:20   Rheumatoid Factor 0.0 - 15.0 IU/mL <13.0       PFT results reviewed  11/19/22- moderate restriction, NO obstruction, dlco severely reduced- stable from previous PFT 3 mos prior      6mwt 12/6/22- 366m, min sat 84% req 2L oxygen    Plan:  Clinical impression is reasonably certain and repeated evaluation prn +/- follow up will be needed as below. Chronic bronchiectasis, chronic resp failure. PH ruled out with RHC. Hypoxemia seems out of proportion to lung findings.    Nathalie was seen today for restrictive lung disease.    Diagnoses and all orders for this visit:    Chronic hypoxemic respiratory failure  -     CT Chest Without Contrast; Future  -     Complete PFT with bronchodilator; Future    Bronchiectasis without complication    Restrictive lung disease    IgM deficiency    Mild obstructive sleep apnea          Follow up in about 3 months (around 7/5/2023).    Discussed with patient above for education the following:      Patient Instructions   Continue oxygen to keep sats >90%  Continue cpap nightly use  No evidence of pulmonary hypertension  Nebulizer/aerobika only as needed for mucous/congestion  Repeat CT chest- high resolution   Pulmonary function tests repeat  May have interstitial lung disease? May be very subtle and hard to see but seems stable/improving with time  Continue exercise and diet, weight loss  May consider advanced lung disease referral in the future to see Dr. Barry

## 2023-04-06 ENCOUNTER — PATIENT MESSAGE (OUTPATIENT)
Dept: PULMONOLOGY | Facility: CLINIC | Age: 58
End: 2023-04-06
Payer: COMMERCIAL

## 2023-04-10 ENCOUNTER — HOSPITAL ENCOUNTER (OUTPATIENT)
Dept: PULMONOLOGY | Facility: HOSPITAL | Age: 58
Discharge: HOME | End: 2023-04-10
Attending: INTERNAL MEDICINE
Payer: COMMERCIAL

## 2023-04-10 DIAGNOSIS — J96.11 CHRONIC HYPOXEMIC RESPIRATORY FAILURE: ICD-10-CM

## 2023-04-10 PROCEDURE — 94729 DIFFUSING CAPACITY: CPT | Mod: 26,,, | Performed by: INTERNAL MEDICINE

## 2023-04-10 PROCEDURE — 94726 PLETHYSMOGRAPHY LUNG VOLUMES: CPT | Mod: 26,,, | Performed by: INTERNAL MEDICINE

## 2023-04-10 PROCEDURE — 94726 PLETHYSMOGRAPHY LUNG VOLUMES: CPT

## 2023-04-10 PROCEDURE — 94060 PR EVAL OF BRONCHOSPASM: ICD-10-PCS | Mod: 26,,, | Performed by: INTERNAL MEDICINE

## 2023-04-10 PROCEDURE — 94729 DIFFUSING CAPACITY: CPT

## 2023-04-10 PROCEDURE — 94729 PR C02/MEMBANE DIFFUSE CAPACITY: ICD-10-PCS | Mod: 26,,, | Performed by: INTERNAL MEDICINE

## 2023-04-10 PROCEDURE — 94060 EVALUATION OF WHEEZING: CPT | Mod: 26,,, | Performed by: INTERNAL MEDICINE

## 2023-04-10 PROCEDURE — 94060 EVALUATION OF WHEEZING: CPT

## 2023-04-10 PROCEDURE — 94726 PULM FUNCT TST PLETHYSMOGRAP: ICD-10-PCS | Mod: 26,,, | Performed by: INTERNAL MEDICINE

## 2023-04-10 RX ORDER — ALBUTEROL SULFATE 2.5 MG/.5ML
2.5 SOLUTION RESPIRATORY (INHALATION)
Status: COMPLETED | OUTPATIENT
Start: 2023-04-10 | End: 2023-04-10

## 2023-04-10 RX ADMIN — ALBUTEROL SULFATE 2.5 MG: 2.5 SOLUTION RESPIRATORY (INHALATION) at 03:04

## 2023-04-11 ENCOUNTER — PATIENT MESSAGE (OUTPATIENT)
Dept: ADMINISTRATIVE | Facility: HOSPITAL | Age: 58
End: 2023-04-11
Payer: COMMERCIAL

## 2023-04-11 ENCOUNTER — HOSPITAL ENCOUNTER (OUTPATIENT)
Dept: RADIOLOGY | Facility: HOSPITAL | Age: 58
Discharge: HOME | End: 2023-04-11
Attending: INTERNAL MEDICINE
Payer: COMMERCIAL

## 2023-04-11 DIAGNOSIS — J96.11 CHRONIC HYPOXEMIC RESPIRATORY FAILURE: ICD-10-CM

## 2023-04-11 LAB
BRPFT: ABNORMAL
DLCO SINGLE BREATH LLN: 20.41
DLCO SINGLE BREATH PRE REF: 30.2 %
DLCO SINGLE BREATH REF: 26.14
DLCOC SBVA LLN: 3.26
DLCOC SBVA REF: 4.52
DLCOC SINGLE BREATH LLN: 20.41
DLCOC SINGLE BREATH REF: 26.14
DLCOVA LLN: 3.26
DLCOVA PRE REF: 59.3 %
DLCOVA PRE: 2.68 ML/(MIN*MMHG*L) (ref 3.26–5.78)
DLCOVA REF: 4.52
ERV LLN: -16449.12
ERV PRE REF: 140.6 %
ERV REF: 0.88
FEF 25 75 CHG: 12.6 %
FEF 25 75 LLN: 1.34
FEF 25 75 POST REF: 90.8 %
FEF 25 75 PRE REF: 80.6 %
FEF 25 75 REF: 2.59
FET100 CHG: -20.9 %
FEV1 CHG: 5.8 %
FEV1 FVC CHG: 0.8 %
FEV1 FVC LLN: 67
FEV1 FVC POST REF: 105.2 %
FEV1 FVC PRE REF: 104.4 %
FEV1 FVC REF: 79
FEV1 LLN: 2.26
FEV1 POST REF: 65.6 %
FEV1 PRE REF: 62 %
FEV1 REF: 2.97
FRCPLETH LLN: 2.16
FRCPLETH PREREF: 69.3 %
FRCPLETH REF: 2.98
FVC CHG: 5 %
FVC LLN: 2.89
FVC POST REF: 61.8 %
FVC PRE REF: 58.8 %
FVC REF: 3.8
IVC PRE: 2.26 L (ref 2.89–4.75)
IVC SINGLE BREATH LLN: 2.89
IVC SINGLE BREATH PRE REF: 59.5 %
IVC SINGLE BREATH REF: 3.8
MVV LLN: 98
MVV PRE REF: 81.8 %
MVV REF: 115
PEF CHG: 30.1 %
PEF LLN: 5.21
PEF POST REF: 108.8 %
PEF PRE REF: 83.6 %
PEF REF: 7.2
POST FEF 25 75: 2.35 L/S (ref 1.34–4.25)
POST FET 100: 6.68 SEC
POST FEV1 FVC: 82.93 % (ref 67.06–88.87)
POST FEV1: 1.95 L (ref 2.26–3.66)
POST FVC: 2.35 L (ref 2.89–4.75)
POST PEF: 7.83 L/S (ref 5.21–9.19)
PRE DLCO: 7.89 ML/(MIN*MMHG) (ref 20.41–31.87)
PRE ERV: 1.24 L (ref -16449.12–16450.88)
PRE FEF 25 75: 2.09 L/S (ref 1.34–4.25)
PRE FET 100: 8.44 SEC
PRE FEV1 FVC: 82.31 % (ref 67.06–88.87)
PRE FEV1: 1.84 L (ref 2.26–3.66)
PRE FRC PL: 2.07 L (ref 2.16–3.81)
PRE FVC: 2.24 L (ref 2.89–4.75)
PRE MVV: 94.05 L/MIN (ref 97.67–132.15)
PRE PEF: 6.02 L/S (ref 5.21–9.19)
PRE RV: 0.83 L (ref 1.52–2.68)
PRE TLC: 3.06 L (ref 4.79–6.76)
RAW LLN: 3.06
RAW PRE REF: 32.5 %
RAW PRE: 0.99 CMH2O*S/L (ref 3.06–3.06)
RAW REF: 3.06
RV LLN: 1.52
RV PRE REF: 39.4 %
RV REF: 2.1
RVTLC LLN: 29
RVTLC PRE REF: 69.8 %
RVTLC PRE: 26.98 % (ref 29.09–48.27)
RVTLC REF: 39
TLC LLN: 4.79
TLC PRE REF: 53 %
TLC REF: 5.78
VA PRE: 2.94 L (ref 5.63–5.63)
VA SINGLE BREATH LLN: 5.63
VA SINGLE BREATH PRE REF: 52.2 %
VA SINGLE BREATH REF: 5.63
VC LLN: 2.89
VC PRE REF: 58.8 %
VC PRE: 2.24 L (ref 2.89–4.75)
VC REF: 3.8

## 2023-04-11 PROCEDURE — 71250 CT CHEST WITHOUT CONTRAST: ICD-10-PCS | Mod: 26,,, | Performed by: RADIOLOGY

## 2023-04-11 PROCEDURE — 71250 CT THORAX DX C-: CPT | Mod: TC,PO

## 2023-04-11 PROCEDURE — 71250 CT THORAX DX C-: CPT | Mod: 26,,, | Performed by: RADIOLOGY

## 2023-04-12 ENCOUNTER — PATIENT MESSAGE (OUTPATIENT)
Dept: FAMILY MEDICINE | Facility: CLINIC | Age: 58
End: 2023-04-12
Payer: COMMERCIAL

## 2023-04-13 ENCOUNTER — PATIENT MESSAGE (OUTPATIENT)
Dept: PULMONOLOGY | Facility: CLINIC | Age: 58
End: 2023-04-13
Payer: COMMERCIAL

## 2023-04-13 DIAGNOSIS — G47.33 MILD OBSTRUCTIVE SLEEP APNEA: Primary | ICD-10-CM

## 2023-04-13 NOTE — PROGRESS NOTES
Ms. Krueger,  CT chest shows unchanged small pulmonary nodules and no obvious ILD however they did not do high resolution images like I asked. Can try to get high res next time.  Dr. Lemon

## 2023-04-13 NOTE — PROGRESS NOTES
Ms. Krueger,  I reviewed your pulmonary function test. It shows restriction (low lung volumes) which is unchanged compared to prior testing. Continue plan discussed at last visit.  Dr. Lemon

## 2023-04-20 ENCOUNTER — PATIENT MESSAGE (OUTPATIENT)
Dept: FAMILY MEDICINE | Facility: CLINIC | Age: 58
End: 2023-04-20
Payer: COMMERCIAL

## 2023-04-24 ENCOUNTER — PATIENT MESSAGE (OUTPATIENT)
Dept: FAMILY MEDICINE | Facility: CLINIC | Age: 58
End: 2023-04-24
Payer: COMMERCIAL

## 2023-04-24 ENCOUNTER — PATIENT MESSAGE (OUTPATIENT)
Dept: PULMONOLOGY | Facility: CLINIC | Age: 58
End: 2023-04-24
Payer: COMMERCIAL

## 2023-04-24 ENCOUNTER — TELEPHONE (OUTPATIENT)
Dept: PULMONOLOGY | Facility: CLINIC | Age: 58
End: 2023-04-24
Payer: COMMERCIAL

## 2023-04-24 DIAGNOSIS — E11.9 TYPE 2 DIABETES MELLITUS WITHOUT COMPLICATION, WITHOUT LONG-TERM CURRENT USE OF INSULIN: Primary | ICD-10-CM

## 2023-04-24 NOTE — TELEPHONE ENCOUNTER
Contacted pharmacy   rx was sent in by Tyron Flannery    Called pt and lmom that she would need to contact Dr Flannery for a refill if needed

## 2023-04-24 NOTE — TELEPHONE ENCOUNTER
----- Message from Salo England sent at 4/24/2023  3:32 PM CDT -----  Regarding: PA Needed to Fill  Type:  Pharmacy Calling to Clarify an RX    Name of Caller:  Adam LOPEZ    Pharmacy Name:    [id number] [place] - [address]  [address]  [place] [address]  Phone: [phone] Fax: [phone]    Prescription Name:    OZEMPIC 1 mg/dose (4 mg/3 mL)   3/22/2023    Sig - Route: Inject 1 mg into the skin every 7 days. - Subcutaneous   Class: Historical Med     What do they need to clarify?:  PA Needed to Fill Med    Best Call Back Number:  [phone]    Additional Information:  Please Complete PA so PT can get MED

## 2023-04-25 ENCOUNTER — PATIENT MESSAGE (OUTPATIENT)
Dept: PULMONOLOGY | Facility: CLINIC | Age: 58
End: 2023-04-25
Payer: COMMERCIAL

## 2023-04-25 RX ORDER — SEMAGLUTIDE 1.34 MG/ML
1 INJECTION, SOLUTION SUBCUTANEOUS
Qty: 3 ML | Refills: 0 | Status: SHIPPED | OUTPATIENT
Start: 2023-04-25 | End: 2023-05-18

## 2023-04-25 NOTE — TELEPHONE ENCOUNTER
No new care gaps identified.  [facility] Embedded Care Gaps. Reference number: [id number]. 4/25/2023   7:43:47 AM CRISTOFERT

## 2023-04-26 ENCOUNTER — DOCUMENTATION ONLY (OUTPATIENT)
Dept: FAMILY MEDICINE | Facility: CLINIC | Age: 58
End: 2023-04-26
Payer: COMMERCIAL

## 2023-04-26 ENCOUNTER — PATIENT MESSAGE (OUTPATIENT)
Dept: FAMILY MEDICINE | Facility: CLINIC | Age: 58
End: 2023-04-26
Payer: COMMERCIAL

## 2023-04-27 ENCOUNTER — PATIENT MESSAGE (OUTPATIENT)
Dept: FAMILY MEDICINE | Facility: CLINIC | Age: 58
End: 2023-04-27
Payer: COMMERCIAL

## 2023-04-27 ENCOUNTER — TELEPHONE (OUTPATIENT)
Dept: FAMILY MEDICINE | Facility: CLINIC | Age: 58
End: 2023-04-27
Payer: COMMERCIAL

## 2023-04-27 NOTE — TELEPHONE ENCOUNTER
----- Message from Liseth Snow, Patient Care Assistant sent at 4/26/2023  3:19 PM CDT -----  Contact: self  Type: Needs Medical Advice  Who Called:  self  Symptoms (please be specific):  ingrown toenail, painful, red  How long has patient had these symptoms:  2 days  Pharmacy name and phone #:   [id number] [address]  [address]  [facility] [address]  Phone: [phone] Fax: [phone]  Best Call Back Number: [phone]  Additional Information: thanks

## 2023-04-28 ENCOUNTER — OFFICE VISIT (OUTPATIENT)
Dept: PODIATRY | Facility: CLINIC | Age: 58
End: 2023-04-28
Payer: COMMERCIAL

## 2023-04-28 VITALS — HEIGHT: 69 IN | BODY MASS INDEX: 35.04 KG/M2 | WEIGHT: 236.56 LBS

## 2023-04-28 DIAGNOSIS — L60.0 INGROWN NAIL OF GREAT TOE OF LEFT FOOT: Primary | ICD-10-CM

## 2023-04-28 PROCEDURE — 99999 PR PBB SHADOW E&M-EST. PATIENT-LVL III: CPT | Mod: PBBFAC,,, | Performed by: PODIATRIST

## 2023-04-28 PROCEDURE — 3051F HG A1C>EQUAL 7.0%<8.0%: CPT | Mod: CPTII,S$GLB,, | Performed by: PODIATRIST

## 2023-04-28 PROCEDURE — 1160F RVW MEDS BY RX/DR IN RCRD: CPT | Mod: CPTII,S$GLB,, | Performed by: PODIATRIST

## 2023-04-28 PROCEDURE — 3008F PR BODY MASS INDEX (BMI) DOCUMENTED: ICD-10-PCS | Mod: CPTII,S$GLB,, | Performed by: PODIATRIST

## 2023-04-28 PROCEDURE — 99999 PR PBB SHADOW E&M-EST. PATIENT-LVL III: ICD-10-PCS | Mod: PBBFAC,,, | Performed by: PODIATRIST

## 2023-04-28 PROCEDURE — 1159F MED LIST DOCD IN RCRD: CPT | Mod: CPTII,S$GLB,, | Performed by: PODIATRIST

## 2023-04-28 PROCEDURE — 1160F PR REVIEW ALL MEDS BY PRESCRIBER/CLIN PHARMACIST DOCUMENTED: ICD-10-PCS | Mod: CPTII,S$GLB,, | Performed by: PODIATRIST

## 2023-04-28 PROCEDURE — 3008F BODY MASS INDEX DOCD: CPT | Mod: CPTII,S$GLB,, | Performed by: PODIATRIST

## 2023-04-28 PROCEDURE — 99203 PR OFFICE/OUTPT VISIT, NEW, LEVL III, 30-44 MIN: ICD-10-PCS | Mod: S$GLB,,, | Performed by: PODIATRIST

## 2023-04-28 PROCEDURE — 99203 OFFICE O/P NEW LOW 30 MIN: CPT | Mod: S$GLB,,, | Performed by: PODIATRIST

## 2023-04-28 PROCEDURE — 3051F PR MOST RECENT HEMOGLOBIN A1C LEVEL 7.0 - < 8.0%: ICD-10-PCS | Mod: CPTII,S$GLB,, | Performed by: PODIATRIST

## 2023-04-28 PROCEDURE — 1159F PR MEDICATION LIST DOCUMENTED IN MEDICAL RECORD: ICD-10-PCS | Mod: CPTII,S$GLB,, | Performed by: PODIATRIST

## 2023-04-28 NOTE — PROGRESS NOTES
Subjective:      Patient ID: Nathalie Krueger is a 57 y.o. female.    Chief Complaint: Foot Pain    Nathalie is a 57 y.o. female who presents to the clinic complaining of painful ingrown toenail on the left foot great toe. Pain with shoe wear and pressure, no prior medical treatments has tried trimming it out herself.    Review of Systems   Constitutional: Negative for chills and fever.   Cardiovascular:  Negative for claudication and leg swelling.   Respiratory:  Negative for shortness of breath.    Skin:  Positive for nail changes. Negative for itching and rash.   Musculoskeletal:  Negative for muscle cramps, muscle weakness and myalgias.   Gastrointestinal:  Negative for nausea and vomiting.   Neurological:  Negative for focal weakness, loss of balance, numbness and paresthesias.         Objective:      Physical Exam  Constitutional:       General: She is not in acute distress.     Appearance: She is well-developed. She is not diaphoretic.   Cardiovascular:      Pulses:           Dorsalis pedis pulses are 2+ on the right side and 2+ on the left side.        Posterior tibial pulses are 2+ on the right side and 2+ on the left side.      Comments: < 3 sec capillary refill time to toes 1-5 bilateral. Toes and feet are warm to touch proximally with normal distal cooling b/l. There is some hair growth on the feet and toes b/l. There is no edema b/l. No spider veins or varicosities present b/l.     Musculoskeletal:      Comments: Equinus noted b/l ankles with < 10 deg DF noted. MMT 5/5 in DF/PF/Inv/Ev resistance with no reproduction of pain in any direction. Passive range of motion of ankle and pedal joints is painless b/l.     Skin:     General: Skin is warm and dry.      Coloration: Skin is not pale.      Findings: No abrasion, bruising, burn, ecchymosis, erythema, laceration, lesion, petechiae or rash.      Nails: There is no clubbing.      Comments: Skin temperature, texture and turgor within normal limits.    medial  hallux nail margin of the left foot with ingrown nail plate. Surrounding erythema and minimal edema is noted there is no granuloma formation noted. No drainage or malodor        Neurological:      Mental Status: She is alert and oriented to person, place, and time.      Sensory: No sensory deficit.      Motor: No tremor, atrophy or abnormal muscle tone.      Comments: Negative tinel sign bilateral.   Psychiatric:         Behavior: Behavior normal.             Assessment:       Encounter Diagnosis   Name Primary?    Ingrown nail of great toe of left foot Yes         Plan:       Nathalie was seen today for foot pain.    Diagnoses and all orders for this visit:    Ingrown nail of great toe of left foot      I counseled the patient on her conditions, their implications and medical management.    Utilizing sterile toenail clippers I aggressively debrided the offending nail border approximately 3 mm from its edge and carried the nail plate incision down at an angle in order to wedge out the offending cryptotic portion of the nail plate. The offending border was then removed in toto.  No blood was drawn. Patient tolerated the procedure well and related significant relief.    Discussed doing a more permanent ingrown nail procedure PNA with phenol if this comes back or continues to bother her    Return PRAMINA Jauregui DPM

## 2023-05-01 ENCOUNTER — TELEPHONE (OUTPATIENT)
Dept: PULMONOLOGY | Facility: CLINIC | Age: 58
End: 2023-05-01
Payer: COMMERCIAL

## 2023-05-05 ENCOUNTER — PATIENT MESSAGE (OUTPATIENT)
Dept: PULMONOLOGY | Facility: CLINIC | Age: 58
End: 2023-05-05
Payer: COMMERCIAL

## 2023-05-22 RX ORDER — OMEPRAZOLE 40 MG/1
CAPSULE, DELAYED RELEASE ORAL
Qty: 30 CAPSULE | Refills: 2 | Status: SHIPPED | OUTPATIENT
Start: 2023-05-22 | End: 2023-09-15 | Stop reason: SDUPTHER

## 2023-06-05 PROBLEM — J96.11 CHRONIC HYPOXEMIC RESPIRATORY FAILURE: Status: RESOLVED | Noted: 2022-08-09 | Resolved: 2023-06-05

## 2023-06-16 ENCOUNTER — LAB VISIT (OUTPATIENT)
Dept: LAB | Facility: HOSPITAL | Age: 58
End: 2023-06-16
Attending: STUDENT IN AN ORGANIZED HEALTH CARE EDUCATION/TRAINING PROGRAM
Payer: COMMERCIAL

## 2023-06-16 ENCOUNTER — OFFICE VISIT (OUTPATIENT)
Dept: FAMILY MEDICINE | Facility: CLINIC | Age: 58
End: 2023-06-16
Payer: COMMERCIAL

## 2023-06-16 VITALS
HEART RATE: 92 BPM | HEIGHT: 69 IN | WEIGHT: 227.94 LBS | DIASTOLIC BLOOD PRESSURE: 84 MMHG | OXYGEN SATURATION: 94 % | SYSTOLIC BLOOD PRESSURE: 118 MMHG | BODY MASS INDEX: 33.76 KG/M2

## 2023-06-16 DIAGNOSIS — Z12.4 CERVICAL CANCER SCREENING: ICD-10-CM

## 2023-06-16 DIAGNOSIS — E11.9 TYPE 2 DIABETES MELLITUS WITHOUT COMPLICATION, WITHOUT LONG-TERM CURRENT USE OF INSULIN: ICD-10-CM

## 2023-06-16 DIAGNOSIS — E11.9 TYPE 2 DIABETES MELLITUS WITHOUT COMPLICATION, WITHOUT LONG-TERM CURRENT USE OF INSULIN: Primary | ICD-10-CM

## 2023-06-16 PROCEDURE — 87624 HPV HI-RISK TYP POOLED RSLT: CPT | Performed by: STUDENT IN AN ORGANIZED HEALTH CARE EDUCATION/TRAINING PROGRAM

## 2023-06-16 PROCEDURE — 99213 PR OFFICE/OUTPT VISIT, EST, LEVL III, 20-29 MIN: ICD-10-PCS | Mod: S$GLB,,, | Performed by: STUDENT IN AN ORGANIZED HEALTH CARE EDUCATION/TRAINING PROGRAM

## 2023-06-16 PROCEDURE — 3008F BODY MASS INDEX DOCD: CPT | Mod: CPTII,S$GLB,, | Performed by: STUDENT IN AN ORGANIZED HEALTH CARE EDUCATION/TRAINING PROGRAM

## 2023-06-16 PROCEDURE — 3051F HG A1C>EQUAL 7.0%<8.0%: CPT | Mod: CPTII,S$GLB,, | Performed by: STUDENT IN AN ORGANIZED HEALTH CARE EDUCATION/TRAINING PROGRAM

## 2023-06-16 PROCEDURE — 36415 COLL VENOUS BLD VENIPUNCTURE: CPT | Mod: PO | Performed by: STUDENT IN AN ORGANIZED HEALTH CARE EDUCATION/TRAINING PROGRAM

## 2023-06-16 PROCEDURE — 83036 HEMOGLOBIN GLYCOSYLATED A1C: CPT | Performed by: STUDENT IN AN ORGANIZED HEALTH CARE EDUCATION/TRAINING PROGRAM

## 2023-06-16 PROCEDURE — 3079F DIAST BP 80-89 MM HG: CPT | Mod: CPTII,S$GLB,, | Performed by: STUDENT IN AN ORGANIZED HEALTH CARE EDUCATION/TRAINING PROGRAM

## 2023-06-16 PROCEDURE — 3074F SYST BP LT 130 MM HG: CPT | Mod: CPTII,S$GLB,, | Performed by: STUDENT IN AN ORGANIZED HEALTH CARE EDUCATION/TRAINING PROGRAM

## 2023-06-16 PROCEDURE — 88175 CYTOPATH C/V AUTO FLUID REDO: CPT | Performed by: STUDENT IN AN ORGANIZED HEALTH CARE EDUCATION/TRAINING PROGRAM

## 2023-06-16 PROCEDURE — 3008F PR BODY MASS INDEX (BMI) DOCUMENTED: ICD-10-PCS | Mod: CPTII,S$GLB,, | Performed by: STUDENT IN AN ORGANIZED HEALTH CARE EDUCATION/TRAINING PROGRAM

## 2023-06-16 PROCEDURE — 99999 PR PBB SHADOW E&M-EST. PATIENT-LVL IV: CPT | Mod: PBBFAC,,, | Performed by: STUDENT IN AN ORGANIZED HEALTH CARE EDUCATION/TRAINING PROGRAM

## 2023-06-16 PROCEDURE — 99213 OFFICE O/P EST LOW 20 MIN: CPT | Mod: S$GLB,,, | Performed by: STUDENT IN AN ORGANIZED HEALTH CARE EDUCATION/TRAINING PROGRAM

## 2023-06-16 PROCEDURE — 3074F PR MOST RECENT SYSTOLIC BLOOD PRESSURE < 130 MM HG: ICD-10-PCS | Mod: CPTII,S$GLB,, | Performed by: STUDENT IN AN ORGANIZED HEALTH CARE EDUCATION/TRAINING PROGRAM

## 2023-06-16 PROCEDURE — 99999 PR PBB SHADOW E&M-EST. PATIENT-LVL IV: ICD-10-PCS | Mod: PBBFAC,,, | Performed by: STUDENT IN AN ORGANIZED HEALTH CARE EDUCATION/TRAINING PROGRAM

## 2023-06-16 PROCEDURE — 3051F PR MOST RECENT HEMOGLOBIN A1C LEVEL 7.0 - < 8.0%: ICD-10-PCS | Mod: CPTII,S$GLB,, | Performed by: STUDENT IN AN ORGANIZED HEALTH CARE EDUCATION/TRAINING PROGRAM

## 2023-06-16 PROCEDURE — 3079F PR MOST RECENT DIASTOLIC BLOOD PRESSURE 80-89 MM HG: ICD-10-PCS | Mod: CPTII,S$GLB,, | Performed by: STUDENT IN AN ORGANIZED HEALTH CARE EDUCATION/TRAINING PROGRAM

## 2023-06-16 PROCEDURE — 1159F MED LIST DOCD IN RCRD: CPT | Mod: CPTII,S$GLB,, | Performed by: STUDENT IN AN ORGANIZED HEALTH CARE EDUCATION/TRAINING PROGRAM

## 2023-06-16 PROCEDURE — 1159F PR MEDICATION LIST DOCUMENTED IN MEDICAL RECORD: ICD-10-PCS | Mod: CPTII,S$GLB,, | Performed by: STUDENT IN AN ORGANIZED HEALTH CARE EDUCATION/TRAINING PROGRAM

## 2023-06-16 NOTE — PROGRESS NOTES
Name: Nathalie Krueger  MRN: [id number]  : 1965  PCP: Tyron Flannery MD    HPI  Patient presents for follow up. Currently on Ozempic 1mg. There was only a two week gap since the last visit when she was on Trulicity but she was able to get Ozempic again. Tolerating the dose well.    She asked if she needs to be on rosuvastatin. Only lipid panel I have is from  when she was already on rosuvastatin. She heard concerns about what rosuvastatin does to your muscles.     She also asked if she needed to be on omeprazole.     Review of Systems   Gastrointestinal:  Negative for nausea and vomiting.     Patient Active Problem List   Diagnosis    Essential hypertension    Other hyperlipidemia    Type 2 diabetes mellitus without complication, without long-term current use of insulin    Lung nodule    Bronchiectasis without complication    Gastroesophageal reflux disease    Right flank pain    Class 1 obesity due to excess calories without serious comorbidity with body mass index (BMI) of 34.0 to 34.9 in adult    Mild obstructive sleep apnea    Restrictive lung disease    IgM deficiency       Vitals:    23 1335   BP: 118/84   Pulse: 92       Physical Exam  Constitutional:       General: She is not in acute distress.     Appearance: Normal appearance. She is well-developed.   HENT:      Head: Normocephalic and atraumatic.      Right Ear: External ear normal.      Left Ear: External ear normal.   Eyes:      Conjunctiva/sclera: Conjunctivae normal.   Pulmonary:      Effort: Pulmonary effort is normal. No respiratory distress.   Abdominal:      General: Abdomen is flat. There is no distension.   Musculoskeletal:         General: No swelling or deformity.      Right lower leg: No edema.      Left lower leg: No edema.   Skin:     General: Skin is warm and dry.      Coloration: Skin is not jaundiced.   Neurological:      Mental Status: She is alert and oriented to person, place, and time. Mental status is at  baseline.   Psychiatric:         Attention and Perception: Attention and perception normal.         Mood and Affect: Mood normal.         Speech: Speech normal.         Behavior: Behavior normal. Behavior is cooperative.         Thought Content: Thought content normal.         Cognition and Memory: Cognition normal.         Judgment: Judgment normal.       1. Type 2 diabetes mellitus without complication, without long-term current use of insulin  Assessment & Plan:  Presumably controlled although there was some interruption in her Ozempic. Labs today.    Patient is curious about her need for rosuvastatin due to concerns for side effects. By guidelines, she should be on statin therapy due to her diabetes but we can get an accurate lipid profile off rosuvastatin - depending on her risk, we can discuss the risk of statin cessation vs starting a lower intensity statin.    Orders:  -     Hemoglobin A1C; Future    2. Cervical cancer screening  -     Liquid-Based Pap Smear, Screening  -     HPV High Risk Genotypes, PCR        Follow up in 3 months    Tyron Flannery MD  06/16/2023

## 2023-06-16 NOTE — ASSESSMENT & PLAN NOTE
Presumably controlled although there was some interruption in her Ozempic. Labs today.    Patient is curious about her need for rosuvastatin due to concerns for side effects. By guidelines, she should be on statin therapy due to her diabetes but we can get an accurate lipid profile off rosuvastatin - depending on her risk, we can discuss the risk of statin cessation vs starting a lower intensity statin.

## 2023-06-17 LAB
ESTIMATED AVG GLUCOSE: 151 MG/DL (ref 68–131)
HBA1C MFR BLD: 6.9 % (ref 4–5.6)

## 2023-06-20 LAB
FINAL PATHOLOGIC DIAGNOSIS: NORMAL
Lab: NORMAL

## 2023-06-21 LAB
HPV HR 12 DNA SPEC QL NAA+PROBE: NEGATIVE
HPV16 AG SPEC QL: NEGATIVE
HPV18 DNA SPEC QL NAA+PROBE: NEGATIVE

## 2023-06-27 ENCOUNTER — OFFICE VISIT (OUTPATIENT)
Dept: PULMONOLOGY | Facility: CLINIC | Age: 58
End: 2023-06-27
Payer: COMMERCIAL

## 2023-06-27 VITALS
WEIGHT: 226.38 LBS | DIASTOLIC BLOOD PRESSURE: 82 MMHG | HEIGHT: 69 IN | SYSTOLIC BLOOD PRESSURE: 118 MMHG | OXYGEN SATURATION: 94 % | BODY MASS INDEX: 33.53 KG/M2 | HEART RATE: 87 BPM

## 2023-06-27 DIAGNOSIS — J47.9 BRONCHIECTASIS WITHOUT COMPLICATION: Primary | ICD-10-CM

## 2023-06-27 DIAGNOSIS — J96.11 CHRONIC HYPOXEMIC RESPIRATORY FAILURE: ICD-10-CM

## 2023-06-27 DIAGNOSIS — J84.9 ILD (INTERSTITIAL LUNG DISEASE): ICD-10-CM

## 2023-06-27 DIAGNOSIS — G47.33 MILD OBSTRUCTIVE SLEEP APNEA: ICD-10-CM

## 2023-06-27 PROCEDURE — 1159F MED LIST DOCD IN RCRD: CPT | Mod: CPTII,S$GLB,, | Performed by: INTERNAL MEDICINE

## 2023-06-27 PROCEDURE — 3044F HG A1C LEVEL LT 7.0%: CPT | Mod: CPTII,S$GLB,, | Performed by: INTERNAL MEDICINE

## 2023-06-27 PROCEDURE — 3044F PR MOST RECENT HEMOGLOBIN A1C LEVEL <7.0%: ICD-10-PCS | Mod: CPTII,S$GLB,, | Performed by: INTERNAL MEDICINE

## 2023-06-27 PROCEDURE — 3074F SYST BP LT 130 MM HG: CPT | Mod: CPTII,S$GLB,, | Performed by: INTERNAL MEDICINE

## 2023-06-27 PROCEDURE — 3079F PR MOST RECENT DIASTOLIC BLOOD PRESSURE 80-89 MM HG: ICD-10-PCS | Mod: CPTII,S$GLB,, | Performed by: INTERNAL MEDICINE

## 2023-06-27 PROCEDURE — 3008F PR BODY MASS INDEX (BMI) DOCUMENTED: ICD-10-PCS | Mod: CPTII,S$GLB,, | Performed by: INTERNAL MEDICINE

## 2023-06-27 PROCEDURE — 3074F PR MOST RECENT SYSTOLIC BLOOD PRESSURE < 130 MM HG: ICD-10-PCS | Mod: CPTII,S$GLB,, | Performed by: INTERNAL MEDICINE

## 2023-06-27 PROCEDURE — 99214 PR OFFICE/OUTPT VISIT, EST, LEVL IV, 30-39 MIN: ICD-10-PCS | Mod: S$GLB,,, | Performed by: INTERNAL MEDICINE

## 2023-06-27 PROCEDURE — 3008F BODY MASS INDEX DOCD: CPT | Mod: CPTII,S$GLB,, | Performed by: INTERNAL MEDICINE

## 2023-06-27 PROCEDURE — 1159F PR MEDICATION LIST DOCUMENTED IN MEDICAL RECORD: ICD-10-PCS | Mod: CPTII,S$GLB,, | Performed by: INTERNAL MEDICINE

## 2023-06-27 PROCEDURE — 3079F DIAST BP 80-89 MM HG: CPT | Mod: CPTII,S$GLB,, | Performed by: INTERNAL MEDICINE

## 2023-06-27 PROCEDURE — 99999 PR PBB SHADOW E&M-EST. PATIENT-LVL III: ICD-10-PCS | Mod: PBBFAC,,, | Performed by: INTERNAL MEDICINE

## 2023-06-27 PROCEDURE — 99999 PR PBB SHADOW E&M-EST. PATIENT-LVL III: CPT | Mod: PBBFAC,,, | Performed by: INTERNAL MEDICINE

## 2023-06-27 PROCEDURE — 99214 OFFICE O/P EST MOD 30 MIN: CPT | Mod: S$GLB,,, | Performed by: INTERNAL MEDICINE

## 2023-06-27 NOTE — PATIENT INSTRUCTIONS
Continue oxygen with exertion, keep sats >90%  Repeat pulmonary function testing  Advanced lung disease consultation with Dr. Barry  Consider surgical lung biopsy for diagnosis?   Continue cpap nightly use

## 2023-06-27 NOTE — PROGRESS NOTES
6/27/2023    Nathalie Carey Krueger  Follow up    No chief complaint on file.      HPI:  06/27/2023- Pt here w/ wife today. she feels breathing is same, able to walk 30 min at slight incline with 3L oxygen  She noticed slight flare of productive cough in May lasting about 1 wk, self resolves. She uses nebs PRN. She has been doing water aerobics. She continues to diet and has lost about 9#. She has stopped omeprazole and rosuvastatin    04/05/2023-   Continue oxygen to keep sats >90%  Continue cpap nightly use  No evidence of pulmonary hypertension  Nebulizer/aerobika only as needed for mucous/congestion  Repeat CT chest- high resolution   Pulmonary function tests repeat  May have interstitial lung disease? May be very subtle and hard to see but seems stable/improving with time  Continue exercise and diet, weight loss  May consider advanced lung disease referral in the future to see Dr. Barry  pt feeling well, here with wife who assists w/ history. Had RHC with no evidence of PH. Still using 3L O2 with exertion, unchanged. She gets a little winded w/ climbing stairs. Every day has cough- very mild, intermittent, clear mucous couple times a day. Not needing nebulizer. Uses CPAP nightly w/ benefit. Able to exercise in pool w/o dyspnea. Sometimes standing in kitchen partner will notice her lips are blue- has to use oxygen.  Working on losing wt- mediterranean diet. Has lost 3# so far.    2/28/23-   I suspect you have pulmonary hypertension causing shortness of breath, oxygen requirement  No COPD  Bronchiectasis is mild- may cause cough, mucous build up, may be prone to respiratory infections  Stop stiolto inhaler, continue pulmicort inhaler and albuterol nebulizer as needed  Stop azithromycin  Overnight pulse ox, complete off cpap and off oxygen  Continue oxygen to keep sats >92%    VQ scan to rule out blood clot  Blood work to rule out HIV, Lupus  Referral to Dr. Maye Hernandez for right heart cath  Fingerstick test to rule  "out A1AT deficiency  Pt is a 58 yo female with DM2, GERD, HTN, bronchiectasis, ORLIN and chronic hypoxemic resp failure presenting to establish new pulmonologist. She has previously been seen by pulmonology offices at [facility], [facility], and [facility]. Her wife vilma is present and assists w/ history.  Pt lived in [place], moved to  2 yr ago. . Since moving here she has experienced worsening SOB, fatigue, noted low sats and went to ED 1/2022. Aug 2022 she had a "flare up" and started requiring 2L oxygen w/ exertion. For 2 mos she felt very bad, reports steroids may have helped- gave her energy.    Used to have hacking cough attributed to lisinopril, now different type of cough w/ mucous coming up. She may have had respiratory infection which lasted about 2d in December, resolved spontaneously. She has been on azithromycin 4x/week since suppressive tx.  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   Nebulizer: albuterol every other day. Doesn't use duo neb or saline. Uses stiolto and pulmicort inhalers daily.  Pt goes on walks, goes swimming, functions much better since move to [place] in 12/2022. After exercise has to bump up O2 to 3-4L to recover.  She has mild ORLIN, always slept well, has CPAP w/ bleed in O2.  Pt quit smoking 3-4 yr ago, smoked about 25-30 yrs.  FH father had lung problems, was a diver in the [employer] and was forced to stop.  Pt used to be an athlete- played basketball college and soccer.  She would like to travel, asks about overseas and plane travel w/ oxygen    The chief complaint problem is new to me.    PFSH:  Past Medical History:   Diagnosis Date    Acute bronchitis 02/01/2022    Adenomatous polyp of ascending colon 06/22/2020         Past Surgical History:   Procedure Laterality Date    ABLATION, FIBROID, UTERUS, LAPAROSCOPIC, WITH US GUIDANCE      APPENDECTOMY  1982    RIGHT HEART CATHETERIZATION " "Right 3/29/2023    Procedure: INSERTION, CATHETER, RIGHT HEART;  Surgeon: Yulisa Yung DO;  Location: [facility] CATH LAB;  Service: Cardiology;  Laterality: Right;    ROTATOR CUFF REPAIR Right 1998    TONSILLECTOMY  1977     Social History     Tobacco Use    Smoking status: Former     Packs/day: 0.50     Years: 25.00     Pack years: 12.50     Types: Cigarettes     Quit date: 12/16/2019     Years since quitting: 3.5    Smokeless tobacco: Never    Tobacco comments:     Smoked additive free tobacco   Substance Use Topics    Alcohol use: Not Currently    Drug use: Never     Family History   Problem Relation Age of Onset    Diabetes Mother     Hypertension Mother     Heart disease Mother     Diabetes Father     Glaucoma Neg Hx     Macular degeneration Neg Hx      Review of patient's allergies indicates:   Allergen Reactions    Atorvastatin Other (See Comments)     Dry cough      Lisinopril      Other reaction(s): Cough    Meperidine hcl Nausea Only       Performance Status:The patient's activity level is regular exercise.      Review of Systems:  a review of eleven systems covering constitutional, Eye, HEENT, Psych, Respiratory, Cardiac, GI, , Musculoskeletal, Endocrine, Dermatologic was negative except for pertinent findings as listed ABOVE and below:  All negative with pertinent positives as above       Exam:Comprehensive exam done. /82 (BP Location: Right arm, Patient Position: Sitting, BP Method: Large (Automatic))   Pulse 87   Ht 5' 9" (1.753 m)   Wt 102.7 kg (226 lb 6.4 oz)   SpO2 (!) 94% Comment: 2L  BMI 33.43 kg/m²   Exam included Vitals as listed, and patient's appearance and affect and alertness and mood, oral exam for yeast and hygiene and pharynx lesions and Mallapatti (M) score, neck with inspection for jvd and masses and thyroid abnormalities and lymph nodes (supraclavicular and infraclavicular nodes and axillary also examined and noted if abn), chest exam included symmetry and effort and " fremitus and percussion and auscultation, cardiac exam included rhythm and gallops and murmur and rubs and jvd and edema, abdominal exam for mass and hepatosplenomegaly and tenderness and hernias and bowel sounds, Musculoskeletal exam with muscle tone and posture and mobility/gait and  strength, and skin for rashes and cyanosis and pallor and turgor, extremity for clubbing.  Findings were normal except for pertinent findings listed below:  M3, oropharynx clear  HR regular  Breath sounds clear bilaterally  No edema/clubbing    Radiographs (ct chest and cxr) reviewed: view by direct vision   HRCT chest 4/11/23- slight mosaicism throughout, scant emphysematous changes vs cysts, bronchiectasis  CT chest 8/31/22- mild diffuse bronchiectasis, subtle reticulations upper lobes?  3 mm more solid-appearing nodule, right upper lobe series 4, image 174, previously subsolid 3 mm.  3 mm solid nodule lingular region series 4, image 336, in retrospect, unchanged.  No new nodule.  No focal airspace disease.    TTE 3/21/23-   The left ventricle is normal in size with normal systolic function.  The estimated ejection fraction is 65%.  Normal left ventricular diastolic function.  Moderate right ventricular enlargement with low normal to mildly reduced right ventricular systolic function.  The estimated PA systolic pressure is 34 mmHg.  Normal central venous pressure (3 mmHg).  The ascending aorta is mildly dilated.    TTE 1/3/23-   The left ventricle is normal in size with mild concentric hypertrophy and normal systolic function.  The estimated ejection fraction is 60%.  Grade I left ventricular diastolic dysfunction.  Moderate right ventricular enlargement with low normal right ventricular systolic function.  Mild left atrial enlargement.  The estimated PA systolic pressure is 29 mmHg.  The aortic root is mildly dilated at 3.9 cm  There is no evidence of intracardiac shunting (negative bubble study)    Labs reviewed    Sputum  cultures AFB and routine 9/2022- no growth   Latest Reference Range & Units 01/03/23 10:46   POC PH 7.35 - 7.45  7.47 (H)   POC PCO2 35.0 - 45.0 mmHg 42   POC PO2 80.0 - 100.0 mmHg 75 (L)   POC SATURATED O2 94.0 - 100.0 % 96.0   Allens Test  POS   POC pAO2 mmHg 97   POC A-aDO2 mmHg 22   POC HCO3-(c) 22.0 - 26.0 mmol/L 30.6 (H)   POC THb 12.0 - 18.0 g/dL 15.9   POC O2Hb 94.0 - 100.0 % 93.4 (L)   POC COHb <1.6 % 1.7 (H)   POC MetHb <3.0 % 0.9   POC BE(B) -2.0 - 2.0 mmol/L 6.2 (H)      Latest Reference Range & Units 11/01/22 15:36   IgG 650 - 1600 mg/dL 882   IgM 50 - 300 mg/dL 17 (L)   IgA 40 - 350 mg/dL 205   IgE 0 - 100 IU/mL <35      Latest Reference Range & Units 09/09/22 15:20   DAYTON Screen Negative <1:80  Positive !   DAYTON Titer 1  1:80   DAYTON PATTERN 1  Homogeneous   ds DNA Ab Negative 1:10  Negative 1:10   Anti-SSA Antibody 0.00 - 0.99 Ratio  0.00 - 0.99 Ratio 0.06  0.06   Anti-SSA Interpretation Negative   Negative  Negative  Negative   Anti-SSB Antibody 0.00 - 0.99 Ratio  0.00 - 0.99 Ratio 0.04  0.04   Anti-SSB Interpretation Negative   Negative  Negative  Negative   Anti Sm Antibody 0.00 - 0.99 Ratio 0.07   Anti-Sm Interpretation Negative  Negative   Anti Sm/RNP Antibody 0.00 - 0.99 Ratio 0.05   Anti-Sm/RNP Interpretation Negative  Negative   Scleroderma SCL- <20 UNITS 3      Latest Reference Range & Units 09/09/22 15:20   Rheumatoid Factor 0.0 - 15.0 IU/mL <13.0       PFT results reviewed  11/19/22- moderate restriction, NO obstruction, dlco severely reduced- stable from previous PFT 3 mos prior      6mwt 12/6/22- 366m, min sat 84% req 2L oxygen    Plan:  Clinical impression is reasonably certain and repeated evaluation prn +/- follow up will be needed as below. Chronic bronchiectasis, chronic resp failure. PH ruled out with RHC. Hypoxemia seems out of proportion to lung findings.    Diagnoses and all orders for this visit:    Bronchiectasis without complication    Chronic hypoxemic respiratory failure  -      Complete PFT with bronchodilator; Future  -     Ambulatory referral/consult to Advanced Lung Disease; Future    ILD (interstitial lung disease)    Mild obstructive sleep apnea            Follow up in about 4 months (around 10/27/2023).    Discussed with patient above for education the following:      Patient Instructions   Continue oxygen with exertion, keep sats >90%  Repeat pulmonary function testing  Advanced lung disease consultation with Dr. Baryr  Consider surgical lung biopsy for diagnosis?   Continue cpap nightly use

## 2023-06-29 ENCOUNTER — TELEPHONE (OUTPATIENT)
Dept: TRANSPLANT | Facility: CLINIC | Age: 58
End: 2023-06-29
Payer: COMMERCIAL

## 2023-06-29 DIAGNOSIS — J84.9 ILD (INTERSTITIAL LUNG DISEASE): Primary | ICD-10-CM

## 2023-06-29 NOTE — TELEPHONE ENCOUNTER
"6/29/23: Received the plan of care from Dr. Ledesma. Sent patient a portal message to discuss the plan. Appointments scheduled for 7/12/23 per patient request.      ----- Message from Lesly Ledesma MD sent at 6/29/2023 11:52 AM CDT -----  Regarding: RE: ALD Referral    ALD routine  Testing: karlie and 6MWT    ----- Message -----  From: Erica Mehta RN  Sent: 6/28/2023   3:47 PM CDT  To: Lesly Ledesma MD  Subject: ALD Referral                                     Advanced Lung Disease (ALD) Clinic Referral Note    Referral from: Dr. Lemon    Lung diagnosis: ILD     Age: 57 y.o.    Height/Weight/BMI:  5' 9"/ 102.7 kg/ 33.43 kg/m²    Smoking history: Social History  Tobacco Use  Smoking status: Former        Packs/day: 0.50        Years: 25.00        Pack years: 12.5        Types: Cigarettes        Quit date: 12/16/2019        Years since quitting: 3.5      Smokeless tobacco: Never      Tobacco comments: Smoked additive free tobacco    PFT date: 04/10/2023  FVC 2.24 (58.8%) FEV1 1.84 (62%) TLC 3.06 (53%) DLCO 7.89 (30.2%)  Moderately severe restriction is noted on spirometry and lung volume testing.   Airflow is not clearly improved after bronchodilator. Clinical improvement following bronchodilator therapy may occur in the absence of spirometric improvement.   Diffusion capacity is severely reduced - unadjusted for hemoglobin    6 MWT date: 03/21/2023  82% on room air at rest  Walk done on 2 liters - distance 1000 feet with sats 86% on 2 liters during exercise    Chest CT date: 04/11/2023  Two small stable solid-appearing pulmonary nodules stable since the prior of 01/26/2022 favoring a benign etiology of less than 6 mm in size.  High-resolution images in prone and supine were also noted to have been performed.  The supine images are significantly hindered by motion.  No fixed interstitial process is convincingly noted.  Some mild dependent density is noted anteriorly in the upper lobe on each side on the " prone sequence, the supine supine high-resolution sequence cannot clear this region secondary to motion but no obvious abnormality is noted on the thin slice supine sequence.  No convincing fixed interstitial process is identified    Echo date: 03/21/2023    Impression:  · The left ventricle is normal in size with normal systolic function.  · The estimated ejection fraction is 65%.  · Normal left ventricular diastolic function.  · Moderate right ventricular enlargement with low normal to mildly reduced right ventricular systolic function.  · The estimated PA systolic pressure is 34 mmHg.  · Normal central venous pressure (3 mmHg).  · The ascending aorta is mildly dilated.    RHC date: 03/9/23  Right Heart Pressures  RA: 10 RV: 30/ 10 PA: 36/ 20/ 25 PWP: 16 .   Cardiac output was 5.9  by Fletcher. Cardiac index is 2.66 L/min/m2.   O2 Sat: PA 70%.   Pulmonary vascular resistance: 1.5. Systemic vascular resistance: 1288      Other pertinent medical history: Chronic bronchiectasis, chronic resp failure. PH ruled out with RHC. Hypoxemia seems out of proportion to lung findings.    Recommendations?

## 2023-07-04 ENCOUNTER — PATIENT MESSAGE (OUTPATIENT)
Dept: FAMILY MEDICINE | Facility: CLINIC | Age: 58
End: 2023-07-04
Payer: COMMERCIAL

## 2023-07-05 RX ORDER — MINERAL OIL AND PETROLATUM 150; 830 MG/G; MG/G
OINTMENT OPHTHALMIC NIGHTLY
Qty: 3.5 G | Refills: 1 | Status: SHIPPED | OUTPATIENT
Start: 2023-07-05 | End: 2023-10-25

## 2023-07-06 ENCOUNTER — PATIENT MESSAGE (OUTPATIENT)
Dept: PODIATRY | Facility: CLINIC | Age: 58
End: 2023-07-06
Payer: COMMERCIAL

## 2023-07-07 ENCOUNTER — PATIENT MESSAGE (OUTPATIENT)
Dept: PODIATRY | Facility: CLINIC | Age: 58
End: 2023-07-07
Payer: COMMERCIAL

## 2023-07-10 ENCOUNTER — TELEPHONE (OUTPATIENT)
Dept: PODIATRY | Facility: CLINIC | Age: 58
End: 2023-07-10
Payer: COMMERCIAL

## 2023-07-10 ENCOUNTER — OFFICE VISIT (OUTPATIENT)
Dept: PODIATRY | Facility: CLINIC | Age: 58
End: 2023-07-10
Payer: COMMERCIAL

## 2023-07-10 VITALS — HEIGHT: 69 IN | BODY MASS INDEX: 33.47 KG/M2 | WEIGHT: 226 LBS

## 2023-07-10 DIAGNOSIS — L60.0 INGROWN NAIL OF GREAT TOE OF LEFT FOOT: Primary | ICD-10-CM

## 2023-07-10 PROCEDURE — 1159F PR MEDICATION LIST DOCUMENTED IN MEDICAL RECORD: ICD-10-PCS | Mod: CPTII,S$GLB,, | Performed by: PODIATRIST

## 2023-07-10 PROCEDURE — 3008F PR BODY MASS INDEX (BMI) DOCUMENTED: ICD-10-PCS | Mod: CPTII,S$GLB,, | Performed by: PODIATRIST

## 2023-07-10 PROCEDURE — 99213 OFFICE O/P EST LOW 20 MIN: CPT | Mod: S$GLB,,, | Performed by: PODIATRIST

## 2023-07-10 PROCEDURE — 1160F PR REVIEW ALL MEDS BY PRESCRIBER/CLIN PHARMACIST DOCUMENTED: ICD-10-PCS | Mod: CPTII,S$GLB,, | Performed by: PODIATRIST

## 2023-07-10 PROCEDURE — 1159F MED LIST DOCD IN RCRD: CPT | Mod: CPTII,S$GLB,, | Performed by: PODIATRIST

## 2023-07-10 PROCEDURE — 1160F RVW MEDS BY RX/DR IN RCRD: CPT | Mod: CPTII,S$GLB,, | Performed by: PODIATRIST

## 2023-07-10 PROCEDURE — 99999 PR PBB SHADOW E&M-EST. PATIENT-LVL II: CPT | Mod: PBBFAC,,, | Performed by: PODIATRIST

## 2023-07-10 PROCEDURE — 99213 PR OFFICE/OUTPT VISIT, EST, LEVL III, 20-29 MIN: ICD-10-PCS | Mod: S$GLB,,, | Performed by: PODIATRIST

## 2023-07-10 PROCEDURE — 99999 PR PBB SHADOW E&M-EST. PATIENT-LVL II: ICD-10-PCS | Mod: PBBFAC,,, | Performed by: PODIATRIST

## 2023-07-10 PROCEDURE — 3044F PR MOST RECENT HEMOGLOBIN A1C LEVEL <7.0%: ICD-10-PCS | Mod: CPTII,S$GLB,, | Performed by: PODIATRIST

## 2023-07-10 PROCEDURE — 3008F BODY MASS INDEX DOCD: CPT | Mod: CPTII,S$GLB,, | Performed by: PODIATRIST

## 2023-07-10 PROCEDURE — 3044F HG A1C LEVEL LT 7.0%: CPT | Mod: CPTII,S$GLB,, | Performed by: PODIATRIST

## 2023-07-10 NOTE — PROGRESS NOTES
Subjective:      Patient ID: Nathalie Krueger is a 57 y.o. female.    Chief Complaint: No chief complaint on file.    Nathalie is a 57 y.o. female who presents to the clinic complaining of painful ingrown toenail on the left foot great toe. Pain with shoe wear and pressure, no prior medical treatments has tried trimming it out herself. Last time she was here I trimmed it out but it has returned    Review of Systems   Constitutional: Negative for chills and fever.   Cardiovascular:  Negative for claudication and leg swelling.   Respiratory:  Negative for shortness of breath.    Skin:  Positive for nail changes. Negative for itching and rash.   Musculoskeletal:  Negative for muscle cramps, muscle weakness and myalgias.   Gastrointestinal:  Negative for nausea and vomiting.   Neurological:  Negative for focal weakness, loss of balance, numbness and paresthesias.         Objective:      Physical Exam  Constitutional:       General: She is not in acute distress.     Appearance: She is well-developed. She is not diaphoretic.   Cardiovascular:      Pulses:           Dorsalis pedis pulses are 2+ on the right side and 2+ on the left side.        Posterior tibial pulses are 2+ on the right side and 2+ on the left side.      Comments: < 3 sec capillary refill time to toes 1-5 bilateral. Toes and feet are warm to touch proximally with normal distal cooling b/l. There is some hair growth on the feet and toes b/l. There is no edema b/l. No spider veins or varicosities present b/l.     Musculoskeletal:      Comments: Equinus noted b/l ankles with < 10 deg DF noted. MMT 5/5 in DF/PF/Inv/Ev resistance with no reproduction of pain in any direction. Passive range of motion of ankle and pedal joints is painless b/l.     Skin:     General: Skin is warm and dry.      Coloration: Skin is not pale.      Findings: No abrasion, bruising, burn, ecchymosis, erythema, laceration, lesion, petechiae or rash.      Nails: There is no clubbing.       Comments: Skin temperature, texture and turgor within normal limits.    medial hallux nail margin of the left foot with ingrown nail plate. Surrounding erythema and minimal edema is noted there is no granuloma formation noted. No drainage or malodor        Neurological:      Mental Status: She is alert and oriented to person, place, and time.      Sensory: No sensory deficit.      Motor: No tremor, atrophy or abnormal muscle tone.      Comments: Negative tinel sign bilateral.   Psychiatric:         Behavior: Behavior normal.             Assessment:       Encounter Diagnosis   Name Primary?    Ingrown nail of great toe of left foot Yes           Plan:       Diagnoses and all orders for this visit:    Ingrown nail of great toe of left foot        I counseled the patient on her conditions, their implications and medical management.    Utilizing sterile toenail clippers I aggressively debrided the offending nail border approximately 3 mm from its edge and carried the nail plate incision down at an angle in order to wedge out the offending cryptotic portion of the nail plate. The offending border was then removed in toto.  No blood was drawn. Patient tolerated the procedure well and related significant relief.    Discussed doing a more permanent ingrown nail procedure PNA with phenol she is amendable to this and will return in August for the procedure    Return for nail procedure in August    Seferino Jauregui DPM

## 2023-07-11 ENCOUNTER — TELEPHONE (OUTPATIENT)
Dept: TRANSPLANT | Facility: CLINIC | Age: 58
End: 2023-07-11
Payer: COMMERCIAL

## 2023-07-12 ENCOUNTER — HOSPITAL ENCOUNTER (OUTPATIENT)
Dept: PULMONOLOGY | Facility: CLINIC | Age: 58
Discharge: HOME | End: 2023-07-12
Payer: COMMERCIAL

## 2023-07-12 ENCOUNTER — LAB VISIT (OUTPATIENT)
Dept: LAB | Facility: HOSPITAL | Age: 58
End: 2023-07-12
Attending: INTERNAL MEDICINE
Payer: COMMERCIAL

## 2023-07-12 ENCOUNTER — OFFICE VISIT (OUTPATIENT)
Dept: TRANSPLANT | Facility: CLINIC | Age: 58
End: 2023-07-12
Payer: COMMERCIAL

## 2023-07-12 VITALS
SYSTOLIC BLOOD PRESSURE: 127 MMHG | WEIGHT: 228.19 LBS | HEIGHT: 69 IN | DIASTOLIC BLOOD PRESSURE: 82 MMHG | BODY MASS INDEX: 33.63 KG/M2 | HEART RATE: 85 BPM | TEMPERATURE: 96 F | WEIGHT: 227.06 LBS | OXYGEN SATURATION: 95 % | HEIGHT: 69 IN | RESPIRATION RATE: 20 BRPM | BODY MASS INDEX: 33.8 KG/M2

## 2023-07-12 DIAGNOSIS — G47.33 MILD OBSTRUCTIVE SLEEP APNEA: ICD-10-CM

## 2023-07-12 DIAGNOSIS — J84.9 ILD (INTERSTITIAL LUNG DISEASE): ICD-10-CM

## 2023-07-12 DIAGNOSIS — J84.9 ILD (INTERSTITIAL LUNG DISEASE): Primary | ICD-10-CM

## 2023-07-12 DIAGNOSIS — J96.11 CHRONIC HYPOXEMIC RESPIRATORY FAILURE: ICD-10-CM

## 2023-07-12 DIAGNOSIS — K21.9 GASTROESOPHAGEAL REFLUX DISEASE, UNSPECIFIED WHETHER ESOPHAGITIS PRESENT: ICD-10-CM

## 2023-07-12 LAB
CCP AB SER IA-ACNC: 4.7 U/ML
CK SERPL-CCNC: 275 U/L (ref 20–180)
CRP SERPL-MCNC: 1.2 MG/L (ref 0–8.2)
ERYTHROCYTE [SEDIMENTATION RATE] IN BLOOD BY PHOTOMETRIC METHOD: 7 MM/HR (ref 0–36)
FEF 25 75 LLN: 1.33
FEF 25 75 PRE REF: 98.9 %
FEF 25 75 REF: 2.58
FEV05 LLN: 1.26
FEV05 REF: 2.11
FEV1 FVC LLN: 67
FEV1 FVC PRE REF: 106 %
FEV1 FVC REF: 79
FEV1 LLN: 2.25
FEV1 PRE REF: 68 %
FEV1 REF: 2.96
FVC LLN: 2.88
FVC PRE REF: 63.5 %
FVC REF: 3.79
IGE SERPL-ACNC: 49 IU/ML (ref 0–100)
IGG SERPL-MCNC: 951 MG/DL (ref 650–1600)
IGM SERPL-MCNC: 16 MG/DL (ref 50–300)
PEF LLN: 5.21
PEF PRE REF: 103.1 %
PEF REF: 7.2
PHYSICIAN COMMENT: ABNORMAL
PRE FEF 25 75: 2.55 L/S (ref 1.33–4.23)
PRE FET 100: 6.3 SEC
PRE FEV05 REF: 81.9 %
PRE FEV1 FVC: 83.58 % (ref 67.01–88.87)
PRE FEV1: 2.01 L (ref 2.25–3.65)
PRE FEV5: 1.73 L (ref 1.26–2.97)
PRE FVC: 2.41 L (ref 2.88–4.74)
PRE PEF: 7.42 L/S (ref 5.21–9.19)
RHEUMATOID FACT SERPL-ACNC: <13 IU/ML (ref 0–15)

## 2023-07-12 PROCEDURE — 83516 IMMUNOASSAY NONANTIBODY: CPT | Mod: 59,TXP | Performed by: INTERNAL MEDICINE

## 2023-07-12 PROCEDURE — 94618 PULMONARY STRESS TESTING: CPT | Mod: NTX,S$GLB,, | Performed by: INTERNAL MEDICINE

## 2023-07-12 PROCEDURE — 3074F SYST BP LT 130 MM HG: CPT | Mod: CPTII,NTX,S$GLB, | Performed by: INTERNAL MEDICINE

## 2023-07-12 PROCEDURE — 3074F PR MOST RECENT SYSTOLIC BLOOD PRESSURE < 130 MM HG: ICD-10-PCS | Mod: CPTII,NTX,S$GLB, | Performed by: INTERNAL MEDICINE

## 2023-07-12 PROCEDURE — 86225 DNA ANTIBODY NATIVE: CPT | Mod: TXP | Performed by: INTERNAL MEDICINE

## 2023-07-12 PROCEDURE — 82784 ASSAY IGA/IGD/IGG/IGM EACH: CPT | Mod: 59,TXP | Performed by: INTERNAL MEDICINE

## 2023-07-12 PROCEDURE — 3044F PR MOST RECENT HEMOGLOBIN A1C LEVEL <7.0%: ICD-10-PCS | Mod: CPTII,NTX,S$GLB, | Performed by: INTERNAL MEDICINE

## 2023-07-12 PROCEDURE — 3044F HG A1C LEVEL LT 7.0%: CPT | Mod: CPTII,NTX,S$GLB, | Performed by: INTERNAL MEDICINE

## 2023-07-12 PROCEDURE — 82550 ASSAY OF CK (CPK): CPT | Mod: NTX | Performed by: INTERNAL MEDICINE

## 2023-07-12 PROCEDURE — 82787 IGG 1 2 3 OR 4 EACH: CPT | Mod: NTX | Performed by: INTERNAL MEDICINE

## 2023-07-12 PROCEDURE — 99999 PR PBB SHADOW E&M-EST. PATIENT-LVL III: CPT | Mod: PBBFAC,TXP,, | Performed by: INTERNAL MEDICINE

## 2023-07-12 PROCEDURE — 86235 NUCLEAR ANTIGEN ANTIBODY: CPT | Mod: NTX | Performed by: INTERNAL MEDICINE

## 2023-07-12 PROCEDURE — 3079F DIAST BP 80-89 MM HG: CPT | Mod: CPTII,NTX,S$GLB, | Performed by: INTERNAL MEDICINE

## 2023-07-12 PROCEDURE — 3079F PR MOST RECENT DIASTOLIC BLOOD PRESSURE 80-89 MM HG: ICD-10-PCS | Mod: CPTII,NTX,S$GLB, | Performed by: INTERNAL MEDICINE

## 2023-07-12 PROCEDURE — 94618 PULMONARY STRESS TESTING: ICD-10-PCS | Mod: NTX,S$GLB,, | Performed by: INTERNAL MEDICINE

## 2023-07-12 PROCEDURE — 86140 C-REACTIVE PROTEIN: CPT | Mod: TXP | Performed by: INTERNAL MEDICINE

## 2023-07-12 PROCEDURE — 82085 ASSAY OF ALDOLASE: CPT | Mod: TXP | Performed by: INTERNAL MEDICINE

## 2023-07-12 PROCEDURE — 82784 ASSAY IGA/IGD/IGG/IGM EACH: CPT | Mod: NTX | Performed by: INTERNAL MEDICINE

## 2023-07-12 PROCEDURE — 99214 OFFICE O/P EST MOD 30 MIN: CPT | Mod: 25,NTX,S$GLB, | Performed by: INTERNAL MEDICINE

## 2023-07-12 PROCEDURE — 99214 PR OFFICE/OUTPT VISIT, EST, LEVL IV, 30-39 MIN: ICD-10-PCS | Mod: 25,NTX,S$GLB, | Performed by: INTERNAL MEDICINE

## 2023-07-12 PROCEDURE — 86036 ANCA SCREEN EACH ANTIBODY: CPT | Mod: 59,NTX | Performed by: INTERNAL MEDICINE

## 2023-07-12 PROCEDURE — 82784 ASSAY IGA/IGD/IGG/IGM EACH: CPT | Mod: 59,NTX | Performed by: INTERNAL MEDICINE

## 2023-07-12 PROCEDURE — 99999 PR PBB SHADOW E&M-EST. PATIENT-LVL III: ICD-10-PCS | Mod: PBBFAC,TXP,, | Performed by: INTERNAL MEDICINE

## 2023-07-12 PROCEDURE — 86039 ANTINUCLEAR ANTIBODIES (ANA): CPT | Mod: NTX | Performed by: INTERNAL MEDICINE

## 2023-07-12 PROCEDURE — 85652 RBC SED RATE AUTOMATED: CPT | Mod: TXP | Performed by: INTERNAL MEDICINE

## 2023-07-12 PROCEDURE — 86235 NUCLEAR ANTIGEN ANTIBODY: CPT | Mod: 59,TXP | Performed by: INTERNAL MEDICINE

## 2023-07-12 PROCEDURE — 83520 IMMUNOASSAY QUANT NOS NONAB: CPT | Mod: 59,NTX | Performed by: INTERNAL MEDICINE

## 2023-07-12 PROCEDURE — 86200 CCP ANTIBODY: CPT | Mod: NTX | Performed by: INTERNAL MEDICINE

## 2023-07-12 PROCEDURE — 86235 NUCLEAR ANTIGEN ANTIBODY: CPT | Mod: 59,NTX | Performed by: INTERNAL MEDICINE

## 2023-07-12 PROCEDURE — 87305 ASPERGILLUS AG IA: CPT | Mod: TXP | Performed by: INTERNAL MEDICINE

## 2023-07-12 PROCEDURE — 82164 ANGIOTENSIN I ENZYME TEST: CPT | Mod: TXP | Performed by: INTERNAL MEDICINE

## 2023-07-12 PROCEDURE — 82785 ASSAY OF IGE: CPT | Mod: NTX | Performed by: INTERNAL MEDICINE

## 2023-07-12 PROCEDURE — 83516 IMMUNOASSAY NONANTIBODY: CPT | Mod: TXP | Performed by: INTERNAL MEDICINE

## 2023-07-12 PROCEDURE — 94010 BREATHING CAPACITY TEST: CPT | Mod: NTX,S$GLB,, | Performed by: INTERNAL MEDICINE

## 2023-07-12 PROCEDURE — 94010 BREATHING CAPACITY TEST: ICD-10-PCS | Mod: NTX,S$GLB,, | Performed by: INTERNAL MEDICINE

## 2023-07-12 PROCEDURE — 83520 IMMUNOASSAY QUANT NOS NONAB: CPT | Mod: TXP | Performed by: INTERNAL MEDICINE

## 2023-07-12 PROCEDURE — 3008F PR BODY MASS INDEX (BMI) DOCUMENTED: ICD-10-PCS | Mod: CPTII,NTX,S$GLB, | Performed by: INTERNAL MEDICINE

## 2023-07-12 PROCEDURE — 86038 ANTINUCLEAR ANTIBODIES: CPT | Mod: TXP | Performed by: INTERNAL MEDICINE

## 2023-07-12 PROCEDURE — 86431 RHEUMATOID FACTOR QUANT: CPT | Mod: TXP | Performed by: INTERNAL MEDICINE

## 2023-07-12 PROCEDURE — 36415 COLL VENOUS BLD VENIPUNCTURE: CPT | Mod: NTX | Performed by: INTERNAL MEDICINE

## 2023-07-12 PROCEDURE — 3008F BODY MASS INDEX DOCD: CPT | Mod: CPTII,NTX,S$GLB, | Performed by: INTERNAL MEDICINE

## 2023-07-12 RX ORDER — FLUTICASONE FUROATE, UMECLIDINIUM BROMIDE AND VILANTEROL TRIFENATATE 200; 62.5; 25 UG/1; UG/1; UG/1
1 POWDER RESPIRATORY (INHALATION) DAILY
Qty: 60 EACH | Refills: 5 | Status: SHIPPED | OUTPATIENT
Start: 2023-07-12 | End: 2024-01-16

## 2023-07-12 RX ORDER — CETIRIZINE HYDROCHLORIDE 10 MG/1
10 TABLET ORAL DAILY
Qty: 360 TABLET | Refills: 0 | Status: SHIPPED | OUTPATIENT
Start: 2023-07-12 | End: 2024-07-11

## 2023-07-12 RX ORDER — AZELASTINE 1 MG/ML
1 SPRAY, METERED NASAL 2 TIMES DAILY
Qty: 30 ML | Refills: 0 | Status: SHIPPED | OUTPATIENT
Start: 2023-07-12 | End: 2023-08-04 | Stop reason: SDUPTHER

## 2023-07-12 NOTE — LETTER
July 13, 2023        Sarah Lemon  [address]  [address]  [place] [address]  Phone: [phone]  Fax: [phone]             [facility]  [address]  [facility] [address]  Phone: [phone]   Patient: Nathalie Krueger   MR Number: [id number]   YOB: 1965   Date of Visit: 7/12/2023       Dear Dr. Sarah Lemon    Thank you for referring Nathalie Krueger to me for evaluation. Attached you will find relevant portions of my assessment and plan of care.    If you have questions, please do not hesitate to call me. I look forward to following Nathalie Krueger along with you.    Sincerely,    Lesly Ledesma MD    Enclosure    If you would like to receive this communication electronically, please contact [email or web address] or [phone] to request [email or web address] [employer] access.    [email or web address] is a tool which provides read-only access to select patient information with whom you have a relationship. Its easy to use and provides real time access to review your patients record including encounter summaries, notes, results, and demographic information.    If you feel you have received this communication in error or would no longer like to receive these types of communications, please e-mail [email or web address]

## 2023-07-12 NOTE — PROGRESS NOTES
[facility] INITIAL EVALUATION                                                                                                                                             Reason for Visit:  Evaluation for ILD    Referring Physician: Sarah Lemon MD    History of Present Illness: Nathalie Krueger is a 57 y.o. female who is on 2L of oxygen.  She is on CPAP.  Her New [place] Heart Association Class is II and a Karnofsky score of 80% - Normal activity with effort: some symptoms of disease. She is diabetic non insulin dependent    Patient presents today for evaluation of ILD. Patient states symptoms first began over three years ago when she lived in [place]. Per patient's wife, patient had developed progressive fatigue and dyspnea with exertion. She was still able to perform her ADLs independently, but noticed she would get breathless with activity very quickly. Previously lived on [email or web address] in [place]. She states she had an episode of profound fatigue/malaise one week following clean out of family's chicken coup. Frequent exposures to dust, pesticides, fertilizers while living in [place]. Patient and her wife moved to [place] ~two years ago. While living downtown, patient noticed increased cough, worsening fatigue requiring frequent daytime naps. [facility] [address] and found she had worsening cough if other residents smoked within the building. She presented to the ED in early 2022 due to hypoxemia. She checked her pulse ox at home and reportedly had oxygen saturation of 76%. She did not require hospitalization. No prior chest surgeries, ICU admission, intubations, lung biopsy.     In August 2022, patient had acute worsening of her dyspnea and cough. She was started on 2L oxygen to be used with exertion. 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 Since starting CPAP, notes improvement in daytime fatigue. She completed a trial of steroids in the  fall of 2022 after evaluation with Dr. Rodas at [facility] pulm and states her symptoms improved drastically. No recent exacerbations/hospitalizations. She has had an extensive autoimmune workup which revealed +DAYTON and low IgM. All other workup unrevealing. Currently of stiolto and symbicort for management.     Patient is a former 25-30 year smoker and quit 3-4 years ago. Her father had lung disease related to diving in the [employer], but otherwise denies family history of lung disease. No history of frequent infections as a child. She states she did have a history of scarlet fever as a child while living in [place] and was told she should not live there in the future? Also states she used a 20 year old inhaler she bought in [place] while still living in [place] and is concerned she may have aspirated mold. She occasionally has episodes of flushing and rashes. No arthralgias. History of reflux and recently stopped omeprazole in hopes of improving symptoms with diet modification. She recently lost ~10 pounds. Continues to have intermittent reflux despite diet changes. She states she remains very active and walks and performs water aerobics frequently. 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 Remains independent with her ADLs, but does struggle with vacuuming, bending over, and climbing stairs.       Past Medical History:   Diagnosis Date    Acute bronchitis 02/01/2022    Adenomatous polyp of ascending colon 06/22/2020       Past Surgical History:   Procedure Laterality Date    ABLATION, FIBROID, UTERUS, LAPAROSCOPIC, WITH US GUIDANCE      APPENDECTOMY  1982    RIGHT HEART CATHETERIZATION Right 3/29/2023    Procedure: INSERTION, CATHETER, RIGHT HEART;  Surgeon: Yulisa Yung DO;  Location: [facility] CATH LAB;  Service: Cardiology;  Laterality: Right;    ROTATOR CUFF REPAIR Right 1998    TONSILLECTOMY  1977       Allergies: Atorvastatin, Lisinopril, and Meperidine hcl    Current Outpatient Medications    Medication Sig    albuterol (PROVENTIL/VENTOLIN HFA) 90 mcg/actuation inhaler Inhale 1-2 puffs into the lungs every 4 (four) hours as needed for Wheezing or Shortness of Breath. Rescue    albuterol-ipratropium (DUO-NEB) 2.5 mg-0.5 mg/3 mL nebulizer solution Take 3 mLs by nebulization 2 (two) times daily. Rescue    calcium carbonate/vitamin D3 (CALCIUM WITH VITAMIN D ORAL) Take 1 tablet by mouth once daily.    carvediloL (COREG) 25 MG tablet Take 1 tablet (25 mg total) by mouth 2 (two) times daily with meals.    coenzyme Q10 100 mg capsule Take 200 mg by mouth.    metFORMIN (GLUCOPHAGE) 500 MG tablet Take 1 tablet (500 mg total) by mouth daily with breakfast.    multivitamin capsule Take 1 capsule by mouth once daily.    omeprazole (PRILOSEC) 40 MG capsule TAKE 1 CAPSULE BY MOUTH EVERY DAY    OZEMPIC 1 mg/dose (4 mg/3 mL) INJECT 1 MG INTO THE SKIN EVERY 7 DAYS.    PULMICORT FLEXHALER 90 mcg/actuation AePB Inhale 2 puffs (180 mcg total) into the lungs 2 (two) times daily.    rosuvastatin (CRESTOR) 5 MG tablet Take 1 tablet (5 mg total) by mouth once daily.    sodium chloride 3% 3 % nebulizer solution 3 CC OF 3% SALINE INTO NEBS TWICE DAILY AS NEEDED    white petrolatum-mineral oiL (ARTIFICIAL TEARS, BLAKE/MIN,) 83-15 % Oint Place into both eyes every evening.     No current facility-administered medications for this visit.       Immunization History   Administered Date(s) Administered    COVID-19, MRNA, LN-S, PF (Pfizer) (Gray Cap) 04/06/2022    COVID-19, MRNA, LN-S, PF (Pfizer) (Purple Cap) 03/06/2021, 03/30/2021, 09/30/2021    COVID-19, mRNA, LNP-S, bivalent booster, PF (PFIZER OMICRON) 09/22/2022    Hepatitis B, Adult 01/16/2018, 02/27/2018, 07/31/2018    Influenza 09/28/2016, 10/04/2017, 10/24/2018, 09/05/2019, 09/08/2022    Influenza - Quadrivalent - PF *Preferred* (6 months and older) 09/30/2021    Pneumococcal Conjugate - 20 Valent 09/08/2022    Pneumococcal Polysaccharide - 23 Valent 10/04/2016    Tdap  01/01/2016    Zoster Recombinant 10/29/2020, 01/31/2022     Family History:    Family History   Problem Relation Age of Onset    Diabetes Mother     Hypertension Mother     Heart disease Mother     Diabetes Father     Glaucoma Neg Hx     Macular degeneration Neg Hx      Social History     Substance and Sexual Activity   Alcohol Use Not Currently      Social History     Substance and Sexual Activity   Drug Use Never      Social History     Socioeconomic History    Marital status:    Occupational History    Occupation: Consultant with non profits   Tobacco Use    Smoking status: Former     Packs/day: 0.50     Years: 25.00     Pack years: 12.50     Types: Cigarettes     Quit date: 12/16/2019     Years since quitting: 3.5    Smokeless tobacco: Never    Tobacco comments:     Smoked additive free tobacco   Substance and Sexual Activity    Alcohol use: Not Currently    Drug use: Never    Sexual activity: Not Currently     Partners: Female     Birth control/protection: Post-menopausal     Comment: spouse -  14 years    Social History Narrative    Lives with her spouse, Aisha. Enjoys live music, painting.      Review of Systems   Constitutional:  Negative for chills, diaphoresis, fever, malaise/fatigue and weight loss.   HENT:  Negative for congestion, ear discharge, ear pain, hearing loss, nosebleeds, sinus pain, sore throat and tinnitus.    Eyes:  Negative for blurred vision, double vision, photophobia, pain, discharge and redness.   Respiratory:  Positive for cough and shortness of breath. Negative for hemoptysis, sputum production, wheezing and stridor.    Cardiovascular:  Negative for chest pain, palpitations, orthopnea, claudication, leg swelling and PND.   Gastrointestinal:  Negative for abdominal pain, blood in stool, constipation, diarrhea, heartburn, melena, nausea and vomiting.   Genitourinary:  Negative for dysuria, flank pain, frequency, hematuria and urgency.   Musculoskeletal:  Negative for  back pain, falls, joint pain, myalgias and neck pain.   Skin:  Negative for itching and rash.   Neurological:  Negative for dizziness, tingling, tremors, sensory change, speech change, focal weakness, seizures, loss of consciousness, weakness and headaches.   Endo/Heme/Allergies:  Negative for environmental allergies and polydipsia. Does not bruise/bleed easily.   Psychiatric/Behavioral:  Negative for depression, hallucinations, memory loss, substance abuse and suicidal ideas. The patient is not nervous/anxious and does not have insomnia.    Vitals  There were no vitals taken for this visit.  Physical Exam  Vitals and nursing note reviewed.   Constitutional:       General: She is not in acute distress.     Appearance: Normal appearance.   HENT:      Head: Normocephalic and atraumatic.      Nose: No congestion or rhinorrhea.      Mouth/Throat:      Mouth: Mucous membranes are moist.   Eyes:      General: No scleral icterus.     Conjunctiva/sclera: Conjunctivae normal.   Cardiovascular:      Rate and Rhythm: Normal rate.      Heart sounds: No murmur heard.    No friction rub.   Pulmonary:      Effort: No respiratory distress.      Breath sounds: No wheezing, rhonchi or rales.   Abdominal:      General: Bowel sounds are normal. There is no distension.      Palpations: Abdomen is soft.      Tenderness: There is no abdominal tenderness.   Musculoskeletal:      Right lower leg: No edema.      Left lower leg: No edema.   Skin:     General: Skin is warm and dry.   Neurological:      General: No focal deficit present.      Mental Status: She is alert and oriented to person, place, and time.   Psychiatric:         Mood and Affect: Mood normal.         Behavior: Behavior normal.       Labs:  Hospital Outpatient Visit on 07/12/2023   Component Date Value    Pre FVC 07/12/2023 2.41 (L)     PRE FEV5 07/12/2023 1.73     Pre FEV1 07/12/2023 2.01 (L)     Pre FEV1 FVC 07/12/2023 83.58     Pre FEF 25 75 07/12/2023 2.55     Pre PEF  07/12/2023 7.42     Pre  07/12/2023 6.30     FVC Ref 07/12/2023 3.79     FVC LLN 07/12/2023 2.88     FVC Pre Ref 07/12/2023 63.5     FEV05 REF 07/12/2023 2.11     FEV05 LLN 07/12/2023 1.26     PRE FEV05 REF 07/12/2023 81.9     FEV1 Ref 07/12/2023 2.96     FEV1 LLN 07/12/2023 2.25     FEV1 Pre Ref 07/12/2023 68.0     FEV1 FVC Ref 07/12/2023 79     FEV1 FVC LLN 07/12/2023 67     FEV1 FVC Pre Ref 07/12/2023 106.0     FEF 25 75 Ref 07/12/2023 2.58     FEF 25 75 LLN 07/12/2023 1.33     FEF 25 75 Pre Ref 07/12/2023 98.9     PEF Ref 07/12/2023 7.20     PEF LLN 07/12/2023 5.21     PEF Pre Ref 07/12/2023 103.1        No flowsheet data found.  6MW 3/21/2023 12/6/2022   6MWT Status completed without stopping completed without stopping   Patient Reported No complaints No complaints   Was O2 used? Yes Yes   Delivery Method Cannula;Pull Tank;Continuous Flow Cannula;Demand Flow;Shoulder Carry   6MW Distance walked (feet) 1000 1200   Distance walked (meters) 304.8 365.76   Did patient stop? No No   Oxygen Saturation 97 98   Supplemental Oxygen 2 L/M 2 L/M   Heart Rate 85 83   Blood Pressure 134/81 145/84   Ashlie Dyspnea Rating  nothing at all nothing at all   Oxygen Saturation 86 84   Supplemental Oxygen 2 L/M 2 L/M   Heart Rate 99 96   Blood Pressure 135/81 160/79   Ashlie Dyspnea Rating  light very, very light (just noticeable)   Recovery Time (seconds) 120 90   Oxygen Saturation 97 95   Supplemental Oxygen 2 L/M 2 L/M   Heart Rate 91 87       Imaging:  EXAMINATION:  CT CHEST WITHOUT CONTRAST     CLINICAL HISTORY:  HRCT ILD; Chronic respiratory failure with hypoxia     TECHNIQUE:  Low dose axial images, sagittal and coronal reformations were obtained from the thoracic inlet to the lung bases. Contrast was not administered.     COMPARISON:  08/31/2022 and 01/26/2022     FINDINGS:  Normal sized mediastinal nodes are noted including prevascular nodes slightly more numerous than expected but unchanged since 01/26/2022.     Gas is  noted within the distal esophagus as can be seen with air swallowing or reflux.     A normal size node appears to be present adjacent to the gallbladder with a short diameter of 9 mm unchanged since 01/26/2022.     Multiple colonic diverticula are noted without obvious inflammatory change.     A 4 mm nodule is noted within the lingula image 311 sequence 4 stable since 01/26/2022     A right upper lobe nodule is noted image 156 sequence 4 stable since 01/26/2022.  Cystic changes noted within the right humeral head suggesting labral injury at the glenoid with degenerative change.     The ascending aorta is mildly prominent at 4 point 2 cm unchanged since the prior given inter study variance     Impression:     1. Two small stable solid-appearing pulmonary nodules stable since the prior of 01/26/2022 favoring a benign etiology of less than 6 mm in size.        Electronically signed by: Delfino Saucedo MD  Date:                                            04/11/2023  Time:                                           12:16      Cardiodiagnostics:  Results for orders placed during the hospital encounter of 03/21/23    Echo    Interpretation Summary  · The left ventricle is normal in size with normal systolic function.  · The estimated ejection fraction is 65%.  · Normal left ventricular diastolic function.  · Moderate right ventricular enlargement with low normal to mildly reduced right ventricular systolic function.  · The estimated PA systolic pressure is 34 mmHg.  · Normal central venous pressure (3 mmHg).  · The ascending aorta is mildly dilated.    Results for orders placed during the hospital encounter of 03/29/23    Cardiac catheterization    Conclusion  Summary  Filling pressures: RA 10, PCWP 16  PA 36/20, mean PA pressure 20 mmHg  PVR 1.5 DUQUE  PA saturation 70%, Ao saturation 97%  Fletcher CO/CI: 5.9/2.66  /100  with repeat intraprocedure similar results. On recheck post procedure 133/75  HR 71 SR  Hb  13.6        Assessment:  1. ILD (interstitial lung disease)    2. Chronic hypoxemic respiratory failure    3. Mild obstructive sleep apnea      Plan:     Spirometry shows moderate restriction. No DLCO for review. CT chest with diffuse mosaicism, scattered, thin-walled cysts, and very small areas of early fibrotic changes (chronic HP vs LIP?). Her autoimmune workup thus far has been unrevealing aside from a +DAYTON and low IgM. Will repeat autoimmune/allergy workup. No evidence of PH on RHC with mPAP of 20. She has exertional hypoxemia and desats to 87% during her 6MWT while on 3L. Would like to trial steroids and repeat PFTs and 6MWT after. If she is steroid responsive, would consider starting MMF.    Continue oxygen supplementation at rest and with exertion.     Continue nightly CPAP.     Recommend patient resume PPI given ongoing reflux symptoms.     RTC in 6 weeks or sooner if needed.       Nighat Granado PA-C  [facility]

## 2023-07-12 NOTE — PROCEDURES
Nathalie Krueger is a 57 y.o.  female patient, who presents for a 6 minute walk test ordered by MD Baltazar.  The diagnosis is Pulmonary Hypertension; Bronchiectasis.  The patient's BMI is 33.7 kg/m2.  Predicted distance (lower limit of normal) is 329.57 meters.      Test Results:    The test was completed without stopping. The total time walked was 360 seconds. During walking, the patient reported:  No complaints. The patient used supplemental oxygen during testing.     07/12/2023---------Distance: 365.76 meters (1200 feet)     Lap Walk Time O2 Sat % Supplemental Oxygen Heart Rate Blood Pressure Ashlie Scale Comment   Pre-exercise  (Resting) 0 0 95 % 2 L/M 87 bpm 122/76 mmHg 0.5    During Exercise 1 47 sec 87 % 2 L/M 107 bpm   Oxygen increased   During Exercise 2 113 sec 89 % 3 L/M 101 bpm      During Exercise 3 174 sec 89 % 3 L/M 98 bpm      During Exercise 4 234 sec 90 % 3 L/M 101 bpm      During Exercise 5 291 sec 89 % 3 L/M 115 bpm      During Exercise 6 360 sec 89 % 3 L/M 97 bpm 119/76 mmHg 0.5    Post-exercise  (Recovery)   98 % 3 L/M  90 bpm        Recovery Time: 101 seconds    Performing nurse/tech: Elvis TOBAR      PREVIOUS STUDY:   03/21/2023---------Distance: 304.8 meters (1000 feet)       O2 Sat % Supplemental Oxygen Heart Rate Blood Pressure Ashlie Scale   Pre-exercise  (Resting) 82 % Room Air 96 bpm 134/81 mmHg 0   Pre-exercise  (Resting) 97 % 2 L/M 85 bpm 134/81 mmHg 0   During Exercise 86 % 2 L/M 94 bpm 135/81 mmHg 2   Post-exercise    97 % 2 L/M  91 bpm           CLINICAL INTERPRETATION:  Six minute walk distance is 365.76 meters (1200 feet) with very, very light dyspnea.  During exercise, there was significant desaturation while breathing supplemental oxygen.  Blood pressure remained stable and Heart rate increased significantly with walking.  The patient did not report non-pulmonary symptoms during exercise.  Since the previous study in March 2023, exercise capacity is significantly  improved.  Based upon age and body mass index, exercise capacity is normal.

## 2023-07-13 LAB
ACE SERPL-CCNC: 52 U/L (ref 16–85)
ANA PATTERN 1: NORMAL
ANA SER-ACNC: POSITIVE
ANA TITR SER IF: NORMAL {TITER}
BM IGG SER-ACNC: <0.2 U
DSDNA AB SER-ACNC: NORMAL [IU]/ML
IGA SERPL-MCNC: 203 MG/DL (ref 40–350)

## 2023-07-14 ENCOUNTER — PATIENT MESSAGE (OUTPATIENT)
Dept: TRANSPLANT | Facility: CLINIC | Age: 58
End: 2023-07-14
Payer: COMMERCIAL

## 2023-07-14 ENCOUNTER — TELEPHONE (OUTPATIENT)
Dept: TRANSPLANT | Facility: CLINIC | Age: 58
End: 2023-07-14
Payer: COMMERCIAL

## 2023-07-14 DIAGNOSIS — J96.11 CHRONIC HYPOXEMIC RESPIRATORY FAILURE: Primary | ICD-10-CM

## 2023-07-14 DIAGNOSIS — J84.9 ILD (INTERSTITIAL LUNG DISEASE): ICD-10-CM

## 2023-07-14 LAB
ALDOLASE SERPL-CCNC: 4.7 U/L (ref 1.2–7.6)
ANTI SM/RNP ANTIBODY: 0.06 RATIO (ref 0–0.99)
ANTI-SM/RNP INTERPRETATION: NEGATIVE
GALACTOMANNAN AG SERPL IA-ACNC: <0.5 INDEX

## 2023-07-14 NOTE — TELEPHONE ENCOUNTER
----- Message from Do Perez sent at 7/14/2023  8:12 AM CDT -----  Regarding: pt advice  Contact: pt [phone]  Pt called stating she is returning call to speak to someone in provider's office. pls call      Contacted patient.  She stated that she is returning a call.  Informed patient that I am not certain who called her since I don't see any documentation regarding a phone call.  Patient verbalized her understanding.

## 2023-07-17 LAB
ANCA AB TITR SER IF: NORMAL TITER
ENA SCL70 AB SER-ACNC: <0.6 U/ML
IGG1 SER-MCNC: 396 MG/DL (ref 382–929)
IGG2 SER-MCNC: 463 MG/DL (ref 242–700)
IGG3 SER-MCNC: 52 MG/DL (ref 22–176)
IGG4 SER-MCNC: 22 MG/DL (ref 4–86)
P-ANCA TITR SER IF: NORMAL TITER

## 2023-07-20 LAB — RNAP III AB SER-ACNC: <20 UNITS

## 2023-07-26 ENCOUNTER — PATIENT MESSAGE (OUTPATIENT)
Dept: PODIATRY | Facility: CLINIC | Age: 58
End: 2023-07-26
Payer: COMMERCIAL

## 2023-07-27 ENCOUNTER — PATIENT MESSAGE (OUTPATIENT)
Dept: PODIATRY | Facility: CLINIC | Age: 58
End: 2023-07-27
Payer: COMMERCIAL

## 2023-08-01 LAB
ANTI-PM/SCL AB: <20 UNITS
ANTI-SS-A 52 KD AB, IGG: <20 UNITS
EJ AB SER QL: NEGATIVE
ENA JO1 AB SER IA-ACNC: <20 UNITS
ENA SM+RNP AB SER IA-ACNC: <20 UNITS
FIBRILLARIN (U3 RNP): NEGATIVE
KU AB SER QL: NEGATIVE
MDA-5 (P140): <20 UNITS
MI2 AB SER QL: NEGATIVE
NXP-2 (P140): <20 UNITS
OJ AB SER QL: NEGATIVE
PL12 AB SER QL: NEGATIVE
PL7 AB SER QL: NEGATIVE
PM1 AB SER QL: NORMAL
SRP AB SERPL QL: NEGATIVE
TH/TO: NEGATIVE
TIF1 GAMMA (P155/140): <20 UNITS
U2 SNRNP: NEGATIVE

## 2023-08-04 RX ORDER — AZELASTINE 1 MG/ML
SPRAY, METERED NASAL
Qty: 90 ML | Refills: 1 | Status: SHIPPED | OUTPATIENT
Start: 2023-08-04 | End: 2024-01-22

## 2023-08-09 ENCOUNTER — PATIENT MESSAGE (OUTPATIENT)
Dept: FAMILY MEDICINE | Facility: CLINIC | Age: 58
End: 2023-08-09
Payer: COMMERCIAL

## 2023-08-09 DIAGNOSIS — I10 ESSENTIAL HYPERTENSION: ICD-10-CM

## 2023-08-09 RX ORDER — CARVEDILOL 25 MG/1
25 TABLET ORAL 2 TIMES DAILY WITH MEALS
Qty: 180 TABLET | Refills: 3 | Status: SHIPPED | OUTPATIENT
Start: 2023-08-09 | End: 2023-09-20

## 2023-08-09 NOTE — TELEPHONE ENCOUNTER
No care due was identified.  [facility] Embedded Care Due Messages. Reference number: [id number].   8/09/2023 10:59:46 AM CDT

## 2023-08-15 ENCOUNTER — OFFICE VISIT (OUTPATIENT)
Dept: PODIATRY | Facility: CLINIC | Age: 58
End: 2023-08-15
Payer: COMMERCIAL

## 2023-08-15 VITALS
SYSTOLIC BLOOD PRESSURE: 124 MMHG | WEIGHT: 227 LBS | DIASTOLIC BLOOD PRESSURE: 89 MMHG | BODY MASS INDEX: 33.62 KG/M2 | HEIGHT: 69 IN

## 2023-08-15 DIAGNOSIS — L60.0 INGROWN NAIL OF GREAT TOE OF LEFT FOOT: Primary | ICD-10-CM

## 2023-08-15 PROCEDURE — 99499 UNLISTED E&M SERVICE: CPT | Mod: S$GLB,,, | Performed by: PODIATRIST

## 2023-08-15 PROCEDURE — 99499 NO LOS: ICD-10-PCS | Mod: S$GLB,,, | Performed by: PODIATRIST

## 2023-08-15 PROCEDURE — 11750 EXCISION NAIL&NAIL MATRIX: CPT | Mod: TA,S$GLB,, | Performed by: PODIATRIST

## 2023-08-15 PROCEDURE — 99999 PR PBB SHADOW E&M-EST. PATIENT-LVL III: ICD-10-PCS | Mod: PBBFAC,,, | Performed by: PODIATRIST

## 2023-08-15 PROCEDURE — 11750 NAIL REMOVAL: ICD-10-PCS | Mod: TA,S$GLB,, | Performed by: PODIATRIST

## 2023-08-15 PROCEDURE — 99999 PR PBB SHADOW E&M-EST. PATIENT-LVL III: CPT | Mod: PBBFAC,,, | Performed by: PODIATRIST

## 2023-08-15 NOTE — PROCEDURES
Nail Removal    Date/Time: 8/15/2023 9:30 AM    Performed by: Seferino Jauregui DPM  Authorized by: Seferino Jauregui DPM    Consent Done?:  Yes (Written)  Time out: Immediately prior to the procedure a time out was called    Prep: patient was prepped and draped in usual sterile fashion    Location:     Location:  Left foot    Location detail:  Left big toe  Anesthesia:     Anesthesia:  Digital block    Local anesthetic:  Lidocaine 1% without epinephrine  Procedure Details:     Preparation:  Skin prepped with alcohol and skin prepped with Betadine    Amount removed:  Partial    Side:  Medial    Wedge excision of skin of nail fold: No      Nail bed sutured?: No      Nail matrix removed:  Partial    Removal method:  Phenol and alcohol    Dressing applied:  4x4, antibiotic ointment and dressing applied    Patient tolerance:  Patient tolerated the procedure well with no immediate complications

## 2023-08-15 NOTE — PROGRESS NOTES
Subjective:      Patient ID: Nathalie Krueger is a 58 y.o. female.    Chief Complaint: Ingrown Toenail    Nathalie is a 58 y.o. female who presents to the clinic complaining of painful ingrown toenail on the left foot great toe. Pain with shoe wear and pressure, no prior medical treatments has tried trimming it out herself. Last time she was here I trimmed it out but it has returned    8/15/23: Patient returns for follow up left great toe ingrown nail for the procedure today no new complaints    Review of Systems   Constitutional: Negative for chills and fever.   Cardiovascular:  Negative for claudication and leg swelling.   Respiratory:  Negative for shortness of breath.    Skin:  Positive for nail changes. Negative for itching and rash.   Musculoskeletal:  Negative for muscle cramps, muscle weakness and myalgias.   Gastrointestinal:  Negative for nausea and vomiting.   Neurological:  Negative for focal weakness, loss of balance, numbness and paresthesias.           Objective:      Physical Exam  Constitutional:       General: She is not in acute distress.     Appearance: She is well-developed. She is not diaphoretic.   Cardiovascular:      Pulses:           Dorsalis pedis pulses are 2+ on the right side and 2+ on the left side.        Posterior tibial pulses are 2+ on the right side and 2+ on the left side.      Comments: < 3 sec capillary refill time to toes 1-5 bilateral. Toes and feet are warm to touch proximally with normal distal cooling b/l. There is some hair growth on the feet and toes b/l. There is no edema b/l. No spider veins or varicosities present b/l.     Musculoskeletal:      Comments: Equinus noted b/l ankles with < 10 deg DF noted. MMT 5/5 in DF/PF/Inv/Ev resistance with no reproduction of pain in any direction. Passive range of motion of ankle and pedal joints is painless b/l.     Skin:     General: Skin is warm and dry.      Coloration: Skin is not pale.      Findings: No abrasion, bruising, burn,  ecchymosis, erythema, laceration, lesion, petechiae or rash.      Nails: There is no clubbing.      Comments: Skin temperature, texture and turgor within normal limits.    medial hallux nail margin of the left foot with ingrown nail plate. Surrounding erythema and minimal edema is noted there is no granuloma formation noted. No drainage or malodor        Neurological:      Mental Status: She is alert and oriented to person, place, and time.      Sensory: No sensory deficit.      Motor: No tremor, atrophy or abnormal muscle tone.      Comments: Negative tinel sign bilateral.   Psychiatric:         Behavior: Behavior normal.               Assessment:       Encounter Diagnosis   Name Primary?    Ingrown nail of great toe of left foot Yes           Plan:       Nathalie was seen today for ingrown toenail.    Diagnoses and all orders for this visit:    Ingrown nail of great toe of left foot  -     Nail Removal        I counseled the patient on her conditions, their implications and medical management.    Discussed the options of slant back vs permanent removal of offending corners of nail. Patient opted for more permanent solution due to recurrent issues with the nails. All alternatives, risks and complications were discussed in detail with patient regarding phenol matrixectomy. Patient elected for this procedure on the medial border of the left great toe. Informed consent was discussed in detail and signed/witnessed. Procedure note separate.     Written post op instructions dispensed    Return in 2 weeks post op    Seferino Jauregui DPM

## 2023-08-16 DIAGNOSIS — Z12.31 OTHER SCREENING MAMMOGRAM: ICD-10-CM

## 2023-08-23 ENCOUNTER — TELEPHONE (OUTPATIENT)
Dept: TRANSPLANT | Facility: CLINIC | Age: 58
End: 2023-08-23
Payer: COMMERCIAL

## 2023-08-24 ENCOUNTER — HOSPITAL ENCOUNTER (OUTPATIENT)
Dept: PULMONOLOGY | Facility: CLINIC | Age: 58
Discharge: HOME | End: 2023-08-24
Payer: COMMERCIAL

## 2023-08-24 ENCOUNTER — LAB VISIT (OUTPATIENT)
Dept: LAB | Facility: HOSPITAL | Age: 58
End: 2023-08-24
Payer: COMMERCIAL

## 2023-08-24 ENCOUNTER — OFFICE VISIT (OUTPATIENT)
Dept: TRANSPLANT | Facility: CLINIC | Age: 58
End: 2023-08-24
Payer: COMMERCIAL

## 2023-08-24 VITALS
OXYGEN SATURATION: 96 % | HEIGHT: 69 IN | DIASTOLIC BLOOD PRESSURE: 75 MMHG | BODY MASS INDEX: 33.6 KG/M2 | HEART RATE: 97 BPM | WEIGHT: 226.88 LBS | SYSTOLIC BLOOD PRESSURE: 115 MMHG

## 2023-08-24 DIAGNOSIS — G47.33 OSA (OBSTRUCTIVE SLEEP APNEA): ICD-10-CM

## 2023-08-24 DIAGNOSIS — J96.11 CHRONIC HYPOXEMIC RESPIRATORY FAILURE: ICD-10-CM

## 2023-08-24 DIAGNOSIS — J84.9 ILD (INTERSTITIAL LUNG DISEASE): ICD-10-CM

## 2023-08-24 DIAGNOSIS — J96.11 CHRONIC HYPOXEMIC RESPIRATORY FAILURE: Primary | ICD-10-CM

## 2023-08-24 DIAGNOSIS — K21.9 GASTROESOPHAGEAL REFLUX DISEASE, UNSPECIFIED WHETHER ESOPHAGITIS PRESENT: ICD-10-CM

## 2023-08-24 DIAGNOSIS — J84.9 ILD (INTERSTITIAL LUNG DISEASE): Primary | ICD-10-CM

## 2023-08-24 LAB
FEF 25 75 LLN: 1.33
FEF 25 75 PRE REF: 117.4 %
FEF 25 75 REF: 2.57
FEV05 LLN: 1.24
FEV05 REF: 2.1
FEV1 FVC LLN: 67
FEV1 FVC PRE REF: 107.5 %
FEV1 FVC REF: 79
FEV1 LLN: 2.24
FEV1 PRE REF: 71.8 %
FEV1 REF: 2.96
FVC LLN: 2.88
FVC PRE REF: 66.2 %
FVC REF: 3.79
HAV IGG SER QL IA: NORMAL
HBV CORE AB SERPL QL IA: NORMAL
HBV SURFACE AB SER-ACNC: <3 MIU/ML
HBV SURFACE AB SER-ACNC: NORMAL M[IU]/ML
HBV SURFACE AG SERPL QL IA: NORMAL
HCV AB SERPL QL IA: NORMAL
HIV 1+2 AB+HIV1 P24 AG SERPL QL IA: NORMAL
PEF LLN: 5.16
PEF PRE REF: 113.6 %
PEF REF: 7.15
PHYSICIAN COMMENT: ABNORMAL
PRE FEF 25 75: 3.02 L/S (ref 1.33–4.23)
PRE FET 100: 6.68 SEC
PRE FEV05 REF: 89.8 %
PRE FEV1 FVC: 84.68 % (ref 66.98–88.87)
PRE FEV1: 2.12 L (ref 2.24–3.65)
PRE FEV5: 1.88 L (ref 1.24–2.95)
PRE FVC: 2.51 L (ref 2.88–4.74)
PRE PEF: 8.12 L/S (ref 5.16–9.14)

## 2023-08-24 PROCEDURE — 86706 HEP B SURFACE ANTIBODY: CPT | Mod: 91,TXP | Performed by: PHYSICIAN ASSISTANT

## 2023-08-24 PROCEDURE — 99214 PR OFFICE/OUTPT VISIT, EST, LEVL IV, 30-39 MIN: ICD-10-PCS | Mod: 25,NTX,S$GLB, | Performed by: PHYSICIAN ASSISTANT

## 2023-08-24 PROCEDURE — 1160F PR REVIEW ALL MEDS BY PRESCRIBER/CLIN PHARMACIST DOCUMENTED: ICD-10-PCS | Mod: CPTII,NTX,S$GLB, | Performed by: PHYSICIAN ASSISTANT

## 2023-08-24 PROCEDURE — 3078F PR MOST RECENT DIASTOLIC BLOOD PRESSURE < 80 MM HG: ICD-10-PCS | Mod: CPTII,NTX,S$GLB, | Performed by: PHYSICIAN ASSISTANT

## 2023-08-24 PROCEDURE — 3044F HG A1C LEVEL LT 7.0%: CPT | Mod: CPTII,NTX,S$GLB, | Performed by: PHYSICIAN ASSISTANT

## 2023-08-24 PROCEDURE — 87340 HEPATITIS B SURFACE AG IA: CPT | Mod: TXP | Performed by: PHYSICIAN ASSISTANT

## 2023-08-24 PROCEDURE — 86592 SYPHILIS TEST NON-TREP QUAL: CPT | Mod: TXP | Performed by: PHYSICIAN ASSISTANT

## 2023-08-24 PROCEDURE — 3074F SYST BP LT 130 MM HG: CPT | Mod: CPTII,NTX,S$GLB, | Performed by: PHYSICIAN ASSISTANT

## 2023-08-24 PROCEDURE — 3008F PR BODY MASS INDEX (BMI) DOCUMENTED: ICD-10-PCS | Mod: CPTII,NTX,S$GLB, | Performed by: PHYSICIAN ASSISTANT

## 2023-08-24 PROCEDURE — 3074F PR MOST RECENT SYSTOLIC BLOOD PRESSURE < 130 MM HG: ICD-10-PCS | Mod: CPTII,NTX,S$GLB, | Performed by: PHYSICIAN ASSISTANT

## 2023-08-24 PROCEDURE — 1159F MED LIST DOCD IN RCRD: CPT | Mod: CPTII,NTX,S$GLB, | Performed by: PHYSICIAN ASSISTANT

## 2023-08-24 PROCEDURE — 3078F DIAST BP <80 MM HG: CPT | Mod: CPTII,NTX,S$GLB, | Performed by: PHYSICIAN ASSISTANT

## 2023-08-24 PROCEDURE — 1160F RVW MEDS BY RX/DR IN RCRD: CPT | Mod: CPTII,NTX,S$GLB, | Performed by: PHYSICIAN ASSISTANT

## 2023-08-24 PROCEDURE — 36415 COLL VENOUS BLD VENIPUNCTURE: CPT | Mod: TXP | Performed by: PHYSICIAN ASSISTANT

## 2023-08-24 PROCEDURE — 86790 VIRUS ANTIBODY NOS: CPT | Mod: TXP | Performed by: PHYSICIAN ASSISTANT

## 2023-08-24 PROCEDURE — 94010 BREATHING CAPACITY TEST: ICD-10-PCS | Mod: NTX,S$GLB,, | Performed by: INTERNAL MEDICINE

## 2023-08-24 PROCEDURE — 86787 VARICELLA-ZOSTER ANTIBODY: CPT | Mod: TXP | Performed by: PHYSICIAN ASSISTANT

## 2023-08-24 PROCEDURE — 3044F PR MOST RECENT HEMOGLOBIN A1C LEVEL <7.0%: ICD-10-PCS | Mod: CPTII,NTX,S$GLB, | Performed by: PHYSICIAN ASSISTANT

## 2023-08-24 PROCEDURE — 99214 OFFICE O/P EST MOD 30 MIN: CPT | Mod: 25,NTX,S$GLB, | Performed by: PHYSICIAN ASSISTANT

## 2023-08-24 PROCEDURE — 87389 HIV-1 AG W/HIV-1&-2 AB AG IA: CPT | Mod: TXP | Performed by: PHYSICIAN ASSISTANT

## 2023-08-24 PROCEDURE — 86682 HELMINTH ANTIBODY: CPT | Mod: TXP | Performed by: PHYSICIAN ASSISTANT

## 2023-08-24 PROCEDURE — 3008F BODY MASS INDEX DOCD: CPT | Mod: CPTII,NTX,S$GLB, | Performed by: PHYSICIAN ASSISTANT

## 2023-08-24 PROCEDURE — 94010 BREATHING CAPACITY TEST: CPT | Mod: NTX,S$GLB,, | Performed by: INTERNAL MEDICINE

## 2023-08-24 PROCEDURE — 86704 HEP B CORE ANTIBODY TOTAL: CPT | Mod: TXP | Performed by: PHYSICIAN ASSISTANT

## 2023-08-24 PROCEDURE — 99999 PR PBB SHADOW E&M-EST. PATIENT-LVL IV: ICD-10-PCS | Mod: PBBFAC,TXP,, | Performed by: PHYSICIAN ASSISTANT

## 2023-08-24 PROCEDURE — 86480 TB TEST CELL IMMUN MEASURE: CPT | Mod: TXP | Performed by: PHYSICIAN ASSISTANT

## 2023-08-24 PROCEDURE — 99999 PR PBB SHADOW E&M-EST. PATIENT-LVL IV: CPT | Mod: PBBFAC,TXP,, | Performed by: PHYSICIAN ASSISTANT

## 2023-08-24 PROCEDURE — 1159F PR MEDICATION LIST DOCUMENTED IN MEDICAL RECORD: ICD-10-PCS | Mod: CPTII,NTX,S$GLB, | Performed by: PHYSICIAN ASSISTANT

## 2023-08-24 PROCEDURE — 86803 HEPATITIS C AB TEST: CPT | Mod: TXP | Performed by: PHYSICIAN ASSISTANT

## 2023-08-24 NOTE — LETTER
August 24, 2023        Sarah Lemon  [address]  [address]  [place] [address]  Phone: [phone]  Fax: [phone]             [facility] 1st Fl  [address]  [facility] [address]  Phone: [phone]   Patient: Nathalie Krueger   MR Number: [id number]   YOB: 1965   Date of Visit: 8/24/2023       Dear Dr. Sarah Lemon    Thank you for referring Nathalie Krueger to me for evaluation. Attached you will find relevant portions of my assessment and plan of care.    If you have questions, please do not hesitate to call me. I look forward to following Nathalie Krueger along with you.    Sincerely,    PAUL Garciaosure    If you would like to receive this communication electronically, please contact [email or web address] or [phone] to request [employer] Link access.    [employer] Link is a tool which provides read-only access to select patient information with whom you have a relationship. Its easy to use and provides real time access to review your patients record including encounter summaries, notes, results, and demographic information.    If you feel you have received this communication in error or would no longer like to receive these types of communications, please e-mail [email or web address]

## 2023-08-24 NOTE — PROGRESS NOTES
[facility] INITIAL EVALUATION                                                                                                                                             Reason for Visit:  Evaluation for ILD    Referring Physician: No ref. provider found    History of Present Illness: Nathalie Krueger is a 58 y.o. female who is on 2L of oxygen.  She is on CPAP.  Her New [place] Heart Association Class is II and a Karnofsky score of 80% - Normal activity with effort: some symptoms of disease. She is diabetic non insulin dependent    Patient presents today for routine follow up. She was started on astelin, flonase, and Trelegy inhaler after her initial visit. She states she recently traveled to [place] and noticed improvement in her respiratory symptoms. Using her oxygen less frequently with exertion. Per patient's wife, improvement in her functional status and cough. Patient reports compliant with nightly CPAP with 2L oxygen. Presents today on room air. No recent exacerbations/hospitalizations.     PER INITIAL HPI:    Patient presents today for evaluation of ILD. Patient states symptoms first began over three years ago when she lived in [place]. Per patient's wife, patient had developed progressive fatigue and dyspnea with exertion. She was still able to perform her ADLs independently, but noticed she would get breathless with activity very quickly. Previously lived on [employer] farm in [place]. She states she had an episode of profound fatigue/malaise one week following clean out of family's chicken coup. Frequent exposures to dust, pesticides, fertilizers while living in [place]. Patient and her wife moved to [place] ~two years ago. While living [place], patient noticed increased cough, worsening fatigue requiring frequent daytime naps. Briefly lived [place] apartments [place] and found she had worsening cough if other residents smoked within the building. She presented to the ED in early 2022 due to  hypoxemia. She checked her pulse ox at home and reportedly had oxygen saturation of 76%. She did not require hospitalization. No prior chest surgeries, ICU admission, intubations, lung biopsy.     In August 2022, patient had acute worsening of her dyspnea and cough. She was started on 2L oxygen to be used with exertion. 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 Since starting CPAP, notes improvement in daytime fatigue. She completed a trial of steroids in the fall of 2022 after evaluation with Dr. Rodas at [facility] and states her symptoms improved drastically. No recent exacerbations/hospitalizations. She has had an extensive autoimmune workup which revealed +DAYTON and low IgM. All other workup unrevealing. Currently of stiolto and symbicort for management.     Patient is a former 25-30 year smoker and quit 3-4 years ago. Her father had lung disease related to diving in the [employer], but otherwise denies family history of lung disease. No history of frequent infections as a child. She states she did have a history of scarlet fever as a child while living in [place] and was told she should not live there in the future? Also states she used a 20 year old inhaler she bought in [place] while still living in [place] and is concerned she may have aspirated mold. She occasionally has episodes of flushing and rashes. No arthralgias. History of reflux and recently stopped omeprazole in hopes of improving symptoms with diet modification. She recently lost ~10 pounds. Continues to have intermittent reflux despite diet changes. She states she remains very active and walks and performs water aerobics frequently. 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 Remains independent with her ADLs, but does struggle with vacuuming, bending over, and climbing stairs.       Past Medical History:   Diagnosis Date    Acute bronchitis 02/01/2022    Adenomatous polyp of ascending colon 06/22/2020        Past Surgical History:   Procedure Laterality Date    ABLATION, FIBROID, UTERUS, LAPAROSCOPIC, WITH US GUIDANCE      APPENDECTOMY  1982    RIGHT HEART CATHETERIZATION Right 3/29/2023    Procedure: INSERTION, CATHETER, RIGHT HEART;  Surgeon: Yulisa Yung DO;  Location: [facility] CATH LAB;  Service: Cardiology;  Laterality: Right;    ROTATOR CUFF REPAIR Right 1998    TONSILLECTOMY  1977       Allergies: Atorvastatin, Lisinopril, and Meperidine hcl    Current Outpatient Medications   Medication Sig    azelastine (ASTELIN) 137 mcg (0.1 %) nasal spray SPRAY 1 SPRAY IN EACH NOSTRIL 2 TIMES DAILY.    albuterol (PROVENTIL/VENTOLIN HFA) 90 mcg/actuation inhaler Inhale 1-2 puffs into the lungs every 4 (four) hours as needed for Wheezing or Shortness of Breath. Rescue    albuterol-ipratropium (DUO-NEB) 2.5 mg-0.5 mg/3 mL nebulizer solution Take 3 mLs by nebulization 2 (two) times daily. Rescue    calcium carbonate/vitamin D3 (CALCIUM WITH VITAMIN D ORAL) Take 1 tablet by mouth once daily.    carvediloL (COREG) 25 MG tablet Take 1 tablet (25 mg total) by mouth 2 (two) times daily with meals.    cetirizine (ZYRTEC) 10 MG tablet Take 1 tablet (10 mg total) by mouth once daily.    fluticasone-umeclidin-vilanter (TRELEGY ELLIPTA) 200-62.5-25 mcg inhaler Inhale 1 puff into the lungs once daily.    metFORMIN (GLUCOPHAGE) 500 MG tablet Take 1 tablet (500 mg total) by mouth daily with breakfast.    multivitamin capsule Take 1 capsule by mouth once daily.    omeprazole (PRILOSEC) 40 MG capsule TAKE 1 CAPSULE BY MOUTH EVERY DAY    OZEMPIC 1 mg/dose (4 mg/3 mL) INJECT 1 MG INTO THE SKIN EVERY 7 DAYS.    sodium chloride 3% 3 % nebulizer solution 3 CC OF 3% SALINE INTO NEBS TWICE DAILY AS NEEDED    white petrolatum-mineral oiL (ARTIFICIAL TEARS, BLAKE/MIN,) 83-15 % Oint Place into both eyes every evening.     No current facility-administered medications for this visit.       Immunization History   Administered Date(s) Administered     COVID-19, MRNA, LN-S, PF (Pfizer) (Gray Cap) 04/06/2022    COVID-19, MRNA, LN-S, PF (Pfizer) (Purple Cap) 03/06/2021, 03/30/2021, 09/30/2021    COVID-19, mRNA, LNP-S, bivalent booster, PF (PFIZER OMICRON) 09/22/2022    Hepatitis B, Adult 01/16/2018, 02/27/2018, 07/31/2018    Influenza 09/28/2016, 10/04/2017, 10/24/2018, 09/05/2019, 09/08/2022    Influenza - Quadrivalent - PF *Preferred* (6 months and older) 09/30/2021    Pneumococcal Conjugate - 20 Valent 09/08/2022    Pneumococcal Polysaccharide - 23 Valent 10/04/2016    Tdap 01/01/2016    Zoster Recombinant 10/29/2020, 01/31/2022     Family History:    Family History   Problem Relation Age of Onset    Diabetes Mother     Hypertension Mother     Heart disease Mother     Diabetes Father     Glaucoma Neg Hx     Macular degeneration Neg Hx      Social History     Substance and Sexual Activity   Alcohol Use Not Currently      Social History     Substance and Sexual Activity   Drug Use Never      Social History     Socioeconomic History    Marital status:    Occupational History    Occupation: Consultant with non profits   Tobacco Use    Smoking status: Former     Current packs/day: 0.00     Average packs/day: 0.5 packs/day for 25.0 years (12.5 ttl pk-yrs)     Types: Cigarettes     Start date: 12/16/1994     Quit date: 12/16/2019     Years since quitting: 3.6     Passive exposure: Past    Smokeless tobacco: Never    Tobacco comments:     Smoked additive free tobacco   Substance and Sexual Activity    Alcohol use: Not Currently    Drug use: Never    Sexual activity: Not Currently     Partners: Female     Birth control/protection: Post-menopausal     Comment: spouse -  14 years    Social History Narrative    Lives with her spouse, Aisha. Enjoys live music, painting.      Review of Systems   Constitutional:  Negative for chills, diaphoresis, fever, malaise/fatigue and weight loss.   HENT:  Negative for congestion, ear discharge, ear pain, hearing loss,  "nosebleeds, sinus pain, sore throat and tinnitus.    Eyes:  Negative for blurred vision, double vision, photophobia, pain, discharge and redness.   Respiratory:  Positive for cough and shortness of breath. Negative for hemoptysis, sputum production, wheezing and stridor.    Cardiovascular:  Negative for chest pain, palpitations, orthopnea, claudication, leg swelling and PND.   Gastrointestinal:  Negative for abdominal pain, blood in stool, constipation, diarrhea, heartburn, melena, nausea and vomiting.   Genitourinary:  Negative for dysuria, flank pain, frequency, hematuria and urgency.   Musculoskeletal:  Negative for back pain, falls, joint pain, myalgias and neck pain.   Skin:  Negative for itching and rash.   Neurological:  Negative for dizziness, tingling, tremors, sensory change, speech change, focal weakness, seizures, loss of consciousness, weakness and headaches.   Endo/Heme/Allergies:  Negative for environmental allergies and polydipsia. Does not bruise/bleed easily.   Psychiatric/Behavioral:  Negative for depression, hallucinations, memory loss, substance abuse and suicidal ideas. The patient is not nervous/anxious and does not have insomnia.      Vitals  /75 (BP Location: Left arm, Patient Position: Sitting, BP Method: Large (Automatic))   Pulse 97   Ht 5' 9" (1.753 m)   Wt 102.9 kg (226 lb 13.7 oz)   SpO2 96% Comment: room air  BMI 33.50 kg/m²   Physical Exam  Vitals and nursing note reviewed.   Constitutional:       General: She is not in acute distress.     Appearance: Normal appearance.   HENT:      Head: Normocephalic and atraumatic.      Nose: No congestion or rhinorrhea.      Mouth/Throat:      Mouth: Mucous membranes are moist.   Eyes:      General: No scleral icterus.     Conjunctiva/sclera: Conjunctivae normal.   Cardiovascular:      Rate and Rhythm: Normal rate.      Heart sounds: No murmur heard.     No friction rub.   Pulmonary:      Effort: No respiratory distress.      Breath " sounds: No wheezing, rhonchi or rales.   Abdominal:      General: Bowel sounds are normal. There is no distension.      Palpations: Abdomen is soft.      Tenderness: There is no abdominal tenderness.   Musculoskeletal:      Right lower leg: No edema.      Left lower leg: No edema.   Skin:     General: Skin is warm and dry.   Neurological:      General: No focal deficit present.      Mental Status: She is alert and oriented to person, place, and time.   Psychiatric:         Mood and Affect: Mood normal.         Behavior: Behavior normal.         Labs:  Hospital Outpatient Visit on 08/24/2023   Component Date Value    Pre FVC 08/24/2023 2.51 (L)     PRE FEV5 08/24/2023 1.88     Pre FEV1 08/24/2023 2.12 (L)     Pre FEV1 FVC 08/24/2023 84.68     Pre FEF 25 75 08/24/2023 3.02     Pre PEF 08/24/2023 8.12     Pre  08/24/2023 6.68     FVC Ref 08/24/2023 3.79     FVC LLN 08/24/2023 2.88     FVC Pre Ref 08/24/2023 66.2     FEV05 REF 08/24/2023 2.10     FEV05 LLN 08/24/2023 1.24     PRE FEV05 REF 08/24/2023 89.8     FEV1 Ref 08/24/2023 2.96     FEV1 LLN 08/24/2023 2.24     FEV1 Pre Ref 08/24/2023 71.8     FEV1 FVC Ref 08/24/2023 79     FEV1 FVC LLN 08/24/2023 67     FEV1 FVC Pre Ref 08/24/2023 107.5     FEF 25 75 Ref 08/24/2023 2.57     FEF 25 75 LLN 08/24/2023 1.33     FEF 25 75 Pre Ref 08/24/2023 117.4     PEF Ref 08/24/2023 7.15     PEF LLN 08/24/2023 5.16     PEF Pre Ref 08/24/2023 113.6            8/24/2023    10:10 AM 4/10/2023    10:10 AM   Pulmonary Function Tests   FVC 2.51 liters 2.35 liters   FEV1 2.12 liters 1.95 liters   TLC (liters)  3.06 liters   DLCO (ml/mmHg sec)  7.89 ml/mmHg sec   FVC% 66.2 61.8   FEV1% 71.8 65.6   FEF 25-75 3.02 2.35   FEF 25-75% 117.4 90.8   TLC%  53   RV  0.83   RV%  39.4   DLCO%  30.2         7/12/2023     1:42 PM 3/21/2023    12:40 PM 12/6/2022     2:18 PM   6MW   6MWT Status completed without stopping completed without stopping completed without stopping   Patient Reported No  complaints No complaints No complaints   Was O2 used? Yes Yes Yes   Delivery Method Cannula;Pull Tank;Continuous Flow Cannula;Pull Tank;Continuous Flow Cannula;Demand Flow;Shoulder Carry   6MW Distance walked (feet) 1200 feet 1000 feet 1200 feet   Distance walked (meters) 365.76 meters 304.8 meters 365.76 meters   Did patient stop? No No No   Oxygen Saturation 95 % 97 % 98 %   Supplemental Oxygen 2 L/M 2 L/M 2 L/M   Heart Rate 87 bpm 85 bpm 83 bpm   Blood Pressure 122/76 134/81 145/84   Ashlie Dyspnea Rating  very, very light (just noticeable) nothing at all nothing at all   Oxygen Saturation 89 % 86 % 84 %   Supplemental Oxygen 3 L/M 2 L/M 2 L/M   Heart Rate 115 bpm 99 bpm 96 bpm   Blood Pressure 119/76 135/81 160/79   Ashlie Dyspnea Rating  very, very light (just noticeable) light very, very light (just noticeable)   Recovery Time (seconds) 101 seconds 120 seconds 90 seconds   Oxygen Saturation 98 % 97 % 95 %   Supplemental Oxygen 3 L/M 2 L/M 2 L/M   Heart Rate 90 bpm 91 bpm 87 bpm       Imaging:  EXAMINATION:  CT CHEST WITHOUT CONTRAST     CLINICAL HISTORY:  HRCT ILD; Chronic respiratory failure with hypoxia     TECHNIQUE:  Low dose axial images, sagittal and coronal reformations were obtained from the thoracic inlet to the lung bases. Contrast was not administered.     COMPARISON:  08/31/2022 and 01/26/2022     FINDINGS:  Normal sized mediastinal nodes are noted including prevascular nodes slightly more numerous than expected but unchanged since 01/26/2022.     Gas is noted within the distal esophagus as can be seen with air swallowing or reflux.     A normal size node appears to be present adjacent to the gallbladder with a short diameter of 9 mm unchanged since 01/26/2022.     Multiple colonic diverticula are noted without obvious inflammatory change.     A 4 mm nodule is noted within the lingula image 311 sequence 4 stable since 01/26/2022     A right upper lobe nodule is noted image 156 sequence 4 stable since  01/26/2022.  Cystic changes noted within the right humeral head suggesting labral injury at the glenoid with degenerative change.     The ascending aorta is mildly prominent at 4 point 2 cm unchanged since the prior given inter study variance     Impression:     1. Two small stable solid-appearing pulmonary nodules stable since the prior of 01/26/2022 favoring a benign etiology of less than 6 mm in size.        Electronically signed by: Delfino Saucedo MD  Date:                                            04/11/2023  Time:                                           12:16      Cardiodiagnostics:  Results for orders placed during the hospital encounter of 03/21/23    Echo    Interpretation Summary  · The left ventricle is normal in size with normal systolic function.  · The estimated ejection fraction is 65%.  · Normal left ventricular diastolic function.  · Moderate right ventricular enlargement with low normal to mildly reduced right ventricular systolic function.  · The estimated PA systolic pressure is 34 mmHg.  · Normal central venous pressure (3 mmHg).  · The ascending aorta is mildly dilated.    Results for orders placed during the hospital encounter of 03/29/23    Cardiac catheterization    Conclusion  Summary  Filling pressures: RA 10, PCWP 16  PA 36/20, mean PA pressure 20 mmHg  PVR 1.5 DUQUE  PA saturation 70%, Ao saturation 97%  Fletcher CO/CI: 5.9/2.66  /100  with repeat intraprocedure similar results. On recheck post procedure 133/75  HR 71 SR  Hb 13.6        Assessment:  1. ILD (interstitial lung disease)    2. Chronic hypoxemic respiratory failure    3. ORLIN (obstructive sleep apnea)    4. Gastroesophageal reflux disease, unspecified whether esophagitis present      Plan:     Spirometry shows moderate restriction. No DLCO for review. CT chest with diffuse mosaicism, scattered, thin-walled cysts, and very small areas of early fibrotic changes (chronic HP vs LIP?). Her repeat autoimmune workup has been  unrevealing aside from a +DAYTON and low IgM. No evidence of PH on RHC with mPAP of 20. She has exertional hypoxemia and previously desated to 87% during her 6MWT while on 3L. Spirometry stable from previous. Subjective improvement from last clinic visit after starting Trelegy and nasal sprays. Initially discussed starting MMF, which patient was agreeable to; however, will continue Trelegy and nasal sprays for now and repeat PFTs and 6MWT in 6 weeks. Will discuss role of biopsy guided therapy versus prednisone +/- steroid sparing therapy in the future.     Continue oxygen supplementation at rest and with exertion.     Continue nightly CPAP.     Continue PPI.     RTC in 6-8 weeks with repeat PFTs and 6MWT or sooner if needed.       Nighat Granado PA-C  [facility]

## 2023-08-25 ENCOUNTER — TELEPHONE (OUTPATIENT)
Dept: HEPATOLOGY | Facility: CLINIC | Age: 58
End: 2023-08-25
Payer: COMMERCIAL

## 2023-08-25 DIAGNOSIS — J84.9 ILD (INTERSTITIAL LUNG DISEASE): Primary | ICD-10-CM

## 2023-08-25 LAB
GAMMA INTERFERON BACKGROUND BLD IA-ACNC: 0.04 IU/ML
M TB IFN-G CD4+ BCKGRND COR BLD-ACNC: -0 IU/ML
M TB IFN-G CD4+ BCKGRND COR BLD-ACNC: 0.01 IU/ML
MITOGEN IGNF BCKGRD COR BLD-ACNC: 9.96 IU/ML
RPR SER QL: NORMAL
STRONGYLOIDES ANTIBODY IGG: NEGATIVE
TB GOLD PLUS: NEGATIVE
VARICELLA INTERPRETATION: POSITIVE
VARICELLA ZOSTER IGG: >4000 AU/ML

## 2023-08-25 NOTE — TELEPHONE ENCOUNTER
D/w Dr. Ledesma- tissue dx is best prior to starting MMF- I agree  Can start with bronch/ transbronchial bx understanding this is expected to be low yield. If no dx by TBBx may have to have VATS/lung biopsy.  I called and discussed with pt who is agreeable to having bronchoscopy- will set up for next week thurs am

## 2023-08-29 ENCOUNTER — OFFICE VISIT (OUTPATIENT)
Dept: PODIATRY | Facility: CLINIC | Age: 58
End: 2023-08-29
Payer: COMMERCIAL

## 2023-08-29 VITALS — WEIGHT: 226 LBS | BODY MASS INDEX: 33.47 KG/M2 | HEIGHT: 69 IN

## 2023-08-29 DIAGNOSIS — Z98.890 STATUS POST NAIL SURGERY: Primary | ICD-10-CM

## 2023-08-29 PROCEDURE — 1159F MED LIST DOCD IN RCRD: CPT | Mod: CPTII,S$GLB,, | Performed by: PODIATRIST

## 2023-08-29 PROCEDURE — 1160F RVW MEDS BY RX/DR IN RCRD: CPT | Mod: CPTII,S$GLB,, | Performed by: PODIATRIST

## 2023-08-29 PROCEDURE — 3008F PR BODY MASS INDEX (BMI) DOCUMENTED: ICD-10-PCS | Mod: CPTII,S$GLB,, | Performed by: PODIATRIST

## 2023-08-29 PROCEDURE — 3044F PR MOST RECENT HEMOGLOBIN A1C LEVEL <7.0%: ICD-10-PCS | Mod: CPTII,S$GLB,, | Performed by: PODIATRIST

## 2023-08-29 PROCEDURE — 99024 POSTOP FOLLOW-UP VISIT: CPT | Mod: S$GLB,,, | Performed by: PODIATRIST

## 2023-08-29 PROCEDURE — 3008F BODY MASS INDEX DOCD: CPT | Mod: CPTII,S$GLB,, | Performed by: PODIATRIST

## 2023-08-29 PROCEDURE — 99999 PR PBB SHADOW E&M-EST. PATIENT-LVL III: ICD-10-PCS | Mod: PBBFAC,,, | Performed by: PODIATRIST

## 2023-08-29 PROCEDURE — 1159F PR MEDICATION LIST DOCUMENTED IN MEDICAL RECORD: ICD-10-PCS | Mod: CPTII,S$GLB,, | Performed by: PODIATRIST

## 2023-08-29 PROCEDURE — 3044F HG A1C LEVEL LT 7.0%: CPT | Mod: CPTII,S$GLB,, | Performed by: PODIATRIST

## 2023-08-29 PROCEDURE — 99999 PR PBB SHADOW E&M-EST. PATIENT-LVL III: CPT | Mod: PBBFAC,,, | Performed by: PODIATRIST

## 2023-08-29 PROCEDURE — 1160F PR REVIEW ALL MEDS BY PRESCRIBER/CLIN PHARMACIST DOCUMENTED: ICD-10-PCS | Mod: CPTII,S$GLB,, | Performed by: PODIATRIST

## 2023-08-29 PROCEDURE — 99024 PR POST-OP FOLLOW-UP VISIT: ICD-10-PCS | Mod: S$GLB,,, | Performed by: PODIATRIST

## 2023-08-29 NOTE — PROGRESS NOTES
Subjective:      Patient ID: Nathalie Krueger is a 58 y.o. female.    Chief Complaint: Post-op Evaluation    Nathalie is a 58 y.o. female who presents to the clinic complaining of painful ingrown toenail on the left foot great toe. Pain with shoe wear and pressure, no prior medical treatments has tried trimming it out herself. Last time she was here I trimmed it out but it has returned    8/15/23: Patient returns for follow up left great toe ingrown nail for the procedure today no new complaints    8/29/23: patient returns s/p 2 weeks left great toe ingorwn nail procedure doing well no new complaints keeping it covered with a band aid most of the time    Review of Systems   Constitutional: Negative for chills and fever.   Cardiovascular:  Negative for claudication and leg swelling.   Respiratory:  Negative for shortness of breath.    Skin:  Positive for nail changes. Negative for itching and rash.   Musculoskeletal:  Negative for muscle cramps, muscle weakness and myalgias.   Gastrointestinal:  Negative for nausea and vomiting.   Neurological:  Negative for focal weakness, loss of balance, numbness and paresthesias.           Objective:      Physical Exam  Constitutional:       General: She is not in acute distress.     Appearance: She is well-developed. She is not diaphoretic.   Cardiovascular:      Pulses:           Dorsalis pedis pulses are 2+ on the right side and 2+ on the left side.        Posterior tibial pulses are 2+ on the right side and 2+ on the left side.      Comments: < 3 sec capillary refill time to toes 1-5 bilateral. Toes and feet are warm to touch proximally with normal distal cooling b/l. There is some hair growth on the feet and toes b/l. There is no edema b/l. No spider veins or varicosities present b/l.     Musculoskeletal:      Comments: Equinus noted b/l ankles with < 10 deg DF noted. MMT 5/5 in DF/PF/Inv/Ev resistance with no reproduction of pain in any direction. Passive range of motion of  ankle and pedal joints is painless b/l.     Skin:     General: Skin is warm and dry.      Coloration: Skin is not pale.      Findings: No abrasion, bruising, burn, ecchymosis, erythema, laceration, lesion, petechiae or rash.      Nails: There is no clubbing.      Comments: Skin temperature, texture and turgor within normal limits.    medial hallux nail margin of the left foot with ingrown nail plate removed. Surrounding erythema and edema is resolving well there is no granuloma formation noted. No drainage or malodor        Neurological:      Mental Status: She is alert and oriented to person, place, and time.      Sensory: No sensory deficit.      Motor: No tremor, atrophy or abnormal muscle tone.      Comments: Negative tinel sign bilateral.   Psychiatric:         Behavior: Behavior normal.               Assessment:       Encounter Diagnosis   Name Primary?    Status post nail surgery Yes             Plan:       Nathalie was seen today for post-op evaluation.    Diagnoses and all orders for this visit:    Status post nail surgery          I counseled the patient on her conditions, their implications and medical management.    Doing well will continue to cover with a band aid until no drainage is noted on the band aid then can discontinue covering    Return PRN and let me know if there are increased signs of infection    Seferino Jauregui DPM

## 2023-08-30 ENCOUNTER — ANESTHESIA EVENT (OUTPATIENT)
Dept: ENDOSCOPY | Facility: HOSPITAL | Age: 58
End: 2023-08-30
Payer: COMMERCIAL

## 2023-08-31 ENCOUNTER — ANESTHESIA (OUTPATIENT)
Dept: ENDOSCOPY | Facility: HOSPITAL | Age: 58
End: 2023-08-31
Payer: COMMERCIAL

## 2023-08-31 ENCOUNTER — HOSPITAL ENCOUNTER (OUTPATIENT)
Facility: HOSPITAL | Age: 58
Discharge: HOME | End: 2023-08-31
Attending: INTERNAL MEDICINE | Admitting: STUDENT IN AN ORGANIZED HEALTH CARE EDUCATION/TRAINING PROGRAM
Payer: COMMERCIAL

## 2023-08-31 DIAGNOSIS — R93.89 ABNORMAL CT SCAN, CHEST: ICD-10-CM

## 2023-08-31 LAB
APPEARANCE FLD: NORMAL
BODY FLD TYPE: NORMAL
COLOR FLD: NORMAL
LYMPHOCYTES NFR FLD MANUAL: 72 %
MESOTHL CELL NFR FLD MANUAL: 5 %
MONOS+MACROS NFR FLD MANUAL: 2 %
NEUTROPHILS NFR FLD MANUAL: 21 %
WBC # FLD: NORMAL /CU MM

## 2023-08-31 PROCEDURE — 89051 BODY FLUID CELL COUNT: CPT | Performed by: INTERNAL MEDICINE

## 2023-08-31 PROCEDURE — 31625 BRONCHOSCOPY W/BIOPSY(S): CPT | Performed by: INTERNAL MEDICINE

## 2023-08-31 PROCEDURE — 87205 SMEAR GRAM STAIN: CPT | Performed by: INTERNAL MEDICINE

## 2023-08-31 PROCEDURE — 87116 MYCOBACTERIA CULTURE: CPT | Performed by: INTERNAL MEDICINE

## 2023-08-31 PROCEDURE — 31624 PR BRONCHOSCOPY,DIAG2STIC W LAVAGE: ICD-10-PCS | Mod: 51,LT,, | Performed by: INTERNAL MEDICINE

## 2023-08-31 PROCEDURE — 87206 SMEAR FLUORESCENT/ACID STAI: CPT | Performed by: INTERNAL MEDICINE

## 2023-08-31 PROCEDURE — 87102 FUNGUS ISOLATION CULTURE: CPT | Performed by: INTERNAL MEDICINE

## 2023-08-31 PROCEDURE — 31628 PR BRONCHOSCOPY,TRANSBRONCH BIOPSY: ICD-10-PCS | Mod: LT,,, | Performed by: INTERNAL MEDICINE

## 2023-08-31 PROCEDURE — D9220A PRA ANESTHESIA: ICD-10-PCS | Mod: CRNA,,, | Performed by: NURSE ANESTHETIST, CERTIFIED REGISTERED

## 2023-08-31 PROCEDURE — 25000003 PHARM REV CODE 250: Performed by: INTERNAL MEDICINE

## 2023-08-31 PROCEDURE — 27200946 HC BRUSH, CYTOLOGY: Performed by: INTERNAL MEDICINE

## 2023-08-31 PROCEDURE — 25000003 PHARM REV CODE 250: Performed by: NURSE ANESTHETIST, CERTIFIED REGISTERED

## 2023-08-31 PROCEDURE — 31623 DX BRONCHOSCOPE/BRUSH: CPT | Performed by: INTERNAL MEDICINE

## 2023-08-31 PROCEDURE — 87210 SMEAR WET MOUNT SALINE/INK: CPT | Performed by: INTERNAL MEDICINE

## 2023-08-31 PROCEDURE — 31628 BRONCHOSCOPY/LUNG BX EACH: CPT | Mod: LT,,, | Performed by: INTERNAL MEDICINE

## 2023-08-31 PROCEDURE — 87070 CULTURE OTHR SPECIMN AEROBIC: CPT | Performed by: INTERNAL MEDICINE

## 2023-08-31 PROCEDURE — 31624 DX BRONCHOSCOPE/LAVAGE: CPT | Mod: 51,LT,, | Performed by: INTERNAL MEDICINE

## 2023-08-31 PROCEDURE — 63600175 PHARM REV CODE 636 W HCPCS: Performed by: NURSE ANESTHETIST, CERTIFIED REGISTERED

## 2023-08-31 PROCEDURE — 37000009 HC ANESTHESIA EA ADD 15 MINS: Performed by: INTERNAL MEDICINE

## 2023-08-31 PROCEDURE — 31623 DX BRONCHOSCOPE/BRUSH: CPT | Mod: 51,LT,, | Performed by: INTERNAL MEDICINE

## 2023-08-31 PROCEDURE — 31622 DX BRONCHOSCOPE/WASH: CPT | Performed by: INTERNAL MEDICINE

## 2023-08-31 PROCEDURE — D9220A PRA ANESTHESIA: Mod: CRNA,,, | Performed by: NURSE ANESTHETIST, CERTIFIED REGISTERED

## 2023-08-31 PROCEDURE — 27201012 HC FORCEPS, HOT/COLD, DISP: Performed by: INTERNAL MEDICINE

## 2023-08-31 PROCEDURE — 37000008 HC ANESTHESIA 1ST 15 MINUTES: Performed by: INTERNAL MEDICINE

## 2023-08-31 PROCEDURE — 31624 DX BRONCHOSCOPE/LAVAGE: CPT | Performed by: INTERNAL MEDICINE

## 2023-08-31 PROCEDURE — 94640 AIRWAY INHALATION TREATMENT: CPT

## 2023-08-31 PROCEDURE — 31623 PR BRONCHOSCOPY,DIAGNOSTIC W BRUSH: ICD-10-PCS | Mod: 51,LT,, | Performed by: INTERNAL MEDICINE

## 2023-08-31 PROCEDURE — D9220A PRA ANESTHESIA: ICD-10-PCS | Mod: ANES,,, | Performed by: ANESTHESIOLOGY

## 2023-08-31 PROCEDURE — D9220A PRA ANESTHESIA: Mod: ANES,,, | Performed by: ANESTHESIOLOGY

## 2023-08-31 PROCEDURE — 31628 BRONCHOSCOPY/LUNG BX EACH: CPT | Performed by: INTERNAL MEDICINE

## 2023-08-31 RX ORDER — LIDOCAINE HYDROCHLORIDE 20 MG/ML
INJECTION, SOLUTION EPIDURAL; INFILTRATION; INTRACAUDAL; PERINEURAL
Status: DISCONTINUED
Start: 2023-08-31 | End: 2023-08-31 | Stop reason: HOSPADM

## 2023-08-31 RX ORDER — PROPOFOL 10 MG/ML
INJECTION, EMULSION INTRAVENOUS
Status: COMPLETED
Start: 2023-08-31 | End: 2023-08-31

## 2023-08-31 RX ORDER — LIDOCAINE HYDROCHLORIDE 20 MG/ML
INJECTION INTRAVENOUS
Status: DISCONTINUED | OUTPATIENT
Start: 2023-08-31 | End: 2023-08-31

## 2023-08-31 RX ORDER — SODIUM CHLORIDE 9 MG/ML
INJECTION, SOLUTION INTRAVENOUS CONTINUOUS
Status: DISCONTINUED | OUTPATIENT
Start: 2023-08-31 | End: 2023-08-31 | Stop reason: HOSPADM

## 2023-08-31 RX ORDER — LIDOCAINE HYDROCHLORIDE 40 MG/ML
4 INJECTION, SOLUTION RETROBULBAR ONCE
Status: COMPLETED | OUTPATIENT
Start: 2023-08-31 | End: 2023-08-31

## 2023-08-31 RX ORDER — OXYMETAZOLINE HCL 0.05 %
SPRAY, NON-AEROSOL (ML) NASAL
Status: COMPLETED | OUTPATIENT
Start: 2023-08-31 | End: 2023-08-31

## 2023-08-31 RX ORDER — PROPOFOL 10 MG/ML
VIAL (ML) INTRAVENOUS
Status: DISCONTINUED | OUTPATIENT
Start: 2023-08-31 | End: 2023-08-31

## 2023-08-31 RX ADMIN — PROPOFOL 50 MG: 10 INJECTION, EMULSION INTRAVENOUS at 08:08

## 2023-08-31 RX ADMIN — PROPOFOL 50 MG: 10 INJECTION, EMULSION INTRAVENOUS at 07:08

## 2023-08-31 RX ADMIN — GLYCOPYRROLATE 0.2 MG: 0.2 INJECTION, SOLUTION INTRAMUSCULAR; INTRAVITREAL at 07:08

## 2023-08-31 RX ADMIN — LIDOCAINE HYDROCHLORIDE 100 MG: 20 INJECTION, SOLUTION INTRAVENOUS at 07:08

## 2023-08-31 RX ADMIN — SODIUM CHLORIDE: 9 INJECTION, SOLUTION INTRAVENOUS at 07:08

## 2023-08-31 RX ADMIN — LIDOCAINE HYDROCHLORIDE 4 ML: 40 INJECTION, SOLUTION RETROBULBAR; TOPICAL at 07:08

## 2023-08-31 NOTE — ANESTHESIA POSTPROCEDURE EVALUATION
Anesthesia Post Evaluation    Patient: Nathalie Krueger    Procedure(s) Performed: Procedure(s) (LRB):  Bronchoscopy(need fluoro) (N/A)    Final Anesthesia Type: general      Patient location during evaluation: PACU  Patient participation: Yes- Able to Participate  Level of consciousness: awake and alert and oriented  Post-procedure vital signs: reviewed and stable  Pain management: adequate  Airway patency: patent    PONV status at discharge: No PONV  Anesthetic complications: no      Cardiovascular status: blood pressure returned to baseline and stable  Respiratory status: unassisted and spontaneous ventilation  Hydration status: euvolemic  Follow-up not needed.          Vitals Value Taken Time   /89 08/31/23 0904   Temp 36.7 °C (98.1 °F) 08/31/23 0900   Pulse 80 08/31/23 0907   Resp 26 08/31/23 0907   SpO2 94 % 08/31/23 0907   Vitals shown include unvalidated device data.      Event Time   Out of Recovery 09:45:00         Pain/Narciso Score: Narciso Score: 10 (8/31/2023  9:00 AM)

## 2023-08-31 NOTE — ANESTHESIA PREPROCEDURE EVALUATION
08/31/2023  Nathalie Krueger is a 58 y.o., female.      Pre-op Assessment    I have reviewed the Patient Summary Reports.     I have reviewed the Nursing Notes. I have reviewed the NPO Status.   I have reviewed the Medications.     Review of Systems  Anesthesia Hx:  No problems with previous Anesthesia    Social:  Former Smoker    Cardiovascular:   Hypertension    Pulmonary:   Sleep Apnea Interstitial lung dz  Home O2 PRN  Hypoxemic resp failure       Renal/:  Renal/ Normal     Hepatic/GI:   GERD, well controlled    Neurological:  Neurology Normal    Endocrine:   Diabetes, well controlled, type 2        Physical Exam  General: Well nourished, Cooperative, Alert and Oriented    Airway:  Mallampati: II   Mouth Opening: Normal  TM Distance: Normal  Neck ROM: Normal ROM        Anesthesia Plan  Type of Anesthesia, risks & benefits discussed:    Anesthesia Type: Gen ETT, Gen Supraglottic Airway, Gen Natural Airway, MAC  Intra-op Monitoring Plan: Standard ASA Monitors  Post Op Pain Control Plan: multimodal analgesia  Induction:  IV  Airway Plan: Direct, Video and Fiberoptic, Post-Induction  Informed Consent: Informed consent signed with the Patient and all parties understand the risks and agree with anesthesia plan.  All questions answered.   ASA Score: 3    Ready For Surgery From Anesthesia Perspective.     .

## 2023-08-31 NOTE — TRANSFER OF CARE
"Anesthesia Transfer of Care Note    Patient: Nathalie Krueger    Procedure(s) Performed: Procedure(s) (LRB):  Bronchoscopy(need fluoro) (N/A)    Patient location: PACU    Anesthesia Type: general    Transport from OR: Transported from OR on 6-10 L/min O2 by face mask with adequate spontaneous ventilation    Post pain: adequate analgesia    Post assessment: no apparent anesthetic complications    Post vital signs: stable    Level of consciousness: responds to stimulation and sedated    Nausea/Vomiting: no nausea/vomiting    Complications: none    Transfer of care protocol was followed      Last vitals:   Visit Vitals  /81 (BP Location: Right arm, Patient Position: Lying)   Pulse 76   Temp 36.7 °C (98.1 °F) (Skin)   Resp 18   Ht 5' 9" (1.753 m)   Wt 101.6 kg (224 lb)   SpO2 (!) 94%   Breastfeeding No   BMI 33.08 kg/m²     "

## 2023-08-31 NOTE — H&P
History & Physical - Short Stay  Pulmonary      SUBJECTIVE:     Procedure: bronchoscopy    Chief Complaint/Indication for Procedure:  ILD    PTA Medications   Medication Sig    azelastine (ASTELIN) 137 mcg (0.1 %) nasal spray SPRAY 1 SPRAY IN EACH NOSTRIL 2 TIMES DAILY.    calcium carbonate/vitamin D3 (CALCIUM WITH VITAMIN D ORAL) Take 1 tablet by mouth once daily.    carvediloL (COREG) 25 MG tablet Take 1 tablet (25 mg total) by mouth 2 (two) times daily with meals.    cetirizine (ZYRTEC) 10 MG tablet Take 1 tablet (10 mg total) by mouth once daily.    fluticasone-umeclidin-vilanter (TRELEGY ELLIPTA) 200-62.5-25 mcg inhaler Inhale 1 puff into the lungs once daily.    metFORMIN (GLUCOPHAGE) 500 MG tablet Take 1 tablet (500 mg total) by mouth daily with breakfast.    multivitamin capsule Take 1 capsule by mouth once daily.    omeprazole (PRILOSEC) 40 MG capsule TAKE 1 CAPSULE BY MOUTH EVERY DAY    albuterol (PROVENTIL/VENTOLIN HFA) 90 mcg/actuation inhaler Inhale 1-2 puffs into the lungs every 4 (four) hours as needed for Wheezing or Shortness of Breath. Rescue    albuterol-ipratropium (DUO-NEB) 2.5 mg-0.5 mg/3 mL nebulizer solution Take 3 mLs by nebulization 2 (two) times daily. Rescue    OZEMPIC 1 mg/dose (4 mg/3 mL) INJECT 1 MG INTO THE SKIN EVERY 7 DAYS.    sodium chloride 3% 3 % nebulizer solution 3 CC OF 3% SALINE INTO NEBS TWICE DAILY AS NEEDED    white petrolatum-mineral oiL (ARTIFICIAL TEARS, BLAKE/MIN,) 83-15 % Oint Place into both eyes every evening.       Review of patient's allergies indicates:   Allergen Reactions    Atorvastatin Other (See Comments)     Dry cough      Lisinopril      Other reaction(s): Cough    Meperidine hcl Nausea Only        Past Medical History:   Diagnosis Date    Acute bronchitis 02/01/2022    Adenomatous polyp of ascending colon 06/22/2020    Diabetes mellitus     Hypertension     Interstitial lung disease     Sleep apnea      Past Surgical History:   Procedure Laterality Date     ABLATION, FIBROID, UTERUS, LAPAROSCOPIC, WITH US GUIDANCE      APPENDECTOMY  1982    RIGHT HEART CATHETERIZATION Right 3/29/2023    Procedure: INSERTION, CATHETER, RIGHT HEART;  Surgeon: Yulisa Yung DO;  Location: [facility] CATH LAB;  Service: Cardiology;  Laterality: Right;    ROTATOR CUFF REPAIR Right 1998    TONSILLECTOMY  1977     Family History   Problem Relation Age of Onset    Diabetes Mother     Hypertension Mother     Heart disease Mother     Diabetes Father     Glaucoma Neg Hx     Macular degeneration Neg Hx      Social History     Tobacco Use    Smoking status: Former     Current packs/day: 0.00     Average packs/day: 0.5 packs/day for 25.0 years (12.5 ttl pk-yrs)     Types: Cigarettes     Start date: 12/16/1994     Quit date: 12/16/2019     Years since quitting: 3.7     Passive exposure: Past    Smokeless tobacco: Never    Tobacco comments:     Smoked additive free tobacco   Substance Use Topics    Alcohol use: Not Currently    Drug use: Never         OBJECTIVE:     Vital Signs (Most Recent)  Temp: 98.1 °F (36.7 °C) (08/31/23 0850)  Pulse: 81 (08/31/23 0855)  Resp: 19 (08/31/23 0855)  BP: 135/67 (08/31/23 0855)  SpO2: 96 % (08/31/23 0855)    Physical Exam:                                                       GENERAL:  Comfortable, in no acute distress.                                 HEENT EXAM:  Nonicteric.  No adenopathy.  Oropharynx is clear.               NECK:  Supple.                                                               LUNGS:  Clear.                                                               CARDIAC:  Regular rate and rhythm.  S1, S2.  No murmur.                      ABDOMEN:  Soft, positive bowel sounds, nontender.  No hepatosplenomegaly or masses.  No rebound or guarding.                                             EXTREMITIES:  No edema.     MENTAL STATUS:  Normal, alert and oriented.      ASSESSMENT/PLAN:     Assessment: 59 yo female with ILD, needs tissue  diagnosis    Plan: Bronchoscopy, Diagnostic  [place] biopsy  Transbronchial biopsy    Anesthesia Plan: per anesthesia    ASA Grade: ASA 3 - Patient with moderate systemic disease with functional limitations    MALLAMPATI SCORE:  II (hard and soft palate, upper portion of tonsils anduvula visible)

## 2023-08-31 NOTE — DISCHARGE SUMMARY
Patient underwent an outpatient bronchoscopy without complications and was subsequently discharged.  Please see procedure note for further details.    BRONCHOSCOPY NOTE   Date:   08/31/2023        Clinical History:  57 yo female with ILD     Indication for procedure:  1- Evaluate for etiology of lung disease     MD performing procedure:  AMY Lemon      Consent:   The risks, benefits, complications, treatment options and expected outcomes were discussed with the patient.  The possibilities of reaction to medication, pulmonary aspiration, pneumothorax, bleeding, failure to diagnose his/her condition, and creating a complication requiring transfusion or operation were discussed with the patient who freely signed the consent.      Topical anesthesia:   Lidocaine: 5ml of 4% nebulized and 1.5% endobronchial as needed.      Sedation/Anesthesia:  Per anesthesia     Bronchoscopic airway inspection:   The standard flexible bronchovideoscope was inserted nasally. The oropharynx was normal. The vocal cords were visualized and were normal.   The bronchoscope was passed into the trachea. Endoscopic inspection of the tracheobronchial mucosa to the sub-segmental level was normal.     Trans-bronchial lung biopsy (TBB) of ROSEMARY, lingula:   With the bronchoscope directed toward the ROSEMARY and lingula, a 1.8mm biopsy forceps was advanced into the bronchus under direct visual guidance, and four (4) biopsies were taken. These samples were sent for histologic analysis.       Brushings were then performed in the ROSEMARY, sent for cytology    Bronchoalveolar lavage (BAL) of ROSEMARY:  With the bronchoscope wedged in the ROSEMARY, a 80ml aliquot of normal saline was used to lavage this lobe, returning 25ml. This was sent for analysis. The return was sanguinous in color.      Bleeding was minimal (<25ml) and ceased spontaneously. After hemostasis was confirmed, the bronchoscope was withdrawn.      Total sedation administered:  See anesthesia documentation      Immediate complications:  None. The patient tolerated the procedure without difficulty. Vital signs were stable as compared to those pre-procedure. Patient awakened and answered appropriately to stimuli.   A post procedure chest X ray was performed and reviewed by me. There is no pneumothorax, pneumomediastinum, or new lung opacity.      Impression:  1- ILD with diffuse ground glass and some cystic changes, evaluate for etiology     Recommendations:  1- Await microbiology, cytology, histology results.     Sarah Lemon MD  Pulmonary & Critical Care Medicine

## 2023-08-31 NOTE — PROCEDURES
BRONCHOSCOPY NOTE   Date:   08/31/2023      Clinical History:  57 yo female with ILD    Indication for procedure:  1- Evaluate for etiology of lung disease    MD performing procedure:  AMY Lemon     Consent:   The risks, benefits, complications, treatment options and expected outcomes were discussed with the patient.  The possibilities of reaction to medication, pulmonary aspiration, pneumothorax, bleeding, failure to diagnose his/her condition, and creating a complication requiring transfusion or operation were discussed with the patient who freely signed the consent.      Topical anesthesia:   Lidocaine: 5ml of 4% nebulized and 1.5% endobronchial as needed.     Sedation/Anesthesia:  Per anesthesia    Bronchoscopic airway inspection:   The standard flexible bronchovideoscope was inserted nasally. The oropharynx was normal. The vocal cords were visualized and were normal.   The bronchoscope was passed into the trachea. Endoscopic inspection of the tracheobronchial mucosa to the sub-segmental level was normal.    Trans-bronchial lung biopsy (TBB) of ROSEMARY, lingula:   With the bronchoscope directed toward the ROSEMARY and lingula, a 1.8mm biopsy forceps was advanced into the bronchus under direct visual guidance, and four (4) biopsies were taken. These samples were sent for histologic analysis.      Brushings were then performed in the ROSEMARY, sent for cytology    Bronchoalveolar lavage (BAL) of ROSEMARY:  With the bronchoscope wedged in the ROSEMARY, a 80ml aliquot of normal saline was used to lavage this lobe, returning 25ml. This was sent for analysis. The return was sanguinous in color.     Bleeding was minimal (<25ml) and ceased spontaneously. After hemostasis was confirmed, the bronchoscope was withdrawn.     Total sedation administered:  See anesthesia documentation    Immediate complications:  None. The patient tolerated the procedure without difficulty. Vital signs were stable as compared to those pre-procedure. Patient  awakened and answered appropriately to stimuli.   A post procedure chest X ray was performed and reviewed by me. There is no pneumothorax, pneumomediastinum, or new lung opacity.     Impression:  1- ILD with diffuse ground glass and some cystic changes, evaluate for etiology    Recommendations:  1- Await microbiology, cytology, histology results.    Sarah Lemon MD  Pulmonary & Critical Care Medicine

## 2023-09-01 VITALS
HEART RATE: 77 BPM | DIASTOLIC BLOOD PRESSURE: 66 MMHG | TEMPERATURE: 98 F | RESPIRATION RATE: 20 BRPM | SYSTOLIC BLOOD PRESSURE: 136 MMHG | OXYGEN SATURATION: 95 % | WEIGHT: 224 LBS | BODY MASS INDEX: 33.18 KG/M2 | HEIGHT: 69 IN

## 2023-09-04 LAB — KOH PREP SPEC: NORMAL

## 2023-09-05 RX ORDER — OMEPRAZOLE 40 MG/1
CAPSULE, DELAYED RELEASE ORAL
Qty: 30 CAPSULE | Refills: 2 | OUTPATIENT
Start: 2023-09-05

## 2023-09-06 ENCOUNTER — PATIENT MESSAGE (OUTPATIENT)
Dept: FAMILY MEDICINE | Facility: CLINIC | Age: 58
End: 2023-09-06
Payer: COMMERCIAL

## 2023-09-06 DIAGNOSIS — E11.9 TYPE 2 DIABETES MELLITUS WITHOUT COMPLICATION, WITHOUT LONG-TERM CURRENT USE OF INSULIN: ICD-10-CM

## 2023-09-06 LAB
BACTERIA SPEC AEROBE CULT: NORMAL
GRAM STN SPEC: NORMAL

## 2023-09-06 RX ORDER — SEMAGLUTIDE 1.34 MG/ML
1 INJECTION, SOLUTION SUBCUTANEOUS
Qty: 9 ML | Refills: 1 | Status: SHIPPED | OUTPATIENT
Start: 2023-09-06 | End: 2023-12-27

## 2023-09-06 NOTE — TELEPHONE ENCOUNTER
Refill Decision Note   Nathalie Krueger  is requesting a refill authorization.  Brief Assessment and Rationale for Refill:  Approve     Medication Therapy Plan:         Comments:     Note composed:3:39 PM 09/06/2023

## 2023-09-06 NOTE — TELEPHONE ENCOUNTER
No care due was identified.  [facility] Embedded Care Due Messages. Reference number: [id number].   9/06/2023 9:58:59 AM CDT

## 2023-09-07 DIAGNOSIS — K21.9 GASTROESOPHAGEAL REFLUX DISEASE WITHOUT ESOPHAGITIS: Primary | ICD-10-CM

## 2023-09-08 ENCOUNTER — TELEPHONE (OUTPATIENT)
Dept: PULMONOLOGY | Facility: CLINIC | Age: 58
End: 2023-09-08
Payer: COMMERCIAL

## 2023-09-08 NOTE — TELEPHONE ENCOUNTER
Form faxed back   Pt wanted to know if she should see also see a GI dr or just have the test done?  Advised dr out until Monday but will check into and call her back  pt v/u

## 2023-09-11 ENCOUNTER — TELEPHONE (OUTPATIENT)
Dept: TRANSPLANT | Facility: CLINIC | Age: 58
End: 2023-09-11
Payer: COMMERCIAL

## 2023-09-11 ENCOUNTER — PATIENT MESSAGE (OUTPATIENT)
Dept: PULMONOLOGY | Facility: CLINIC | Age: 58
End: 2023-09-11
Payer: COMMERCIAL

## 2023-09-11 ENCOUNTER — TELEPHONE (OUTPATIENT)
Dept: FAMILY MEDICINE | Facility: CLINIC | Age: 58
End: 2023-09-11
Payer: COMMERCIAL

## 2023-09-11 DIAGNOSIS — J84.9 ILD (INTERSTITIAL LUNG DISEASE): ICD-10-CM

## 2023-09-11 DIAGNOSIS — K21.9 GASTROESOPHAGEAL REFLUX DISEASE, UNSPECIFIED WHETHER ESOPHAGITIS PRESENT: Primary | ICD-10-CM

## 2023-09-11 NOTE — TELEPHONE ENCOUNTER
Called and spoke to pt about setting up Gastrology appt p/Referral  Appt set up for 9/14/2023 @ 3:00pm w/Ms Berry in [place]

## 2023-09-11 NOTE — TELEPHONE ENCOUNTER
9/12/23 - Contacted patient regarding Dr. Ledesma's instructions for a referral to GI motility clinic for GI testing (pH impedance & manometry).  Informed her that manometry is not performed on the [facility].  Inquired if she would like us to cancel the GI consult on Thursday, 9/14/23 and reschedule the appointment to Dr. Villafana in the motility clinic.  Patient requested that we cancel the appointment this Thursday and refer her to Dr. Villafana in the motility clinic.  Informed her that the initial appointment will be scheduled with the GI testing to be scheduled after the initial consult.  Patient requested a virtual appointment if possible.  Informed patient that the appointment will be scheduled and she will be contacted with the date and time.  She verbalized her understanding.    Appointment with PARADISE Berry on 9/14/23 canceled per patient's request.    ----- Message from Lesly Ledesma MD sent at 9/8/2023  4:35 PM CDT -----    Key can you refer to motility clinic with pH/impedance and manometry     Patient scheduled for a GI consult with PARADISE Berry on 9/14/23.  Message sent to PARADISE Berry to determine if patient has to be referred to the motility clinic here of if she can order the above testing.    Received the below message from PARADISE Berry:    Unfortunately we do not do esophageal manometry here so I would send her to Dr. Villaafna at the [facility] location.   Sincerely,   Milagros Berry NP     ----- Message -----  From: Sarah Lemon MD  Sent: 9/8/2023   3:10 PM CDT  To: Lesly Ledesma MD    Ok I'm not familiar with ordering those. Are you able to put them in?  I tried but its looking more like a case request? Not sure if I'm picking correct order    ----- Message -----  From: Lesly Ledesma MD  Sent: 9/7/2023  10:43 AM CDT  To: Sarah Lemon MD    I would recommend more in-depth studies: pH/impedance and manometry.  I don't think the chronic aspiration explains everything  especially hypoxemia and the mosaicism is likely mostly air-trapping.      ----- Message -----  From: Sarah Lemon MD  Sent: 9/7/2023   9:21 AM CDT  To: Lesly Ledesma MD    Thank you. I can order GI referral, barium swallow for her    ----- Message -----  From: Lesly Ledesma MD  Sent: 9/7/2023   8:06 AM CDT  To: MD Jameel Padron, Looks more like chronic aspiration.  I did treat her symptomatically with PPI.  Would benefit with full GI workup to see if mechanical repair of reflux is needed.  Should I put in those tests?

## 2023-09-12 ENCOUNTER — PATIENT MESSAGE (OUTPATIENT)
Dept: ADMINISTRATIVE | Facility: HOSPITAL | Age: 58
End: 2023-09-12
Payer: COMMERCIAL

## 2023-09-12 ENCOUNTER — TELEPHONE (OUTPATIENT)
Dept: FAMILY MEDICINE | Facility: CLINIC | Age: 58
End: 2023-09-12

## 2023-09-12 ENCOUNTER — TELEPHONE (OUTPATIENT)
Dept: GASTROENTEROLOGY | Facility: CLINIC | Age: 58
End: 2023-09-12
Payer: COMMERCIAL

## 2023-09-12 ENCOUNTER — PATIENT MESSAGE (OUTPATIENT)
Dept: PULMONOLOGY | Facility: CLINIC | Age: 58
End: 2023-09-12
Payer: COMMERCIAL

## 2023-09-12 ENCOUNTER — TELEPHONE (OUTPATIENT)
Dept: TRANSPLANT | Facility: CLINIC | Age: 58
End: 2023-09-12
Payer: COMMERCIAL

## 2023-09-12 ENCOUNTER — PATIENT MESSAGE (OUTPATIENT)
Dept: FAMILY MEDICINE | Facility: CLINIC | Age: 58
End: 2023-09-12
Payer: COMMERCIAL

## 2023-09-12 DIAGNOSIS — Z12.31 ENCOUNTER FOR SCREENING MAMMOGRAM FOR MALIGNANT NEOPLASM OF BREAST: Primary | ICD-10-CM

## 2023-09-12 DIAGNOSIS — E78.49 OTHER HYPERLIPIDEMIA: ICD-10-CM

## 2023-09-12 DIAGNOSIS — E11.9 TYPE 2 DIABETES MELLITUS WITHOUT COMPLICATION, WITHOUT LONG-TERM CURRENT USE OF INSULIN: ICD-10-CM

## 2023-09-12 NOTE — TELEPHONE ENCOUNTER
----- Message from Rhiannon Reis sent at 9/12/2023  9:56 AM CDT -----  Contact: Self  Type: Needs Medical Advice    Who Called:  Patient  What is this regarding?:  She has some questions about her apt next week.  Best Call Back Number:  [phone]  Additional Information:  Please call the patient back at the phone number listed above to advise. Thank you!

## 2023-09-12 NOTE — TELEPHONE ENCOUNTER
Spoke with Patient, she is requesting lab orders be placed to have drawn before her next appointment.     Specifically requesting lipid panel.

## 2023-09-12 NOTE — TELEPHONE ENCOUNTER
----- Message from Sofia Osuna sent at 9/12/2023  1:40 PM CDT -----  Regarding: self  [phone]  .Type:  Patient Returning Call    Who Called: self    Who Left Message for Patient: Estee    Does the patient know what this is regarding? no    Would the patient rather a call back or a response via [facility]? Call back     Best Call Back Number: [phone]

## 2023-09-12 NOTE — TELEPHONE ENCOUNTER
----- Message from Felicita Villafana MD sent at 9/12/2023 11:22 AM CDT -----  Regarding: Lung transplant spot  Anne Mckeon, or Estee, can you schedule this patient in a lung transplant opening?  Thanks!    Felicita  ----- Message -----  From: Josselyn Christensen MA  Sent: 9/12/2023  11:17 AM CDT  To: Felicita Villafana MD    Non  Pre Transplant Patient .Patient is a Advance lung disease patient. Dr Ledesma would like patient to be seen for motility testing. Ph impedance & monometry concerns  w/aspiration.

## 2023-09-12 NOTE — TELEPHONE ENCOUNTER
Patient was contacted and a hold was placed on our next available lung transplant slot.  A hold was placed on 10/26/23 @ 8:00 am, virtually. A reminder will be mailed as soon as appointment is scheduled.

## 2023-09-12 NOTE — TELEPHONE ENCOUNTER
Attempted to contact patient to get her scheduled in a lung transplant slot to come see Dr. Villafana.  A voice message was left for patient to call back.

## 2023-09-13 ENCOUNTER — TELEPHONE (OUTPATIENT)
Dept: GASTROENTEROLOGY | Facility: CLINIC | Age: 58
End: 2023-09-13
Payer: COMMERCIAL

## 2023-09-13 ENCOUNTER — IMMUNIZATION (OUTPATIENT)
Dept: FAMILY MEDICINE | Facility: CLINIC | Age: 58
End: 2023-09-13
Payer: COMMERCIAL

## 2023-09-13 PROCEDURE — 90686 IIV4 VACC NO PRSV 0.5 ML IM: CPT | Mod: S$GLB,,, | Performed by: INTERNAL MEDICINE

## 2023-09-13 PROCEDURE — 90471 IMMUNIZATION ADMIN: CPT | Mod: S$GLB,,, | Performed by: INTERNAL MEDICINE

## 2023-09-13 PROCEDURE — 90471 FLU VACCINE (QUAD) GREATER THAN OR EQUAL TO 3YO PRESERVATIVE FREE IM: ICD-10-PCS | Mod: S$GLB,,, | Performed by: INTERNAL MEDICINE

## 2023-09-13 PROCEDURE — 90686 FLU VACCINE (QUAD) GREATER THAN OR EQUAL TO 3YO PRESERVATIVE FREE IM: ICD-10-PCS | Mod: S$GLB,,, | Performed by: INTERNAL MEDICINE

## 2023-09-13 NOTE — TELEPHONE ENCOUNTER
Patient GI appointment with Np in [place] /  called to see if able to reschedule/n/a left message to contact [place] to reschedule.

## 2023-09-14 ENCOUNTER — LAB VISIT (OUTPATIENT)
Dept: LAB | Facility: HOSPITAL | Age: 58
End: 2023-09-14
Attending: STUDENT IN AN ORGANIZED HEALTH CARE EDUCATION/TRAINING PROGRAM
Payer: COMMERCIAL

## 2023-09-14 ENCOUNTER — PATIENT MESSAGE (OUTPATIENT)
Dept: FAMILY MEDICINE | Facility: CLINIC | Age: 58
End: 2023-09-14
Payer: COMMERCIAL

## 2023-09-14 DIAGNOSIS — E78.49 OTHER HYPERLIPIDEMIA: ICD-10-CM

## 2023-09-14 DIAGNOSIS — E78.2 MIXED HYPERLIPIDEMIA: ICD-10-CM

## 2023-09-14 DIAGNOSIS — E11.9 TYPE 2 DIABETES MELLITUS WITHOUT COMPLICATION, WITHOUT LONG-TERM CURRENT USE OF INSULIN: ICD-10-CM

## 2023-09-14 LAB
CHOLEST SERPL-MCNC: 242 MG/DL (ref 120–199)
CHOLEST/HDLC SERPL: 6.2 {RATIO} (ref 2–5)
ESTIMATED AVG GLUCOSE: 137 MG/DL (ref 68–131)
HBA1C MFR BLD: 6.4 % (ref 4–5.6)
HDLC SERPL-MCNC: 39 MG/DL (ref 40–75)
HDLC SERPL: 16.1 % (ref 20–50)
LDLC SERPL CALC-MCNC: 169.6 MG/DL (ref 63–159)
NONHDLC SERPL-MCNC: 203 MG/DL
TRIGL SERPL-MCNC: 167 MG/DL (ref 30–150)

## 2023-09-14 PROCEDURE — 80061 LIPID PANEL: CPT | Mod: TXP | Performed by: STUDENT IN AN ORGANIZED HEALTH CARE EDUCATION/TRAINING PROGRAM

## 2023-09-14 PROCEDURE — 36415 COLL VENOUS BLD VENIPUNCTURE: CPT | Mod: PO,NTX | Performed by: STUDENT IN AN ORGANIZED HEALTH CARE EDUCATION/TRAINING PROGRAM

## 2023-09-14 PROCEDURE — 83036 HEMOGLOBIN GLYCOSYLATED A1C: CPT | Mod: NTX | Performed by: STUDENT IN AN ORGANIZED HEALTH CARE EDUCATION/TRAINING PROGRAM

## 2023-09-15 RX ORDER — OMEPRAZOLE 40 MG/1
40 CAPSULE, DELAYED RELEASE ORAL DAILY
Qty: 90 CAPSULE | Refills: 1 | Status: SHIPPED | OUTPATIENT
Start: 2023-09-15 | End: 2023-09-20

## 2023-09-15 RX ORDER — ROSUVASTATIN CALCIUM 5 MG/1
5 TABLET, COATED ORAL DAILY
Qty: 90 TABLET | Refills: 3 | Status: SHIPPED | OUTPATIENT
Start: 2023-09-15

## 2023-09-15 NOTE — TELEPHONE ENCOUNTER
No care due was identified.  [facility] Embedded Care Due Messages. Reference number: [id number].   9/15/2023 10:22:45 AM CDT

## 2023-09-20 ENCOUNTER — OFFICE VISIT (OUTPATIENT)
Dept: FAMILY MEDICINE | Facility: CLINIC | Age: 58
End: 2023-09-20
Payer: COMMERCIAL

## 2023-09-20 VITALS
OXYGEN SATURATION: 93 % | WEIGHT: 229.63 LBS | SYSTOLIC BLOOD PRESSURE: 126 MMHG | HEART RATE: 85 BPM | HEIGHT: 69 IN | DIASTOLIC BLOOD PRESSURE: 82 MMHG | BODY MASS INDEX: 34.01 KG/M2

## 2023-09-20 DIAGNOSIS — K21.9 GASTROESOPHAGEAL REFLUX DISEASE, UNSPECIFIED WHETHER ESOPHAGITIS PRESENT: ICD-10-CM

## 2023-09-20 DIAGNOSIS — I10 ESSENTIAL HYPERTENSION: ICD-10-CM

## 2023-09-20 DIAGNOSIS — E11.9 TYPE 2 DIABETES MELLITUS WITHOUT COMPLICATION, WITHOUT LONG-TERM CURRENT USE OF INSULIN: ICD-10-CM

## 2023-09-20 PROBLEM — E78.2 MIXED HYPERLIPIDEMIA: Status: ACTIVE | Noted: 2020-06-22

## 2023-09-20 PROBLEM — R93.89 ABNORMAL CT SCAN, CHEST: Status: RESOLVED | Noted: 2023-08-31 | Resolved: 2023-09-20

## 2023-09-20 PROCEDURE — 3074F SYST BP LT 130 MM HG: CPT | Mod: CPTII,S$GLB,, | Performed by: STUDENT IN AN ORGANIZED HEALTH CARE EDUCATION/TRAINING PROGRAM

## 2023-09-20 PROCEDURE — 3008F BODY MASS INDEX DOCD: CPT | Mod: CPTII,S$GLB,, | Performed by: STUDENT IN AN ORGANIZED HEALTH CARE EDUCATION/TRAINING PROGRAM

## 2023-09-20 PROCEDURE — 99999 PR PBB SHADOW E&M-EST. PATIENT-LVL IV: CPT | Mod: PBBFAC,,, | Performed by: STUDENT IN AN ORGANIZED HEALTH CARE EDUCATION/TRAINING PROGRAM

## 2023-09-20 PROCEDURE — 99214 OFFICE O/P EST MOD 30 MIN: CPT | Mod: S$GLB,,, | Performed by: STUDENT IN AN ORGANIZED HEALTH CARE EDUCATION/TRAINING PROGRAM

## 2023-09-20 PROCEDURE — 3079F DIAST BP 80-89 MM HG: CPT | Mod: CPTII,S$GLB,, | Performed by: STUDENT IN AN ORGANIZED HEALTH CARE EDUCATION/TRAINING PROGRAM

## 2023-09-20 PROCEDURE — 3044F HG A1C LEVEL LT 7.0%: CPT | Mod: CPTII,S$GLB,, | Performed by: STUDENT IN AN ORGANIZED HEALTH CARE EDUCATION/TRAINING PROGRAM

## 2023-09-20 PROCEDURE — 3008F PR BODY MASS INDEX (BMI) DOCUMENTED: ICD-10-PCS | Mod: CPTII,S$GLB,, | Performed by: STUDENT IN AN ORGANIZED HEALTH CARE EDUCATION/TRAINING PROGRAM

## 2023-09-20 PROCEDURE — 3074F PR MOST RECENT SYSTOLIC BLOOD PRESSURE < 130 MM HG: ICD-10-PCS | Mod: CPTII,S$GLB,, | Performed by: STUDENT IN AN ORGANIZED HEALTH CARE EDUCATION/TRAINING PROGRAM

## 2023-09-20 PROCEDURE — 3061F PR NEG MICROALBUMINURIA RESULT DOCUMENTED/REVIEW: ICD-10-PCS | Mod: CPTII,S$GLB,, | Performed by: STUDENT IN AN ORGANIZED HEALTH CARE EDUCATION/TRAINING PROGRAM

## 2023-09-20 PROCEDURE — 3044F PR MOST RECENT HEMOGLOBIN A1C LEVEL <7.0%: ICD-10-PCS | Mod: CPTII,S$GLB,, | Performed by: STUDENT IN AN ORGANIZED HEALTH CARE EDUCATION/TRAINING PROGRAM

## 2023-09-20 PROCEDURE — 3066F NEPHROPATHY DOC TX: CPT | Mod: CPTII,S$GLB,, | Performed by: STUDENT IN AN ORGANIZED HEALTH CARE EDUCATION/TRAINING PROGRAM

## 2023-09-20 PROCEDURE — 3066F PR DOCUMENTATION OF TREATMENT FOR NEPHROPATHY: ICD-10-PCS | Mod: CPTII,S$GLB,, | Performed by: STUDENT IN AN ORGANIZED HEALTH CARE EDUCATION/TRAINING PROGRAM

## 2023-09-20 PROCEDURE — 99214 PR OFFICE/OUTPT VISIT, EST, LEVL IV, 30-39 MIN: ICD-10-PCS | Mod: S$GLB,,, | Performed by: STUDENT IN AN ORGANIZED HEALTH CARE EDUCATION/TRAINING PROGRAM

## 2023-09-20 PROCEDURE — 99999 PR PBB SHADOW E&M-EST. PATIENT-LVL IV: ICD-10-PCS | Mod: PBBFAC,,, | Performed by: STUDENT IN AN ORGANIZED HEALTH CARE EDUCATION/TRAINING PROGRAM

## 2023-09-20 PROCEDURE — 3079F PR MOST RECENT DIASTOLIC BLOOD PRESSURE 80-89 MM HG: ICD-10-PCS | Mod: CPTII,S$GLB,, | Performed by: STUDENT IN AN ORGANIZED HEALTH CARE EDUCATION/TRAINING PROGRAM

## 2023-09-20 PROCEDURE — 1159F MED LIST DOCD IN RCRD: CPT | Mod: CPTII,S$GLB,, | Performed by: STUDENT IN AN ORGANIZED HEALTH CARE EDUCATION/TRAINING PROGRAM

## 2023-09-20 PROCEDURE — 1159F PR MEDICATION LIST DOCUMENTED IN MEDICAL RECORD: ICD-10-PCS | Mod: CPTII,S$GLB,, | Performed by: STUDENT IN AN ORGANIZED HEALTH CARE EDUCATION/TRAINING PROGRAM

## 2023-09-20 PROCEDURE — 3061F NEG MICROALBUMINURIA REV: CPT | Mod: CPTII,S$GLB,, | Performed by: STUDENT IN AN ORGANIZED HEALTH CARE EDUCATION/TRAINING PROGRAM

## 2023-09-20 RX ORDER — PANTOPRAZOLE SODIUM 40 MG/1
40 TABLET, DELAYED RELEASE ORAL DAILY
Qty: 30 TABLET | Refills: 11 | Status: SHIPPED | OUTPATIENT
Start: 2023-09-20 | End: 2023-11-27

## 2023-09-20 RX ORDER — AMLODIPINE BESYLATE 5 MG/1
5 TABLET ORAL DAILY
Qty: 30 TABLET | Refills: 11 | Status: SHIPPED | OUTPATIENT
Start: 2023-09-20 | End: 2023-10-13

## 2023-09-20 NOTE — ASSESSMENT & PLAN NOTE
Patient's heartburn is controlled. However, she mentions tightness in her lower chest. Will switch omeprazole to pantoprazole.

## 2023-09-20 NOTE — ASSESSMENT & PLAN NOTE
Currently controlled. However, patient mentions frustration with medication burden. I will switch her twice daily carvedilol to once daily medication. I will message her in a few weeks to see what her home pressures are.

## 2023-09-20 NOTE — ASSESSMENT & PLAN NOTE
Patient seems to be tolerating Ozempic well. However, recent bronchoscopy with pulmonology showed some concern for reflux (I do not have bronchoscopy report immediately available). They scheduled an esophagram. If imaging demonstrates reflux, we may have to stop Ozempic.

## 2023-09-20 NOTE — PROGRESS NOTES
Name: Nathalie Krueger  MRN: [id number]  : 1965  PCP: Tyron Flannery MD    HPI  Patient presents for regular follow up.    Review of Systems   Respiratory:  Positive for chest tightness.    Cardiovascular:  Negative for chest pain.       Patient Active Problem List   Diagnosis    Essential hypertension    Mixed hyperlipidemia    Type 2 diabetes mellitus without complication, without long-term current use of insulin    Lung nodule    Chronic hypoxemic respiratory failure    Bronchiectasis without complication    Gastroesophageal reflux disease    Right flank pain    Class 1 obesity due to excess calories without serious comorbidity with body mass index (BMI) of 33.0 to 33.9 in adult    Mild obstructive sleep apnea    Restrictive lung disease    IgM deficiency    ILD (interstitial lung disease)       Vitals:    23 1333   BP: 126/82   Pulse: 85       Physical Exam  Constitutional:       General: She is not in acute distress.     Appearance: Normal appearance. She is well-developed.   HENT:      Head: Normocephalic and atraumatic.      Right Ear: External ear normal.      Left Ear: External ear normal.   Eyes:      Conjunctiva/sclera: Conjunctivae normal.   Pulmonary:      Effort: Pulmonary effort is normal. No respiratory distress.   Abdominal:      General: Abdomen is flat. There is no distension.   Musculoskeletal:         General: No swelling or deformity.      Right lower leg: No edema.      Left lower leg: No edema.   Skin:     General: Skin is warm and dry.      Coloration: Skin is not jaundiced.   Neurological:      Mental Status: She is alert and oriented to person, place, and time. Mental status is at baseline.   Psychiatric:         Attention and Perception: Attention and perception normal.         Mood and Affect: Mood normal.         Speech: Speech normal.         Behavior: Behavior normal. Behavior is cooperative.         Thought Content: Thought content normal.         Cognition and  Memory: Cognition normal.         Judgment: Judgment normal.           Protective Sensation (w/ 10 gram monofilament):  Right: Intact  Left: Intact    Visual Inspection:  Normal -  Bilateral    Pedal Pulses:   Right: Present  Left: Present    Posterior Tibialis Pulses:   Right:Present  Left: Present      1. Essential hypertension  Assessment & Plan:  Currently controlled. However, patient mentions frustration with medication burden. I will switch her twice daily carvedilol to once daily medication. I will message her in a few weeks to see what her home pressures are.    Orders:  -     amLODIPine (NORVASC) 5 MG tablet; Take 1 tablet (5 mg total) by mouth once daily.  Dispense: 30 tablet; Refill: 11    2. Type 2 diabetes mellitus without complication, without long-term current use of insulin  Assessment & Plan:  Patient seems to be tolerating Ozempic well. However, recent bronchoscopy with pulmonology showed some concern for reflux (I do not have bronchoscopy report immediately available). They scheduled an esophagram. If imaging demonstrates reflux, we may have to stop Ozempic.      3. Gastroesophageal reflux disease, unspecified whether esophagitis present  Assessment & Plan:  Patient's heartburn is controlled. However, she mentions tightness in her lower chest. Will switch omeprazole to pantoprazole.    Orders:  -     pantoprazole (PROTONIX) 40 MG tablet; Take 1 tablet (40 mg total) by mouth once daily.  Dispense: 30 tablet; Refill: 11        Follow up in 3 months    Tyron Flannery MD  09/20/2023

## 2023-09-21 ENCOUNTER — PATIENT MESSAGE (OUTPATIENT)
Dept: FAMILY MEDICINE | Facility: CLINIC | Age: 58
End: 2023-09-21
Payer: COMMERCIAL

## 2023-10-02 DIAGNOSIS — E11.9 TYPE 2 DIABETES MELLITUS WITHOUT COMPLICATION, WITHOUT LONG-TERM CURRENT USE OF INSULIN: ICD-10-CM

## 2023-10-02 PROBLEM — J96.11 CHRONIC HYPOXEMIC RESPIRATORY FAILURE: Status: RESOLVED | Noted: 2022-08-09 | Resolved: 2023-10-02

## 2023-10-02 RX ORDER — METFORMIN HYDROCHLORIDE 500 MG/1
500 TABLET ORAL
Qty: 90 TABLET | Refills: 1 | Status: SHIPPED | OUTPATIENT
Start: 2023-10-02 | End: 2024-03-18

## 2023-10-02 NOTE — TELEPHONE ENCOUNTER
No care due was identified.  [facility] Embedded Care Due Messages. Reference number: [id number].   10/02/2023 1:04:49 AM CDT

## 2023-10-02 NOTE — TELEPHONE ENCOUNTER
Refill Decision Note   Nathalie Krueger  is requesting a refill authorization.  Brief Assessment and Rationale for Refill:  Approve     Medication Therapy Plan:         Comments:     Note composed:6:22 AM 10/02/2023             Appointments     Last Visit   9/20/2023 Tyron Flannery MD   Next Visit   1/2/2024 Tyron Flannery MD

## 2023-10-03 ENCOUNTER — TELEPHONE (OUTPATIENT)
Dept: PULMONOLOGY | Facility: CLINIC | Age: 58
End: 2023-10-03
Payer: COMMERCIAL

## 2023-10-03 NOTE — TELEPHONE ENCOUNTER
----- Message from Sarah Lemon MD sent at 10/3/2023  1:58 PM CDT -----  Contact: self  I am not planning to do a procedure on her. Please contact the dr who is doing procedure.    ----- Message -----  From: Yuni Webb LPN  Sent: 10/3/2023   1:55 PM CDT  To: Sarah Lemon MD    Please advise.      ----- Message -----  From: Niko Schwarzen  Sent: 10/3/2023  12:09 PM CDT  To: Ion Smith Staff    Pt has a procedure tomorrow but she is having a little issue right now with TMJ and needs to know how wide she has to open her mouth.  She is at her dentist office right now so please call back at [phone] and thanks

## 2023-10-03 NOTE — TELEPHONE ENCOUNTER
Spoke to patient.  She stated it was a procedure that Dr. Lemon ordered.  I informed her that Dr. Lemon isn't performing the procedure.  Pt would half to reach out to the hospital performing the procedure to see how wide they would be opening her mouth.  Pt is currently having TMJ issues.  Verbalized understanding.

## 2023-10-04 ENCOUNTER — HOSPITAL ENCOUNTER (OUTPATIENT)
Dept: RADIOLOGY | Facility: HOSPITAL | Age: 58
Discharge: HOME | End: 2023-10-04
Attending: INTERNAL MEDICINE
Payer: COMMERCIAL

## 2023-10-04 DIAGNOSIS — K21.9 GASTROESOPHAGEAL REFLUX DISEASE WITHOUT ESOPHAGITIS: ICD-10-CM

## 2023-10-04 PROCEDURE — 74220 FL ESOPHAGRAM COMPLETE: ICD-10-PCS | Mod: 26,NTX,, | Performed by: RADIOLOGY

## 2023-10-04 PROCEDURE — 25500020 PHARM REV CODE 255: Mod: PO,TXP | Performed by: INTERNAL MEDICINE

## 2023-10-04 PROCEDURE — 74220 X-RAY XM ESOPHAGUS 1CNTRST: CPT | Mod: 26,NTX,, | Performed by: RADIOLOGY

## 2023-10-04 PROCEDURE — A9698 NON-RAD CONTRAST MATERIALNOC: HCPCS | Mod: PO,TXP | Performed by: INTERNAL MEDICINE

## 2023-10-04 PROCEDURE — 74220 X-RAY XM ESOPHAGUS 1CNTRST: CPT | Mod: TC,PO,NTX

## 2023-10-04 RX ADMIN — BARIUM SULFATE 340 G: 980 POWDER, FOR SUSPENSION ORAL at 09:10

## 2023-10-04 RX ADMIN — BARIUM SULFATE 355 ML: 0.6 SUSPENSION ORAL at 09:10

## 2023-10-05 ENCOUNTER — OFFICE VISIT (OUTPATIENT)
Dept: GASTROENTEROLOGY | Facility: CLINIC | Age: 58
End: 2023-10-05
Payer: COMMERCIAL

## 2023-10-05 ENCOUNTER — HOSPITAL ENCOUNTER (OUTPATIENT)
Dept: RADIOLOGY | Facility: HOSPITAL | Age: 58
Discharge: HOME | End: 2023-10-05
Attending: INTERNAL MEDICINE
Payer: COMMERCIAL

## 2023-10-05 ENCOUNTER — TELEPHONE (OUTPATIENT)
Dept: ENDOSCOPY | Facility: HOSPITAL | Age: 58
End: 2023-10-05
Payer: COMMERCIAL

## 2023-10-05 ENCOUNTER — HOSPITAL ENCOUNTER (OUTPATIENT)
Dept: PULMONOLOGY | Facility: CLINIC | Age: 58
Discharge: HOME | End: 2023-10-05
Payer: COMMERCIAL

## 2023-10-05 ENCOUNTER — PATIENT MESSAGE (OUTPATIENT)
Dept: TRANSPLANT | Facility: CLINIC | Age: 58
End: 2023-10-05

## 2023-10-05 ENCOUNTER — OFFICE VISIT (OUTPATIENT)
Dept: TRANSPLANT | Facility: CLINIC | Age: 58
End: 2023-10-05
Payer: COMMERCIAL

## 2023-10-05 VITALS
HEART RATE: 89 BPM | WEIGHT: 227.31 LBS | DIASTOLIC BLOOD PRESSURE: 87 MMHG | HEIGHT: 69 IN | OXYGEN SATURATION: 95 % | WEIGHT: 227.31 LBS | HEIGHT: 69 IN | SYSTOLIC BLOOD PRESSURE: 136 MMHG | TEMPERATURE: 97 F | BODY MASS INDEX: 33.67 KG/M2 | BODY MASS INDEX: 33.67 KG/M2

## 2023-10-05 VITALS
HEIGHT: 69 IN | WEIGHT: 224.88 LBS | SYSTOLIC BLOOD PRESSURE: 133 MMHG | HEART RATE: 82 BPM | DIASTOLIC BLOOD PRESSURE: 94 MMHG | BODY MASS INDEX: 33.31 KG/M2

## 2023-10-05 VITALS — HEIGHT: 69 IN | BODY MASS INDEX: 33.03 KG/M2 | WEIGHT: 223 LBS

## 2023-10-05 DIAGNOSIS — R13.10 DYSPHAGIA, UNSPECIFIED TYPE: Primary | ICD-10-CM

## 2023-10-05 DIAGNOSIS — J84.9 ILD (INTERSTITIAL LUNG DISEASE): ICD-10-CM

## 2023-10-05 DIAGNOSIS — J96.11 CHRONIC HYPOXEMIC RESPIRATORY FAILURE: ICD-10-CM

## 2023-10-05 DIAGNOSIS — J84.9 ILD (INTERSTITIAL LUNG DISEASE): Primary | ICD-10-CM

## 2023-10-05 DIAGNOSIS — G47.33 OSA (OBSTRUCTIVE SLEEP APNEA): ICD-10-CM

## 2023-10-05 DIAGNOSIS — K21.9 GASTROESOPHAGEAL REFLUX DISEASE, UNSPECIFIED WHETHER ESOPHAGITIS PRESENT: ICD-10-CM

## 2023-10-05 DIAGNOSIS — Z12.11 SCREEN FOR COLON CANCER: ICD-10-CM

## 2023-10-05 PROCEDURE — 99999 PR PBB SHADOW E&M-EST. PATIENT-LVL III: CPT | Mod: PBBFAC,TXP,, | Performed by: INTERNAL MEDICINE

## 2023-10-05 PROCEDURE — 3044F PR MOST RECENT HEMOGLOBIN A1C LEVEL <7.0%: ICD-10-PCS | Mod: CPTII,NTX,S$GLB, | Performed by: INTERNAL MEDICINE

## 2023-10-05 PROCEDURE — 3066F PR DOCUMENTATION OF TREATMENT FOR NEPHROPATHY: ICD-10-PCS | Mod: CPTII,NTX,S$GLB, | Performed by: INTERNAL MEDICINE

## 2023-10-05 PROCEDURE — 71046 X-RAY EXAM CHEST 2 VIEWS: CPT | Mod: TC,FY,PO,TXP

## 2023-10-05 PROCEDURE — 94729 DIFFUSING CAPACITY: CPT | Mod: NTX,S$GLB,, | Performed by: INTERNAL MEDICINE

## 2023-10-05 PROCEDURE — 94729 PR C02/MEMBANE DIFFUSE CAPACITY: ICD-10-PCS | Mod: NTX,S$GLB,, | Performed by: INTERNAL MEDICINE

## 2023-10-05 PROCEDURE — 1159F MED LIST DOCD IN RCRD: CPT | Mod: CPTII,NTX,S$GLB, | Performed by: INTERNAL MEDICINE

## 2023-10-05 PROCEDURE — 3061F NEG MICROALBUMINURIA REV: CPT | Mod: CPTII,NTX,S$GLB, | Performed by: INTERNAL MEDICINE

## 2023-10-05 PROCEDURE — 1160F PR REVIEW ALL MEDS BY PRESCRIBER/CLIN PHARMACIST DOCUMENTED: ICD-10-PCS | Mod: CPTII,NTX,S$GLB, | Performed by: INTERNAL MEDICINE

## 2023-10-05 PROCEDURE — 3008F PR BODY MASS INDEX (BMI) DOCUMENTED: ICD-10-PCS | Mod: CPTII,NTX,S$GLB, | Performed by: INTERNAL MEDICINE

## 2023-10-05 PROCEDURE — 3061F PR NEG MICROALBUMINURIA RESULT DOCUMENTED/REVIEW: ICD-10-PCS | Mod: CPTII,NTX,S$GLB, | Performed by: INTERNAL MEDICINE

## 2023-10-05 PROCEDURE — 3075F PR MOST RECENT SYSTOLIC BLOOD PRESS GE 130-139MM HG: ICD-10-PCS | Mod: CPTII,NTX,S$GLB, | Performed by: INTERNAL MEDICINE

## 2023-10-05 PROCEDURE — 71046 XR CHEST PA AND LATERAL: ICD-10-PCS | Mod: 26,NTX,, | Performed by: RADIOLOGY

## 2023-10-05 PROCEDURE — 3080F DIAST BP >= 90 MM HG: CPT | Mod: CPTII,NTX,S$GLB, | Performed by: INTERNAL MEDICINE

## 2023-10-05 PROCEDURE — 3066F NEPHROPATHY DOC TX: CPT | Mod: CPTII,NTX,S$GLB, | Performed by: INTERNAL MEDICINE

## 2023-10-05 PROCEDURE — 99204 PR OFFICE/OUTPT VISIT, NEW, LEVL IV, 45-59 MIN: ICD-10-PCS | Mod: NTX,S$GLB,, | Performed by: INTERNAL MEDICINE

## 2023-10-05 PROCEDURE — 99999 PR PBB SHADOW E&M-EST. PATIENT-LVL IV: ICD-10-PCS | Mod: PBBFAC,TXP,, | Performed by: INTERNAL MEDICINE

## 2023-10-05 PROCEDURE — 1160F RVW MEDS BY RX/DR IN RCRD: CPT | Mod: CPTII,NTX,S$GLB, | Performed by: INTERNAL MEDICINE

## 2023-10-05 PROCEDURE — 3080F PR MOST RECENT DIASTOLIC BLOOD PRESSURE >= 90 MM HG: ICD-10-PCS | Mod: CPTII,NTX,S$GLB, | Performed by: INTERNAL MEDICINE

## 2023-10-05 PROCEDURE — 94010 BREATHING CAPACITY TEST: CPT | Mod: 59,NTX,S$GLB, | Performed by: INTERNAL MEDICINE

## 2023-10-05 PROCEDURE — 99214 OFFICE O/P EST MOD 30 MIN: CPT | Mod: 25,NTX,S$GLB, | Performed by: INTERNAL MEDICINE

## 2023-10-05 PROCEDURE — 94010 BREATHING CAPACITY TEST: ICD-10-PCS | Mod: 59,NTX,S$GLB, | Performed by: INTERNAL MEDICINE

## 2023-10-05 PROCEDURE — 94727 GAS DIL/WSHOT DETER LNG VOL: CPT | Mod: NTX,S$GLB,, | Performed by: INTERNAL MEDICINE

## 2023-10-05 PROCEDURE — 99999 PR PBB SHADOW E&M-EST. PATIENT-LVL IV: CPT | Mod: PBBFAC,TXP,, | Performed by: INTERNAL MEDICINE

## 2023-10-05 PROCEDURE — 3075F SYST BP GE 130 - 139MM HG: CPT | Mod: CPTII,NTX,S$GLB, | Performed by: INTERNAL MEDICINE

## 2023-10-05 PROCEDURE — 3044F HG A1C LEVEL LT 7.0%: CPT | Mod: CPTII,NTX,S$GLB, | Performed by: INTERNAL MEDICINE

## 2023-10-05 PROCEDURE — 3008F BODY MASS INDEX DOCD: CPT | Mod: CPTII,NTX,S$GLB, | Performed by: INTERNAL MEDICINE

## 2023-10-05 PROCEDURE — 3079F DIAST BP 80-89 MM HG: CPT | Mod: CPTII,NTX,S$GLB, | Performed by: INTERNAL MEDICINE

## 2023-10-05 PROCEDURE — 94618 PULMONARY STRESS TESTING: ICD-10-PCS | Mod: NTX,S$GLB,, | Performed by: INTERNAL MEDICINE

## 2023-10-05 PROCEDURE — 94618 PULMONARY STRESS TESTING: CPT | Mod: NTX,S$GLB,, | Performed by: INTERNAL MEDICINE

## 2023-10-05 PROCEDURE — 3079F PR MOST RECENT DIASTOLIC BLOOD PRESSURE 80-89 MM HG: ICD-10-PCS | Mod: CPTII,NTX,S$GLB, | Performed by: INTERNAL MEDICINE

## 2023-10-05 PROCEDURE — 99214 PR OFFICE/OUTPT VISIT, EST, LEVL IV, 30-39 MIN: ICD-10-PCS | Mod: 25,NTX,S$GLB, | Performed by: INTERNAL MEDICINE

## 2023-10-05 PROCEDURE — 94727 PR PULM FUNCTION TEST BY GAS: ICD-10-PCS | Mod: NTX,S$GLB,, | Performed by: INTERNAL MEDICINE

## 2023-10-05 PROCEDURE — 99204 OFFICE O/P NEW MOD 45 MIN: CPT | Mod: NTX,S$GLB,, | Performed by: INTERNAL MEDICINE

## 2023-10-05 PROCEDURE — 1159F PR MEDICATION LIST DOCUMENTED IN MEDICAL RECORD: ICD-10-PCS | Mod: CPTII,NTX,S$GLB, | Performed by: INTERNAL MEDICINE

## 2023-10-05 PROCEDURE — 71046 X-RAY EXAM CHEST 2 VIEWS: CPT | Mod: 26,NTX,, | Performed by: RADIOLOGY

## 2023-10-05 PROCEDURE — 99999 PR PBB SHADOW E&M-EST. PATIENT-LVL III: ICD-10-PCS | Mod: PBBFAC,TXP,, | Performed by: INTERNAL MEDICINE

## 2023-10-05 RX ORDER — PREDNISONE 10 MG/1
TABLET ORAL
Qty: 95 TABLET | Refills: 0 | Status: SHIPPED | OUTPATIENT
Start: 2023-10-05 | End: 2023-10-25

## 2023-10-05 RX ORDER — AMOXICILLIN AND CLAVULANATE POTASSIUM 875; 125 MG/1; MG/1
1 TABLET, FILM COATED ORAL EVERY 12 HOURS
Qty: 14 TABLET | Refills: 0 | Status: SHIPPED | OUTPATIENT
Start: 2023-10-05 | End: 2023-10-12

## 2023-10-05 NOTE — PROGRESS NOTES
"ADVANCED LUNG DISEASE CLINIC INITIAL EVALUATION                                                                                                                                             Reason for Visit:  Evaluation for ILD    Referring Physician: No ref. provider found    History of Present Illness: Nathalie Krueger is a 58 y.o. female who is on 2L of oxygen.  She is on CPAP.  Her New York Heart Association Class is II and a Karnofsky score of 80% - Normal activity with effort: some symptoms of disease. She is diabetic non insulin dependent      Patient reports that she has felt better since moving to the [facility] in December 2022. Additionally, she was started on cetirizine and nasal sprays and it seemed to help significantly. She has been able to be a lot more active. She is walking more and swimming. She uses her oxygen with exertion (2-3L) and nightly with CPAP. She did feel anson she was having a "flare up" in mid-September which resolved. However, she then went to [place] 9/22/23 and since returning has noticed increased cough and need for oxygen with exertion. In [place] she was more active outside. She was also in the car for 15 hours on her drive back from [place] on Sunday. They stopped 4-5 times and she was able to get out of the car to walk at those stops. She has been coughing more which is mostly dry. However she has had some cough with sputum that is clear. She also has had to use oxygen more after less exertion than normal, even walking around the apartment. She has had to bump it up to 3L NC. After resting her shortness of breath improves. She denies chest pain or leg swelling. She has had no fevers or chills. She has an albuterol inhaler that she does not usually use. She used it once yesterday and it helped a little bit. She does not have significant reflux symptoms. Prior to starting omeprazole, she did use tums for indigestion. She had an esophagram which showed some dysmotility. She will be " undergoing EGD and pH manometry with GI in the future. She had a bronchoscopy with BAL and biopsy of the left lung which showed chronic pneumonitis with signs of possible but diagnostic of chronic aspiration.       PER INITIAL HPI:    Patient presents today for evaluation of ILD. Patient states symptoms first began over three years ago when she lived in [place]. Per patient's wife, patient had developed progressive fatigue and dyspnea with exertion. She was still able to perform her ADLs independently, but noticed she would get breathless with activity very quickly. Previously lived on family's farm in [place]. She states she had an episode of profound fatigue/malaise one week following clean out of family's chicken coup. Frequent exposures to dust, pesticides, fertilizers while living in [place]. Patient and her wife moved to [place] ~two years ago. While living downtown, patient noticed increased cough, worsening fatigue requiring frequent daytime naps. [place] [facility] and found she had worsening cough if other residents smoked within the building. She presented to the ED in early 2022 due to hypoxemia. She checked her pulse ox at home and reportedly had oxygen saturation of 76%. She did not require hospitalization. No prior chest surgeries, ICU admission, intubations, lung biopsy.     In August 2022, patient had acute worsening of her dyspnea and cough. She was started on 2L oxygen to be used with exertion. 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 Since starting CPAP, notes improvement in daytime fatigue. She completed a trial of steroids in the fall of 2022 after [place] [facility] [place] and states her symptoms improved drastically. No recent exacerbations/hospitalizations. She has had an extensive autoimmune workup which revealed +DAYTON and low IgM. All other workup unrevealing. Currently of stiolto and symbicort for management.     Patient is a  former 25-30 year smoker and quit 3-4 years ago. Her father had lung disease related to diving in the [employer], but otherwise denies family history of lung disease. No history of frequent infections as a child. She states she did have a history of scarlet fever as a child while living in [place] and was told she should not live there in the future? Also states she used a 20 year old inhaler she bought in [place] while still living in [place] and is concerned she may have aspirated mold. She occasionally has episodes of flushing and rashes. No arthralgias. History of reflux and recently stopped omeprazole in hopes of improving symptoms with diet modification. She recently lost ~10 pounds. Continues to have intermittent reflux despite diet changes. She states she remains very active and walks and performs water aerobics frequently. 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 Remains independent with her ADLs, but does struggle with vacuuming, bending over, and climbing stairs.       Past Medical History:   Diagnosis Date    Acute bronchitis 02/01/2022    Adenomatous polyp of ascending colon 06/22/2020    Diabetes mellitus     Hypertension     Interstitial lung disease     Sleep apnea        Past Surgical History:   Procedure Laterality Date    ABLATION, FIBROID, UTERUS, LAPAROSCOPIC, WITH US GUIDANCE      APPENDECTOMY  1982    BRONCHOSCOPY N/A 8/31/2023    Procedure: Bronchoscopy(need fluoro);  Surgeon: Sarah Lemon MD;  Location: [facility] ENDO;  Service: Pulmonary;  Laterality: N/A;  transbronchial biopsies need fluoro    RIGHT HEART CATHETERIZATION Right 3/29/2023    Procedure: INSERTION, CATHETER, RIGHT HEART;  Surgeon: Yulisa Yung DO;  Location: [facility] CATH LAB;  Service: Cardiology;  Laterality: Right;    ROTATOR CUFF REPAIR Right 1998    TONSILLECTOMY  1977       Allergies: Atorvastatin, Lisinopril, and Meperidine hcl    Current Outpatient Medications   Medication Sig    albuterol  (PROVENTIL/VENTOLIN HFA) 90 mcg/actuation inhaler Inhale 1-2 puffs into the lungs every 4 (four) hours as needed for Wheezing or Shortness of Breath. Rescue    albuterol-ipratropium (DUO-NEB) 2.5 mg-0.5 mg/3 mL nebulizer solution Take 3 mLs by nebulization 2 (two) times daily. Rescue    amLODIPine (NORVASC) 5 MG tablet Take 1 tablet (5 mg total) by mouth once daily.    azelastine (ASTELIN) 137 mcg (0.1 %) nasal spray SPRAY 1 SPRAY IN EACH NOSTRIL 2 TIMES DAILY.    calcium carbonate/vitamin D3 (CALCIUM WITH VITAMIN D ORAL) Take 1 tablet by mouth once daily.    cetirizine (ZYRTEC) 10 MG tablet Take 1 tablet (10 mg total) by mouth once daily.    fluticasone-umeclidin-vilanter (TRELEGY ELLIPTA) 200-62.5-25 mcg inhaler Inhale 1 puff into the lungs once daily.    metFORMIN (GLUCOPHAGE) 500 MG tablet TAKE 1 TABLET BY MOUTH EVERY DAY WITH BREAKFAST    multivitamin capsule Take 1 capsule by mouth once daily.    OZEMPIC 1 mg/dose (4 mg/3 mL) INJECT 1 MG INTO THE SKIN EVERY 7 DAYS.    pantoprazole (PROTONIX) 40 MG tablet Take 1 tablet (40 mg total) by mouth once daily.    rosuvastatin (CRESTOR) 5 MG tablet Take 1 tablet (5 mg total) by mouth once daily.    sodium chloride 3% 3 % nebulizer solution 3 CC OF 3% SALINE INTO NEBS TWICE DAILY AS NEEDED    white petrolatum-mineral oiL (ARTIFICIAL TEARS, BLAKE/MIN,) 83-15 % Oint Place into both eyes every evening.     No current facility-administered medications for this visit.       Immunization History   Administered Date(s) Administered    COVID-19, MRNA, LN-S, PF (Pfizer) (Gray Cap) 04/06/2022    COVID-19, MRNA, LN-S, PF (Pfizer) (Purple Cap) 03/06/2021, 03/30/2021, 09/30/2021    COVID-19, mRNA, LNP-S, bivalent booster, PF (PFIZER OMICRON) 09/22/2022    Hepatitis B, Adult 01/16/2018, 02/27/2018, 07/31/2018    Influenza 09/28/2016, 10/04/2017, 10/24/2018, 09/05/2019, 09/08/2022    Influenza - Quadrivalent - PF *Preferred* (6 months and older) 09/30/2021, 09/13/2023    Pneumococcal  Conjugate - 20 Valent 09/08/2022    Pneumococcal Polysaccharide - 23 Valent 10/04/2016    Tdap 01/01/2016    Zoster Recombinant 10/29/2020, 01/31/2022     Family History:    Family History   Problem Relation Age of Onset    Diabetes Mother     Hypertension Mother     Heart disease Mother     Diabetes Father     Glaucoma Neg Hx     Macular degeneration Neg Hx     Colon cancer Neg Hx     Esophageal cancer Neg Hx      Social History     Substance and Sexual Activity   Alcohol Use Not Currently      Social History     Substance and Sexual Activity   Drug Use Never      Social History     Socioeconomic History    Marital status:    Occupational History    Occupation: Consultant with non profits   Tobacco Use    Smoking status: Former     Current packs/day: 0.00     Average packs/day: 0.5 packs/day for 25.0 years (12.5 ttl pk-yrs)     Types: Cigarettes     Start date: 12/16/1994     Quit date: 12/16/2019     Years since quitting: 3.8     Passive exposure: Past    Smokeless tobacco: Never    Tobacco comments:     Smoked additive free tobacco   Substance and Sexual Activity    Alcohol use: Not Currently    Drug use: Never    Sexual activity: Not Currently     Partners: Female     Birth control/protection: Post-menopausal     Comment: spouse -  14 years    Social History Narrative    Lives with her spouse, Aisha. Enjoys live music, painting.      Review of Systems   Constitutional:  Negative for diaphoresis and malaise/fatigue.   HENT:  Negative for congestion, ear discharge, ear pain, hearing loss, nosebleeds, sinus pain, sore throat and tinnitus.    Eyes:  Negative for blurred vision, double vision, photophobia, pain, discharge and redness.   Respiratory:  Positive for cough and shortness of breath. Negative for hemoptysis, sputum production, wheezing and stridor.    Cardiovascular:  Negative for palpitations, orthopnea, claudication and PND.   Gastrointestinal:  Negative for abdominal pain, blood in  "stool, constipation, diarrhea, melena, nausea and vomiting.   Genitourinary:  Negative for dysuria, flank pain, frequency, hematuria and urgency.   Musculoskeletal:  Negative for back pain, falls, joint pain, myalgias and neck pain.   Skin:  Negative for itching.   Neurological:  Negative for dizziness, tingling, tremors, sensory change, speech change, focal weakness, seizures, loss of consciousness, weakness and headaches.   Endo/Heme/Allergies:  Negative for environmental allergies and polydipsia. Does not bruise/bleed easily.   Psychiatric/Behavioral:  Negative for depression, hallucinations, memory loss, substance abuse and suicidal ideas. The patient is not nervous/anxious and does not have insomnia.      Vitals  Ht 5' 9" (1.753 m)   Wt 103.1 kg (227 lb 4.7 oz)   BMI 33.57 kg/m²   Physical Exam  Vitals and nursing note reviewed.   Constitutional:       General: She is not in acute distress.     Appearance: Normal appearance.   HENT:      Head: Normocephalic and atraumatic.      Nose: No congestion or rhinorrhea.      Mouth/Throat:      Mouth: Mucous membranes are moist.   Eyes:      General: No scleral icterus.     Conjunctiva/sclera: Conjunctivae normal.   Cardiovascular:      Rate and Rhythm: Normal rate.      Heart sounds: No murmur heard.     No friction rub.   Pulmonary:      Effort: No respiratory distress.      Breath sounds: No wheezing, rhonchi or rales.   Abdominal:      General: Bowel sounds are normal. There is no distension.      Palpations: Abdomen is soft.      Tenderness: There is no abdominal tenderness.   Musculoskeletal:      Right lower leg: No edema.      Left lower leg: No edema.   Skin:     General: Skin is warm and dry.   Neurological:      General: No focal deficit present.      Mental Status: She is alert and oriented to person, place, and time.   Psychiatric:         Mood and Affect: Mood normal.         Behavior: Behavior normal.       Labs:  No visits with results within 7 Day(s) " from this visit.   Latest known visit with results is:   Lab Visit on 09/14/2023   Component Date Value    Microalbumin, Urine 09/14/2023 6.0     Creatinine, Urine 09/14/2023 174.0     Microalb/Creat Ratio 09/14/2023 3.4            10/5/2023    11:03 AM 8/24/2023    10:10 AM 4/10/2023    10:10 AM   Pulmonary Function Tests   FVC 2.36 liters 2.51 liters 2.35 liters   FEV1 2.05 liters 2.12 liters 1.95 liters   TLC (liters) 2.94 liters  3.06 liters   DLCO (ml/mmHg sec) 6.89 ml/mmHg sec  7.89 ml/mmHg sec   FVC% 62.3 66.2 61.8   FEV1% 69.3 71.8 65.6   FEF 25-75 3.23 3.02 2.35   FEF 25-75% 125.9 117.4 90.8   TLC% 50.8  53   RV 0.58  0.83   RV% 27.3  39.4   DLCO% 26.5  30.2         10/5/2023     9:53 AM 7/12/2023     1:42 PM 3/21/2023    12:40 PM   6MW   6MWT Status completed without stopping completed without stopping completed without stopping completed without stopping   Patient Reported No complaints No complaints No complaints No complaints   Was O2 used? Yes Yes Yes Yes   Delivery Method Cannula;Pull Tank;Continuous Flow Cannula;Pull Tank;Continuous Flow Cannula;Pull Tank;Continuous Flow Cannula;Demand Flow;Shoulder Carry   6MW Distance walked (feet) 1200 feet 1200 feet 1000 feet 1200 feet   Distance walked (meters) 365.76 meters 365.76 meters 304.8 meters 365.76 meters   Did patient stop? No No No No   Oxygen Saturation 96 % 95 % 97 % 98 %   Supplemental Oxygen 3 L/M 2 L/M 2 L/M 2 L/M   Heart Rate 81 bpm 87 bpm 85 bpm 83 bpm   Blood Pressure 134/87 122/76 134/81 145/84   Ashlie Dyspnea Rating  nothing at all very, very light (just noticeable) nothing at all nothing at all   Oxygen Saturation 90 % 89 % 86 % 84 %   Supplemental Oxygen 4 L/M 3 L/M 2 L/M 2 L/M   Heart Rate 100 bpm 115 bpm 99 bpm 96 bpm   Blood Pressure 159/86 119/76 135/81 160/79   Ashlie Dyspnea Rating  nothing at all very, very light (just noticeable) light very, very light (just noticeable)   Recovery Time (seconds) 95 seconds 101 seconds 120 seconds 90  seconds   Oxygen Saturation 95 % 98 % 97 % 95 %   Supplemental Oxygen 4 L/M 3 L/M 2 L/M 2 L/M   Heart Rate 90 bpm 90 bpm 91 bpm 87 bpm       Imaging:  EXAMINATION:  CT CHEST WITHOUT CONTRAST     CLINICAL HISTORY:  HRCT ILD; Chronic respiratory failure with hypoxia     TECHNIQUE:  Low dose axial images, sagittal and coronal reformations were obtained from the thoracic inlet to the lung bases. Contrast was not administered.     COMPARISON:  08/31/2022 and 01/26/2022     FINDINGS:  Normal sized mediastinal nodes are noted including prevascular nodes slightly more numerous than expected but unchanged since 01/26/2022.     Gas is noted within the distal esophagus as can be seen with air swallowing or reflux.     A normal size node appears to be present adjacent to the gallbladder with a short diameter of 9 mm unchanged since 01/26/2022.     Multiple colonic diverticula are noted without obvious inflammatory change.     A 4 mm nodule is noted within the lingula image 311 sequence 4 stable since 01/26/2022     A right upper lobe nodule is noted image 156 sequence 4 stable since 01/26/2022.  Cystic changes noted within the right humeral head suggesting labral injury at the glenoid with degenerative change.     The ascending aorta is mildly prominent at 4 point 2 cm unchanged since the prior given inter study variance     Impression:     1. Two small stable solid-appearing pulmonary nodules stable since the prior of 01/26/2022 favoring a benign etiology of less than 6 mm in size.        Electronically signed by: Delfino Saucedo MD  Date:                                            04/11/2023  Time:                                           12:16      Cardiodiagnostics:  Results for orders placed during the hospital encounter of 03/21/23    Echo    Interpretation Summary  · The left ventricle is normal in size with normal systolic function.  · The estimated ejection fraction is 65%.  · Normal left ventricular diastolic  function.  · Moderate right ventricular enlargement with low normal to mildly reduced right ventricular systolic function.  · The estimated PA systolic pressure is 34 mmHg.  · Normal central venous pressure (3 mmHg).  · The ascending aorta is mildly dilated.    Results for orders placed during the hospital encounter of 03/29/23    Cardiac catheterization    Conclusion  Summary  Filling pressures: RA 10, PCWP 16  PA 36/20, mean PA pressure 20 mmHg  PVR 1.5 DUQUE  PA saturation 70%, Ao saturation 97%  Fletcher CO/CI: 5.9/2.66  /100  with repeat intraprocedure similar results. On recheck post procedure 133/75  HR 71 SR  Hb 13.6        Assessment:  1. ILD (interstitial lung disease)    2. Chronic hypoxemic respiratory failure    3. ORLIN (obstructive sleep apnea)    4. Gastroesophageal reflux disease, unspecified whether esophagitis present      PFTs from today show stable restriction and DLCO from prior PFTs. However, DLCO has significantly declined over time. 6 MWT with desaturation to 88% requiring increase in oxygen to 4L. 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 She underwent bronchoscopy with BAL and biopsy which showed evidence of chronic pneumonitis with concerns for chronic aspiration but findings were not diagnostic. She had an esophagram which did show esophageal dysmotility. She is on PPI. Her rheumatologic workup has been mostly negative aside from mildly elevated DAYTON (1:80). RHC with mPAP of 20 and PCWP of 16. She has noted that since returning from her trip to [place] she has had increased mostly dry cough with occasional clear sputum as well as need for increased oxygen use with less exertion like walking around her apartment. Revised [place] score with patient low risk for PE.     Plan:     - Chest xray ordered   - Augmentin and prednisone ordered for concern for acute respiratory infection  - Continue astelin, flonase, cetirizine, Trelegy  - Continue PPI  - Continue  oxygen supplementation at rest and with exertion  - Continue nocturnal CPAP   - Follow up with GI for EGD and pH manometry       Jessy Wellington ,DO  [employer] PCCM Fellow

## 2023-10-05 NOTE — TELEPHONE ENCOUNTER
Spoke to Nathalie to schedule procedure(s) Esophageal Manometry/24 hr ph       Physician to perform procedure(s) Dr. OSBALDO Villafana   Date of Procedure (s) 11/14/23  Arrival Time 7:00 AM  Time of Procedure(s) 8:00 AM   Location of Procedure(s) [facility] Floor  Type of Rx Prep sent to patient: Other  Instructions provided to patient via [facility]    Patient was informed on the following information and verbalized understanding. Screening questionnaire reviewed with patient and complete. If procedure requires anesthesia, a responsible adult needs to be present to accompany the patient home, patient cannot drive after receiving anesthesia. Appointment details are tentative, especially check-in time. Patient will receive a prep-op call 4 days prior to confirm check-in time for procedure. If applicable the patient should contact their pharmacy to verify Rx for procedure prep is ready for pick-up. Patient was advised to call the scheduling department at [phone] if pharmacy states no Rx is available. Patient was advised to call the endoscopy scheduling department if any questions or concerns arise.      [facility] Scheduling Department

## 2023-10-05 NOTE — TELEPHONE ENCOUNTER
----- Message from Felicita Villafana MD sent at 10/5/2023  8:24 AM CDT -----  Regarding: Motility orders  MOTILITY CLINIC PROCEDURE ORDERS    CLEARANCE FOR PROCEDURES:  [ ] Not needed       PROCEDURES  EGD with Endoflip and colonoscopy with extended prep    Esophageal manometry with Dysphagia protocol followed by 24 hour pH impedance testing OFF acid reflux testing    Does manometry need to be placed endoscopically:   No    FLOOR:    [ ] 2nd Floor    Reason for 2nd Floor:     [ ] Severe pulmonary Disease       PREP    [ ] Severe Constipation Prep (Low residue diet 7 days.  1.5 prep the day prior, 0.5 prep the day of procedure)    PATIENT ON OZEMPIC      MEDICATIONS    pH Studies    [ ] OFF PPI/H2 Blocker     Metal allergy (stainless steal w chromium, nickel, copper, cobalt and iron).:   No    Pacemaker/defibrillator:  No    Motility Studies (esophageal manometry/anorectal manometry)  Hold narcotics x 1 days if able   Hold TCA x 1 days if able  Propofol/lidocaine only during sedation.  Discuss with Dr. José if additional sedation needed.   Hold baclofen for thee days (at least one day) if able   Hold muscle relaxants x 1 day if able     Anticoagulation/antiplt agents:   No    ORDER OF TESTING:  Day 1: EGD with Endoflip and colonoscopy with extended prep, ok to schedule on Friday Nov 3  Day 2: Esophageal manometry with 24 hour pH impedance off acid reflux testing       [ [place]    SCHEDULING PRIORITY  Routine, ok to schedule Nov 3 if that is my first available endo 2 opening      URGENCY     [x] Medically NOT Urgent      DIAGNOSIS   [ ] Dysphagia        PHYSICIAN  [ ]  Ok with Dr. Villafana

## 2023-10-05 NOTE — PROCEDURES
Nathalie Krueger is a 58 y.o.  female patient, who presents for a 6 minute walk test ordered by MD Baltazar.  The diagnosis is Pulmonary Hypertension; Bronchiectasis.  The patient's BMI is 33.6 kg/m2.  Predicted distance (lower limit of normal) is 324.37 meters.      Test Results:    The test was completed without stopping. The total time walked was 360 seconds. During walking, the patient reported:  No complaints. The patient used supplemental oxygen during testing.     10/05/2023---------Distance: 365.76 meters (1200 feet)     Lap Walk Time O2 Sat % Supplemental Oxygen Heart Rate Blood Pressure Ashlie Scale Comment   Pre-exercise  (Resting) 0 0 96 % 3 L/M 81 bpm 134/87 mmHg 0    During Exercise 1 52 sec 95 % 3 L/M 98 bpm      During Exercise 2 105 sec 89 % 3 L/M 102 bpm      During Exercise 3 168 sec 88 % 3 L/M 107 bpm   Oxygen increased   During Exercise 4 234 sec 90 % 4 L/M 105 bpm      During Exercise 5 295 sec 89 % 4 L/M 109 bpm      End of Exercise 6 360 sec 90 % 4 L/M 100 bpm 159/86 mmHg 0    Post-exercise  (Recovery)   95 % 4 L/M  90 bpm 147/84 mmHg       Recovery Time: 95 seconds    Performing nurse/tech: Estopinal RRT      PREVIOUS STUDY:   07/12/2023---------Distance: 365.76 meters (1200 feet)       Lap Walk Time O2 Sat % Supplemental Oxygen Heart Rate Blood Pressure Ashlie Scale Comment   Pre-exercise  (Resting) 0 0 95 % 2 L/M 87 bpm 122/76 mmHg 0.5     During Exercise 1 47 sec 87 % 2 L/M 107 bpm     Oxygen increased   During Exercise 2 113 sec 89 % 3 L/M 101 bpm         During Exercise 3 174 sec 89 % 3 L/M 98 bpm         During Exercise 4 234 sec 90 % 3 L/M 101 bpm         During Exercise 5 291 sec 89 % 3 L/M 115 bpm         During Exercise 6 360 sec 89 % 3 L/M 97 bpm 119/76 mmHg 0.5     Post-exercise  (Recovery)     98 % 3 L/M  90 bpm             CLINICAL INTERPRETATION:  Six minute walk distance is 365.76 meters (1200 feet) with no dyspnea.  During exercise, there was significant desaturation  while breathing supplemental oxygen.  Blood pressure remained stable and Heart rate increased significantly with walking.  The patient did not report non-pulmonary symptoms during exercise.  Since the previous study in July 2023, exercise capacity is unchanged.  Based upon age and body mass index, exercise capacity is normal.

## 2023-10-05 NOTE — TELEPHONE ENCOUNTER
Spoke to Nathalie to schedule procedure(s) Colonoscopy/EGD       Physician to perform procedure(s) Dr. OSBALDO Villafana   Date of Procedure (s) 11/3/23  Arrival Time 9:15 AM  Time of Procedure(s) 10:15 AM   Location of Procedure(s) [facility] [address] Floor  Type of Rx Prep sent to patient: PEG  Instructions provided to patient via [facility]    Patient was informed on the following information and verbalized understanding. Screening questionnaire reviewed with patient and complete. If procedure requires anesthesia, a responsible adult needs to be present to accompany the patient home, patient cannot drive after receiving anesthesia. Appointment details are tentative, especially check-in time. Patient will receive a prep-op call 4 days prior to confirm check-in time for procedure. If applicable the patient should contact their pharmacy to verify Rx for procedure prep is ready for pick-up. Patient was advised to call the scheduling department at [phone] if pharmacy states no Rx is available. Patient was advised to call the endoscopy scheduling department if any questions or concerns arise.      [facility] Scheduling Department

## 2023-10-05 NOTE — PROGRESS NOTES
"[facility]    Referral from Dr. Ledesma    CC: dysphagia    HPI 58 y.o. female with past medical history of ILD, bronchiectasis who uses 2-4 L O2 with exertion who presents with chronic, intermittent, solid food dysphagia with associated sensation that something is in her chest.    She previously had GERD but this resolved with daily PPI use, currently Protonix 40mg daily.    She does take Ozempic.  No nausea or vomiting.    She has a personal history of colon polyps.  Her last colonoscopy was in 2019 with poor prep.    Past Medical History  Past Medical History:   Diagnosis Date    Acute bronchitis 02/01/2022    Adenomatous polyp of ascending colon 06/22/2020    Diabetes mellitus     Hypertension     Interstitial lung disease     Sleep apnea          Physical Examination  BP (!) 133/94   Pulse 82   Ht 5' 9" (1.753 m)   Wt 102 kg (224 lb 13.9 oz)   BMI 33.21 kg/m²   General appearance: alert, cooperative, no distress  HENT: Normocephalic, atraumatic, neck symmetrical, no nasal discharge   Abdomen: soft, non-tender; non distended, no rebound or guarding  Neurologic: Alert and oriented X 3, moving all four extremities, intact sensation to light touch    Labs:  Lab Results   Component Value Date    WBC 8.13 03/29/2023    HGB 13.6 03/29/2023    HCT 41.7 03/29/2023    MCV 93 03/29/2023     03/29/2023         CMP  Sodium   Date Value Ref Range Status   03/29/2023 139 136 - 145 mmol/L Final     Potassium   Date Value Ref Range Status   03/29/2023 4.3 3.5 - 5.1 mmol/L Final     Chloride   Date Value Ref Range Status   03/29/2023 106 95 - 110 mmol/L Final     CO2   Date Value Ref Range Status   03/29/2023 26 23 - 29 mmol/L Final     Glucose   Date Value Ref Range Status   03/29/2023 222 (H) 70 - 110 mg/dL Final     BUN   Date Value Ref Range Status   03/29/2023 13 6 - 20 mg/dL Final     Creatinine   Date Value Ref Range Status   03/29/2023 0.8 0.5 - 1.4 mg/dL Final     Calcium   Date Value Ref " Range Status   03/29/2023 9.4 8.7 - 10.5 mg/dL Final     Total Protein   Date Value Ref Range Status   03/29/2023 6.3 6.0 - 8.4 g/dL Final     Albumin   Date Value Ref Range Status   03/29/2023 3.7 3.5 - 5.2 g/dL Final     Total Bilirubin   Date Value Ref Range Status   03/29/2023 0.3 0.1 - 1.0 mg/dL Final     Comment:     For infants and newborns, interpretation of results should be based  on gestational age, weight and in agreement with clinical  observations.    Premature Infant recommended reference ranges:  Up to 24 hours.............<8.0 mg/dL  Up to 48 hours............<12.0 mg/dL  3-5 days..................<15.0 mg/dL  6-29 days.................<15.0 mg/dL       Alkaline Phosphatase   Date Value Ref Range Status   03/29/2023 122 55 - 135 U/L Final     AST   Date Value Ref Range Status   03/29/2023 20 10 - 40 U/L Final     ALT   Date Value Ref Range Status   03/29/2023 21 10 - 44 U/L Final     Anion Gap   Date Value Ref Range Status   03/29/2023 7 (L) 8 - 16 mmol/L Final     eGFR   Date Value Ref Range Status   03/29/2023 >60.0 >60 mL/min/1.73 m^2 Final         Assessment:   The patient is a 59 yo female with ILD, bronchiectasis on 2-4 L O2, Ozempic use for DM2, HTN who presents with intermittent dysphagia, a sensation of something in her chest, GERD symptoms controlled with medications, she is due for a repeat colonoscopy due to poor prep.  Her pulmonary team is concerned for aspiration.    Plan:  -Schedule EGD with End[facility] and colonoscopy with extended prep, request Nov 3 on endo 2.  -Schedule esophageal manometry with dysphagia protocol and 24 hour pH impedance testing off acid reflux medicine.    Felicita Villafana MD

## 2023-10-05 NOTE — LETTER
October 6, 2023        Sarah Lemon  [address]  [address]  [place] [facility] [address]  Phone: [phone]  Fax: [phone]             [facility]  [address]  [facility] [address]  Phone: [phone]   Patient: Nathalie Krueger   MR Number: [id number]   YOB: 1965   Date of Visit: 10/5/2023       Dear Dr. Sarah Lemon    Thank you for referring Nathalie Krueger to me for evaluation. Attached you will find relevant portions of my assessment and plan of care.    If you have questions, please do not hesitate to call me. I look forward to following Nathalie Krueger along with you.    Sincerely,    Lesly Ledesma MD    Enclosure    If you would like to receive this communication electronically, please contact [email or web address] or [phone] to request [employer] access.    [employer] is a tool which provides read-only access to select patient information with whom you have a relationship. Its easy to use and provides real time access to review your patients record including encounter summaries, notes, results, and demographic information.    If you feel you have received this communication in error or would no longer like to receive these types of communications, please e-mail [email or web address]

## 2023-10-06 DIAGNOSIS — J84.9 ILD (INTERSTITIAL LUNG DISEASE): Primary | ICD-10-CM

## 2023-10-06 DIAGNOSIS — J84.9 ILD (INTERSTITIAL LUNG DISEASE): ICD-10-CM

## 2023-10-06 DIAGNOSIS — R91.1 LUNG NODULE: Primary | ICD-10-CM

## 2023-10-06 DIAGNOSIS — Z12.11 SCREEN FOR COLON CANCER: Primary | ICD-10-CM

## 2023-10-09 LAB — FUNGUS SPEC CULT: NORMAL

## 2023-10-10 ENCOUNTER — PATIENT MESSAGE (OUTPATIENT)
Dept: TRANSPLANT | Facility: CLINIC | Age: 58
End: 2023-10-10
Payer: COMMERCIAL

## 2023-10-10 ENCOUNTER — PATIENT MESSAGE (OUTPATIENT)
Dept: FAMILY MEDICINE | Facility: CLINIC | Age: 58
End: 2023-10-10
Payer: COMMERCIAL

## 2023-10-12 ENCOUNTER — PATIENT MESSAGE (OUTPATIENT)
Dept: TRANSPLANT | Facility: CLINIC | Age: 58
End: 2023-10-12
Payer: COMMERCIAL

## 2023-10-13 ENCOUNTER — HOSPITAL ENCOUNTER (OUTPATIENT)
Dept: RADIOLOGY | Facility: HOSPITAL | Age: 58
Discharge: HOME | End: 2023-10-13
Attending: STUDENT IN AN ORGANIZED HEALTH CARE EDUCATION/TRAINING PROGRAM
Payer: COMMERCIAL

## 2023-10-13 DIAGNOSIS — Z12.31 OTHER SCREENING MAMMOGRAM: ICD-10-CM

## 2023-10-13 PROCEDURE — 77067 MAMMO DIGITAL SCREENING BILAT WITH TOMO: ICD-10-PCS | Mod: 26,NTX,, | Performed by: RADIOLOGY

## 2023-10-13 PROCEDURE — 77067 SCR MAMMO BI INCL CAD: CPT | Mod: TC,PN,NTX

## 2023-10-13 PROCEDURE — 77063 BREAST TOMOSYNTHESIS BI: CPT | Mod: 26,NTX,, | Performed by: RADIOLOGY

## 2023-10-13 PROCEDURE — 77067 SCR MAMMO BI INCL CAD: CPT | Mod: 26,NTX,, | Performed by: RADIOLOGY

## 2023-10-13 PROCEDURE — 77063 MAMMO DIGITAL SCREENING BILAT WITH TOMO: ICD-10-PCS | Mod: 26,NTX,, | Performed by: RADIOLOGY

## 2023-10-16 ENCOUNTER — OFFICE VISIT (OUTPATIENT)
Dept: OPTOMETRY | Facility: CLINIC | Age: 58
End: 2023-10-16
Payer: COMMERCIAL

## 2023-10-16 ENCOUNTER — TELEPHONE (OUTPATIENT)
Dept: RADIOLOGY | Facility: HOSPITAL | Age: 58
End: 2023-10-16
Payer: COMMERCIAL

## 2023-10-16 DIAGNOSIS — E11.9 TYPE 2 DIABETES MELLITUS WITHOUT RETINOPATHY: Primary | ICD-10-CM

## 2023-10-16 DIAGNOSIS — H25.13 NUCLEAR SCLEROSIS OF BOTH EYES: ICD-10-CM

## 2023-10-16 PROCEDURE — 92014 COMPRE OPH EXAM EST PT 1/>: CPT | Mod: S$GLB,TXP,, | Performed by: OPTOMETRIST

## 2023-10-16 PROCEDURE — 3044F PR MOST RECENT HEMOGLOBIN A1C LEVEL <7.0%: ICD-10-PCS | Mod: CPTII,S$GLB,TXP, | Performed by: OPTOMETRIST

## 2023-10-16 PROCEDURE — 3044F HG A1C LEVEL LT 7.0%: CPT | Mod: CPTII,S$GLB,TXP, | Performed by: OPTOMETRIST

## 2023-10-16 PROCEDURE — 3066F NEPHROPATHY DOC TX: CPT | Mod: CPTII,S$GLB,TXP, | Performed by: OPTOMETRIST

## 2023-10-16 PROCEDURE — 3061F NEG MICROALBUMINURIA REV: CPT | Mod: CPTII,S$GLB,TXP, | Performed by: OPTOMETRIST

## 2023-10-16 PROCEDURE — 2023F DILAT RTA XM W/O RTNOPTHY: CPT | Mod: CPTII,S$GLB,TXP, | Performed by: OPTOMETRIST

## 2023-10-16 PROCEDURE — 99999 PR PBB SHADOW E&M-EST. PATIENT-LVL III: CPT | Mod: PBBFAC,TXP,, | Performed by: OPTOMETRIST

## 2023-10-16 PROCEDURE — 92014 PR EYE EXAM, EST PATIENT,COMPREHESV: ICD-10-PCS | Mod: S$GLB,TXP,, | Performed by: OPTOMETRIST

## 2023-10-16 PROCEDURE — 3061F PR NEG MICROALBUMINURIA RESULT DOCUMENTED/REVIEW: ICD-10-PCS | Mod: CPTII,S$GLB,TXP, | Performed by: OPTOMETRIST

## 2023-10-16 PROCEDURE — 1160F RVW MEDS BY RX/DR IN RCRD: CPT | Mod: CPTII,S$GLB,TXP, | Performed by: OPTOMETRIST

## 2023-10-16 PROCEDURE — 1160F PR REVIEW ALL MEDS BY PRESCRIBER/CLIN PHARMACIST DOCUMENTED: ICD-10-PCS | Mod: CPTII,S$GLB,TXP, | Performed by: OPTOMETRIST

## 2023-10-16 PROCEDURE — 1159F PR MEDICATION LIST DOCUMENTED IN MEDICAL RECORD: ICD-10-PCS | Mod: CPTII,S$GLB,TXP, | Performed by: OPTOMETRIST

## 2023-10-16 PROCEDURE — 2023F PR DILATED RETINAL EXAM W/O EVID OF RETINOPATHY: ICD-10-PCS | Mod: CPTII,S$GLB,TXP, | Performed by: OPTOMETRIST

## 2023-10-16 PROCEDURE — 99999 PR PBB SHADOW E&M-EST. PATIENT-LVL III: ICD-10-PCS | Mod: PBBFAC,TXP,, | Performed by: OPTOMETRIST

## 2023-10-16 PROCEDURE — 1159F MED LIST DOCD IN RCRD: CPT | Mod: CPTII,S$GLB,TXP, | Performed by: OPTOMETRIST

## 2023-10-16 PROCEDURE — 3066F PR DOCUMENTATION OF TREATMENT FOR NEPHROPATHY: ICD-10-PCS | Mod: CPTII,S$GLB,TXP, | Performed by: OPTOMETRIST

## 2023-10-16 NOTE — PROGRESS NOTES
HPI    Urgent care-blurred va    Pt complains of blurred va at distance since taking steroids for lung   disease. BSL controlled-doesn't check everyday. Denies any flashes or   floaters.     Hemoglobin A1C       Date                     Value               Ref Range             Status                09/14/2023               6.4 (H)             4.0 - 5.6 %           Final              Comment:    ADA Screening Guidelines:  5.7-6.4%  Consistent with   prediabetes  >or=6.5%  Consistent with diabetes    High levels of fetal   hemoglobin interfere with the HbA1C  assay. Heterozygous hemoglobin   variants (HbS, HgC, etc)do  not significantly interfere with this assay.     However, presence of multiple variants may affect accuracy.         06/16/2023               6.9 (H)             4.0 - 5.6 %           Final              Comment:    ADA Screening Guidelines:  5.7-6.4%  Consistent with   prediabetes  >or=6.5%  Consistent with diabetes    High levels of fetal   hemoglobin interfere with the HbA1C  assay. Heterozygous hemoglobin   variants (HbS, HgC, etc)do  not significantly interfere with this assay.     However, presence of multiple variants may affect accuracy.         03/16/2023               7.8 (H)             4.0 - 5.6 %           Final              Comment:    ADA Screening Guidelines:  5.7-6.4%  Consistent with   prediabetes  >or=6.5%  Consistent with diabetes    High levels of fetal   hemoglobin interfere with the HbA1C  assay. Heterozygous hemoglobin   variants (HbS, HgC, etc)do  not significantly interfere with this assay.     However, presence of multiple variants may affect accuracy.    ----------    Last edited by Maria Teresa Ariza on 10/16/2023  9:56 AM.            Assessment /Plan     For exam results, see Encounter Report.    Type 2 diabetes mellitus without retinopathy    Nuclear sclerosis of both eyes      No diabetic retinopathy, no csme. Return in 1 year for dilated eye exam.   2. Educated pt on  presence of cataracts and effects on vision. No surgery at this time. Recheck in one year.   *no increased IOP or PSC cataract secondary to steroid use

## 2023-10-17 ENCOUNTER — TELEPHONE (OUTPATIENT)
Dept: RADIOLOGY | Facility: HOSPITAL | Age: 58
End: 2023-10-17
Payer: COMMERCIAL

## 2023-10-18 ENCOUNTER — OFFICE VISIT (OUTPATIENT)
Dept: TRANSPLANT | Facility: CLINIC | Age: 58
End: 2023-10-18
Payer: COMMERCIAL

## 2023-10-18 ENCOUNTER — HOSPITAL ENCOUNTER (OUTPATIENT)
Dept: RADIOLOGY | Facility: HOSPITAL | Age: 58
Discharge: HOME | End: 2023-10-18
Attending: STUDENT IN AN ORGANIZED HEALTH CARE EDUCATION/TRAINING PROGRAM
Payer: COMMERCIAL

## 2023-10-18 DIAGNOSIS — R92.8 ABNORMAL MAMMOGRAM OF RIGHT BREAST: ICD-10-CM

## 2023-10-18 DIAGNOSIS — J18.9 PNEUMONIA DUE TO INFECTIOUS ORGANISM, UNSPECIFIED LATERALITY, UNSPECIFIED PART OF LUNG: ICD-10-CM

## 2023-10-18 DIAGNOSIS — J96.11 CHRONIC HYPOXEMIC RESPIRATORY FAILURE: ICD-10-CM

## 2023-10-18 DIAGNOSIS — K21.9 GASTROESOPHAGEAL REFLUX DISEASE, UNSPECIFIED WHETHER ESOPHAGITIS PRESENT: ICD-10-CM

## 2023-10-18 DIAGNOSIS — J84.9 ILD (INTERSTITIAL LUNG DISEASE): Primary | ICD-10-CM

## 2023-10-18 DIAGNOSIS — G47.33 OSA (OBSTRUCTIVE SLEEP APNEA): ICD-10-CM

## 2023-10-18 PROCEDURE — 76642 ULTRASOUND BREAST LIMITED: CPT | Mod: TC,PO,RT,NTX

## 2023-10-18 PROCEDURE — 3061F PR NEG MICROALBUMINURIA RESULT DOCUMENTED/REVIEW: ICD-10-PCS | Mod: CPTII,95,NTX, | Performed by: INTERNAL MEDICINE

## 2023-10-18 PROCEDURE — 77061 BREAST TOMOSYNTHESIS UNI: CPT | Mod: 26,RT,NTX, | Performed by: RADIOLOGY

## 2023-10-18 PROCEDURE — 77061 MAMMO DIGITAL DIAGNOSTIC RIGHT WITH TOMO: ICD-10-PCS | Mod: 26,RT,NTX, | Performed by: RADIOLOGY

## 2023-10-18 PROCEDURE — 77065 DX MAMMO INCL CAD UNI: CPT | Mod: TC,PO,RT,NTX

## 2023-10-18 PROCEDURE — 3066F PR DOCUMENTATION OF TREATMENT FOR NEPHROPATHY: ICD-10-PCS | Mod: CPTII,95,NTX, | Performed by: INTERNAL MEDICINE

## 2023-10-18 PROCEDURE — 77065 MAMMO DIGITAL DIAGNOSTIC RIGHT WITH TOMO: ICD-10-PCS | Mod: 26,RT,NTX, | Performed by: RADIOLOGY

## 2023-10-18 PROCEDURE — 77065 DX MAMMO INCL CAD UNI: CPT | Mod: 26,RT,NTX, | Performed by: RADIOLOGY

## 2023-10-18 PROCEDURE — 3066F NEPHROPATHY DOC TX: CPT | Mod: CPTII,95,NTX, | Performed by: INTERNAL MEDICINE

## 2023-10-18 PROCEDURE — 99214 PR OFFICE/OUTPT VISIT, EST, LEVL IV, 30-39 MIN: ICD-10-PCS | Mod: 25,95,NTX, | Performed by: INTERNAL MEDICINE

## 2023-10-18 PROCEDURE — 3061F NEG MICROALBUMINURIA REV: CPT | Mod: CPTII,95,NTX, | Performed by: INTERNAL MEDICINE

## 2023-10-18 PROCEDURE — 76642 ULTRASOUND BREAST LIMITED: CPT | Mod: 26,RT,NTX, | Performed by: RADIOLOGY

## 2023-10-18 PROCEDURE — 76642 US BREAST RIGHT LIMITED: ICD-10-PCS | Mod: 26,RT,NTX, | Performed by: RADIOLOGY

## 2023-10-18 PROCEDURE — 3044F PR MOST RECENT HEMOGLOBIN A1C LEVEL <7.0%: ICD-10-PCS | Mod: CPTII,95,NTX, | Performed by: INTERNAL MEDICINE

## 2023-10-18 PROCEDURE — 3044F HG A1C LEVEL LT 7.0%: CPT | Mod: CPTII,95,NTX, | Performed by: INTERNAL MEDICINE

## 2023-10-18 PROCEDURE — 99214 OFFICE O/P EST MOD 30 MIN: CPT | Mod: 25,95,NTX, | Performed by: INTERNAL MEDICINE

## 2023-10-18 NOTE — LETTER
October 18, 2023        Sarah Lemon  [address]  [address]  [place] [address]  Phone: [phone]  Fax: [phone]             [facility]  [address]  [facility] [address]  Phone: [phone]   Patient: Nathalie Krueger   MR Number: [id number]   YOB: 1965   Date of Visit: 10/18/2023       Dear Dr. Sarah Lemon    Thank you for referring Nathalie Krueger to me for evaluation. Attached you will find relevant portions of my assessment and plan of care.    If you have questions, please do not hesitate to call me. I look forward to following Nathalie Krugeer along with you.    Sincerely,    Lesly Ledesma MD    Enclosure    If you would like to receive this communication electronically, please contact [email or web address] or [phone] to request [employer] access.    [employer] is a tool which provides read-only access to select patient information with whom you have a relationship. Its easy to use and provides real time access to review your patients record including encounter summaries, notes, results, and demographic information.    If you feel you have received this communication in error or would no longer like to receive these types of communications, please e-mail [email or web address]

## 2023-10-18 NOTE — PROGRESS NOTES
The patient location is: HOME    Visit type: audiovisual    Face to Face time with patient: 30 minutes of total time spent on the encounter, which includes face to face time and non-face to face time preparing to see the patient (eg, review of tests), Obtaining and/or reviewing separately obtained history, Documenting clinical information in the electronic or other health record, Independently interpreting results (not separately reported) and communicating results to the patient/family/caregiver, or Care coordination (not separately reported).         Each patient to whom he or she provides medical services by telemedicine is:  (1) informed of the relationship between the physician and patient and the respective role of any other health care provider with respect to management of the patient; and (2) notified that he or she may decline to receive medical services by telemedicine and may withdraw from such care at any time.    Notes:       ADVANCED LUNG DISEASE: FOLLOW-UP    Referring Physician: Sarah Lemon    Reason for Visit:  Interstitial lung Disease    Date of Initial Evaluation: 03/21/2023                                                                                             History of Present Illness: Nathalie Krueger is a 58 y.o. female who is on 4L of oxygen. She is on CPAP.  Her New [place] Heart Association Class is III and a Karnofsky score of 70% - Cares for self: Unable to carry on normal activity or active work. She is diabetic non insulin dependent    Patient was treated upon last visit 2 weeks ago for a presumed exacerbation of ILD with antibiotics and prednisone.  She reports improved respiratory symptoms, including improvement in cough, dyspnea, and reports more energy.  She is able to walk up 19 stairs with ease now.  She reports side-effects which she attributes to adverse effects of steroids including dry mouth, muscle weakness (LE) and blurry vision.        Brief history summarized  from previous visits:   Patient presents for her first visit in March 2023.  She reports symptoms began over three years ago when she lived in [place]. Per patient's wife, patient had developed progressive fatigue and dyspnea with exertion. She was still able to perform her ADLs independently, but noticed she would get breathless with activity very quickly. Previously lived on family's farm in [place]. She states she had an episode of profound fatigue/malaise one week following clean out of family's chicken coup. Frequent exposures to dust, pesticides, fertilizers while living in [place]. Patient and her wife moved to [place] ~two years ago. While living downtown, patient noticed increased cough, worsening fatigue requiring frequent daytime naps. [facility] and found she had worsening cough if other residents smoked within the building. She presented to the ED in early 2022 due to hypoxemia. She checked her pulse ox at home and reportedly had oxygen saturation of 76%. She did not require hospitalization. No prior chest surgeries, ICU admission, intubations, lung biopsy.     In August 2022, patient had acute worsening of her dyspnea and cough. She was started on 2L oxygen to be used with exertion. 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 Since starting CPAP, notes improvement in daytime fatigue. She completed a trial of steroids in the fall of 2022 after evaluation with [place]. Rodas [facility] [place] and states her symptoms improved drastically. No recent exacerbations/hospitalizations. She has had an extensive autoimmune workup which revealed +DAYTON and low IgM. All other workup unrevealing. Currently of stiolto and symbicort for management.     Patient is a former 25-30 year smoker and quit 3-4 years ago. Her father had lung disease related to diving in the [employer], but otherwise denies family history of lung disease. No history of frequent infections as a child. She  states she did have a history of scarlet fever as a child while living in [place] and was told she should not live there in the future? Also states she used a 20 year old inhaler she bought in [place] while still living in [place] and is concerned she may have aspirated mold. She occasionally has episodes of flushing and rashes. No arthralgias. History of reflux and recently stopped omeprazole in hopes of improving symptoms with diet modification. She recently lost ~10 pounds. Continues to have intermittent reflux despite diet changes. She states she remains very active and walks and performs water aerobics frequently. 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 Remains independent with her ADLs, but does struggle with vacuuming, bending over, and climbing stairs.     She reported improvement in her symptoms including dyspnea after her first visit with empriric tx of cough with intranasal sprays, trelegy, and PPI.  However, in 10/2023 she presented with increasing O2 needs and cough and was treated for a presumed acute exacerbation with pred taper starting at 50 mg and antibiotics with improved symptomatology    Review of Systems   Constitutional:  Negative for diaphoresis and malaise/fatigue.   HENT:  Negative for congestion, ear discharge, ear pain, hearing loss, nosebleeds, sinus pain, sore throat and tinnitus.    Eyes:  Negative for blurred vision, double vision, photophobia, pain, discharge and redness.   Respiratory:  Negative for hemoptysis, sputum production, wheezing and stridor.    Cardiovascular:  Negative for palpitations, orthopnea, claudication and PND.   Gastrointestinal:  Negative for abdominal pain, blood in stool, constipation, diarrhea, melena, nausea and vomiting.   Genitourinary:  Negative for dysuria, flank pain, frequency, hematuria and urgency.   Musculoskeletal:  Negative for back pain, falls, joint pain, myalgias and neck pain.   Skin:  Negative for itching.   Neurological:   Negative for dizziness, tingling, tremors, sensory change, speech change, focal weakness, seizures, loss of consciousness, weakness and headaches.   Endo/Heme/Allergies:  Negative for environmental allergies and polydipsia. Does not bruise/bleed easily.   Psychiatric/Behavioral:  Negative for depression, hallucinations, memory loss, substance abuse and suicidal ideas. The patient is not nervous/anxious and does not have insomnia.      Objective:   There were no vitals taken for this visit.  Physical Exam  Physical Exam is limited due to nature of today's visit.  Based on the audiovisual platform noted:  -no sign of distress  -no signs of respiratory distress, speaking full sentences, no coughing noted, no wheezes audible and voice is clear  - alert and oriented  - organized thought pattern      Labs:  No visits with results within 7 Day(s) from this visit.   Latest known visit with results is:   Lab Visit on 09/14/2023   Component Date Value    Microalbumin, Urine 09/14/2023 6.0     Creatinine, Urine 09/14/2023 174.0     Microalb/Creat Ratio 09/14/2023 3.4            10/5/2023    11:03 AM 8/24/2023    10:10 AM 4/10/2023    10:10 AM   Pulmonary Function Tests   FVC 2.36 liters 2.51 liters 2.35 liters   FEV1 2.05 liters 2.12 liters 1.95 liters   TLC (liters) 2.94 liters  3.06 liters   DLCO (ml/mmHg sec) 6.89 ml/mmHg sec  7.89 ml/mmHg sec   FVC% 62.3 66.2 61.8   FEV1% 69.3 71.8 65.6   FEF 25-75 3.23 3.02 2.35   FEF 25-75% 125.9 117.4 90.8   TLC% 50.8  53   RV 0.58  0.83   RV% 27.3  39.4   DLCO% 26.5  30.2         10/5/2023     9:53 AM 7/12/2023     1:42 PM 3/21/2023    12:40 PM 12/6/2022     2:18 PM   6MW   6MWT Status completed without stopping completed without stopping completed without stopping completed without stopping   Patient Reported No complaints No complaints No complaints No complaints   Was O2 used? Yes Yes Yes Yes   Delivery Method Cannula;Pull Tank;Continuous Flow Cannula;Pull Tank;Continuous Flow  Cannula;Pull Tank;Continuous Flow Cannula;Demand Flow;Shoulder Carry   6MW Distance walked (feet) 1200 feet 1200 feet 1000 feet 1200 feet   Distance walked (meters) 365.76 meters 365.76 meters 304.8 meters 365.76 meters   Did patient stop? No No No No   Oxygen Saturation 96 % 95 % 97 % 98 %   Supplemental Oxygen 3 L/M 2 L/M 2 L/M 2 L/M   Heart Rate 81 bpm 87 bpm 85 bpm 83 bpm   Blood Pressure 134/87 122/76 134/81 145/84   Ashlie Dyspnea Rating  nothing at all very, very light (just noticeable) nothing at all nothing at all   Oxygen Saturation 90 % 89 % 86 % 84 %   Supplemental Oxygen 4 L/M 3 L/M 2 L/M 2 L/M   Heart Rate 100 bpm 115 bpm 99 bpm 96 bpm   Blood Pressure 159/86 119/76 135/81 160/79   Ashlie Dyspnea Rating  nothing at all very, very light (just noticeable) light very, very light (just noticeable)   Recovery Time (seconds) 95 seconds 101 seconds 120 seconds 90 seconds   Oxygen Saturation 95 % 98 % 97 % 95 %   Supplemental Oxygen 4 L/M 3 L/M 2 L/M 2 L/M   Heart Rate 90 bpm 90 bpm 91 bpm 87 bpm       Assessment:-  1. ILD (interstitial lung disease)    2. Chronic hypoxemic respiratory failure    3. ORLIN (obstructive sleep apnea)    4. Gastroesophageal reflux disease, unspecified whether esophagitis present    5. Pneumonia due to infectious organism, unspecified laterality, unspecified part of lung      Plan:   58 y.o. female seen today for ILD, follow up visit for AE-ILD.    -Improved respiratory symptoms.  In anticipation of wedge biopsy and improvement in symptoms decrease prednisone at a faster schedule.  Currently on pred 30 mg daily, will taper by 10 mg every 3 days.  Educated patient about symptoms of adrenal insufficiency and crisis.   -suspect LE cramps are due to sore muscle from increased activity.  However if this continues will obtain CK levels   -suspect blurry vision is due to dry eye, recommend lubricating eye drops.    -Scheduled to see thoracic surgery on Nov 16 for possible wedge.     return  visit.  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 She underwent bronchoscopy with BAL and biopsy which showed evidence of chronic pneumonitis with concerns for chronic aspiration but findings were not diagnostic. She had an esophagram which did show esophageal dysmotility. She is on PPI. Her rheumatologic workup has been mostly negative aside from mildly elevated DAYTON (1:80). RHC with mPAP of 20 and PCWP of 16.     RTC with testing (6MWT on 4L and PFTs) Nov 16

## 2023-10-23 LAB
ACID FAST MOD KINY STN SPEC: NORMAL
MYCOBACTERIUM SPEC QL CULT: NORMAL

## 2023-10-25 ENCOUNTER — OFFICE VISIT (OUTPATIENT)
Dept: PULMONOLOGY | Facility: CLINIC | Age: 58
End: 2023-10-25
Payer: COMMERCIAL

## 2023-10-25 ENCOUNTER — LAB VISIT (OUTPATIENT)
Dept: LAB | Facility: HOSPITAL | Age: 58
End: 2023-10-25
Attending: INTERNAL MEDICINE
Payer: COMMERCIAL

## 2023-10-25 VITALS
SYSTOLIC BLOOD PRESSURE: 127 MMHG | OXYGEN SATURATION: 94 % | DIASTOLIC BLOOD PRESSURE: 90 MMHG | BODY MASS INDEX: 33.3 KG/M2 | WEIGHT: 224.81 LBS | HEIGHT: 69 IN | HEART RATE: 105 BPM

## 2023-10-25 DIAGNOSIS — J84.9 ILD (INTERSTITIAL LUNG DISEASE): Primary | ICD-10-CM

## 2023-10-25 DIAGNOSIS — J47.9 BRONCHIECTASIS WITHOUT COMPLICATION: ICD-10-CM

## 2023-10-25 DIAGNOSIS — J96.11 CHRONIC HYPOXEMIC RESPIRATORY FAILURE: ICD-10-CM

## 2023-10-25 DIAGNOSIS — R25.2 MUSCLE CRAMPS: ICD-10-CM

## 2023-10-25 PROBLEM — J98.4 RESTRICTIVE LUNG DISEASE: Status: RESOLVED | Noted: 2023-01-10 | Resolved: 2023-10-25

## 2023-10-25 LAB — CK SERPL-CCNC: 102 U/L (ref 20–180)

## 2023-10-25 PROCEDURE — 99999 PR PBB SHADOW E&M-EST. PATIENT-LVL III: CPT | Mod: PBBFAC,TXP,, | Performed by: INTERNAL MEDICINE

## 2023-10-25 PROCEDURE — 3066F PR DOCUMENTATION OF TREATMENT FOR NEPHROPATHY: ICD-10-PCS | Mod: CPTII,NTX,S$GLB, | Performed by: INTERNAL MEDICINE

## 2023-10-25 PROCEDURE — 3061F NEG MICROALBUMINURIA REV: CPT | Mod: CPTII,NTX,S$GLB, | Performed by: INTERNAL MEDICINE

## 2023-10-25 PROCEDURE — 1159F MED LIST DOCD IN RCRD: CPT | Mod: CPTII,NTX,S$GLB, | Performed by: INTERNAL MEDICINE

## 2023-10-25 PROCEDURE — 3074F SYST BP LT 130 MM HG: CPT | Mod: CPTII,NTX,S$GLB, | Performed by: INTERNAL MEDICINE

## 2023-10-25 PROCEDURE — 3044F HG A1C LEVEL LT 7.0%: CPT | Mod: CPTII,NTX,S$GLB, | Performed by: INTERNAL MEDICINE

## 2023-10-25 PROCEDURE — 99214 OFFICE O/P EST MOD 30 MIN: CPT | Mod: NTX,S$GLB,, | Performed by: INTERNAL MEDICINE

## 2023-10-25 PROCEDURE — 3080F DIAST BP >= 90 MM HG: CPT | Mod: CPTII,NTX,S$GLB, | Performed by: INTERNAL MEDICINE

## 2023-10-25 PROCEDURE — 99999 PR PBB SHADOW E&M-EST. PATIENT-LVL III: ICD-10-PCS | Mod: PBBFAC,TXP,, | Performed by: INTERNAL MEDICINE

## 2023-10-25 PROCEDURE — 3066F NEPHROPATHY DOC TX: CPT | Mod: CPTII,NTX,S$GLB, | Performed by: INTERNAL MEDICINE

## 2023-10-25 PROCEDURE — 3008F PR BODY MASS INDEX (BMI) DOCUMENTED: ICD-10-PCS | Mod: CPTII,NTX,S$GLB, | Performed by: INTERNAL MEDICINE

## 2023-10-25 PROCEDURE — 99214 PR OFFICE/OUTPT VISIT, EST, LEVL IV, 30-39 MIN: ICD-10-PCS | Mod: NTX,S$GLB,, | Performed by: INTERNAL MEDICINE

## 2023-10-25 PROCEDURE — 3080F PR MOST RECENT DIASTOLIC BLOOD PRESSURE >= 90 MM HG: ICD-10-PCS | Mod: CPTII,NTX,S$GLB, | Performed by: INTERNAL MEDICINE

## 2023-10-25 PROCEDURE — 3061F PR NEG MICROALBUMINURIA RESULT DOCUMENTED/REVIEW: ICD-10-PCS | Mod: CPTII,NTX,S$GLB, | Performed by: INTERNAL MEDICINE

## 2023-10-25 PROCEDURE — 3008F BODY MASS INDEX DOCD: CPT | Mod: CPTII,NTX,S$GLB, | Performed by: INTERNAL MEDICINE

## 2023-10-25 PROCEDURE — 1159F PR MEDICATION LIST DOCUMENTED IN MEDICAL RECORD: ICD-10-PCS | Mod: CPTII,NTX,S$GLB, | Performed by: INTERNAL MEDICINE

## 2023-10-25 PROCEDURE — 3044F PR MOST RECENT HEMOGLOBIN A1C LEVEL <7.0%: ICD-10-PCS | Mod: CPTII,NTX,S$GLB, | Performed by: INTERNAL MEDICINE

## 2023-10-25 PROCEDURE — 82550 ASSAY OF CK (CPK): CPT | Performed by: INTERNAL MEDICINE

## 2023-10-25 PROCEDURE — 3074F PR MOST RECENT SYSTOLIC BLOOD PRESSURE < 130 MM HG: ICD-10-PCS | Mod: CPTII,NTX,S$GLB, | Performed by: INTERNAL MEDICINE

## 2023-10-25 PROCEDURE — 82085 ASSAY OF ALDOLASE: CPT | Performed by: INTERNAL MEDICINE

## 2023-10-25 NOTE — PATIENT INSTRUCTIONS
Agree with surgical lung biopsy   Try fiber supplement and miralax as needed  Oxygen as needed keep sats >90%  Continue cpap nightly  Continue inhaler  Agree with esophageal testing

## 2023-10-25 NOTE — PROGRESS NOTES
10/25/2023    Nathalie Krueger  Follow up    Chief Complaint   Patient presents with    Bronchiectasis       HPI:  10/25/2023- pt had nondiagnostic bronch w/ biopsies and now planned for surgical lung biopsy. Has been seeing Dr. Ledesma.  She was treated for possible pneumonia vs ILD flare with antibiotic and course of prednisone - feels improved. She is tapering prednisone and now on 10mg daily, has tolerated well. She still gets muscle cramps of legs, flushing of face.   Continues on trelegy daily.   When active she uses 3-4L oxygen, not at rest. Cpap bleed in 2L at night.  Spouse vilma present and states pt unable to detect when something is wrong.   She is doing some esophageal studies, has referral for surgical lung biopsy.     06/27/2023-   Continue oxygen with exertion, keep sats >90%  Repeat pulmonary function testing  Advanced lung disease consultation with Dr. Barry  Consider surgical lung biopsy for diagnosis?   Continue cpap nightly use    Pt here w/ wife today. she feels breathing is same, able to walk 30 min at slight incline with 3L oxygen  She noticed slight flare of productive cough in May lasting about 1 wk, self resolves. She uses nebs PRN. She has been doing water aerobics. She continues to diet and has lost about 9#. She has stopped omeprazole and rosuvastatin    04/05/2023-   Continue oxygen to keep sats >90%  Continue cpap nightly use  No evidence of pulmonary hypertension  Nebulizer/aerobika only as needed for mucous/congestion  Repeat CT chest- high resolution   Pulmonary function tests repeat  May have interstitial lung disease? May be very subtle and hard to see but seems stable/improving with time  Continue exercise and diet, weight loss  May consider advanced lung disease referral in the future to see Dr. Barry  pt feeling well, here with wife who assists w/ history. Had RHC with no evidence of PH. Still using 3L O2 with exertion, unchanged. She gets a little winded w/ climbing  "stairs. Every day has cough- very mild, intermittent, clear mucous couple times a day. Not needing nebulizer. Uses CPAP nightly w/ benefit. Able to exercise in pool w/o dyspnea. Sometimes standing in kitchen partner will notice her lips are blue- has to use oxygen.  Working on losing wt- mediterranean diet. Has lost 3# so far.    2/28/23-   I suspect you have pulmonary hypertension causing shortness of breath, oxygen requirement  No COPD  Bronchiectasis is mild- may cause cough, mucous build up, may be prone to respiratory infections  Stop stiolto inhaler, continue pulmicort inhaler and albuterol nebulizer as needed  Stop azithromycin  Overnight pulse ox, complete off cpap and off oxygen  Continue oxygen to keep sats >92%    VQ scan to rule out blood clot  Blood work to rule out HIV, Lupus  Referral to Dr. Maye Hernandez for right heart cath  Fingerstick test to rule out A1AT deficiency  Pt is a 56 yo female with DM2, GERD, HTN, bronchiectasis, ORLIN and chronic hypoxemic resp failure presenting to establish new pulmonologist. She has previously been seen by pulmonology offices at [facility], [facility] [facility]. Her wife vilma is present and assists w/ history.  Pt lived in [place], moved to  2 yr ago. . Since moving here she has experienced worsening SOB, fatigue, noted low sats and went to ED 1/2022. Aug 2022 she had a "flare up" and started requiring 2L oxygen w/ exertion. For 2 mos she felt very bad, reports steroids may have helped- gave her energy.    Used to have hacking cough attributed to lisinopril, now different type of cough w/ mucous coming up. She may have had respiratory infection which lasted about 2d in December, resolved spontaneously. She has been on azithromycin 4x/week since suppressive tx.  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   Nebulizer: albuterol every other day. Doesn't use duo neb or " saline. Uses stiolto and pulmicort inhalers daily.  Pt goes on walks, goes swimming, functions much better since move to [place] in 12/2022. After exercise has to bump up O2 to 3-4L to recover.  She has mild ORLIN, always slept well, has CPAP w/ bleed in O2.  Pt quit smoking 3-4 yr ago, smoked about 25-30 yrs.  FH father had lung problems, was a diver in the [employer] and was forced to stop.  Pt used to be an athlete- played basketball college and soccer.  She would like to travel, asks about overseas and plane travel w/ oxygen    The chief complaint problem is stable    PFSH:  Past Medical History:   Diagnosis Date    Acute bronchitis 02/01/2022    Adenomatous polyp of ascending colon 06/22/2020    Diabetes mellitus     Hypertension     Interstitial lung disease     Sleep apnea          Past Surgical History:   Procedure Laterality Date    ABLATION, FIBROID, UTERUS, LAPAROSCOPIC, WITH US GUIDANCE      APPENDECTOMY  1982    BRONCHOSCOPY N/A 8/31/2023    Procedure: Bronchoscopy(need fluoro);  Surgeon: Sarah Lemon MD;  Location: [facility];  Service: Pulmonary;  Laterality: N/A;  transbronchial biopsies need fluoro    RIGHT HEART CATHETERIZATION Right 3/29/2023    Procedure: INSERTION, CATHETER, RIGHT HEART;  Surgeon: Yulisa Yung DO;  Location: [facility] CATH LAB;  Service: Cardiology;  Laterality: Right;    ROTATOR CUFF REPAIR Right 1998    TONSILLECTOMY  1977     Social History     Tobacco Use    Smoking status: Former     Current packs/day: 0.00     Average packs/day: 0.5 packs/day for 25.0 years (12.5 ttl pk-yrs)     Types: Cigarettes     Start date: 12/16/1994     Quit date: 12/16/2019     Years since quitting: 3.8     Passive exposure: Past    Smokeless tobacco: Never    Tobacco comments:     Smoked additive free tobacco   Substance Use Topics    Alcohol use: Not Currently    Drug use: Never     Family History   Problem Relation Age of Onset    Diabetes Mother     Hypertension Mother     Heart disease  "Mother     Diabetes Father     Glaucoma Neg Hx     Macular degeneration Neg Hx     Colon cancer Neg Hx     Esophageal cancer Neg Hx      Review of patient's allergies indicates:   Allergen Reactions    Atorvastatin Other (See Comments)     Dry cough      Lisinopril      Other reaction(s): Cough    Meperidine hcl Nausea Only       Performance Status:The patient's activity level is regular exercise.      Review of Systems:  a review of eleven systems covering constitutional, Eye, HEENT, Psych, Respiratory, Cardiac, GI, , Musculoskeletal, Endocrine, Dermatologic was negative except for pertinent findings as listed ABOVE and below:  Constipation- bm every 3-4 days  Salty taste in mouth    Exam:Comprehensive exam done. BP (!) 127/90 (BP Location: Right arm, Patient Position: Sitting, BP Method: Large (Automatic))   Pulse 105   Ht 5' 9" (1.753 m)   Wt 102 kg (224 lb 12.8 oz)   SpO2 (!) 94%   BMI 33.20 kg/m²   Exam included Vitals as listed, and patient's appearance and affect and alertness and mood, oral exam for yeast and hygiene and pharynx lesions and Mallapatti (M) score, neck with inspection for jvd and masses and thyroid abnormalities and lymph nodes (supraclavicular and infraclavicular nodes and axillary also examined and noted if abn), chest exam included symmetry and effort and fremitus and percussion and auscultation, cardiac exam included rhythm and gallops and murmur and rubs and jvd and edema, abdominal exam for mass and hepatosplenomegaly and tenderness and hernias and bowel sounds, Musculoskeletal exam with muscle tone and posture and mobility/gait and  strength, and skin for rashes and cyanosis and pallor and turgor, extremity for clubbing.  Findings were normal except for pertinent findings listed below:  M3, oropharynx clear  HR regular  Breath sounds clear bilaterally  No edema/clubbing    Radiographs (ct chest and cxr) reviewed: view by direct vision   CXR 10/5/23- clear  Esophagram 10/4/23- "   Impression:  1. Mild gastroesophageal reflux.  2. Mild esophageal dysmotility.     HRCT chest 4/11/23- slight mosaicism throughout, scant emphysematous changes vs cysts, bronchiectasis  CT chest 8/31/22- mild diffuse bronchiectasis, subtle reticulations upper lobes?  3 mm more solid-appearing nodule, right upper lobe series 4, image 174, previously subsolid 3 mm.  3 mm solid nodule lingular region series 4, image 336, in retrospect, unchanged.  No new nodule.  No focal airspace disease.    TTE 3/21/23-   The left ventricle is normal in size with normal systolic function.  The estimated ejection fraction is 65%.  Normal left ventricular diastolic function.  Moderate right ventricular enlargement with low normal to mildly reduced right ventricular systolic function.  The estimated PA systolic pressure is 34 mmHg.  Normal central venous pressure (3 mmHg).  The ascending aorta is mildly dilated.    TTE 1/3/23-   The left ventricle is normal in size with mild concentric hypertrophy and normal systolic function.  The estimated ejection fraction is 60%.  Grade I left ventricular diastolic dysfunction.  Moderate right ventricular enlargement with low normal right ventricular systolic function.  Mild left atrial enlargement.  The estimated PA systolic pressure is 29 mmHg.  The aortic root is mildly dilated at 3.9 cm  There is no evidence of intracardiac shunting (negative bubble study)    Labs reviewed      Bronchoscopy 8/31/23-   Path LEFT UPPER LOBE TRANSBRONCHIAL BIOPSIES:   - FOCAL ATYPICAL ALVEOLAR CELL HYPERPLASIA NOT DIAGNOSTIC OF MALIGNANCY.   - FOCAL INTERSTITIAL CHRONIC PNEUMONITIS WITH ASSOCIATED MULTINUCLEATED    HISTIOCYTE WITH CHOLESTEROL CLEFT, NONSPECIFIC.   - VARIABLE INTERSTITIAL FIBROSIS.   - NEGATIVE FOR GRANULOMAS.   - NEGATIVE FOR VASCULITIS.   - BRONCHIAL MUCOSA WITH MILD CHRONIC INFLAMMATION.      Latest Reference Range & Units 08/31/23 09:12   Fluid Color  Red   Fluid Appearance  Bloody   WBC, Body  Fluid /cu mm SEE COMMENT   Body Fluid Type  Bronchial Wash   Segs, Fluid % 21   Lymphs, Fluid % 72   Monocytes/Macrophages, Fluid % 2   Mesothelial Cells, Fluid % 5       Sputum cultures AFB and routine 9/2022- no growth   Latest Reference Range & Units 01/03/23 10:46   POC PH 7.35 - 7.45  7.47 (H)   POC PCO2 35.0 - 45.0 mmHg 42   POC PO2 80.0 - 100.0 mmHg 75 (L)   POC SATURATED O2 94.0 - 100.0 % 96.0   Allens Test  POS   POC pAO2 mmHg 97   POC A-aDO2 mmHg 22   POC HCO3-(c) 22.0 - 26.0 mmol/L 30.6 (H)   POC THb 12.0 - 18.0 g/dL 15.9   POC O2Hb 94.0 - 100.0 % 93.4 (L)   POC COHb <1.6 % 1.7 (H)   POC MetHb <3.0 % 0.9   POC BE(B) -2.0 - 2.0 mmol/L 6.2 (H)      Latest Reference Range & Units 11/01/22 15:36   IgG 650 - 1600 mg/dL 882   IgM 50 - 300 mg/dL 17 (L)   IgA 40 - 350 mg/dL 205   IgE 0 - 100 IU/mL <35      Latest Reference Range & Units 09/09/22 15:20   DAYTON Screen Negative <1:80  Positive !   DAYTON Titer 1  1:80   DAYTON PATTERN 1  Homogeneous   ds DNA Ab Negative 1:10  Negative 1:10   Anti-SSA Antibody 0.00 - 0.99 Ratio  0.00 - 0.99 Ratio 0.06  0.06   Anti-SSA Interpretation Negative   Negative  Negative  Negative   Anti-SSB Antibody 0.00 - 0.99 Ratio  0.00 - 0.99 Ratio 0.04  0.04   Anti-SSB Interpretation Negative   Negative  Negative  Negative   Anti Sm Antibody 0.00 - 0.99 Ratio 0.07   Anti-Sm Interpretation Negative  Negative   Anti Sm/RNP Antibody 0.00 - 0.99 Ratio 0.05   Anti-Sm/RNP Interpretation Negative  Negative   Scleroderma SCL- <20 UNITS 3      Latest Reference Range & Units 09/09/22 15:20   Rheumatoid Factor 0.0 - 15.0 IU/mL <13.0     Lab Results   Component Value Date    WBC 8.13 03/29/2023    HGB 13.6 03/29/2023    HCT 41.7 03/29/2023    MCV 93 03/29/2023     03/29/2023       CMP  Sodium   Date Value Ref Range Status   03/29/2023 139 136 - 145 mmol/L Final     Potassium   Date Value Ref Range Status   03/29/2023 4.3 3.5 - 5.1 mmol/L Final     Chloride   Date Value Ref Range Status   03/29/2023  106 95 - 110 mmol/L Final     CO2   Date Value Ref Range Status   03/29/2023 26 23 - 29 mmol/L Final     Glucose   Date Value Ref Range Status   03/29/2023 222 (H) 70 - 110 mg/dL Final     BUN   Date Value Ref Range Status   03/29/2023 13 6 - 20 mg/dL Final     Creatinine   Date Value Ref Range Status   03/29/2023 0.8 0.5 - 1.4 mg/dL Final     Calcium   Date Value Ref Range Status   03/29/2023 9.4 8.7 - 10.5 mg/dL Final     Total Protein   Date Value Ref Range Status   03/29/2023 6.3 6.0 - 8.4 g/dL Final     Albumin   Date Value Ref Range Status   03/29/2023 3.7 3.5 - 5.2 g/dL Final     Total Bilirubin   Date Value Ref Range Status   03/29/2023 0.3 0.1 - 1.0 mg/dL Final     Comment:     For infants and newborns, interpretation of results should be based  on gestational age, weight and in agreement with clinical  observations.    Premature Infant recommended reference ranges:  Up to 24 hours.............<8.0 mg/dL  Up to 48 hours............<12.0 mg/dL  3-5 days..................<15.0 mg/dL  6-29 days.................<15.0 mg/dL       Alkaline Phosphatase   Date Value Ref Range Status   03/29/2023 122 55 - 135 U/L Final     AST   Date Value Ref Range Status   03/29/2023 20 10 - 40 U/L Final     ALT   Date Value Ref Range Status   03/29/2023 21 10 - 44 U/L Final     Anion Gap   Date Value Ref Range Status   03/29/2023 7 (L) 8 - 16 mmol/L Final     eGFR   Date Value Ref Range Status   03/29/2023 >60.0 >60 mL/min/1.73 m^2 Final         PFT results reviewed  10/5/23- moderate restriction, dlco worsening      11/19/22- moderate restriction, NO obstruction, dlco severely reduced- stable from previous PFT 3 mos prior      6mwt 12/6/22- 366m, min sat 84% req 2L oxygen    6mwt 10/5/23- min sat 88%, req up to 4L oxygen with exertion    Plan:  Clinical impression is reasonably certain and repeated evaluation prn +/- follow up will be needed as below. ILD progressing, O2 dependent and pending lung biopsy to further  julian Zelaya was seen today for bronchiectasis.    Diagnoses and all orders for this visit:    ILD (interstitial lung disease)    Bronchiectasis without complication    Chronic hypoxemic respiratory failure    Muscle cramps  -     CK; Future  -     ALDOLASE; Future              Follow up in about 4 months (around 2/25/2024).    Discussed with patient above for education the following:      Patient Instructions   Agree with surgical lung biopsy   Try fiber supplement and miralax as needed  Oxygen as needed keep sats >90%  Continue cpap nightly  Continue inhaler  Agree with esophageal testing

## 2023-10-27 ENCOUNTER — PATIENT MESSAGE (OUTPATIENT)
Dept: ENDOSCOPY | Facility: HOSPITAL | Age: 58
End: 2023-10-27
Payer: COMMERCIAL

## 2023-10-27 LAB — ALDOLASE SERPL-CCNC: 5.5 U/L

## 2023-10-31 DIAGNOSIS — J96.11 CHRONIC HYPOXEMIC RESPIRATORY FAILURE: Primary | ICD-10-CM

## 2023-10-31 DIAGNOSIS — J84.9 ILD (INTERSTITIAL LUNG DISEASE): ICD-10-CM

## 2023-11-02 ENCOUNTER — ANESTHESIA EVENT (OUTPATIENT)
Dept: ENDOSCOPY | Facility: HOSPITAL | Age: 58
End: 2023-11-02
Payer: COMMERCIAL

## 2023-11-03 ENCOUNTER — HOSPITAL ENCOUNTER (OUTPATIENT)
Facility: HOSPITAL | Age: 58
Discharge: HOME | End: 2023-11-03
Attending: INTERNAL MEDICINE | Admitting: INTERNAL MEDICINE
Payer: COMMERCIAL

## 2023-11-03 ENCOUNTER — ANESTHESIA (OUTPATIENT)
Dept: ENDOSCOPY | Facility: HOSPITAL | Age: 58
End: 2023-11-03
Payer: COMMERCIAL

## 2023-11-03 ENCOUNTER — PATIENT MESSAGE (OUTPATIENT)
Dept: ENDOSCOPY | Facility: HOSPITAL | Age: 58
End: 2023-11-03
Payer: COMMERCIAL

## 2023-11-03 VITALS
OXYGEN SATURATION: 99 % | DIASTOLIC BLOOD PRESSURE: 73 MMHG | BODY MASS INDEX: 33.18 KG/M2 | HEIGHT: 69 IN | WEIGHT: 224 LBS | SYSTOLIC BLOOD PRESSURE: 107 MMHG | HEART RATE: 75 BPM | RESPIRATION RATE: 18 BRPM | TEMPERATURE: 98 F

## 2023-11-03 DIAGNOSIS — K21.9 GERD (GASTROESOPHAGEAL REFLUX DISEASE): ICD-10-CM

## 2023-11-03 PROBLEM — K57.90 DIVERTICULOSIS: Status: ACTIVE | Noted: 2023-11-03

## 2023-11-03 PROBLEM — K21.00 GASTROESOPHAGEAL REFLUX DISEASE WITH ESOPHAGITIS WITHOUT HEMORRHAGE: Status: ACTIVE | Noted: 2022-08-09

## 2023-11-03 LAB — POCT GLUCOSE: 152 MG/DL (ref 70–110)

## 2023-11-03 PROCEDURE — 25000003 PHARM REV CODE 250: Mod: NTX | Performed by: INTERNAL MEDICINE

## 2023-11-03 PROCEDURE — 91040 ESOPH BALLOON DISTENSION TST: CPT | Mod: 26,NTX,, | Performed by: INTERNAL MEDICINE

## 2023-11-03 PROCEDURE — 63600175 PHARM REV CODE 636 W HCPCS: Mod: TXP | Performed by: NURSE ANESTHETIST, CERTIFIED REGISTERED

## 2023-11-03 PROCEDURE — 43239 PR EGD, FLEX, W/BIOPSY, SGL/MULTI: ICD-10-PCS | Mod: 51,NTX,, | Performed by: INTERNAL MEDICINE

## 2023-11-03 PROCEDURE — 43239 EGD BIOPSY SINGLE/MULTIPLE: CPT | Mod: NTX | Performed by: INTERNAL MEDICINE

## 2023-11-03 PROCEDURE — 37000008 HC ANESTHESIA 1ST 15 MINUTES: Mod: TXP | Performed by: INTERNAL MEDICINE

## 2023-11-03 PROCEDURE — 88305 TISSUE EXAM BY PATHOLOGIST: CPT | Mod: 26,NTX,, | Performed by: STUDENT IN AN ORGANIZED HEALTH CARE EDUCATION/TRAINING PROGRAM

## 2023-11-03 PROCEDURE — 45380 COLONOSCOPY AND BIOPSY: CPT | Mod: PT,NTX | Performed by: INTERNAL MEDICINE

## 2023-11-03 PROCEDURE — 37000009 HC ANESTHESIA EA ADD 15 MINS: Mod: TXP | Performed by: INTERNAL MEDICINE

## 2023-11-03 PROCEDURE — D9220A PRA ANESTHESIA: Mod: 33,CRNA,, | Performed by: NURSE ANESTHETIST, CERTIFIED REGISTERED

## 2023-11-03 PROCEDURE — 91040 ESOPH BALLOON DISTENSION TST: CPT | Mod: TC,TXP | Performed by: INTERNAL MEDICINE

## 2023-11-03 PROCEDURE — 45380 PR COLONOSCOPY,BIOPSY: ICD-10-PCS | Mod: 33,NTX,, | Performed by: INTERNAL MEDICINE

## 2023-11-03 PROCEDURE — D9220A PRA ANESTHESIA: Mod: 33,ANES,, | Performed by: ANESTHESIOLOGY

## 2023-11-03 PROCEDURE — 25000003 PHARM REV CODE 250: Mod: TXP | Performed by: INTERNAL MEDICINE

## 2023-11-03 PROCEDURE — 45380 COLONOSCOPY AND BIOPSY: CPT | Mod: 33,NTX,, | Performed by: INTERNAL MEDICINE

## 2023-11-03 PROCEDURE — 27201012 HC FORCEPS, HOT/COLD, DISP: Mod: NTX | Performed by: INTERNAL MEDICINE

## 2023-11-03 PROCEDURE — D9220A PRA ANESTHESIA: ICD-10-PCS | Mod: 33,CRNA,, | Performed by: NURSE ANESTHETIST, CERTIFIED REGISTERED

## 2023-11-03 PROCEDURE — D9220A PRA ANESTHESIA: ICD-10-PCS | Mod: 33,ANES,, | Performed by: ANESTHESIOLOGY

## 2023-11-03 PROCEDURE — 88305 TISSUE EXAM BY PATHOLOGIST: CPT | Mod: TXP | Performed by: STUDENT IN AN ORGANIZED HEALTH CARE EDUCATION/TRAINING PROGRAM

## 2023-11-03 PROCEDURE — 88305 TISSUE EXAM BY PATHOLOGIST: ICD-10-PCS | Mod: 26,NTX,, | Performed by: STUDENT IN AN ORGANIZED HEALTH CARE EDUCATION/TRAINING PROGRAM

## 2023-11-03 PROCEDURE — 43239 EGD BIOPSY SINGLE/MULTIPLE: CPT | Mod: 51,NTX,, | Performed by: INTERNAL MEDICINE

## 2023-11-03 PROCEDURE — 82962 GLUCOSE BLOOD TEST: CPT | Mod: TXP | Performed by: INTERNAL MEDICINE

## 2023-11-03 PROCEDURE — 91040 PR ESOPH BALLOON DISTENSION TST: ICD-10-PCS | Mod: 26,NTX,, | Performed by: INTERNAL MEDICINE

## 2023-11-03 PROCEDURE — 25000003 PHARM REV CODE 250: Mod: TXP | Performed by: NURSE ANESTHETIST, CERTIFIED REGISTERED

## 2023-11-03 RX ORDER — SODIUM CHLORIDE 9 MG/ML
INJECTION, SOLUTION INTRAVENOUS CONTINUOUS
Status: DISCONTINUED | OUTPATIENT
Start: 2023-11-03 | End: 2023-11-03 | Stop reason: HOSPADM

## 2023-11-03 RX ORDER — ONDANSETRON 2 MG/ML
4 INJECTION INTRAMUSCULAR; INTRAVENOUS ONCE AS NEEDED
Status: DISCONTINUED | OUTPATIENT
Start: 2023-11-03 | End: 2023-11-03

## 2023-11-03 RX ORDER — ONDANSETRON 2 MG/ML
4 INJECTION INTRAMUSCULAR; INTRAVENOUS ONCE AS NEEDED
Status: DISCONTINUED | OUTPATIENT
Start: 2023-11-03 | End: 2023-11-03 | Stop reason: HOSPADM

## 2023-11-03 RX ORDER — PROPOFOL 10 MG/ML
VIAL (ML) INTRAVENOUS CONTINUOUS PRN
Status: DISCONTINUED | OUTPATIENT
Start: 2023-11-03 | End: 2023-11-03

## 2023-11-03 RX ORDER — DEXMEDETOMIDINE HYDROCHLORIDE 100 UG/ML
INJECTION, SOLUTION INTRAVENOUS
Status: DISCONTINUED | OUTPATIENT
Start: 2023-11-03 | End: 2023-11-03

## 2023-11-03 RX ORDER — LIDOCAINE HYDROCHLORIDE 20 MG/ML
INJECTION INTRAVENOUS
Status: DISCONTINUED | OUTPATIENT
Start: 2023-11-03 | End: 2023-11-03

## 2023-11-03 RX ADMIN — PROPOFOL 125 MCG/KG/MIN: 10 INJECTION, EMULSION INTRAVENOUS at 10:11

## 2023-11-03 RX ADMIN — SODIUM CHLORIDE, SODIUM GLUCONATE, SODIUM ACETATE, POTASSIUM CHLORIDE, MAGNESIUM CHLORIDE, SODIUM PHOSPHATE, DIBASIC, AND POTASSIUM PHOSPHATE: .53; .5; .37; .037; .03; .012; .00082 INJECTION, SOLUTION INTRAVENOUS at 11:11

## 2023-11-03 RX ADMIN — DEXMEDETOMIDINE 8 MCG: 100 INJECTION, SOLUTION, CONCENTRATE INTRAVENOUS at 10:11

## 2023-11-03 RX ADMIN — PROPOFOL 20 MG: 10 INJECTION, EMULSION INTRAVENOUS at 11:11

## 2023-11-03 RX ADMIN — SODIUM CHLORIDE: 9 INJECTION, SOLUTION INTRAVENOUS at 09:11

## 2023-11-03 RX ADMIN — DEXMEDETOMIDINE 4 MCG: 100 INJECTION, SOLUTION, CONCENTRATE INTRAVENOUS at 10:11

## 2023-11-03 RX ADMIN — PROPOFOL 40 MG: 10 INJECTION, EMULSION INTRAVENOUS at 10:11

## 2023-11-03 RX ADMIN — PROPOFOL 100 MG: 10 INJECTION, EMULSION INTRAVENOUS at 10:11

## 2023-11-03 RX ADMIN — LIDOCAINE HYDROCHLORIDE 100 MG: 20 INJECTION INTRAVENOUS at 10:11

## 2023-11-03 RX ADMIN — SODIUM CHLORIDE: 0.9 INJECTION, SOLUTION INTRAVENOUS at 10:11

## 2023-11-03 NOTE — PLAN OF CARE
Discharge instructions reviewed with pt and spouse, verbalized understanding. VSS, IV removed intact, no distress noted. Pt dressed and being escorted to vehicle via wheelchair and spouse

## 2023-11-03 NOTE — PROVATION PATIENT INSTRUCTIONS
Discharge Summary/Instructions after an Endoscopic Procedure  Patient Name: Nathalie Krueger  Patient MRN: [id number]  Patient YOB: 1965  Friday, November 3, 2023  Felicita Villafana MD  Dear patient,  As a result of recent federal legislation (The Federal Cures Act), you may   receive lab or pathology results from your procedure in your [facility]   account before your physician is able to contact you. Your physician or   their representative will relay the results to you with their   recommendations at their soonest availability.  Thank you,  RESTRICTIONS:  During your procedure today, you received medications for sedation.  These   medications may affect your judgment, balance and coordination.  Therefore,   for 24 hours, you have the following restrictions:   - DO NOT drive a car, operate machinery, make legal/financial decisions,   sign important papers or drink alcohol.    ACTIVITY:  Today: no heavy lifting, straining or running due to procedural   sedation/anesthesia.  The following day: return to full activity including work.  DIET:  Eat and drink normally unless instructed otherwise.     TREATMENT FOR COMMON SIDE EFFECTS:  - Mild abdominal pain, nausea, belching, bloating or excessive gas:  rest,   eat lightly and use a heating pad.  - Sore Throat: treat with throat lozenges and/or gargle with warm salt   water.  - Because air was used during the procedure, expelling large amounts of air   from your rectum or belching is normal.  - If a bowel prep was taken, you may not have a bowel movement for 1-3 days.    This is normal.  SYMPTOMS TO WATCH FOR AND REPORT TO YOUR PHYSICIAN:  1. Abdominal pain or bloating, other than gas cramps.  2. Chest pain.  3. Back pain.  4. Signs of infection such as: chills or fever occurring within 24 hours   after the procedure.  5. Rectal bleeding, which would show as bright red, maroon, or black stools.   (A tablespoon of blood from the rectum is not serious, especially if    hemorrhoids are present.)  6. Vomiting.  7. Weakness or dizziness.  GO DIRECTLY TO THE NEAREST EMERGENCY ROOM IF YOU HAVE ANY OF THE FOLLOWING:      Difficulty breathing              Chills and/or fever over 101 F   Persistent vomiting and/or vomiting blood   Severe abdominal pain   Severe chest pain   Black, tarry stools   Bleeding- more than one tablespoon   Any other symptom or condition that you feel may need urgent attention  Your doctor recommends these additional instructions:  If any biopsies were taken, your doctors clinic will contact you in 1 to 2   weeks with any results.  - Proceed to colonoscopy.  - Resume previous diet.   - Continue present medications.   - Await pathology results.  For questions, problems or results please call your physician - Felicita Villafana MD at Work:  [phone].  [facility], EMERGENCY ROOM PHONE NUMBER: [phone]  IF A COMPLICATION OR EMERGENCY SITUATION ARISES AND YOU ARE UNABLE TO REACH   YOUR PHYSICIAN - GO DIRECTLY TO THE EMERGENCY ROOM.  Felicita Villafana MD  11/3/2023 11:55:51 AM  This report has been verified and signed electronically.  Dear patient,  As a result of recent federal legislation (The Federal Cures Act), you may   receive lab or pathology results from your procedure in your [facility]   account before your physician is able to contact you. Your physician or   their representative will relay the results to you with their   recommendations at their soonest availability.  Thank you,  PROVATION

## 2023-11-03 NOTE — H&P
Short Stay Endoscopy History and Physical    PCP - Tyron Flannery MD  Referring Physician - Felicita Villafana MD  [address] [place] [address]    Procedure - Endoscopy  ASA - per anesthesia  Mallampati - per anesthesia  History of Anesthesia problems - no  Family history Anesthesia problems -  no   Plan of anesthesia - General    HPI  58 y.o. female  Reason for procedure:   Dysphagia  Screenin\g    ROS:  Constitutional: No fevers, chills, No weight loss  CV: No chest pain  Pulm: No cough, No shortness of breath  GI: see HPI    Medical History:  has a past medical history of Acute bronchitis (02/01/2022), Adenomatous polyp of ascending colon (06/22/2020), Diabetes mellitus, Hypertension, Interstitial lung disease, and Sleep apnea.    Surgical History:  has a past surgical history that includes Tonsillectomy (1977); Appendectomy (1982); Rotator cuff repair (Right, 1998); ablation, fibroid, uterus, laparoscopic, with us guidance; Right heart catheterization (Right, 3/29/2023); and Bronchoscopy (N/A, 8/31/2023).    Family History: family history includes Diabetes in her father and mother; Heart disease in her mother; Hypertension in her mother..    Social History:  reports that she quit smoking about 3 years ago. Her smoking use included cigarettes. She started smoking about 28 years ago. She has a 12.5 pack-year smoking history. She has been exposed to tobacco smoke. She has never used smokeless tobacco. She reports that she does not currently use alcohol. She reports that she does not use drugs.    Review of patient's allergies indicates:   Allergen Reactions    Atorvastatin Other (See Comments)     Dry cough      Lisinopril      Other reaction(s): Cough    Meperidine hcl Nausea Only       Medications:   Medications Prior to Admission   Medication Sig Dispense Refill Last Dose    amLODIPine (NORVASC) 5 MG tablet TAKE 1 TABLET BY MOUTH EVERY DAY 90 tablet 3 11/3/2023    cetirizine (ZYRTEC) 10 MG  tablet Take 1 tablet (10 mg total) by mouth once daily. 360 tablet 0 11/2/2023    fluticasone-umeclidin-vilanter (TRELEGY ELLIPTA) 200-62.5-25 mcg inhaler Inhale 1 puff into the lungs once daily. 60 each 5 11/2/2023    metFORMIN (GLUCOPHAGE) 500 MG tablet TAKE 1 TABLET BY MOUTH EVERY DAY WITH BREAKFAST 90 tablet 1 11/2/2023    multivitamin capsule Take 1 capsule by mouth once daily.   11/2/2023    pantoprazole (PROTONIX) 40 MG tablet Take 1 tablet (40 mg total) by mouth once daily. 30 tablet 11 11/2/2023    rosuvastatin (CRESTOR) 5 MG tablet Take 1 tablet (5 mg total) by mouth once daily. 90 tablet 3 11/2/2023    albuterol (PROVENTIL/VENTOLIN HFA) 90 mcg/actuation inhaler Inhale 1-2 puffs into the lungs every 4 (four) hours as needed for Wheezing or Shortness of Breath. Rescue 54 g 5     albuterol-ipratropium (DUO-NEB) 2.5 mg-0.5 mg/3 mL nebulizer solution Take 3 mLs by nebulization 2 (two) times daily. Rescue 540 mL 1     azelastine (ASTELIN) 137 mcg (0.1 %) nasal spray SPRAY 1 SPRAY IN EACH NOSTRIL 2 TIMES DAILY. 90 mL 1     calcium carbonate/vitamin D3 (CALCIUM WITH VITAMIN D ORAL) Take 1 tablet by mouth once daily.       OZEMPIC 1 mg/dose (4 mg/3 mL) INJECT 1 MG INTO THE SKIN EVERY 7 DAYS. 9 mL 1 10/21/2023    polyethylene glycol (COLYTE) 240-22.72-6.72 -5.84 gram SolR Take as directed by provider office 8000 mL 0        Physical Exam:    Vital Signs:   Vitals:    11/03/23 0919   BP: 135/81   Pulse: 89   Resp: 16   Temp: 98.2 °F (36.8 °C)       General Appearance: Well appearing in no acute distress  Abdomen: Soft, non tender, non distended with normal bowel sounds, no masses    Labs:  Lab Results   Component Value Date    WBC 8.13 03/29/2023    HGB 13.6 03/29/2023    HCT 41.7 03/29/2023     03/29/2023    CHOL 242 (H) 09/14/2023    TRIG 167 (H) 09/14/2023    HDL 39 (L) 09/14/2023    ALT 21 03/29/2023    AST 20 03/29/2023     03/29/2023    K 4.3 03/29/2023     03/29/2023    CREATININE 0.8  03/29/2023    BUN 13 03/29/2023    CO2 26 03/29/2023    HGBA1C 6.4 (H) 09/14/2023       I have explained the risks and benefits of this endoscopic procedure to the patient including but not limited to bleeding, inflammation, infection, perforation, and death.      Enrique Peralta MD

## 2023-11-03 NOTE — PROVATION PATIENT INSTRUCTIONS
Discharge Summary/Instructions after an Endoscopic Procedure  Patient Name: Nathalie Krueger  Patient MRN: [id number]  Patient YOB: 1965  Friday, November 3, 2023  Felicita Villafana MD  Dear patient,  As a result of recent federal legislation (The Federal Cures Act), you may   receive lab or pathology results from your procedure in your [facility]   account before your physician is able to contact you. Your physician or   their representative will relay the results to you with their   recommendations at their soonest availability.  Thank you,  RESTRICTIONS:  During your procedure today, you received medications for sedation.  These   medications may affect your judgment, balance and coordination.  Therefore,   for 24 hours, you have the following restrictions:   - DO NOT drive a car, operate machinery, make legal/financial decisions,   sign important papers or drink alcohol.    ACTIVITY:  Today: no heavy lifting, straining or running due to procedural   sedation/anesthesia.  The following day: return to full activity including work.  DIET:  Eat and drink normally unless instructed otherwise.     TREATMENT FOR COMMON SIDE EFFECTS:  - Mild abdominal pain, nausea, belching, bloating or excessive gas:  rest,   eat lightly and use a heating pad.  - Sore Throat: treat with throat lozenges and/or gargle with warm salt   water.  - Because air was used during the procedure, expelling large amounts of air   from your rectum or belching is normal.  - If a bowel prep was taken, you may not have a bowel movement for 1-3 days.    This is normal.  SYMPTOMS TO WATCH FOR AND REPORT TO YOUR PHYSICIAN:  1. Abdominal pain or bloating, other than gas cramps.  2. Chest pain.  3. Back pain.  4. Signs of infection such as: chills or fever occurring within 24 hours   after the procedure.  5. Rectal bleeding, which would show as bright red, maroon, or black stools.   (A tablespoon of blood from the rectum is not serious, especially if    hemorrhoids are present.)  6. Vomiting.  7. Weakness or dizziness.  GO DIRECTLY TO THE NEAREST EMERGENCY ROOM IF YOU HAVE ANY OF THE FOLLOWING:      Difficulty breathing              Chills and/or fever over 101 F   Persistent vomiting and/or vomiting blood   Severe abdominal pain   Severe chest pain   Black, tarry stools   Bleeding- more than one tablespoon   Any other symptom or condition that you feel may need urgent attention  Your doctor recommends these additional instructions:  If any biopsies were taken, your doctors clinic will contact you in 1 to 2   weeks with any results.  - Discharge patient to home.   - Resume previous diet.   - Continue present medications.   - Await pathology results.   - Repeat colonoscopy in 5 years for surveillance.  For questions, problems or results please call your physician - Felicita Villafana MD at Work:  [phone].  [facility], EMERGENCY ROOM PHONE NUMBER: [phone]  IF A COMPLICATION OR EMERGENCY SITUATION ARISES AND YOU ARE UNABLE TO REACH   YOUR PHYSICIAN - GO DIRECTLY TO THE EMERGENCY ROOM.  Felicita Villafana MD  11/3/2023 12:01:38 PM  This report has been verified and signed electronically.  Dear patient,  As a result of recent federal legislation (The Federal Cures Act), you may   receive lab or pathology results from your procedure in your [facility]   account before your physician is able to contact you. Your physician or   their representative will relay the results to you with their   recommendations at their soonest availability.  Thank you,  PROVATION

## 2023-11-03 NOTE — TRANSFER OF CARE
"Anesthesia Transfer of Care Note    Patient: Nathalie Krueger    Procedure(s) Performed: Procedure(s) (LRB):  EGD (ESOPHAGOGASTRODUODENOSCOPY) (N/A)  COLONOSCOPY (N/A)    Patient location: [facility]    Anesthesia Type: general    Transport from OR: Transported from OR on 2-3 L/min O2 by NC with adequate spontaneous ventilation    Post pain: adequate analgesia    Post assessment: no apparent anesthetic complications    Post vital signs: stable    Level of consciousness: sedated    Nausea/Vomiting: no nausea/vomiting    Complications: none    Transfer of care protocol was followed      Last vitals: Visit Vitals  BP 92/56   Pulse 89   Temp 36.8 °C (98.2 °F) (Temporal)   Resp 16   Ht 5' 9" (1.753 m)   Wt 101.6 kg (224 lb)   SpO2 (!) 93%   Breastfeeding No   BMI 33.08 kg/m²     "

## 2023-11-03 NOTE — ANESTHESIA PREPROCEDURE EVALUATION
11/03/2023  Nathalie Krueger is a 58 y.o., female.      Pre-op Assessment          Review of Systems  Anesthesia Hx:  No problems with previous Anesthesia    Cardiovascular:   Denies Hypertension.  Denies CAD.     Functional Capacity Can you climb two flights of stairs? ==> Yes    Pulmonary:   Denies Asthma. Sleep Apnea       Interstitial lung disease with PRN inhalers and O2       Renal/:   Denies Chronic Renal Disease.     Hepatic/GI:   Denies PUD. GERD Denies Liver Disease.    Neurological:   Denies CVA. Denies Seizures.    Endocrine:   Diabetes Denies Hypothyroidism.        Physical Exam  General: Alert    Airway:  Mallampati: II / I  Mouth Opening: < 3 cm  TM Distance: Normal  Tongue: Normal  Neck ROM: Normal ROM    Dental:  Intact        Anesthesia Plan  Type of Anesthesia, risks & benefits discussed:    Anesthesia Type: Gen Natural Airway, MAC  Intra-op Monitoring Plan: Standard ASA Monitors  Post Op Pain Control Plan: multimodal analgesia and IV/PO Opioids PRN  Induction:  IV  Airway Plan: Direct  Informed Consent: Informed consent signed with the Patient and all parties understand the risks and agree with anesthesia plan.  All questions answered.   ASA Score: 3    Ready For Surgery From Anesthesia Perspective.     .

## 2023-11-05 NOTE — ANESTHESIA POSTPROCEDURE EVALUATION
Anesthesia Post Evaluation    Patient: Nathalie Krueger    Procedure(s) Performed: Procedure(s) (LRB):  EGD (ESOPHAGOGASTRODUODENOSCOPY) (N/A)  COLONOSCOPY (N/A)    Final Anesthesia Type: general      Patient location during evaluation: PACU  Patient participation: Yes- Able to Participate  Level of consciousness: awake  Post-procedure vital signs: reviewed and stable  Pain management: adequate  Airway patency: patent    PONV status at discharge: No PONV  Anesthetic complications: no      Cardiovascular status: blood pressure returned to baseline  Respiratory status: unassisted  Hydration status: euvolemic  Follow-up not needed.          Vitals Value Taken Time   /73 11/03/23 1300   Temp 36.8 °C (98.2 °F) 11/03/23 1300   Pulse 75 11/03/23 1300   Resp 18 11/03/23 1300   SpO2 99 % 11/03/23 1300         No case tracking events are documented in the log.      Pain/Narciso Score: No data recorded

## 2023-11-06 ENCOUNTER — PATIENT MESSAGE (OUTPATIENT)
Dept: FAMILY MEDICINE | Facility: CLINIC | Age: 58
End: 2023-11-06
Payer: COMMERCIAL

## 2023-11-06 ENCOUNTER — PATIENT MESSAGE (OUTPATIENT)
Dept: TRANSPLANT | Facility: CLINIC | Age: 58
End: 2023-11-06
Payer: COMMERCIAL

## 2023-11-07 ENCOUNTER — PATIENT MESSAGE (OUTPATIENT)
Dept: FAMILY MEDICINE | Facility: CLINIC | Age: 58
End: 2023-11-07
Payer: COMMERCIAL

## 2023-11-07 NOTE — TELEPHONE ENCOUNTER
Discussed patient with Dr. Ledesma during the ALD / Pre Lung Transplant Patient Review Meeting. Informed Dr. Ledesma that patient has a follow-up appointment scheduled on 11/16/23. Inquired if she wanted to keep that appointment or reschedule to a sooner appointment. Instructions received to keep the appointment on 11/16/23 unless patient feels she has to be seen sooner.     Message sent to patient via [facility] inquiring if she needs a sooner appointment than 11/16/23.

## 2023-11-09 NOTE — PROGRESS NOTES
History & Physical    SUBJECTIVE:     History of Present Illness:  Patient is a 58 y.o. female with DM2, GERD, HTN, HLD, bronchiectasis, ORLIN and chronic hypoxemic resp failure referred for consideration of lung biopsy. She is following with Dr. Lemon and Dr. Ledesma. Started on oxygen in Aug 2022. Typcially uses 2L at night and 3-4L with activity. Previous CT was concerning for early fibrosis. Underwent bronchoscopy with biopsies with were non diagnostic although concerning for aspiration. Esophagram with dysmotility. On PPI. Recently treated for possible pna vs ILD flare with abxs and steroids.  She has had an extensive autoimmune workup which revealed +DAYTON and low IgM. All other workup unrevealing. RHC with mPAP of 20 and PCWP of 16. lung transplant requesting wedge biopsy.     Former smoker. Quit 4 years ago.   PSH: appendectomy, rotator cuff repair.     Chief Complaint   Patient presents with    Consult       Review of patient's allergies indicates:   Allergen Reactions    Atorvastatin Other (See Comments)     Dry cough      Lisinopril      Other reaction(s): Cough    Meperidine hcl Nausea Only       Current Outpatient Medications   Medication Sig Dispense Refill    amLODIPine (NORVASC) 5 MG tablet TAKE 1 TABLET BY MOUTH EVERY DAY 90 tablet 3    azelastine (ASTELIN) 137 mcg (0.1 %) nasal spray SPRAY 1 SPRAY IN EACH NOSTRIL 2 TIMES DAILY. 90 mL 1    calcium carbonate/vitamin D3 (CALCIUM WITH VITAMIN D ORAL) Take 1 tablet by mouth once daily.      cetirizine (ZYRTEC) 10 MG tablet Take 1 tablet (10 mg total) by mouth once daily. 360 tablet 0    fluticasone-umeclidin-vilanter (TRELEGY ELLIPTA) 200-62.5-25 mcg inhaler Inhale 1 puff into the lungs once daily. 60 each 5    metFORMIN (GLUCOPHAGE) 500 MG tablet TAKE 1 TABLET BY MOUTH EVERY DAY WITH BREAKFAST 90 tablet 1    multivitamin capsule Take 1 capsule by mouth once daily.      OZEMPIC 1 mg/dose (4 mg/3 mL) INJECT 1 MG INTO THE SKIN EVERY 7 DAYS. 9 mL 1     pantoprazole (PROTONIX) 40 MG tablet Take 1 tablet (40 mg total) by mouth once daily. 30 tablet 11    rosuvastatin (CRESTOR) 5 MG tablet Take 1 tablet (5 mg total) by mouth once daily. 90 tablet 3    albuterol (PROVENTIL/VENTOLIN HFA) 90 mcg/actuation inhaler Inhale 1-2 puffs into the lungs every 4 (four) hours as needed for Wheezing or Shortness of Breath. Rescue 54 g 5    albuterol-ipratropium (DUO-NEB) 2.5 mg-0.5 mg/3 mL nebulizer solution Take 3 mLs by nebulization 2 (two) times daily. Rescue 540 mL 1     No current facility-administered medications for this visit.       Past Medical History:   Diagnosis Date    Acute bronchitis 02/01/2022    Adenomatous polyp of ascending colon 06/22/2020    Colon polyp 11/03/2023    3mm in transverse colon    Diabetes mellitus     Gastric polyps 11/03/2023    Interstitial lung disease      Past Surgical History:   Procedure Laterality Date    24 HOUR IMPEDANCE PH MONITORING OF ESOPHAGUS IN PATIENT NOT TAKING ACID REDUCING MEDICATIONS N/A 11/14/2023    Procedure: IMPEDANCE PH STUDY, ESOPHAGEAL, 24 HOUR, IN PATIENT NOT TAKING ACID REDUCING MEDICATION;  Surgeon: Felicita Villafana MD;  Location: [facility] (4TH FLR);  Service: Endoscopy;  Laterality: N/A;    ABLATION, FIBROID, UTERUS, LAPAROSCOPIC, WITH US GUIDANCE      APPENDECTOMY  1982    BRONCHOSCOPY N/A 8/31/2023    Procedure: Bronchoscopy(need fluoro);  Surgeon: Sarah Lemon MD;  Location: [facility];  Service: Pulmonary;  Laterality: N/A;  transbronchial biopsies need fluoro    COLONOSCOPY N/A 11/3/2023    Procedure: COLONOSCOPY;  Surgeon: Felicita Villafana MD;  Location: [facility] (2ND FLR);  Service: Endoscopy;  Laterality: N/A;  10/26/23-per Eileen adjust to 60 min case - ERW  10/27-precall complete-pt verbalized understanding of holding Ozempic-MS    ESOPHAGEAL MANOMETRY WITH MEASUREMENT OF IMPEDANCE N/A 11/14/2023    Procedure: MANOMETRY, ESOPHAGUS, WITH IMPEDANCE MEASUREMENT;  Surgeon: Felicita Villafana MD;   Location: [facility] (4TH FLR);  Service: Endoscopy;  Laterality: N/A;  esophageal manometry with dysphagia protocol   followed by 24 hr ph off PPI  11/8-precall complete-MS    ESOPHAGOGASTRODUODENOSCOPY N/A 11/3/2023    Procedure: EGD (ESOPHAGOGASTRODUODENOSCOPY);  Surgeon: Felicita Villafana MD;  Location: [facility] ENDO (2ND FLR);  Service: Endoscopy;  Laterality: N/A;  egd/endoflip and colon  extended prep  peg prep  prep instructions sent to pt via portal  diabetic -metformin  wl meds *ozempic-hold 7 days prior  02-2liters  2nd floor-Severe pulmonary Disease    RIGHT HEART CATHETERIZATION Right 3/29/2023    Procedure: INSERTION, CATHETER, RIGHT HEART;  Surgeon: Yulisa Yung DO;  Location: [facility] CATH LAB;  Service: Cardiology;  Laterality: Right;    ROTATOR CUFF REPAIR Right 1998    TONSILLECTOMY  1977     Family History   Problem Relation Age of Onset    Diabetes Mother     Hypertension Mother     Heart disease Mother     Diabetes Father     Glaucoma Neg Hx     Macular degeneration Neg Hx     Colon cancer Neg Hx     Esophageal cancer Neg Hx      Social History     Tobacco Use    Smoking status: Former     Current packs/day: 0.00     Average packs/day: 0.5 packs/day for 25.0 years (12.5 ttl pk-yrs)     Types: Cigarettes     Start date: 12/16/1994     Quit date: 12/16/2019     Years since quitting: 3.9     Passive exposure: Past    Smokeless tobacco: Never    Tobacco comments:     Smoked additive free tobacco   Substance Use Topics    Alcohol use: Not Currently    Drug use: Never        Review of Systems:  Review of Systems   Constitutional:  Negative for activity change and appetite change.   Respiratory:  Positive for shortness of breath.    Cardiovascular:  Negative for chest pain and palpitations.   Gastrointestinal:  Negative for abdominal pain.   Genitourinary:  Negative for difficulty urinating.   Musculoskeletal:  Negative for arthralgias.   Skin:  Negative for color change and rash.   Neurological:   "Negative for dizziness.   Hematological:  Negative for adenopathy.   Psychiatric/Behavioral:  Negative for agitation. The patient is not nervous/anxious.        OBJECTIVE:     Vital Signs (Most Recent)  Vitals:    11/16/23 1052   BP: 129/88   Pulse: 98   SpO2: (!) 92%   Weight: 102 kg (224 lb 13.9 oz)   Height: 5' 9" (1.753 m)   PainSc: 0-No pain         Physical Exam:  Physical Exam  Constitutional:       Appearance: Normal appearance.   HENT:      Head: Atraumatic.   Cardiovascular:      Rate and Rhythm: Normal rate and regular rhythm.      Pulses: Normal pulses.      Heart sounds: Normal heart sounds.   Pulmonary:      Effort: Pulmonary effort is normal.      Breath sounds: Normal breath sounds.   Abdominal:      Palpations: Abdomen is soft.   Musculoskeletal:         General: Normal range of motion.      Cervical back: Normal range of motion.   Skin:     General: Skin is warm and dry.   Neurological:      General: No focal deficit present.      Mental Status: She is alert and oriented to person, place, and time.   Psychiatric:         Mood and Affect: Mood normal.         Behavior: Behavior normal.         March 2023 RHC:   Filling pressures: RA 10, PCWP 16  PA 36/20, mean PA pressure 20 mmHg  PVR 1.5 DUQUE  PA saturation 70%, Ao saturation 97%  Fletcher CO/CI: 5.9/2.66  /100  with repeat intraprocedure similar results. On recheck post procedure 133/75  HR 71 SR  Hb 13.6    Aug 2023:   LEFT UPPER LOBE TRANSBRONCHIAL BIOPSIES:   - FOCAL ATYPICAL ALVEOLAR CELL HYPERPLASIA NOT DIAGNOSTIC OF MALIGNANCY.   - FOCAL INTERSTITIAL CHRONIC PNEUMONITIS WITH ASSOCIATED MULTINUCLEATED    HISTIOCYTE WITH CHOLESTEROL CLEFT, NONSPECIFIC.   - VARIABLE INTERSTITIAL FIBROSIS.   - NEGATIVE FOR GRANULOMAS.   - NEGATIVE FOR VASCULITIS.   - BRONCHIAL MUCOSA WITH MILD CHRONIC INFLAMMATION.           10/05/2023---------Distance: 365.76 meters (1200 feet)       Lap Walk Time O2 Sat % Supplemental Oxygen Heart Rate Blood Pressure " Ashlie Scale Comment   Pre-exercise  (Resting) 0 0 96 % 3 L/M 81 bpm 134/87 mmHg 0     During Exercise 1 52 sec 95 % 3 L/M 98 bpm         During Exercise 2 105 sec 89 % 3 L/M 102 bpm         During Exercise 3 168 sec 88 % 3 L/M 107 bpm     Oxygen increased   During Exercise 4 234 sec 90 % 4 L/M 105 bpm         During Exercise 5 295 sec 89 % 4 L/M 109 bpm         End of Exercise 6 360 sec 90 % 4 L/M 100 bpm 159/86 mmHg 0     Post-exercise  (Recovery)     95 % 4 L/M  90 bpm 147/84 mmHg          Recovery Time: 95 seconds      Chest CT Nov 2023: images reviewed      ASSESSMENT/PLAN:     Patient is a 58 y.o. female with DM2, GERD, HTN, HLD, bronchiectasis, ORLIN and chronic hypoxemic resp failure referred for consideration of lung biopsy.     PLAN:Plan     Pre op labs. Pending, proceed to OR for Bronchoscopy with BAL and Right VATS wedge  Appropriate patient education regarding the bebeto-operative period as well as intraoperative details were discussed. Risks, including but not limited to, bleeding, infection, pain and anesthetic complication were discussed. Patient was given the opportunity to ask questions and to have those questions answered to their satisfaction. Patient verbalized understanding to both procedure and associated risks. Consent was obtained.

## 2023-11-14 ENCOUNTER — HOSPITAL ENCOUNTER (OUTPATIENT)
Facility: HOSPITAL | Age: 58
Discharge: HOME | End: 2023-11-14
Attending: INTERNAL MEDICINE | Admitting: INTERNAL MEDICINE
Payer: COMMERCIAL

## 2023-11-14 VITALS
DIASTOLIC BLOOD PRESSURE: 94 MMHG | SYSTOLIC BLOOD PRESSURE: 134 MMHG | RESPIRATION RATE: 15 BRPM | HEIGHT: 69 IN | BODY MASS INDEX: 33.18 KG/M2 | HEART RATE: 81 BPM | WEIGHT: 224 LBS | TEMPERATURE: 97 F | OXYGEN SATURATION: 95 %

## 2023-11-14 DIAGNOSIS — R13.10 DYSPHAGIA: ICD-10-CM

## 2023-11-14 PROCEDURE — 91037 PR GERD TST W/ NASAL IMPEDENCE ELECTROD: ICD-10-PCS | Mod: 26,NTX,, | Performed by: INTERNAL MEDICINE

## 2023-11-14 PROCEDURE — 91010 ESOPHAGUS MOTILITY STUDY: CPT | Mod: TC,TXP | Performed by: INTERNAL MEDICINE

## 2023-11-14 PROCEDURE — 91010 ESOPHAGUS MOTILITY STUDY: CPT | Mod: 26,NTX,, | Performed by: INTERNAL MEDICINE

## 2023-11-14 PROCEDURE — 91037 ESOPH IMPED FUNCTION TEST: CPT | Mod: TC,NTX | Performed by: INTERNAL MEDICINE

## 2023-11-14 PROCEDURE — 91010 PR ESOPHAGEAL MOTILITY STUDY, MA2METRY: ICD-10-PCS | Mod: 26,NTX,, | Performed by: INTERNAL MEDICINE

## 2023-11-14 PROCEDURE — 25000003 PHARM REV CODE 250: Mod: NTX | Performed by: INTERNAL MEDICINE

## 2023-11-14 PROCEDURE — 91037 ESOPH IMPED FUNCTION TEST: CPT | Mod: 26,NTX,, | Performed by: INTERNAL MEDICINE

## 2023-11-14 RX ORDER — SODIUM CHLORIDE 9 MG/ML
INJECTION, SOLUTION INTRAVENOUS CONTINUOUS
Status: DISCONTINUED | OUTPATIENT
Start: 2023-11-14 | End: 2023-11-14 | Stop reason: HOSPADM

## 2023-11-14 RX ORDER — LIDOCAINE HYDROCHLORIDE 20 MG/ML
JELLY TOPICAL ONCE
Status: COMPLETED | OUTPATIENT
Start: 2023-11-14 | End: 2023-11-14

## 2023-11-14 RX ADMIN — LIDOCAINE HYDROCHLORIDE 10 ML: 20 JELLY TOPICAL at 07:11

## 2023-11-15 NOTE — PLAN OF CARE
Pt reported to endoscopy department. Recorder & pt diary given to RN. Pt's catheter noted to AIDA preciado, taped securely at 38cm. Catheter removed, noted intact; discarded. Pt tolerated well.

## 2023-11-15 NOTE — PROVATION PATIENT INSTRUCTIONS
Discharge Summary/Instructions after an Endoscopic Procedure  Patient Name: Nathalie Krueger  Patient MRN: [id number]  Patient YOB: 1965  Wednesday, November 15, 2023  Felicita Villafana MD  Dear patient,  As a result of recent federal legislation (The Federal Cures Act), you may   receive lab or pathology results from your procedure in your [facility]   account before your physician is able to contact you. Your physician or   their representative will relay the results to you with their   recommendations at their soonest availability.  Thank you,  RESTRICTIONS:  During your procedure today, you received medications for sedation.  These   medications may affect your judgment, balance and coordination.  Therefore,   for 24 hours, you have the following restrictions:   - DO NOT drive a car, operate machinery, make legal/financial decisions,   sign important papers or drink alcohol.    ACTIVITY:  Today: no heavy lifting, straining or running due to procedural   sedation/anesthesia.  The following day: return to full activity including work.  DIET:  Eat and drink normally unless instructed otherwise.     TREATMENT FOR COMMON SIDE EFFECTS:  - Mild abdominal pain, nausea, belching, bloating or excessive gas:  rest,   eat lightly and use a heating pad.  - Sore Throat: treat with throat lozenges and/or gargle with warm salt   water.  - Because air was used during the procedure, expelling large amounts of air   from your rectum or belching is normal.  - If a bowel prep was taken, you may not have a bowel movement for 1-3 days.    This is normal.  SYMPTOMS TO WATCH FOR AND REPORT TO YOUR PHYSICIAN:  1. Abdominal pain or bloating, other than gas cramps.  2. Chest pain.  3. Back pain.  4. Signs of infection such as: chills or fever occurring within 24 hours   after the procedure.  5. Rectal bleeding, which would show as bright red, maroon, or black stools.   (A tablespoon of blood from the rectum is not serious, especially  if   hemorrhoids are present.)  6. Vomiting.  7. Weakness or dizziness.  GO DIRECTLY TO THE NEAREST EMERGENCY ROOM IF YOU HAVE ANY OF THE FOLLOWING:      Difficulty breathing              Chills and/or fever over 101 F   Persistent vomiting and/or vomiting blood   Severe abdominal pain   Severe chest pain   Black, tarry stools   Bleeding- more than one tablespoon   Any other symptom or condition that you feel may need urgent attention  Your doctor recommends these additional instructions:  If any biopsies were taken, your doctors clinic will contact you in 1 to 2   weeks with any results.  Results to be discussed with the patient.  For questions, problems or results please call your physician - Felicita Villafana MD at Work:  [phone].  [facility], EMERGENCY ROOM PHONE NUMBER: [phone]  IF A COMPLICATION OR EMERGENCY SITUATION ARISES AND YOU ARE UNABLE TO REACH   YOUR PHYSICIAN - GO DIRECTLY TO THE EMERGENCY ROOM.  Felicita Villafana MD  11/15/2023 12:43:50 PM  This report has been verified and signed electronically.  Dear patient,  As a result of recent federal legislation (The Federal Cures Act), you may   receive lab or pathology results from your procedure in your [facility]   account before your physician is able to contact you. Your physician or   their representative will relay the results to you with their   recommendations at their soonest availability.  Thank you,  PROVATION

## 2023-11-15 NOTE — PROVATION PATIENT INSTRUCTIONS
Discharge Summary/Instructions after an Endoscopic Procedure  Patient Name: Nathalie Krueger  Patient MRN: [id number]  Patient YOB: 1965  Wednesday, November 15, 2023  Felicita Villafana MD  Dear patient,  As a result of recent federal legislation (The Federal Cures Act), you may   receive lab or pathology results from your procedure in your [facility]   account before your physician is able to contact you. Your physician or   their representative will relay the results to you with their   recommendations at their soonest availability.  Thank you,  RESTRICTIONS:  During your procedure today, you received medications for sedation.  These   medications may affect your judgment, balance and coordination.  Therefore,   for 24 hours, you have the following restrictions:   - DO NOT drive a car, operate machinery, make legal/financial decisions,   sign important papers or drink alcohol.    ACTIVITY:  Today: no heavy lifting, straining or running due to procedural   sedation/anesthesia.  The following day: return to full activity including work.  DIET:  Eat and drink normally unless instructed otherwise.     TREATMENT FOR COMMON SIDE EFFECTS:  - Mild abdominal pain, nausea, belching, bloating or excessive gas:  rest,   eat lightly and use a heating pad.  - Sore Throat: treat with throat lozenges and/or gargle with warm salt   water.  - Because air was used during the procedure, expelling large amounts of air   from your rectum or belching is normal.  - If a bowel prep was taken, you may not have a bowel movement for 1-3 days.    This is normal.  SYMPTOMS TO WATCH FOR AND REPORT TO YOUR PHYSICIAN:  1. Abdominal pain or bloating, other than gas cramps.  2. Chest pain.  3. Back pain.  4. Signs of infection such as: chills or fever occurring within 24 hours   after the procedure.  5. Rectal bleeding, which would show as bright red, maroon, or black stools.   (A tablespoon of blood from the rectum is not serious, especially  if   hemorrhoids are present.)  6. Vomiting.  7. Weakness or dizziness.  GO DIRECTLY TO THE NEAREST EMERGENCY ROOM IF YOU HAVE ANY OF THE FOLLOWING:      Difficulty breathing              Chills and/or fever over 101 F   Persistent vomiting and/or vomiting blood   Severe abdominal pain   Severe chest pain   Black, tarry stools   Bleeding- more than one tablespoon   Any other symptom or condition that you feel may need urgent attention  Your doctor recommends these additional instructions:  If any biopsies were taken, your doctors clinic will contact you in 1 to 2   weeks with any results.  Results to be discussed with the patient.  For questions, problems or results please call your physician - Felicita Villafana MD at Work:  [phone].  [facility], EMERGENCY ROOM PHONE NUMBER: [phone]  IF A COMPLICATION OR EMERGENCY SITUATION ARISES AND YOU ARE UNABLE TO REACH   YOUR PHYSICIAN - GO DIRECTLY TO THE EMERGENCY ROOM.  Felicita Villafana MD  11/15/2023 11:00:42 AM  This report has been verified and signed electronically.  Dear patient,  As a result of recent federal legislation (The Federal Cures Act), you may   receive lab or pathology results from your procedure in your [facility]   account before your physician is able to contact you. Your physician or   their representative will relay the results to you with their   recommendations at their soonest availability.  Thank you,  PROVATION

## 2023-11-16 ENCOUNTER — HOSPITAL ENCOUNTER (OUTPATIENT)
Dept: PULMONOLOGY | Facility: CLINIC | Age: 58
Discharge: HOME | End: 2023-11-16
Payer: COMMERCIAL

## 2023-11-16 ENCOUNTER — HOSPITAL ENCOUNTER (OUTPATIENT)
Dept: RADIOLOGY | Facility: HOSPITAL | Age: 58
Discharge: HOME | End: 2023-11-16
Payer: COMMERCIAL

## 2023-11-16 ENCOUNTER — OFFICE VISIT (OUTPATIENT)
Dept: CARDIOTHORACIC SURGERY | Facility: CLINIC | Age: 58
End: 2023-11-16
Payer: COMMERCIAL

## 2023-11-16 ENCOUNTER — OFFICE VISIT (OUTPATIENT)
Dept: TRANSPLANT | Facility: CLINIC | Age: 58
End: 2023-11-16
Payer: COMMERCIAL

## 2023-11-16 VITALS
SYSTOLIC BLOOD PRESSURE: 129 MMHG | HEIGHT: 69 IN | DIASTOLIC BLOOD PRESSURE: 88 MMHG | BODY MASS INDEX: 33.31 KG/M2 | HEART RATE: 98 BPM | OXYGEN SATURATION: 92 % | WEIGHT: 227.06 LBS | BODY MASS INDEX: 33.63 KG/M2 | HEIGHT: 69 IN | WEIGHT: 224.88 LBS

## 2023-11-16 VITALS
DIASTOLIC BLOOD PRESSURE: 88 MMHG | HEART RATE: 98 BPM | HEIGHT: 69 IN | WEIGHT: 224.88 LBS | BODY MASS INDEX: 33.31 KG/M2 | OXYGEN SATURATION: 92 % | SYSTOLIC BLOOD PRESSURE: 129 MMHG

## 2023-11-16 DIAGNOSIS — D68.9 COAGULOPATHY: Primary | ICD-10-CM

## 2023-11-16 DIAGNOSIS — J84.9 ILD (INTERSTITIAL LUNG DISEASE): ICD-10-CM

## 2023-11-16 DIAGNOSIS — J84.9 ILD (INTERSTITIAL LUNG DISEASE): Primary | ICD-10-CM

## 2023-11-16 DIAGNOSIS — J96.11 CHRONIC HYPOXEMIC RESPIRATORY FAILURE: ICD-10-CM

## 2023-11-16 DIAGNOSIS — R91.1 LUNG NODULE: ICD-10-CM

## 2023-11-16 LAB
FINAL PATHOLOGIC DIAGNOSIS: NORMAL
GROSS: NORMAL
Lab: NORMAL

## 2023-11-16 PROCEDURE — 3074F SYST BP LT 130 MM HG: CPT | Mod: CPTII,NTX,S$GLB, | Performed by: STUDENT IN AN ORGANIZED HEALTH CARE EDUCATION/TRAINING PROGRAM

## 2023-11-16 PROCEDURE — 99999 PR PBB SHADOW E&M-EST. PATIENT-LVL V: CPT | Mod: PBBFAC,TXP,, | Performed by: STUDENT IN AN ORGANIZED HEALTH CARE EDUCATION/TRAINING PROGRAM

## 2023-11-16 PROCEDURE — 3061F PR NEG MICROALBUMINURIA RESULT DOCUMENTED/REVIEW: ICD-10-PCS | Mod: CPTII,NTX,S$GLB, | Performed by: INTERNAL MEDICINE

## 2023-11-16 PROCEDURE — 3061F NEG MICROALBUMINURIA REV: CPT | Mod: CPTII,NTX,S$GLB, | Performed by: INTERNAL MEDICINE

## 2023-11-16 PROCEDURE — 99999 PR PBB SHADOW E&M-EST. PATIENT-LVL V: ICD-10-PCS | Mod: PBBFAC,TXP,, | Performed by: STUDENT IN AN ORGANIZED HEALTH CARE EDUCATION/TRAINING PROGRAM

## 2023-11-16 PROCEDURE — 3074F PR MOST RECENT SYSTOLIC BLOOD PRESSURE < 130 MM HG: ICD-10-PCS | Mod: CPTII,NTX,S$GLB, | Performed by: STUDENT IN AN ORGANIZED HEALTH CARE EDUCATION/TRAINING PROGRAM

## 2023-11-16 PROCEDURE — 3008F PR BODY MASS INDEX (BMI) DOCUMENTED: ICD-10-PCS | Mod: CPTII,NTX,S$GLB, | Performed by: STUDENT IN AN ORGANIZED HEALTH CARE EDUCATION/TRAINING PROGRAM

## 2023-11-16 PROCEDURE — 3008F BODY MASS INDEX DOCD: CPT | Mod: CPTII,NTX,S$GLB, | Performed by: INTERNAL MEDICINE

## 2023-11-16 PROCEDURE — 71250 CT THORAX DX C-: CPT | Mod: TC,TXP

## 2023-11-16 PROCEDURE — 3066F PR DOCUMENTATION OF TREATMENT FOR NEPHROPATHY: ICD-10-PCS | Mod: CPTII,NTX,S$GLB, | Performed by: INTERNAL MEDICINE

## 2023-11-16 PROCEDURE — 99214 OFFICE O/P EST MOD 30 MIN: CPT | Mod: 25,NTX,S$GLB, | Performed by: INTERNAL MEDICINE

## 2023-11-16 PROCEDURE — 71250 CT CHEST WITHOUT CONTRAST: ICD-10-PCS | Mod: 26,NTX,, | Performed by: STUDENT IN AN ORGANIZED HEALTH CARE EDUCATION/TRAINING PROGRAM

## 2023-11-16 PROCEDURE — 71250 CT THORAX DX C-: CPT | Mod: 26,NTX,, | Performed by: STUDENT IN AN ORGANIZED HEALTH CARE EDUCATION/TRAINING PROGRAM

## 2023-11-16 PROCEDURE — 3044F HG A1C LEVEL LT 7.0%: CPT | Mod: CPTII,NTX,S$GLB, | Performed by: INTERNAL MEDICINE

## 2023-11-16 PROCEDURE — 94010 BREATHING CAPACITY TEST: CPT | Mod: 59,NTX,S$GLB, | Performed by: INTERNAL MEDICINE

## 2023-11-16 PROCEDURE — 3044F PR MOST RECENT HEMOGLOBIN A1C LEVEL <7.0%: ICD-10-PCS | Mod: CPTII,NTX,S$GLB, | Performed by: STUDENT IN AN ORGANIZED HEALTH CARE EDUCATION/TRAINING PROGRAM

## 2023-11-16 PROCEDURE — 3079F PR MOST RECENT DIASTOLIC BLOOD PRESSURE 80-89 MM HG: ICD-10-PCS | Mod: CPTII,NTX,S$GLB, | Performed by: STUDENT IN AN ORGANIZED HEALTH CARE EDUCATION/TRAINING PROGRAM

## 2023-11-16 PROCEDURE — 3061F NEG MICROALBUMINURIA REV: CPT | Mod: CPTII,NTX,S$GLB, | Performed by: STUDENT IN AN ORGANIZED HEALTH CARE EDUCATION/TRAINING PROGRAM

## 2023-11-16 PROCEDURE — 94618 PULMONARY STRESS TESTING: CPT | Mod: NTX,S$GLB,, | Performed by: INTERNAL MEDICINE

## 2023-11-16 PROCEDURE — 3008F PR BODY MASS INDEX (BMI) DOCUMENTED: ICD-10-PCS | Mod: CPTII,NTX,S$GLB, | Performed by: INTERNAL MEDICINE

## 2023-11-16 PROCEDURE — 3008F BODY MASS INDEX DOCD: CPT | Mod: CPTII,NTX,S$GLB, | Performed by: STUDENT IN AN ORGANIZED HEALTH CARE EDUCATION/TRAINING PROGRAM

## 2023-11-16 PROCEDURE — 3044F PR MOST RECENT HEMOGLOBIN A1C LEVEL <7.0%: ICD-10-PCS | Mod: CPTII,NTX,S$GLB, | Performed by: INTERNAL MEDICINE

## 2023-11-16 PROCEDURE — 99999 PR PBB SHADOW E&M-EST. PATIENT-LVL III: ICD-10-PCS | Mod: PBBFAC,TXP,, | Performed by: INTERNAL MEDICINE

## 2023-11-16 PROCEDURE — 94010 BREATHING CAPACITY TEST: ICD-10-PCS | Mod: 59,NTX,S$GLB, | Performed by: INTERNAL MEDICINE

## 2023-11-16 PROCEDURE — 94727 GAS DIL/WSHOT DETER LNG VOL: CPT | Mod: NTX,S$GLB,, | Performed by: INTERNAL MEDICINE

## 2023-11-16 PROCEDURE — 99999 PR PBB SHADOW E&M-EST. PATIENT-LVL III: CPT | Mod: PBBFAC,TXP,, | Performed by: INTERNAL MEDICINE

## 2023-11-16 PROCEDURE — 3074F SYST BP LT 130 MM HG: CPT | Mod: CPTII,NTX,S$GLB, | Performed by: INTERNAL MEDICINE

## 2023-11-16 PROCEDURE — 3066F NEPHROPATHY DOC TX: CPT | Mod: CPTII,NTX,S$GLB, | Performed by: STUDENT IN AN ORGANIZED HEALTH CARE EDUCATION/TRAINING PROGRAM

## 2023-11-16 PROCEDURE — 3079F PR MOST RECENT DIASTOLIC BLOOD PRESSURE 80-89 MM HG: ICD-10-PCS | Mod: CPTII,NTX,S$GLB, | Performed by: INTERNAL MEDICINE

## 2023-11-16 PROCEDURE — 3066F NEPHROPATHY DOC TX: CPT | Mod: CPTII,NTX,S$GLB, | Performed by: INTERNAL MEDICINE

## 2023-11-16 PROCEDURE — 3061F PR NEG MICROALBUMINURIA RESULT DOCUMENTED/REVIEW: ICD-10-PCS | Mod: CPTII,NTX,S$GLB, | Performed by: STUDENT IN AN ORGANIZED HEALTH CARE EDUCATION/TRAINING PROGRAM

## 2023-11-16 PROCEDURE — 3079F DIAST BP 80-89 MM HG: CPT | Mod: CPTII,NTX,S$GLB, | Performed by: INTERNAL MEDICINE

## 2023-11-16 PROCEDURE — 99205 PR OFFICE/OUTPT VISIT, NEW, LEVL V, 60-74 MIN: ICD-10-PCS | Mod: NTX,S$GLB,, | Performed by: STUDENT IN AN ORGANIZED HEALTH CARE EDUCATION/TRAINING PROGRAM

## 2023-11-16 PROCEDURE — 3074F PR MOST RECENT SYSTOLIC BLOOD PRESSURE < 130 MM HG: ICD-10-PCS | Mod: CPTII,NTX,S$GLB, | Performed by: INTERNAL MEDICINE

## 2023-11-16 PROCEDURE — 94618 PULMONARY STRESS TESTING: ICD-10-PCS | Mod: NTX,S$GLB,, | Performed by: INTERNAL MEDICINE

## 2023-11-16 PROCEDURE — 3066F PR DOCUMENTATION OF TREATMENT FOR NEPHROPATHY: ICD-10-PCS | Mod: CPTII,NTX,S$GLB, | Performed by: STUDENT IN AN ORGANIZED HEALTH CARE EDUCATION/TRAINING PROGRAM

## 2023-11-16 PROCEDURE — 3079F DIAST BP 80-89 MM HG: CPT | Mod: CPTII,NTX,S$GLB, | Performed by: STUDENT IN AN ORGANIZED HEALTH CARE EDUCATION/TRAINING PROGRAM

## 2023-11-16 PROCEDURE — 99205 OFFICE O/P NEW HI 60 MIN: CPT | Mod: NTX,S$GLB,, | Performed by: STUDENT IN AN ORGANIZED HEALTH CARE EDUCATION/TRAINING PROGRAM

## 2023-11-16 PROCEDURE — 94729 DIFFUSING CAPACITY: CPT | Mod: NTX,S$GLB,, | Performed by: INTERNAL MEDICINE

## 2023-11-16 PROCEDURE — 99214 PR OFFICE/OUTPT VISIT, EST, LEVL IV, 30-39 MIN: ICD-10-PCS | Mod: 25,NTX,S$GLB, | Performed by: INTERNAL MEDICINE

## 2023-11-16 PROCEDURE — 94727 PR PULM FUNCTION TEST BY GAS: ICD-10-PCS | Mod: NTX,S$GLB,, | Performed by: INTERNAL MEDICINE

## 2023-11-16 PROCEDURE — 3044F HG A1C LEVEL LT 7.0%: CPT | Mod: CPTII,NTX,S$GLB, | Performed by: STUDENT IN AN ORGANIZED HEALTH CARE EDUCATION/TRAINING PROGRAM

## 2023-11-16 PROCEDURE — 94729 PR C02/MEMBANE DIFFUSE CAPACITY: ICD-10-PCS | Mod: NTX,S$GLB,, | Performed by: INTERNAL MEDICINE

## 2023-11-16 NOTE — PROGRESS NOTES
ADVANCED LUNG DISEASE CLINIC INITIAL EVALUATION                                                                                                                                             Reason for Visit:  ILD    Referring Physician: Lesly Ledesma MD    History of Present Illness: Nathalie Krueger is a 58 y.o. female who is on 2-4L of oxygen with exertion.  She is on CPAP.  Her [place] Heart Association Class is II and a Karnofsky score of 80% - Normal activity with effort: some symptoms of disease. She is diabetic non insulin dependent    At last visit 10/5/2023 she was started on a prednisone taper in addition to antibiotics for pneumonia and suspected ILD flare. She completed her course of steroids on 10/26.     She reports she felt great while on steroids, had significantly more energy and less coughing. Since complete steroid taper has had problems with fatigue. She also feels like she is using oxygen more although doesn't necessarily feel more short of breath. She typically uses 2L of oxygen around the house, intermittently uses oxygen at rest, and uses 4L of oxygen when exercising. She has been putting her oxygen on more frequently when at rest and when doing things around the house. Her wife has noticed that she gets more short of breath when doing small tasks around the house. Typically puts on oxygen if her SpO2 drops below 90%, had not noticed any desaturations below 88%. She reports a chronic cough, slightly worse since she has come off steroids but not worse than a few months ago.     She contributes some of her fatigue to frequent testing she's had to do over the last several weeks. Had a colonoscopy and also had a esophageal manometry. Acid reflux symptoms have been worse as she had to stop her PPI in preparation for the test.     She reports good compliance with her CPAP. Denies any post-nasal drip.     She has not been exercising as consistently since coming off steroids due to fatigue and  the colder weather. Walking about once a week. Was previously riding the bike and swimming more consistently.     PER INITIAL HPI:  Patient presents today for evaluation of ILD. Patient states symptoms first began over three years ago when she lived in [place]. Per patient's wife, patient had developed progressive fatigue and dyspnea with exertion. She was still able to perform her ADLs independently, but noticed she would get breathless with activity very quickly. Previously lived on family's [place] in [place]. She states she had an episode of profound fatigue/malaise one week following clean out of family's chicken coup. Frequent exposures to dust, pesticides, fertilizers while living in [place]. Patient and her wife moved to [place] ~two years ago. While living downtown, patient noticed increased cough, worsening fatigue requiring frequent daytime naps. [facility] and found she had worsening cough if other residents smoked within the building. She presented to the ED in early 2022 due to hypoxemia. She checked her pulse ox at home and reportedly had oxygen saturation of 76%. She did not require hospitalization. No prior chest surgeries, ICU admission, intubations, lung biopsy.     In August 2022, patient had acute worsening of her dyspnea and cough. She was started on 2L oxygen to be used with exertion. 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 Since starting CPAP, notes improvement in daytime fatigue. She completed a trial of steroids in the fall of 2022 after evaluation with Dr. Rodas at [facility] pulm and states her symptoms improved drastically. No recent exacerbations/hospitalizations. She has had an extensive autoimmune workup which revealed +DAYTON and low IgM. All other workup unrevealing. Currently of stiolto and symbicort for management.     Patient is a former 25-30 year smoker and quit 3-4 years ago. Her father had lung disease related to diving in the  [employer], but otherwise denies family history of lung disease. No history of frequent infections as a child. She states she did have a history of scarlet fever as a child while living in [place] and was told she should not live there in the future? Also states she used a 20 year old inhaler she bought in [place] while still living in [place] and is concerned she may have aspirated mold. She occasionally has episodes of flushing and rashes. No arthralgias. History of reflux and recently stopped omeprazole in hopes of improving symptoms with diet modification. She recently lost ~10 pounds. Continues to have intermittent reflux despite diet changes. She states she remains very active and walks and performs water aerobics frequently. 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 Remains independent with her ADLs, but does struggle with vacuuming, bending over, and climbing stairs.       Past Medical History:   Diagnosis Date    Acute bronchitis 02/01/2022    Adenomatous polyp of ascending colon 06/22/2020    Colon polyp 11/03/2023    3mm in transverse colon    Diabetes mellitus     Gastric polyps 11/03/2023    Interstitial lung disease        Past Surgical History:   Procedure Laterality Date    24 HOUR IMPEDANCE PH MONITORING OF ESOPHAGUS IN PATIENT NOT TAKING ACID REDUCING MEDICATIONS N/A 11/14/2023    Procedure: IMPEDANCE PH STUDY, ESOPHAGEAL, 24 HOUR, IN PATIENT NOT TAKING ACID REDUCING MEDICATION;  Surgeon: Felicita Villafana MD;  Location: [facility] (4TH FLR);  Service: Endoscopy;  Laterality: N/A;    ABLATION, FIBROID, UTERUS, LAPAROSCOPIC, WITH US GUIDANCE      APPENDECTOMY  1982    BRONCHOSCOPY N/A 8/31/2023    Procedure: Bronchoscopy(need fluoro);  Surgeon: Sarah Lemon MD;  Location: [facility];  Service: Pulmonary;  Laterality: N/A;  transbronchial biopsies need fluoro    COLONOSCOPY N/A 11/3/2023    Procedure: COLONOSCOPY;  Surgeon: Felicita Villafana MD;  Location: [facility] (2ND FLR);   Service: Endoscopy;  Laterality: N/A;  10/26/23-per Eileen adjust to 60 min case - ERW  10/27-precall complete-pt verbalized understanding of holding Ozempic-MS    ESOPHAGEAL MANOMETRY WITH MEASUREMENT OF IMPEDANCE N/A 11/14/2023    Procedure: MANOMETRY, ESOPHAGUS, WITH IMPEDANCE MEASUREMENT;  Surgeon: Felicita Villafana MD;  Location: [facility] (4TH FLR);  Service: Endoscopy;  Laterality: N/A;  esophageal manometry with dysphagia protocol   followed by 24 hr ph off PPI  11/8-precall complete-MS    ESOPHAGOGASTRODUODENOSCOPY N/A 11/3/2023    Procedure: EGD (ESOPHAGOGASTRODUODENOSCOPY);  Surgeon: Felicita Villafana MD;  Location: [facility] (2ND FLR);  Service: Endoscopy;  Laterality: N/A;  egd/endoflip and colon  extended prep  peg prep  prep instructions sent to pt via portal  diabetic -metformin  wl meds *ozempic-hold 7 days prior  02-2liters  2nd floor-Severe pulmonary Disease    RIGHT HEART CATHETERIZATION Right 3/29/2023    Procedure: INSERTION, CATHETER, RIGHT HEART;  Surgeon: Yulisa Yung DO;  Location: [facility] CATH LAB;  Service: Cardiology;  Laterality: Right;    ROTATOR CUFF REPAIR Right 1998    TONSILLECTOMY  1977       Allergies: Atorvastatin, Lisinopril, and Meperidine hcl    Current Outpatient Medications   Medication Sig    albuterol (PROVENTIL/VENTOLIN HFA) 90 mcg/actuation inhaler Inhale 1-2 puffs into the lungs every 4 (four) hours as needed for Wheezing or Shortness of Breath. Rescue    albuterol-ipratropium (DUO-NEB) 2.5 mg-0.5 mg/3 mL nebulizer solution Take 3 mLs by nebulization 2 (two) times daily. Rescue    amLODIPine (NORVASC) 5 MG tablet TAKE 1 TABLET BY MOUTH EVERY DAY    azelastine (ASTELIN) 137 mcg (0.1 %) nasal spray SPRAY 1 SPRAY IN EACH NOSTRIL 2 TIMES DAILY.    calcium carbonate/vitamin D3 (CALCIUM WITH VITAMIN D ORAL) Take 1 tablet by mouth once daily.    cetirizine (ZYRTEC) 10 MG tablet Take 1 tablet (10 mg total) by mouth once daily.    fluticasone-umeclidin-vilanter (TRELEGY  ELLIPTA) 200-62.5-25 mcg inhaler Inhale 1 puff into the lungs once daily.    metFORMIN (GLUCOPHAGE) 500 MG tablet TAKE 1 TABLET BY MOUTH EVERY DAY WITH BREAKFAST    multivitamin capsule Take 1 capsule by mouth once daily.    OZEMPIC 1 mg/dose (4 mg/3 mL) INJECT 1 MG INTO THE SKIN EVERY 7 DAYS.    pantoprazole (PROTONIX) 40 MG tablet Take 1 tablet (40 mg total) by mouth once daily.    rosuvastatin (CRESTOR) 5 MG tablet Take 1 tablet (5 mg total) by mouth once daily.     No current facility-administered medications for this visit.       Immunization History   Administered Date(s) Administered    COVID-19, MRNA, LN-S, PF (Pfizer) (Gray Cap) 04/06/2022    COVID-19, MRNA, LN-S, PF (Pfizer) (Purple Cap) 03/06/2021, 03/30/2021, 09/30/2021    COVID-19, mRNA, LNP-S, bivalent booster, PF (PFIZER OMICRON) 09/22/2022    Hepatitis B, Adult 01/16/2018, 02/27/2018, 07/31/2018    Influenza 09/28/2016, 10/04/2017, 10/24/2018, 09/05/2019, 09/08/2022    Influenza - Quadrivalent - PF *Preferred* (6 months and older) 09/30/2021, 09/13/2023    Pneumococcal Conjugate - 20 Valent 09/08/2022    Pneumococcal Polysaccharide - 23 Valent 10/04/2016    Tdap 01/01/2016    Zoster Recombinant 10/29/2020, 01/31/2022     Family History:    Family History   Problem Relation Age of Onset    Diabetes Mother     Hypertension Mother     Heart disease Mother     Diabetes Father     Glaucoma Neg Hx     Macular degeneration Neg Hx     Colon cancer Neg Hx     Esophageal cancer Neg Hx      Social History     Substance and Sexual Activity   Alcohol Use Not Currently      Social History     Substance and Sexual Activity   Drug Use Never      Social History     Socioeconomic History    Marital status:    Occupational History    Occupation: Consultant with non profits   Tobacco Use    Smoking status: Former     Current packs/day: 0.00     Average packs/day: 0.5 packs/day for 25.0 years (12.5 ttl pk-yrs)     Types: Cigarettes     Start date: 12/16/1994      "Quit date: 12/16/2019     Years since quitting: 3.9     Passive exposure: Past    Smokeless tobacco: Never    Tobacco comments:     Smoked additive free tobacco   Substance and Sexual Activity    Alcohol use: Not Currently    Drug use: Never    Sexual activity: Not Currently     Partners: Female     Birth control/protection: Post-menopausal     Comment: spouse -  14 years    Social History Narrative    Lives with her spouse, Aisha. Enjoys live music, painting.      Vitals  /88 (BP Location: Right arm, Patient Position: Sitting, BP Method: Large (Automatic))   Pulse 98   Ht 5' 9" (1.753 m)   Wt 102 kg (224 lb 13.9 oz)   SpO2 (!) 92% Comment: room air  BMI 33.21 kg/m²   Physical Exam:  GEN: middle-aged woman, resting comfortably in bed  HEENT: face symmetric, conjunctivae anicteric, MMM  CV: regular rhythm, normal rate  PULM: CTAB, no crackles, no wheezing, normal respiratory effort on RA  Abd: non-distended  Ext: no LE edema  Skin: no rashes  Neuro: alert, answering questions appropriately     Labs:  Lab Visit on 11/16/2023   Component Date Value    WBC 11/16/2023 11.45     RBC 11/16/2023 4.94     Hemoglobin 11/16/2023 15.0     Hematocrit 11/16/2023 44.4     MCV 11/16/2023 90     MCH 11/16/2023 30.4     MCHC 11/16/2023 33.8     RDW 11/16/2023 11.9     Platelets 11/16/2023 343     MPV 11/16/2023 9.7     Immature Granulocytes 11/16/2023 0.4     Gran # (ANC) 11/16/2023 8.4 (H)     Immature Grans (Abs) 11/16/2023 0.05 (H)     Lymph # 11/16/2023 2.0     Mono # 11/16/2023 0.8     Eos # 11/16/2023 0.2     Baso # 11/16/2023 0.05     nRBC 11/16/2023 0     Gran % 11/16/2023 72.9     Lymph % 11/16/2023 17.6 (L)     Mono % 11/16/2023 7.2     Eosinophil % 11/16/2023 1.5     Basophil % 11/16/2023 0.4     Differential Method 11/16/2023 Automated     Sodium 11/16/2023 143     Potassium 11/16/2023 4.4     Chloride 11/16/2023 104     CO2 11/16/2023 28     Glucose 11/16/2023 133 (H)     BUN 11/16/2023 8     " Creatinine 11/16/2023 0.8     Calcium 11/16/2023 10.6 (H)     Total Protein 11/16/2023 7.0     Albumin 11/16/2023 4.1     Total Bilirubin 11/16/2023 0.5     Alkaline Phosphatase 11/16/2023 119     AST 11/16/2023 25     ALT 11/16/2023 33     eGFR 11/16/2023 >60.0     Anion Gap 11/16/2023 11     Prealbumin 11/16/2023 21     Prothrombin Time 11/16/2023 10.7     INR 11/16/2023 1.0     aPTT 11/16/2023 26.8         11/16/2023    10/5/2023    11:03 AM 8/24/2023    10:10 AM 4/10/2023    10:10 AM   Pulmonary Function Tests    FVC 2.49 2.36 liters 2.51 liters 2.35 liters   FEV1 2.12 2.05 liters 2.12 liters 1.95 liters   TLC (L) 2.95 2.94 liters  3.06 liters   DLCO 7.34 6.89 ml/mmHg sec  7.89 ml/mmHg sec   FVC% 66% 62.3 66.2 61.8   FEV1% 72% 69.3 71.8 65.6   FEF 25-75 3.28 3.23 3.02 2.35   FEF 25-75% 128% 125.9 117.4 90.8   TLC% 51% 50.8  53   RV 0.46 0.58  0.83   RV% 22% 27.3  39.4   DLCO% 28 26.5  30.2         11/16/2023     9:34 AM 10/5/2023     9:53 AM 7/12/2023     1:42 PM 3/21/2023    12:40 PM 12/6/2022     2:18 PM   6MW   6MWT Status completed without stopping completed without stopping completed without stopping completed without stopping completed without stopping   Patient Reported No complaints No complaints No complaints No complaints No complaints   Was O2 used? Yes Yes Yes Yes Yes   Delivery Method Cannula;Pull Tank;Continuous Flow Cannula;Pull Tank;Continuous Flow Cannula;Pull Tank;Continuous Flow Cannula;Pull Tank;Continuous Flow Cannula;Demand Flow;Shoulder Carry   6MW Distance walked (feet) 1200 feet 1200 feet 1200 feet 1000 feet 1200 feet   Distance walked (meters) 365.76 meters 365.76 meters 365.76 meters 304.8 meters 365.76 meters   Did patient stop? No No No No No   Oxygen Saturation 98 % 96 % 95 % 97 % 98 %   Supplemental Oxygen 4 L/M 3 L/M 2 L/M 2 L/M 2 L/M   Heart Rate 83 bpm 81 bpm 87 bpm 85 bpm 83 bpm   Blood Pressure 143/90 134/87 122/76 134/81 145/84   Ashlie Dyspnea Rating  nothing at all nothing at  all very, very light (just noticeable) nothing at all nothing at all   Oxygen Saturation 92 % 90 % 89 % 86 % 84 %   Supplemental Oxygen 4 L/M 4 L/M 3 L/M 2 L/M 2 L/M   Heart Rate 94 bpm 100 bpm 115 bpm 99 bpm 96 bpm   Blood Pressure 166/104 159/86 119/76 135/81 160/79   Ashlie Dyspnea Rating  very, very light (just noticeable) nothing at all very, very light (just noticeable) light very, very light (just noticeable)   Recovery Time (seconds) 155 seconds 95 seconds 101 seconds 120 seconds 90 seconds   Oxygen Saturation 98 % 95 % 98 % 97 % 95 %   Supplemental Oxygen 4 L/M 4 L/M 3 L/M 2 L/M 2 L/M   Heart Rate 93 bpm 90 bpm 90 bpm 91 bpm 87 bpm       Imaging:  EXAMINATION:  CT CHEST WITHOUT CONTRAST  Impression:     1. Two small stable solid-appearing pulmonary nodules stable since the prior of 01/26/2022 favoring a benign etiology of less than 6 mm in size.    Cardiodiagnostics:    Echo 3/2023    Interpretation Summary  · The left ventricle is normal in size with normal systolic function.  · The estimated ejection fraction is 65%.  · Normal left ventricular diastolic function.  · Moderate right ventricular enlargement with low normal to mildly reduced right ventricular systolic function.  · The estimated PA systolic pressure is 34 mmHg.  · Normal central venous pressure (3 mmHg).  · The ascending aorta is mildly dilated.    Cardiac catheterization 3/2023    Conclusion  Summary  Filling pressures: RA 10, PCWP 16  PA 36/20, mean PA pressure 20 mmHg  PVR 1.5 DUQUE  PA saturation 70%, Ao saturation 97%  Fletcher CO/CI: 5.9/2.66  /100  with repeat intraprocedure similar results. On recheck post procedure 133/75  HR 71 SR  Hb 13.6    Assessment:  # ILD  # Chronic hypoxemic respiratory failure     Progressive lung disease as evidenced by increasing oxygen requirements over time. 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 Her PFTs from today are stable compared to prior. She does have restriction and decreased DLCO. Prior work-up has  included bronchoscopy with BAL and biopsy which showed evidence of chronic pneumonitis. She recently underwent esophageal manometry which showed acid reflux. Her rheumatologic workup was notable only for elevated DAYTON (1:80). RHC with mPAP of 20, PCWP of 16, and PVR 1.5.     Plan:     - proceed with CT surgery consultation for wedge lung biopsy  - discussed Ofev today for treatment of fibrosis given progression of her lung disease, she would prefer to get results of lung biopsy prior to starting Ofev in hopes of better understanding underlying process  - discussed routine exercise   - discussed more consistent oxygen use to prevent asymptomatic hypoxemia       Katerin Lui MD  [employer] PCCM Fellow

## 2023-11-16 NOTE — PROCEDURES
Nathalie Krueger is a 58 y.o.  female patient, who presents for a 6 minute walk test ordered by MD Baltazar.  The diagnosis is Interstitial Lung Disease.  The patient's BMI is 33.5 kg/m2.  Predicted distance (lower limit of normal) is 324.99 meters.      Test Results:    The test was completed without stopping. The total time walked was 360 seconds. During walking, the patient reported:  No complaints. The patient used supplemental oxygen during testing.     11/16/2023---------Distance: 365.76 meters (1200 feet)     Lap Walk Time O2 Sat % Supplemental Oxygen Heart Rate Blood Pressure Ashlie Scale   Pre-exercise  (Resting) 0 0 98 % 4 L/M 83 bpm 143/90 mmHg 0   During Exercise 1 49 sec 98 % 4 L/M 98 bpm     During Exercise 2 113 sec 95 % 4 L/M 100 bpm     During Exercise 3 174 sec 92 % 4 L/M 92 bpm     During Exercise 4 236 sec 93 % 4 L/M 96 bpm     During Exercise 5 296 sec 92 % 4 L/M 98 bpm     End of Exercise 6 360 sec 92 % 4 L/M 94 bpm 166/104 mmHg 0.5   Post-exercise  (Recovery)   98 % 4 L/M  93 bpm 153/92 mmHg      Recovery Time: 155 seconds    Performing nurse/tech: Jayden TOBAR      PREVIOUS STUDY:   10/05/2023---------Distance: 365.76 meters (1200 feet)       Lap Walk Time O2 Sat % Supplemental Oxygen Heart Rate Blood Pressure Ashlie Scale Comment   Pre-exercise  (Resting) 0 0 96 % 3 L/M 81 bpm 134/87 mmHg 0     During Exercise 1 52 sec 95 % 3 L/M 98 bpm         During Exercise 2 105 sec 89 % 3 L/M 102 bpm         During Exercise 3 168 sec 88 % 3 L/M 107 bpm     Oxygen increased   During Exercise 4 234 sec 90 % 4 L/M 105 bpm         During Exercise 5 295 sec 89 % 4 L/M 109 bpm         End of Exercise 6 360 sec 90 % 4 L/M 100 bpm 159/86 mmHg 0     Post-exercise  (Recovery)     95 % 4 L/M  90 bpm 147/84 mmHg           CLINICAL INTERPRETATION:  Six minute walk distance is 365.76 meters (1200 feet) with very, very light dyspnea.  During exercise, there was significant desaturation while breathing supplemental  oxygen.  Blood pressure increased significantly and Heart rate remained stable with walking.  Hypertension was present prior to exercise.  The patient did not report non-pulmonary symptoms during exercise.  Since the previous study in October 2023, exercise capacity is unchanged.  Based upon age and body mass index, exercise capacity is normal.

## 2023-11-16 NOTE — LETTER
November 16, 2023        Sarah Lemon  [address]  [address]  [place] [address]  Phone: [phone]  Fax: [phone]             [facility] 1st Fl  [address]  [place] [address]  Phone: [phone]   Patient: Nathalie Krueger   MR Number: [id number]   YOB: 1965   Date of Visit: 11/16/2023       Dear Dr. Sarah Lemon    Thank you for referring Nathalie Krueger to me for evaluation. Attached you will find relevant portions of my assessment and plan of care.    If you have questions, please do not hesitate to call me. I look forward to following Nathalie Krueger along with you.    Sincerely,    Lesly Ledesma MD    Enclosure    If you would like to receive this communication electronically, please contact [email or web address] or [phone] to request [employer] Link access.    [employer] Link is a tool which provides read-only access to select patient information with whom you have a relationship. Its easy to use and provides real time access to review your patients record including encounter summaries, notes, results, and demographic information.    If you feel you have received this communication in error or would no longer like to receive these types of communications, please e-mail [email or web address]

## 2023-11-17 ENCOUNTER — PATIENT MESSAGE (OUTPATIENT)
Dept: FAMILY MEDICINE | Facility: CLINIC | Age: 58
End: 2023-11-17
Payer: COMMERCIAL

## 2023-11-18 ENCOUNTER — PATIENT MESSAGE (OUTPATIENT)
Dept: TRANSPLANT | Facility: CLINIC | Age: 58
End: 2023-11-18
Payer: COMMERCIAL

## 2023-11-26 ENCOUNTER — PATIENT MESSAGE (OUTPATIENT)
Dept: GASTROENTEROLOGY | Facility: CLINIC | Age: 58
End: 2023-11-26
Payer: COMMERCIAL

## 2023-11-26 DIAGNOSIS — K21.9 GASTROESOPHAGEAL REFLUX DISEASE, UNSPECIFIED WHETHER ESOPHAGITIS PRESENT: Primary | ICD-10-CM

## 2023-11-27 RX ORDER — OMEPRAZOLE 40 MG/1
40 CAPSULE, DELAYED RELEASE ORAL
Qty: 60 CAPSULE | Refills: 11 | Status: SHIPPED | OUTPATIENT
Start: 2023-11-27 | End: 2024-11-26

## 2023-11-28 ENCOUNTER — ANESTHESIA EVENT (OUTPATIENT)
Dept: SURGERY | Facility: HOSPITAL | Age: 58
DRG: 167 | End: 2023-11-28
Payer: COMMERCIAL

## 2023-11-28 ENCOUNTER — TELEPHONE (OUTPATIENT)
Dept: CARDIOTHORACIC SURGERY | Facility: CLINIC | Age: 58
End: 2023-11-28
Payer: COMMERCIAL

## 2023-11-28 NOTE — ANESTHESIA PREPROCEDURE EVALUATION
[facility]  Anesthesia Pre-Operative Evaluation         Patient Name: Nathalie Krueger  YOB: 1965  MRN: [id number]    SUBJECTIVE:     Pre-operative evaluation for Procedure(s) (LRB):  RIGHT VATS WEDGE (Right)  Bronchoscopy (N/A)     11/28/2023    Nathalie Krueger is a 58 y.o. female with a significant medical history of HTN, T2DM, GERD on PPI, ORLIN, obesity, former smoker and interstitial lung disease who presents for the above procedure.     Prior work-up has included bronchoscopy with BAL and biopsy which showed evidence of chronic pneumonitis.  CT surgery consultation for bronchoscopy with BAL and Right VATS wedge. Severe restriction is noted on spirometry and lung volume testing.     From recent 6 min walk test:  Six minute walk distance is 365.76 meters (1200 feet) with very, very light dyspnea. During exercise, there was significant desaturation while breathing supplemental oxygen. (4L/min)    Results for orders placed during the hospital encounter of 03/21/23    Echo    Interpretation Summary  · The left ventricle is normal in size with normal systolic function.  · The estimated ejection fraction is 65%.  · Normal left ventricular diastolic function.  · Moderate right ventricular enlargement with low normal to mildly reduced right ventricular systolic function.  · The estimated PA systolic pressure is 34 mmHg.  · Normal central venous pressure (3 mmHg).  · The ascending aorta is mildly dilated.    Lab Results   Component Value Date    HGB 15.0 11/16/2023   BMP  Lab Results   Component Value Date     11/16/2023    K 4.4 11/16/2023     11/16/2023    CO2 28 11/16/2023    BUN 8 11/16/2023    CREATININE 0.8 11/16/2023    CALCIUM 10.6 (H) 11/16/2023    ANIONGAP 11 11/16/2023    EGFRNORACEVR >60.0 11/16/2023       Prev airway: None documented.      Patient Active Problem List   Diagnosis    Essential hypertension    Mixed hyperlipidemia    Type 2 diabetes mellitus without  complication, without long-term current use of insulin    Lung nodule    Chronic hypoxemic respiratory failure    Bronchiectasis without complication    Gastroesophageal reflux disease with esophagitis without hemorrhage    Right flank pain    Class 1 obesity due to excess calories without serious comorbidity with body mass index (BMI) of 33.0 to 33.9 in adult    Mild obstructive sleep apnea    IgM deficiency    ILD (interstitial lung disease)    Diverticulosis       Review of patient's allergies indicates:   Allergen Reactions    Atorvastatin Other (See Comments)     Dry cough      Lisinopril      Other reaction(s): Cough    Meperidine hcl Nausea Only       Current Inpatient Medications:      No current facility-administered medications on file prior to encounter.     Current Outpatient Medications on File Prior to Encounter   Medication Sig Dispense Refill    albuterol (PROVENTIL/VENTOLIN HFA) 90 mcg/actuation inhaler Inhale 1-2 puffs into the lungs every 4 (four) hours as needed for Wheezing or Shortness of Breath. Rescue 54 g 5    albuterol-ipratropium (DUO-NEB) 2.5 mg-0.5 mg/3 mL nebulizer solution Take 3 mLs by nebulization 2 (two) times daily. Rescue 540 mL 1    metFORMIN (GLUCOPHAGE) 500 MG tablet TAKE 1 TABLET BY MOUTH EVERY DAY WITH BREAKFAST (Patient taking differently: Take 500 mg by mouth daily with breakfast.) 90 tablet 1    rosuvastatin (CRESTOR) 5 MG tablet Take 1 tablet (5 mg total) by mouth once daily. 90 tablet 3    amLODIPine (NORVASC) 5 MG tablet TAKE 1 TABLET BY MOUTH EVERY DAY 90 tablet 3    azelastine (ASTELIN) 137 mcg (0.1 %) nasal spray SPRAY 1 SPRAY IN EACH NOSTRIL 2 TIMES DAILY. 90 mL 1    calcium carbonate/vitamin D3 (CALCIUM WITH VITAMIN D ORAL) Take 1 tablet by mouth once daily.      cetirizine (ZYRTEC) 10 MG tablet Take 1 tablet (10 mg total) by mouth once daily. 360 tablet 0    fluticasone-umeclidin-vilanter (TRELEGY ELLIPTA) 200-62.5-25 mcg inhaler Inhale 1 puff into the lungs once  daily. 60 each 5    multivitamin capsule Take 1 capsule by mouth once daily.      OZEMPIC 1 mg/dose (4 mg/3 mL) INJECT 1 MG INTO THE SKIN EVERY 7 DAYS. 9 mL 1       Past Surgical History:   Procedure Laterality Date    24 HOUR IMPEDANCE PH MONITORING OF ESOPHAGUS IN PATIENT NOT TAKING ACID REDUCING MEDICATIONS N/A 11/14/2023    Procedure: IMPEDANCE PH STUDY, ESOPHAGEAL, 24 HOUR, IN PATIENT NOT TAKING ACID REDUCING MEDICATION;  Surgeon: Felicita Villafana MD;  Location: [facility] (4TH FLR);  Service: Endoscopy;  Laterality: N/A;    ABLATION, FIBROID, UTERUS, LAPAROSCOPIC, WITH US GUIDANCE      APPENDECTOMY  1982    BRONCHOSCOPY N/A 8/31/2023    Procedure: Bronchoscopy(need fluoro);  Surgeon: Sarah Lemon MD;  Location: [facility];  Service: Pulmonary;  Laterality: N/A;  transbronchial biopsies need fluoro    COLONOSCOPY N/A 11/3/2023    Procedure: COLONOSCOPY;  Surgeon: Felicita Villafana MD;  Location: [facility] (2ND FLR);  Service: Endoscopy;  Laterality: N/A;  10/26/23-per Eileen adjust to 60 min case - ERW  10/27-precall complete-pt verbalized understanding of holding Ozempic-MS    ESOPHAGEAL MANOMETRY WITH MEASUREMENT OF IMPEDANCE N/A 11/14/2023    Procedure: MANOMETRY, ESOPHAGUS, WITH IMPEDANCE MEASUREMENT;  Surgeon: Felicita Villafana MD;  Location: [facility] (4TH FLR);  Service: Endoscopy;  Laterality: N/A;  esophageal manometry with dysphagia protocol   followed by 24 hr ph off PPI  11/8-precall complete-MS    ESOPHAGOGASTRODUODENOSCOPY N/A 11/3/2023    Procedure: EGD (ESOPHAGOGASTRODUODENOSCOPY);  Surgeon: Felicita Villafana MD;  Location: [facility] (2ND FLR);  Service: Endoscopy;  Laterality: N/A;  egd/endoflip and colon  extended prep  peg prep  prep instructions sent to pt via portal  diabetic -metformin  wl meds *ozempic-hold 7 days prior  02-2liters  2nd floor-Severe pulmonary Disease    RIGHT HEART CATHETERIZATION Right 3/29/2023    Procedure: INSERTION, CATHETER, RIGHT HEART;  Surgeon:  Yulisa Yung DO;  Location: [facility] CATH LAB;  Service: Cardiology;  Laterality: Right;    ROTATOR CUFF REPAIR Right 1998    TONSILLECTOMY  1977       Social History     Socioeconomic History    Marital status:    Occupational History    Occupation: Consultant with non profits   Tobacco Use    Smoking status: Former     Current packs/day: 0.00     Average packs/day: 0.5 packs/day for 25.0 years (12.5 ttl pk-yrs)     Types: Cigarettes     Start date: 12/16/1994     Quit date: 12/16/2019     Years since quitting: 3.9     Passive exposure: Past    Smokeless tobacco: Never    Tobacco comments:     Smoked additive free tobacco   Substance and Sexual Activity    Alcohol use: Not Currently    Drug use: Never    Sexual activity: Not Currently     Partners: Female     Birth control/protection: Post-menopausal     Comment: spouse -  14 years    Social History Narrative    Lives with her spouse, Aisha. Enjoys live music, painting.      Social Determinants of Health     Financial Resource Strain: Low Risk  (11/18/2023)    Overall Financial Resource Strain (CARDIA)     Difficulty of Paying Living Expenses: Not hard at all   Food Insecurity: No Food Insecurity (11/18/2023)    Hunger Vital Sign     Worried About Running Out of Food in the Last Year: Never true     Ran Out of Food in the Last Year: Never true   Transportation Needs: No Transportation Needs (11/18/2023)    PRAPARE - Transportation     Lack of Transportation (Medical): No     Lack of Transportation (Non-Medical): No   Physical Activity: Insufficiently Active (11/18/2023)    Exercise Vital Sign     Days of Exercise per Week: 2 days     Minutes of Exercise per Session: 30 min   Stress: No Stress Concern Present (11/18/2023)    [employer] - Occupational Stress Questionnaire     Feeling of Stress : Not at all   Social Connections: Unknown (11/18/2023)    Social Connection and Isolation Panel [NHANES]     Frequency of  "Communication with Friends and Family: Patient refused     Frequency of Social Gatherings with Friends and Family: Patient refused     Active Member of Clubs or Organizations: Patient refused     Attends Club or Organization Meetings: Patient refused     Marital Status:    Housing Stability: Low Risk  (11/18/2023)    Housing Stability Vital Sign     Unable to Pay for Housing in the Last Year: No     Number of Places Lived in the Last Year: 1     Unstable Housing in the Last Year: No       OBJECTIVE:     Vital Signs Range:      11/16/2023     9:28 AM 11/16/2023     9:34 AM 11/16/2023    10:52 AM   Vitals - 1 value per visit   SYSTOLIC 129  129   DIASTOLIC 88  88   Pulse 98  98   SPO2 92 %  92 %   Weight (lb) 224.87 227.07 224.87   Weight (kg) 102 103 102   Height 5' 9" (1.753 m) 5' 9" (1.753 m) 5' 9" (1.753 m)   BMI (Calculated) 33.2 33.5 33.2   Pain Score Zero  Zero         CBC:   Lab Results   Component Value Date    WBC 11.45 11/16/2023    HGB 15.0 11/16/2023    HCT 44.4 11/16/2023    MCV 90 11/16/2023     11/16/2023         CMP:   Sodium   Date Value Ref Range Status   11/16/2023 143 136 - 145 mmol/L Final     Potassium   Date Value Ref Range Status   11/16/2023 4.4 3.5 - 5.1 mmol/L Final     Chloride   Date Value Ref Range Status   11/16/2023 104 95 - 110 mmol/L Final     CO2   Date Value Ref Range Status   11/16/2023 28 23 - 29 mmol/L Final     Glucose   Date Value Ref Range Status   11/16/2023 133 (H) 70 - 110 mg/dL Final     BUN   Date Value Ref Range Status   11/16/2023 8 6 - 20 mg/dL Final     Creatinine   Date Value Ref Range Status   11/16/2023 0.8 0.5 - 1.4 mg/dL Final     Calcium   Date Value Ref Range Status   11/16/2023 10.6 (H) 8.7 - 10.5 mg/dL Final     Total Protein   Date Value Ref Range Status   11/16/2023 7.0 6.0 - 8.4 g/dL Final     Albumin   Date Value Ref Range Status   11/16/2023 4.1 3.5 - 5.2 g/dL Final     Total Bilirubin   Date Value Ref Range Status   11/16/2023 0.5 0.1 - " 1.0 mg/dL Final     Comment:     For infants and newborns, interpretation of results should be based  on gestational age, weight and in agreement with clinical  observations.    Premature Infant recommended reference ranges:  Up to 24 hours.............<8.0 mg/dL  Up to 48 hours............<12.0 mg/dL  3-5 days..................<15.0 mg/dL  6-29 days.................<15.0 mg/dL       Alkaline Phosphatase   Date Value Ref Range Status   11/16/2023 119 55 - 135 U/L Final     AST   Date Value Ref Range Status   11/16/2023 25 10 - 40 U/L Final     ALT   Date Value Ref Range Status   11/16/2023 33 10 - 44 U/L Final     Anion Gap   Date Value Ref Range Status   11/16/2023 11 8 - 16 mmol/L Final     eGFR if    Date Value Ref Range Status   01/26/2022 >60 >60 mL/min/1.73 m^2 Final     eGFR if non    Date Value Ref Range Status   01/26/2022 >60 >60 mL/min/1.73 m^2 Final     Comment:     Calculation used to obtain the estimated glomerular filtration  rate (eGFR) is the CKD-EPI equation.          INR:  Lab Results   Component Value Date    INR 1.0 11/16/2023       EKG:   Results for orders placed or performed in visit on 09/15/22   IN OFFICE EKG 12-LEAD (to [place])    Collection Time: 09/15/22  2:45 PM    Narrative    Test Reason : I20.8,    Vent. Rate : 100 BPM     Atrial Rate : 100 BPM     P-R Int : 174 ms          QRS Dur : 086 ms      QT Int : 362 ms       P-R-T Axes : 050 005 046 degrees     QTc Int : 466 ms    Normal sinus rhythm  Normal ECG  When compared with ECG of 26-JAN-2022 09:31,  No significant change was found  Confirmed by Lex Christopher MD ([id number]) on 9/20/2022 11:51:45 AM    Referred By: CRISTOFER TEAGUE           Confirmed By:Lex Christopher MD        2D ECHO:   Results for orders placed during the hospital encounter of 03/21/23    Echo    Interpretation Summary  · The left ventricle is normal in size with normal systolic function.  · The estimated ejection fraction is 65%.  · Normal  left ventricular diastolic function.  · Moderate right ventricular enlargement with low normal to mildly reduced right ventricular systolic function.  · The estimated PA systolic pressure is 34 mmHg.  · Normal central venous pressure (3 mmHg).  · The ascending aorta is mildly dilated.         ASSESSMENT/PLAN:       Pre-op Assessment    I have reviewed the Patient Summary Reports.     I have reviewed the Nursing Notes. I have reviewed the NPO Status.   I have reviewed the Medications.     Review of Systems  Anesthesia Hx:  No problems with previous Anesthesia   History of prior surgery of interest to airway management or planning:  Previous anesthesia: General          Denies Personal Hx of Anesthesia complications.                    Social:  Former Smoker       Cardiovascular:     Hypertension    Denies CAD.                Functional Capacity Can you climb two flights of stairs? ==> Yes                         Pulmonary:     Denies Asthma.    Sleep Apnea Interstitial lung disease with PRN inhalers and O2                     Renal/:   Denies Chronic Renal Disease.                Hepatic/GI:   Denies PUD.  GERD Denies Liver Disease.            Neurological:    Denies CVA.    Denies Seizures.                                Endocrine:  Diabetes, well controlled, type 2 Denies Hypothyroidism.        Obesity / BMI > 30      Physical Exam  General: Well nourished, Cooperative and Alert    Airway:  Mallampati: II   Mouth Opening: Normal  TM Distance: Normal  Tongue: Normal  Neck ROM: Normal ROM    Dental:  Intact    Chest/Lungs:  Normal Respiratory Rate    Heart:  Rate: Normal  Rhythm: Regular Rhythm        Anesthesia Plan  Type of Anesthesia, risks & benefits discussed:    Anesthesia Type: Gen ETT  Intra-op Monitoring Plan: Standard ASA Monitors and Art Line  Post Op Pain Control Plan: multimodal analgesia and IV/PO Opioids PRN  Induction:  IV  Airway Plan: Direct, Video and Fiberoptic, Post-Induction  Informed Consent:  Informed consent signed with the Patient and all parties understand the risks and agree with anesthesia plan.  All questions answered.   ASA Score: 3  Day of Surgery Review of History & Physical: H&P Update referred to the surgeon/provider.    Ready For Surgery From Anesthesia Perspective.     .

## 2023-11-29 ENCOUNTER — ANESTHESIA (OUTPATIENT)
Dept: SURGERY | Facility: HOSPITAL | Age: 58
DRG: 167 | End: 2023-11-29
Payer: COMMERCIAL

## 2023-11-29 ENCOUNTER — HOSPITAL ENCOUNTER (INPATIENT)
Facility: HOSPITAL | Age: 58
LOS: 1 days | Discharge: HOME | DRG: 167 | End: 2023-11-30
Attending: STUDENT IN AN ORGANIZED HEALTH CARE EDUCATION/TRAINING PROGRAM | Admitting: STUDENT IN AN ORGANIZED HEALTH CARE EDUCATION/TRAINING PROGRAM
Payer: COMMERCIAL

## 2023-11-29 DIAGNOSIS — J84.9 ILD (INTERSTITIAL LUNG DISEASE): ICD-10-CM

## 2023-11-29 LAB
ABO + RH BLD: NORMAL
BASOPHILS # BLD AUTO: 0.04 K/UL (ref 0–0.2)
BASOPHILS NFR BLD: 0.3 % (ref 0–1.9)
BLD GP AB SCN CELLS X3 SERPL QL: NORMAL
CREAT SERPL-MCNC: 0.6 MG/DL (ref 0.5–1.4)
DIFFERENTIAL METHOD: ABNORMAL
EOSINOPHIL # BLD AUTO: 0 K/UL (ref 0–0.5)
EOSINOPHIL NFR BLD: 0.2 % (ref 0–8)
ERYTHROCYTE [DISTWIDTH] IN BLOOD BY AUTOMATED COUNT: 12.1 % (ref 11.5–14.5)
EST. GFR  (NO RACE VARIABLE): >60 ML/MIN/1.73 M^2
GRAM STN SPEC: NORMAL
HCT VFR BLD AUTO: 41.1 % (ref 37–48.5)
HGB BLD-MCNC: 13.8 G/DL (ref 12–16)
IMM GRANULOCYTES # BLD AUTO: 0.08 K/UL (ref 0–0.04)
IMM GRANULOCYTES NFR BLD AUTO: 0.7 % (ref 0–0.5)
LYMPHOCYTES # BLD AUTO: 1.2 K/UL (ref 1–4.8)
LYMPHOCYTES NFR BLD: 9.6 % (ref 18–48)
MCH RBC QN AUTO: 30.5 PG (ref 27–31)
MCHC RBC AUTO-ENTMCNC: 33.6 G/DL (ref 32–36)
MCV RBC AUTO: 91 FL (ref 82–98)
MONOCYTES # BLD AUTO: 0.1 K/UL (ref 0.3–1)
MONOCYTES NFR BLD: 1.1 % (ref 4–15)
NEUTROPHILS # BLD AUTO: 10.5 K/UL (ref 1.8–7.7)
NEUTROPHILS NFR BLD: 88.1 % (ref 38–73)
NRBC BLD-RTO: 0 /100 WBC
PLATELET # BLD AUTO: 255 K/UL (ref 150–450)
PMV BLD AUTO: 9.8 FL (ref 9.2–12.9)
POCT GLUCOSE: 166 MG/DL (ref 70–110)
POCT GLUCOSE: 227 MG/DL (ref 70–110)
POCT GLUCOSE: 244 MG/DL (ref 70–110)
RBC # BLD AUTO: 4.53 M/UL (ref 4–5.4)
SPECIMEN OUTDATE: NORMAL
WBC # BLD AUTO: 11.93 K/UL (ref 3.9–12.7)

## 2023-11-29 PROCEDURE — 25000242 PHARM REV CODE 250 ALT 637 W/ HCPCS: Mod: NTX | Performed by: STUDENT IN AN ORGANIZED HEALTH CARE EDUCATION/TRAINING PROGRAM

## 2023-11-29 PROCEDURE — C1729 CATH, DRAINAGE: HCPCS | Mod: NTX | Performed by: STUDENT IN AN ORGANIZED HEALTH CARE EDUCATION/TRAINING PROGRAM

## 2023-11-29 PROCEDURE — 63600175 PHARM REV CODE 636 W HCPCS: Mod: NTX | Performed by: STUDENT IN AN ORGANIZED HEALTH CARE EDUCATION/TRAINING PROGRAM

## 2023-11-29 PROCEDURE — D9220A PRA ANESTHESIA: ICD-10-PCS | Mod: NTX,,, | Performed by: ANESTHESIOLOGY

## 2023-11-29 PROCEDURE — 88307 PR  SURG PATH,LEVEL V: ICD-10-PCS | Mod: 26,NTX,, | Performed by: PATHOLOGY

## 2023-11-29 PROCEDURE — 36415 COLL VENOUS BLD VENIPUNCTURE: CPT | Mod: NTX

## 2023-11-29 PROCEDURE — 25000003 PHARM REV CODE 250: Mod: NTX | Performed by: STUDENT IN AN ORGANIZED HEALTH CARE EDUCATION/TRAINING PROGRAM

## 2023-11-29 PROCEDURE — 36000711: Mod: NTX | Performed by: STUDENT IN AN ORGANIZED HEALTH CARE EDUCATION/TRAINING PROGRAM

## 2023-11-29 PROCEDURE — 88112 CYTOPATH CELL ENHANCE TECH: CPT | Mod: 26,NTX,, | Performed by: PATHOLOGY

## 2023-11-29 PROCEDURE — 37000009 HC ANESTHESIA EA ADD 15 MINS: Mod: NTX | Performed by: STUDENT IN AN ORGANIZED HEALTH CARE EDUCATION/TRAINING PROGRAM

## 2023-11-29 PROCEDURE — D9220A PRA ANESTHESIA: Mod: NTX,,, | Performed by: ANESTHESIOLOGY

## 2023-11-29 PROCEDURE — 87116 MYCOBACTERIA CULTURE: CPT | Mod: 59,NTX | Performed by: STUDENT IN AN ORGANIZED HEALTH CARE EDUCATION/TRAINING PROGRAM

## 2023-11-29 PROCEDURE — 94640 AIRWAY INHALATION TREATMENT: CPT | Mod: NTX

## 2023-11-29 PROCEDURE — 88307 TISSUE EXAM BY PATHOLOGIST: CPT | Mod: 59,NTX | Performed by: PATHOLOGY

## 2023-11-29 PROCEDURE — 99900035 HC TECH TIME PER 15 MIN (STAT): Mod: NTX

## 2023-11-29 PROCEDURE — 36000710: Mod: NTX | Performed by: STUDENT IN AN ORGANIZED HEALTH CARE EDUCATION/TRAINING PROGRAM

## 2023-11-29 PROCEDURE — 31624 PR BRONCHOSCOPY,DIAG2STIC W LAVAGE: ICD-10-PCS | Mod: 51,NTX,, | Performed by: STUDENT IN AN ORGANIZED HEALTH CARE EDUCATION/TRAINING PROGRAM

## 2023-11-29 PROCEDURE — 71000016 HC POSTOP RECOV ADDL HR: Mod: NTX | Performed by: STUDENT IN AN ORGANIZED HEALTH CARE EDUCATION/TRAINING PROGRAM

## 2023-11-29 PROCEDURE — 32607 PR THORACOSCOPY W/BX INFILTRATE: ICD-10-PCS | Mod: RT,NTX,, | Performed by: STUDENT IN AN ORGANIZED HEALTH CARE EDUCATION/TRAINING PROGRAM

## 2023-11-29 PROCEDURE — 88307 TISSUE EXAM BY PATHOLOGIST: CPT | Mod: 26,NTX,, | Performed by: PATHOLOGY

## 2023-11-29 PROCEDURE — 88305 TISSUE EXAM BY PATHOLOGIST: CPT | Mod: NTX | Performed by: PATHOLOGY

## 2023-11-29 PROCEDURE — 27000221 HC OXYGEN, UP TO 24 HOURS: Mod: NTX

## 2023-11-29 PROCEDURE — 71000015 HC POSTOP RECOV 1ST HR: Mod: NTX | Performed by: STUDENT IN AN ORGANIZED HEALTH CARE EDUCATION/TRAINING PROGRAM

## 2023-11-29 PROCEDURE — 86850 RBC ANTIBODY SCREEN: CPT | Mod: NTX

## 2023-11-29 PROCEDURE — 94761 N-INVAS EAR/PLS OXIMETRY MLT: CPT | Mod: NTX

## 2023-11-29 PROCEDURE — 25000003 PHARM REV CODE 250: Mod: NTX

## 2023-11-29 PROCEDURE — 32607 THORACOSCOPY W/BX INFILTRATE: CPT | Mod: RT,NTX,, | Performed by: STUDENT IN AN ORGANIZED HEALTH CARE EDUCATION/TRAINING PROGRAM

## 2023-11-29 PROCEDURE — 88305 TISSUE EXAM BY PATHOLOGIST: CPT | Mod: 26,NTX,, | Performed by: PATHOLOGY

## 2023-11-29 PROCEDURE — 36620 INSERTION CATHETER ARTERY: CPT | Mod: 59,NTX,, | Performed by: ANESTHESIOLOGY

## 2023-11-29 PROCEDURE — 82565 ASSAY OF CREATININE: CPT | Mod: NTX | Performed by: STUDENT IN AN ORGANIZED HEALTH CARE EDUCATION/TRAINING PROGRAM

## 2023-11-29 PROCEDURE — 87075 CULTR BACTERIA EXCEPT BLOOD: CPT | Mod: 59,NTX | Performed by: STUDENT IN AN ORGANIZED HEALTH CARE EDUCATION/TRAINING PROGRAM

## 2023-11-29 PROCEDURE — 87205 SMEAR GRAM STAIN: CPT | Mod: 59,NTX | Performed by: STUDENT IN AN ORGANIZED HEALTH CARE EDUCATION/TRAINING PROGRAM

## 2023-11-29 PROCEDURE — 88305 TISSUE EXAM BY PATHOLOGIST: ICD-10-PCS | Mod: 26,NTX,, | Performed by: PATHOLOGY

## 2023-11-29 PROCEDURE — 88112 PR  CYTOPATH, CELL ENHANCE TECH: ICD-10-PCS | Mod: 26,NTX,, | Performed by: PATHOLOGY

## 2023-11-29 PROCEDURE — 85025 COMPLETE CBC W/AUTO DIFF WBC: CPT | Mod: NTX | Performed by: STUDENT IN AN ORGANIZED HEALTH CARE EDUCATION/TRAINING PROGRAM

## 2023-11-29 PROCEDURE — 37000008 HC ANESTHESIA 1ST 15 MINUTES: Mod: NTX | Performed by: STUDENT IN AN ORGANIZED HEALTH CARE EDUCATION/TRAINING PROGRAM

## 2023-11-29 PROCEDURE — 87070 CULTURE OTHR SPECIMN AEROBIC: CPT | Mod: NTX | Performed by: STUDENT IN AN ORGANIZED HEALTH CARE EDUCATION/TRAINING PROGRAM

## 2023-11-29 PROCEDURE — 87102 FUNGUS ISOLATION CULTURE: CPT | Mod: 59,NTX | Performed by: STUDENT IN AN ORGANIZED HEALTH CARE EDUCATION/TRAINING PROGRAM

## 2023-11-29 PROCEDURE — 88112 CYTOPATH CELL ENHANCE TECH: CPT | Mod: NTX | Performed by: PATHOLOGY

## 2023-11-29 PROCEDURE — 71000033 HC RECOVERY, INTIAL HOUR: Mod: NTX | Performed by: STUDENT IN AN ORGANIZED HEALTH CARE EDUCATION/TRAINING PROGRAM

## 2023-11-29 PROCEDURE — 36620: ICD-10-PCS | Mod: 59,NTX,, | Performed by: ANESTHESIOLOGY

## 2023-11-29 PROCEDURE — 27201423 OPTIME MED/SURG SUP & DEVICES STERILE SUPPLY: Mod: NTX | Performed by: STUDENT IN AN ORGANIZED HEALTH CARE EDUCATION/TRAINING PROGRAM

## 2023-11-29 PROCEDURE — 20600001 HC STEP DOWN PRIVATE ROOM: Mod: NTX

## 2023-11-29 PROCEDURE — 82962 GLUCOSE BLOOD TEST: CPT | Mod: NTX | Performed by: STUDENT IN AN ORGANIZED HEALTH CARE EDUCATION/TRAINING PROGRAM

## 2023-11-29 PROCEDURE — 87206 SMEAR FLUORESCENT/ACID STAI: CPT | Mod: 91,NTX | Performed by: STUDENT IN AN ORGANIZED HEALTH CARE EDUCATION/TRAINING PROGRAM

## 2023-11-29 PROCEDURE — 31624 DX BRONCHOSCOPE/LAVAGE: CPT | Mod: 51,NTX,, | Performed by: STUDENT IN AN ORGANIZED HEALTH CARE EDUCATION/TRAINING PROGRAM

## 2023-11-29 RX ORDER — FENTANYL CITRATE 50 UG/ML
INJECTION, SOLUTION INTRAMUSCULAR; INTRAVENOUS
Status: DISCONTINUED | OUTPATIENT
Start: 2023-11-29 | End: 2023-11-29

## 2023-11-29 RX ORDER — HYDRALAZINE HYDROCHLORIDE 20 MG/ML
10 INJECTION INTRAMUSCULAR; INTRAVENOUS EVERY 6 HOURS PRN
Status: DISCONTINUED | OUTPATIENT
Start: 2023-11-29 | End: 2023-11-30 | Stop reason: HOSPADM

## 2023-11-29 RX ORDER — HYDROCODONE BITARTRATE AND ACETAMINOPHEN 5; 325 MG/1; MG/1
1 TABLET ORAL EVERY 4 HOURS PRN
Status: DISCONTINUED | OUTPATIENT
Start: 2023-11-29 | End: 2023-11-29

## 2023-11-29 RX ORDER — ACETAMINOPHEN 500 MG
1000 TABLET ORAL EVERY 8 HOURS
Status: DISCONTINUED | OUTPATIENT
Start: 2023-11-29 | End: 2023-11-30 | Stop reason: HOSPADM

## 2023-11-29 RX ORDER — CEFAZOLIN SODIUM 1 G/3ML
INJECTION, POWDER, FOR SOLUTION INTRAMUSCULAR; INTRAVENOUS
Status: DISCONTINUED | OUTPATIENT
Start: 2023-11-29 | End: 2023-11-29

## 2023-11-29 RX ORDER — ONDANSETRON 2 MG/ML
INJECTION INTRAMUSCULAR; INTRAVENOUS
Status: DISCONTINUED | OUTPATIENT
Start: 2023-11-29 | End: 2023-11-29

## 2023-11-29 RX ORDER — MIDAZOLAM HYDROCHLORIDE 5 MG/ML
INJECTION INTRAMUSCULAR; INTRAVENOUS
Status: DISCONTINUED | OUTPATIENT
Start: 2023-11-29 | End: 2023-11-29

## 2023-11-29 RX ORDER — ENOXAPARIN SODIUM 100 MG/ML
40 INJECTION SUBCUTANEOUS EVERY 24 HOURS
Status: DISCONTINUED | OUTPATIENT
Start: 2023-11-29 | End: 2023-11-30 | Stop reason: HOSPADM

## 2023-11-29 RX ORDER — LIDOCAINE HYDROCHLORIDE 20 MG/ML
INJECTION, SOLUTION EPIDURAL; INFILTRATION; INTRACAUDAL; PERINEURAL
Status: DISCONTINUED | OUTPATIENT
Start: 2023-11-29 | End: 2023-11-29

## 2023-11-29 RX ORDER — HYDROMORPHONE HYDROCHLORIDE 1 MG/ML
0.5 INJECTION, SOLUTION INTRAMUSCULAR; INTRAVENOUS; SUBCUTANEOUS EVERY 6 HOURS PRN
Status: DISCONTINUED | OUTPATIENT
Start: 2023-11-29 | End: 2023-11-30 | Stop reason: HOSPADM

## 2023-11-29 RX ORDER — GLUCAGON 1 MG
1 KIT INJECTION
Status: DISCONTINUED | OUTPATIENT
Start: 2023-11-29 | End: 2023-11-30 | Stop reason: HOSPADM

## 2023-11-29 RX ORDER — SODIUM CHLORIDE 0.9 % (FLUSH) 0.9 %
10 SYRINGE (ML) INJECTION
Status: DISCONTINUED | OUTPATIENT
Start: 2023-11-29 | End: 2023-11-29 | Stop reason: HOSPADM

## 2023-11-29 RX ORDER — DEXAMETHASONE SODIUM PHOSPHATE 4 MG/ML
INJECTION, SOLUTION INTRA-ARTICULAR; INTRALESIONAL; INTRAMUSCULAR; INTRAVENOUS; SOFT TISSUE
Status: DISCONTINUED | OUTPATIENT
Start: 2023-11-29 | End: 2023-11-29

## 2023-11-29 RX ORDER — IBUPROFEN 200 MG
16 TABLET ORAL
Status: DISCONTINUED | OUTPATIENT
Start: 2023-11-29 | End: 2023-11-30 | Stop reason: HOSPADM

## 2023-11-29 RX ORDER — INSULIN ASPART 100 [IU]/ML
0-10 INJECTION, SOLUTION INTRAVENOUS; SUBCUTANEOUS
Status: DISCONTINUED | OUTPATIENT
Start: 2023-11-29 | End: 2023-11-30 | Stop reason: HOSPADM

## 2023-11-29 RX ORDER — GABAPENTIN 300 MG/1
300 CAPSULE ORAL 3 TIMES DAILY
Status: DISCONTINUED | OUTPATIENT
Start: 2023-11-29 | End: 2023-11-30 | Stop reason: HOSPADM

## 2023-11-29 RX ORDER — OXYCODONE HYDROCHLORIDE 5 MG/1
5 TABLET ORAL EVERY 4 HOURS PRN
Status: DISCONTINUED | OUTPATIENT
Start: 2023-11-29 | End: 2023-11-30 | Stop reason: HOSPADM

## 2023-11-29 RX ORDER — METOCLOPRAMIDE HYDROCHLORIDE 5 MG/ML
5 INJECTION INTRAMUSCULAR; INTRAVENOUS EVERY 6 HOURS PRN
Status: DISCONTINUED | OUTPATIENT
Start: 2023-11-29 | End: 2023-11-30 | Stop reason: HOSPADM

## 2023-11-29 RX ORDER — HYDROMORPHONE HYDROCHLORIDE 1 MG/ML
0.2 INJECTION, SOLUTION INTRAMUSCULAR; INTRAVENOUS; SUBCUTANEOUS EVERY 5 MIN PRN
Status: DISCONTINUED | OUTPATIENT
Start: 2023-11-29 | End: 2023-11-29 | Stop reason: HOSPADM

## 2023-11-29 RX ORDER — ACETAMINOPHEN 500 MG
1000 TABLET ORAL
Status: COMPLETED | OUTPATIENT
Start: 2023-11-29 | End: 2023-11-29

## 2023-11-29 RX ORDER — AMOXICILLIN 250 MG
1 CAPSULE ORAL 2 TIMES DAILY
Status: DISCONTINUED | OUTPATIENT
Start: 2023-11-29 | End: 2023-11-30 | Stop reason: HOSPADM

## 2023-11-29 RX ORDER — PROPOFOL 10 MG/ML
VIAL (ML) INTRAVENOUS
Status: DISCONTINUED | OUTPATIENT
Start: 2023-11-29 | End: 2023-11-29

## 2023-11-29 RX ORDER — ONDANSETRON 8 MG/1
8 TABLET, ORALLY DISINTEGRATING ORAL EVERY 8 HOURS PRN
Status: DISCONTINUED | OUTPATIENT
Start: 2023-11-29 | End: 2023-11-30 | Stop reason: HOSPADM

## 2023-11-29 RX ORDER — IBUPROFEN 200 MG
24 TABLET ORAL
Status: DISCONTINUED | OUTPATIENT
Start: 2023-11-29 | End: 2023-11-30 | Stop reason: HOSPADM

## 2023-11-29 RX ORDER — OXYCODONE HYDROCHLORIDE 5 MG/1
10 TABLET ORAL EVERY 4 HOURS PRN
Status: DISCONTINUED | OUTPATIENT
Start: 2023-11-29 | End: 2023-11-30 | Stop reason: HOSPADM

## 2023-11-29 RX ORDER — IPRATROPIUM BROMIDE AND ALBUTEROL SULFATE 2.5; .5 MG/3ML; MG/3ML
3 SOLUTION RESPIRATORY (INHALATION) EVERY 6 HOURS
Status: DISCONTINUED | OUTPATIENT
Start: 2023-11-29 | End: 2023-11-30 | Stop reason: HOSPADM

## 2023-11-29 RX ORDER — METHOCARBAMOL 500 MG/1
500 TABLET, FILM COATED ORAL 4 TIMES DAILY
Status: DISCONTINUED | OUTPATIENT
Start: 2023-11-29 | End: 2023-11-30 | Stop reason: HOSPADM

## 2023-11-29 RX ORDER — BUPIVACAINE HYDROCHLORIDE 2.5 MG/ML
INJECTION, SOLUTION EPIDURAL; INFILTRATION; INTRACAUDAL
Status: DISCONTINUED | OUTPATIENT
Start: 2023-11-29 | End: 2023-11-29 | Stop reason: HOSPADM

## 2023-11-29 RX ORDER — KETAMINE HCL IN 0.9 % NACL 50 MG/5 ML
SYRINGE (ML) INTRAVENOUS
Status: DISCONTINUED | OUTPATIENT
Start: 2023-11-29 | End: 2023-11-29

## 2023-11-29 RX ORDER — HALOPERIDOL 5 MG/ML
0.5 INJECTION INTRAMUSCULAR EVERY 10 MIN PRN
Status: DISCONTINUED | OUTPATIENT
Start: 2023-11-29 | End: 2023-11-29 | Stop reason: HOSPADM

## 2023-11-29 RX ORDER — MIDAZOLAM HYDROCHLORIDE 1 MG/ML
INJECTION, SOLUTION INTRAMUSCULAR; INTRAVENOUS
Status: DISCONTINUED | OUTPATIENT
Start: 2023-11-29 | End: 2023-11-29

## 2023-11-29 RX ORDER — LIDOCAINE HYDROCHLORIDE 10 MG/ML
1 INJECTION, SOLUTION EPIDURAL; INFILTRATION; INTRACAUDAL; PERINEURAL ONCE
Status: DISCONTINUED | OUTPATIENT
Start: 2023-11-29 | End: 2023-11-29 | Stop reason: HOSPADM

## 2023-11-29 RX ORDER — PHENYLEPHRINE HCL IN 0.9% NACL 1 MG/10 ML
SYRINGE (ML) INTRAVENOUS
Status: DISCONTINUED | OUTPATIENT
Start: 2023-11-29 | End: 2023-11-29

## 2023-11-29 RX ORDER — MUPIROCIN 20 MG/G
1 OINTMENT TOPICAL 2 TIMES DAILY
Status: DISCONTINUED | OUTPATIENT
Start: 2023-11-29 | End: 2023-11-30 | Stop reason: HOSPADM

## 2023-11-29 RX ORDER — ROCURONIUM BROMIDE 10 MG/ML
INJECTION, SOLUTION INTRAVENOUS
Status: DISCONTINUED | OUTPATIENT
Start: 2023-11-29 | End: 2023-11-29

## 2023-11-29 RX ORDER — ACETAMINOPHEN 500 MG
1000 TABLET ORAL
Status: DISCONTINUED | OUTPATIENT
Start: 2023-11-29 | End: 2023-11-29

## 2023-11-29 RX ORDER — PANTOPRAZOLE SODIUM 40 MG/1
40 TABLET, DELAYED RELEASE ORAL DAILY
Status: DISCONTINUED | OUTPATIENT
Start: 2023-11-29 | End: 2023-11-30 | Stop reason: HOSPADM

## 2023-11-29 RX ADMIN — ROCURONIUM BROMIDE 50 MG: 10 INJECTION INTRAVENOUS at 08:11

## 2023-11-29 RX ADMIN — SODIUM CHLORIDE, SODIUM GLUCONATE, SODIUM ACETATE, POTASSIUM CHLORIDE, MAGNESIUM CHLORIDE, SODIUM PHOSPHATE, DIBASIC, AND POTASSIUM PHOSPHATE: .53; .5; .37; .037; .03; .012; .00082 INJECTION, SOLUTION INTRAVENOUS at 08:11

## 2023-11-29 RX ADMIN — PANTOPRAZOLE SODIUM 40 MG: 40 TABLET, DELAYED RELEASE ORAL at 11:11

## 2023-11-29 RX ADMIN — METHOCARBAMOL 500 MG: 500 TABLET ORAL at 11:11

## 2023-11-29 RX ADMIN — GABAPENTIN 300 MG: 300 CAPSULE ORAL at 03:11

## 2023-11-29 RX ADMIN — ACETAMINOPHEN 1000 MG: 500 TABLET ORAL at 07:11

## 2023-11-29 RX ADMIN — ACETAMINOPHEN 1000 MG: 500 TABLET ORAL at 11:11

## 2023-11-29 RX ADMIN — ONDANSETRON 4 MG: 2 INJECTION INTRAMUSCULAR; INTRAVENOUS at 10:11

## 2023-11-29 RX ADMIN — INSULIN ASPART 2 UNITS: 100 INJECTION, SOLUTION INTRAVENOUS; SUBCUTANEOUS at 12:11

## 2023-11-29 RX ADMIN — METHOCARBAMOL 500 MG: 500 TABLET ORAL at 08:11

## 2023-11-29 RX ADMIN — IPRATROPIUM BROMIDE AND ALBUTEROL SULFATE 3 ML: .5; 3 SOLUTION RESPIRATORY (INHALATION) at 07:11

## 2023-11-29 RX ADMIN — ENOXAPARIN SODIUM 40 MG: 40 INJECTION SUBCUTANEOUS at 05:11

## 2023-11-29 RX ADMIN — SENNOSIDES AND DOCUSATE SODIUM 1 TABLET: 8.6; 5 TABLET ORAL at 11:11

## 2023-11-29 RX ADMIN — PROPOFOL 160 MG: 10 INJECTION, EMULSION INTRAVENOUS at 08:11

## 2023-11-29 RX ADMIN — Medication 10 MG: at 10:11

## 2023-11-29 RX ADMIN — FENTANYL CITRATE 100 MCG: 50 INJECTION, SOLUTION INTRAMUSCULAR; INTRAVENOUS at 08:11

## 2023-11-29 RX ADMIN — ROCURONIUM BROMIDE 20 MG: 10 INJECTION INTRAVENOUS at 09:11

## 2023-11-29 RX ADMIN — SODIUM CHLORIDE 0.4 MCG/KG/MIN: 9 INJECTION, SOLUTION INTRAVENOUS at 09:11

## 2023-11-29 RX ADMIN — SUGAMMADEX 200 MG: 100 INJECTION, SOLUTION INTRAVENOUS at 10:11

## 2023-11-29 RX ADMIN — GABAPENTIN 300 MG: 300 CAPSULE ORAL at 08:11

## 2023-11-29 RX ADMIN — SODIUM CHLORIDE: 0.9 INJECTION, SOLUTION INTRAVENOUS at 07:11

## 2023-11-29 RX ADMIN — ROCURONIUM BROMIDE 30 MG: 10 INJECTION INTRAVENOUS at 09:11

## 2023-11-29 RX ADMIN — SENNOSIDES AND DOCUSATE SODIUM 1 TABLET: 8.6; 5 TABLET ORAL at 08:11

## 2023-11-29 RX ADMIN — GABAPENTIN 400 MG: 300 CAPSULE ORAL at 07:11

## 2023-11-29 RX ADMIN — SODIUM CHLORIDE, SODIUM GLUCONATE, SODIUM ACETATE, POTASSIUM CHLORIDE, MAGNESIUM CHLORIDE, SODIUM PHOSPHATE, DIBASIC, AND POTASSIUM PHOSPHATE: .53; .5; .37; .037; .03; .012; .00082 INJECTION, SOLUTION INTRAVENOUS at 09:11

## 2023-11-29 RX ADMIN — HYDROMORPHONE HYDROCHLORIDE 0.2 MG: 1 INJECTION, SOLUTION INTRAMUSCULAR; INTRAVENOUS; SUBCUTANEOUS at 11:11

## 2023-11-29 RX ADMIN — OXYCODONE HYDROCHLORIDE 10 MG: 5 TABLET ORAL at 11:11

## 2023-11-29 RX ADMIN — MUPIROCIN 1 G: 20 OINTMENT TOPICAL at 08:11

## 2023-11-29 RX ADMIN — Medication 30 MG: at 08:11

## 2023-11-29 RX ADMIN — Medication 100 MCG: at 08:11

## 2023-11-29 RX ADMIN — DEXAMETHASONE SODIUM PHOSPHATE 8 MG: 4 INJECTION, SOLUTION INTRAMUSCULAR; INTRAVENOUS at 08:11

## 2023-11-29 RX ADMIN — HYDROMORPHONE HYDROCHLORIDE 0.2 MG: 1 INJECTION, SOLUTION INTRAMUSCULAR; INTRAVENOUS; SUBCUTANEOUS at 12:11

## 2023-11-29 RX ADMIN — MUPIROCIN 1 G: 20 OINTMENT TOPICAL at 12:11

## 2023-11-29 RX ADMIN — Medication 200 MCG: at 08:11

## 2023-11-29 RX ADMIN — LIDOCAINE HYDROCHLORIDE 100 MG: 20 INJECTION, SOLUTION EPIDURAL; INFILTRATION; INTRACAUDAL; PERINEURAL at 08:11

## 2023-11-29 RX ADMIN — Medication 100 MCG: at 09:11

## 2023-11-29 RX ADMIN — PROPOFOL 30 MG: 10 INJECTION, EMULSION INTRAVENOUS at 09:11

## 2023-11-29 RX ADMIN — INSULIN ASPART 2 UNITS: 100 INJECTION, SOLUTION INTRAVENOUS; SUBCUTANEOUS at 10:11

## 2023-11-29 RX ADMIN — MIDAZOLAM HYDROCHLORIDE 2 MG: 1 INJECTION, SOLUTION INTRAMUSCULAR; INTRAVENOUS at 08:11

## 2023-11-29 RX ADMIN — METHOCARBAMOL 500 MG: 500 TABLET ORAL at 05:11

## 2023-11-29 RX ADMIN — CEFAZOLIN 2 G: 330 INJECTION, POWDER, FOR SOLUTION INTRAMUSCULAR; INTRAVENOUS at 08:11

## 2023-11-29 RX ADMIN — Medication 10 MG: at 09:11

## 2023-11-29 NOTE — BRIEF OP NOTE
Dean Hoffmann - Surgery ([facility])  Brief Operative Note    SUMMARY     Surgery Date: 11/29/2023     Surgeon(s) and Role:     * Piotr Yoo MD - Primary     * Delfino Sutton MD - Resident - Assisting        Pre-op Diagnosis:  ILD (interstitial lung disease) [J84.9]    Post-op Diagnosis:  Post-Op Diagnosis Codes:     * ILD (interstitial lung disease) [J84.9]    Procedure(s) (LRB):  RIGHT VATS WEDGE (Right)  Bronchoscopy (N/A)  BLOCK, NERVE, INTERCOSTAL, 2 OR MORE    Anesthesia: General    Implants:  * No implants in log *    Operative Findings: Bronchoscopy performed with RLL aspirate obtained. Wedge resection x 3 performed.     Estimated Blood Loss: * No values recorded between 11/29/2023  8:38 AM and 11/29/2023 11:06 AM *    Estimated Blood Loss has been documented.         Specimens:   Specimen (24h ago, onward)       Start     Ordered    11/29/23 1006  Specimen to Pathology, Surgery Pulmonary and Thoracic  Once        Comments: Pre-op Diagnosis: ILD (interstitial lung disease) [J84.9]Procedure(s):RIGHT VATS WEDGEBronchoscopy Number of specimens: 3Name of specimens: 1) Right Upper Lobe - Permanent2) Right Lower Lobe- Permanent 3) Right Middle Lobe- Permanent     References:    Click here for ordering Quick Tip   Question Answer Comment   Procedure Type: Pulmonary and Thoracic    Specimen Class: Routine/Screening    Which provider would you like to cc? PIOTR YOO    Release to patient Immediate        11/29/23 1038    11/29/23 0842  Cytology, Fluid/Wash/Brush  Once        Comments: Specimen # 1, Location Right Lower Lobe BAL asperite- Cytology     Question Answer Comment   Source: Bronchial Alveolar Lavage (BAL)    Clinical Information: ILD (interstitial lung disease)    Specific Site: Right Lower Lobe    Other Requests: see below    Release to patient Immediate        11/29/23 0844                    [id number]

## 2023-11-29 NOTE — TRANSFER OF CARE
"Anesthesia Transfer of Care Note    Patient: Nathalie Krueger    Procedure(s) Performed: Procedure(s) (LRB):  RIGHT VATS WEDGE (Right)  Bronchoscopy (N/A)  BLOCK, NERVE, INTERCOSTAL, 2 OR MORE    Patient location: PACU    Anesthesia Type: general    Transport from OR: Transported from OR on 6-10 L/min O2 by face mask with adequate spontaneous ventilation    Post pain: adequate analgesia    Post assessment: no apparent anesthetic complications    Post vital signs: stable    Level of consciousness: awake and alert    Nausea/Vomiting: no nausea/vomiting    Complications: none    Transfer of care protocol was followed      Last vitals: Visit Vitals  /67   Pulse 87   Temp 37 °C (98.6 °F) (Temporal)   Resp 20   Ht 5' 9" (1.753 m)   Wt 101.6 kg (224 lb)   SpO2 97%   Breastfeeding No   BMI 33.08 kg/m²     "

## 2023-11-29 NOTE — PLAN OF CARE
I have reviewed the chart of Nathalie Krueger and collaborated with Piotr Amezcua MD in the care of the patient who is hospitalized for the following:    Active Hospital Problems    Diagnosis    *ILD (interstitial lung disease)    Chronic hypoxemic respiratory failure     Pt now requires oxygen with exertion and occasionally with rest. Currently 2L NC with exertion. Unknown etiology. Moderate to severe diffusion deficit.         Type 2 diabetes mellitus without complication, without long-term current use of insulin          I have reviewed the Nathalie Keye Evans with the multidisciplinary team during rounds.      Kar Ravi PA-C  Unit Based [facility]

## 2023-11-29 NOTE — ANESTHESIA PROCEDURE NOTES
Right Arterial Line    Diagnosis: Pneumonitits  Doctor requesting consult: Dr. Amezcua    Patient location during procedure: done in OR  Procedure start time: 11/29/2023 8:06 AM  Timeout: 11/29/2023 8:05 AM  Procedure end time: 11/29/2023 8:12 AM    Staffing  Authorizing Provider: Demetrius Mathur MD  Performing Provider: Los Veloz MD    Staffing  Performed by: Los Veloz MD  Authorized by: Demetrius Mathur MD    Anesthesiologist was present at the time of the procedure.    Preanesthetic Checklist  Completed: patient identified, IV checked, site marked, risks and benefits discussed, surgical consent, monitors and equipment checked, pre-op evaluation, timeout performed and anesthesia consent givenRight Arterial Line  Skin Prep: chlorhexidine gluconate and isopropyl alcohol  Orientation: right  Location: radial    Catheter Size: 20 G  Catheter placement by Ultrasound guidance. Heme positive aspiration all ports.   Vessel Caliber: medium, patent, compressibility normal  Needle advanced into vessel with real time Ultrasound guidance.Insertion Attempts: 1  Assessment  Dressing: secured with tape and tegaderm  Patient: Tolerated well

## 2023-11-29 NOTE — PLAN OF CARE
[facility] Plan of Care Note  Dx: Interstitial lung disease     Shift Events.      Goals of Care: Pain control, good chest tube output,     Neuro: A/O x4     Vital Signs: wdl    Respiratory: 3l via nasal cannula    Diet: clear liquid    Is patient tolerating current diet? yes    GTTS: none    Urine Output/Bowel Movement: Voids per toilet    Drains/Tubes/Tube Feeds (include total output/shift): L chest tube - marked at 35ml    Lines: PIV x2     Accuchecks: achs    Skin: wdl    Fall Risk Score: see flowsheet    Activity level? Standby

## 2023-11-29 NOTE — ANESTHESIA PROCEDURE NOTES
Intubation    Date/Time: 11/29/2023 8:42 AM    Performed by: Blanca Rojo MD  Authorized by: Demetrius Mathur MD    Intubation:     Induction:  Intravenous    Intubated:  Postinduction    Mask Ventilation:  Easy with oral airway    Attempts:  1    Attempted By:  Resident anesthesiologist    Blade:  Gallardo 3    Laryngeal View Grade: Grade I - full view of cords      Difficult Airway Encountered?: No      Complications:  None    Airway Device:  Oral endotracheal tube    Airway Device Size:  8.0    Style/Cuff Inflation:  Cuffed (inflated to minimal occlusive pressure)    Tube secured:  22    Secured at:  The lips    Placement Verified By:  Capnometry    Findings Post-Intubation:  BS equal bilateral and atraumatic/condition of teeth unchanged

## 2023-11-29 NOTE — NURSING TRANSFER
Nursing Transfer Note      11/29/2023   2:00 PM    Nurse giving handoff:Kaylynn PARKER PACU  Nurse receiving handoff:Jessica PARKER \[facility]\""KAYLEIGH    Reason patient is being transferred: post anesthesia    Transfer To: [facility] Room     Transfer via stretcher    Transfer with 2L to O2    Transported by RN    Transfer Vital Signs:  Blood Pressure:132/83  Heart Rate:92  O2:95%  Temperature:97.5  Respirations:16    Order for Tele Monitor? No    Additional Lines: Chest Tube    4eyes on Skin: yes    Medicines sent: insulin pen and mupirocin ointment    Any special needs or follow-up needed: none    Patient belongings transferred with patient: Yes    Chart send with patient: Yes    Notified: spouse    Patient reassessed at: 11/29/23 at 1415 (date, time)    Upon arrival to floor: patient oriented to room, call bell in reach, and bed in lowest position

## 2023-11-29 NOTE — OP NOTE
Thoracic Surgery Operative Report       Date: 11/29/2023        Preoperative Diagnosis: Chronic respiratory insufficiency, concern for Interstitial lung disease      Postoperative Diagnosis: Same      Procedure Performed:   1. Flexible Bronchoscopy with BAL  2. Right video assisted thoracoscopy  3. Right upper, middle and lower lobe wedge resection for tissue and culture  4. Intercostal nerve blocks      Intraoperative Findings: Normal appearing lung       Surgeon: Piotr Amezcua    Resident: Delfino Sutton M.D.      Assistant: N/a      Anesthesia Type: General Anesthesia.       Estimated Blood Loss: 50 mL.       Intravenous Fluid: Per anesthesia record      Specimens:   Specimen (24h ago, onward)       Start     Ordered    11/29/23 1006  Specimen to Pathology, Surgery Pulmonary and Thoracic  Once        Comments: Pre-op Diagnosis: ILD (interstitial lung disease) [J84.9]Procedure(s):RIGHT VATS WEDGEBronchoscopy Number of specimens: 3Name of specimens: 1) Right Upper Lobe - Permanent2) Right Lower Lobe- Permanent 3) Right Middle Lobe- Permanent     References:    Click here for ordering Quick Tip   Question Answer Comment   Procedure Type: Pulmonary and Thoracic    Specimen Class: Routine/Screening    Which provider would you like to cc? PIOTR AMEZCUA    Release to patient Immediate        11/29/23 1038    11/29/23 0842  Cytology, Fluid/Wash/Brush  Once        Comments: Specimen # 1, Location Right Lower Lobe BAL asperite- Cytology     Question Answer Comment   Source: Bronchial Alveolar Lavage (BAL)    Clinical Information: ILD (interstitial lung disease)    Specific Site: Right Lower Lobe    Other Requests: see below    Release to patient Immediate        11/29/23 0844                    Right mainstem BAL, Left Mainstem BAL, Right upper lobe, right middle lobe and right lower lobe for surgical pathology, fungus, acid fast culture, anaerobe culture, bacterial culture, and direct smear.     Drains: 28 [place]  Chest Tube.       Complications: None.       Disposition: The patient tolerated the operation well, was extubated, and transported the postanesthesia care unit in stable condition.       Indication for the procedure:   Mr. Krueger is a 58 year/old female, who presents with chronic respiratory failure. She was referred to me by her pulmonologist given concern for interstitial lung disease.  In an effort to establish a definitive diagnosis the patient was recommended to undergo a thorascopic lung biopsy. Risk, benefits, and alternatives were discussed with the patient, who wishes to proceed.      Description of the operation: The patient was transferred to the main operating room. Intravenous lines and an arterial line were placed the anesthesia team. The patient was intubated with a single-lumen endotracheal tube by anesthesia. General anesthesia was induced. The patient received 2 g of IV ancef prior to the incision for antimicrobial prophylaxis. Compression boots were placed in the lower extremities with DVT prophylaxis. After a time out, a flexible bronchoscopy was performed. The flexible bronchoscope was advanced through the endotracheal tube advanced to the armando. The armando appeared to be sharp. The right mainstem bronchus was filled with thick secretions. The right upper lobe bronchial orifice was normal. The bronchus intermedius was normal. The right middle lobe and right lower lobe bronchial orifices were normal. Left mainstem bronchus was entered and was full of thick secretions normal. The left upper lobe and left lower lobe bronchial orifices were normal. Right lower lobe aspirate was  obtained. The patient was then intubated with a double-lumen endotracheal tube and positioned in the left lateral decubitus position with the right chest facing upward.       The right chest was prepped and draped in the standard sterile surgical fashion. An incision was made in the posterior axillary line at the 8th  intercostal space. Soft tissue was dissected with the bovie. Ventilation was held, and a 12mm port was inserted. Camera was inserted confirming no injury to underlying tissue and insufflation was obtained.  A second 12mm port was placed in the anterior axillary line under direct visualization.  0.25% Marcain with epinephrine was then injected in the T4-10 interspaces under direct thoracoscopic guidances. The lung tissue was then visually inspected and appeared to be normal in appearance.  Using a grasper for assistance, 3 sequential wedge lung biopsies were taken from the right upper, right middle and right lower lobes.  Wedge resections were removed individually through the anterior port site.  The staple lines were hemostatic. Ex vivo, wedge biopsies had a small section sharply transected for and sent for culture while the remaining tissue was sent for permanent pathologic analysis.     Once completed a 28 Fr Chest tube was inserted through the anterior most incision.  Ports were removed and the lungs were re-expanded under direct visualization.  Incisions were closed in multiple layers with 2-0 vicryl in muscle and deep dermal layers and a running subcuticular 4-0 vicryl for skin.  Chest tube was secured using an 0 Ticron suture.  Benzoin and steri strips were used to cover skin incisions.     The sponge and instrument counts were correct x 2. I was present for the entire procedure and participated in the entire procedure. There were no intraoperative complications. The patient was extubated and transported to postanesthesia care unit in stable condition.

## 2023-11-29 NOTE — ANESTHESIA POSTPROCEDURE EVALUATION
Anesthesia Post Evaluation    Patient: Nathalie Krueger    Procedure(s) Performed: Procedure(s) (LRB):  RIGHT VATS WEDGE (Right)  Bronchoscopy (N/A)  BLOCK, NERVE, INTERCOSTAL, 2 OR MORE    Final Anesthesia Type: general      Patient location during evaluation: PACU  Patient participation: Yes- Able to Participate  Level of consciousness: awake and alert and oriented  Post-procedure vital signs: reviewed and stable  Pain management: adequate  Airway patency: patent    PONV status at discharge: No PONV  Anesthetic complications: no      Cardiovascular status: hemodynamically stable  Respiratory status: unassisted, spontaneous ventilation and nasal cannula  Hydration status: euvolemic  Follow-up not needed.              Vitals Value Taken Time   /89 11/29/23 1417   Temp 36.4 °C (97.5 °F) 11/29/23 1400   Pulse 97 11/29/23 1422   Resp 22 11/29/23 1418   SpO2 93 % 11/29/23 1422   Vitals shown include unvalidated device data.      Event Time   Out of Recovery 11:45:00         Pain/Narciso Score: Pain Rating Prior to Med Admin: 6 (11/29/2023 12:15 PM)  Narciso Score: 9 (11/29/2023 11:45 AM)

## 2023-11-29 NOTE — H&P
[facility] - Surgery (2nd Fl)  History & Physical  Cardiothoracic Surgery    SUBJECTIVE:     58 y.o. female with DM2, GERD, HTN, HLD, bronchiectasis, ORLIN and chronic hypoxemic resp failure referred for consideration of lung biopsy. She is following with Dr. Lemon and Dr. Ledesma. Started on oxygen in Aug 2022. Typcially uses 2L at night and 3-4L with activity. Previous CT was concerning for early fibrosis. Underwent bronchoscopy with biopsies with were non diagnostic although concerning for aspiration. Esophagram with dysmotility. On PPI. Recently treated for possible pna vs ILD flare with abxs and steroids.  She has had an extensive autoimmune workup which revealed +DAYTON and low IgM. All other workup unrevealing. RHC with mPAP of 20 and PCWP of 16. lung transplant requesting wedge biopsy.      Former smoker. Quit 4 years ago.   PSH: appendectomy, rotator cuff repair.     PTA Medications   Medication Sig    albuterol (PROVENTIL/VENTOLIN HFA) 90 mcg/actuation inhaler Inhale 1-2 puffs into the lungs every 4 (four) hours as needed for Wheezing or Shortness of Breath. Rescue    albuterol-ipratropium (DUO-NEB) 2.5 mg-0.5 mg/3 mL nebulizer solution Take 3 mLs by nebulization 2 (two) times daily. Rescue    amLODIPine (NORVASC) 5 MG tablet TAKE 1 TABLET BY MOUTH EVERY DAY    azelastine (ASTELIN) 137 mcg (0.1 %) nasal spray SPRAY 1 SPRAY IN EACH NOSTRIL 2 TIMES DAILY.    calcium carbonate/vitamin D3 (CALCIUM WITH VITAMIN D ORAL) Take 1 tablet by mouth once daily.    cetirizine (ZYRTEC) 10 MG tablet Take 1 tablet (10 mg total) by mouth once daily.    fluticasone-umeclidin-vilanter (TRELEGY ELLIPTA) 200-62.5-25 mcg inhaler Inhale 1 puff into the lungs once daily.    metFORMIN (GLUCOPHAGE) 500 MG tablet TAKE 1 TABLET BY MOUTH EVERY DAY WITH BREAKFAST (Patient taking differently: Take 500 mg by mouth daily with breakfast.)    omeprazole (PRILOSEC) 40 MG capsule Take 1 capsule (40 mg total) by mouth 2 (two) times daily before  meals.    rosuvastatin (CRESTOR) 5 MG tablet Take 1 tablet (5 mg total) by mouth once daily.    multivitamin capsule Take 1 capsule by mouth once daily.    OZEMPIC 1 mg/dose (4 mg/3 mL) INJECT 1 MG INTO THE SKIN EVERY 7 DAYS.       Review of patient's allergies indicates:   Allergen Reactions    Atorvastatin Other (See Comments)     Dry cough      Lisinopril      Other reaction(s): Cough    Meperidine hcl Nausea Only       Past Medical History:   Diagnosis Date    Acute bronchitis 02/01/2022    Adenomatous polyp of ascending colon 06/22/2020    Colon polyp 11/03/2023    3mm in transverse colon    Diabetes mellitus     Gastric polyps 11/03/2023    Interstitial lung disease      Past Surgical History:   Procedure Laterality Date    24 HOUR IMPEDANCE PH MONITORING OF ESOPHAGUS IN PATIENT NOT TAKING ACID REDUCING MEDICATIONS N/A 11/14/2023    Procedure: IMPEDANCE PH STUDY, ESOPHAGEAL, 24 HOUR, IN PATIENT NOT TAKING ACID REDUCING MEDICATION;  Surgeon: Felicita Villafana MD;  Location: [facility] (4TH FLR);  Service: Endoscopy;  Laterality: N/A;    ABLATION, FIBROID, UTERUS, LAPAROSCOPIC, WITH US GUIDANCE      APPENDECTOMY  1982    BRONCHOSCOPY N/A 8/31/2023    Procedure: Bronchoscopy(need fluoro);  Surgeon: Sarah Lemon MD;  Location: [facility];  Service: Pulmonary;  Laterality: N/A;  transbronchial biopsies need fluoro    COLONOSCOPY N/A 11/3/2023    Procedure: COLONOSCOPY;  Surgeon: Felicita Villafana MD;  Location: [facility] (2ND FLR);  Service: Endoscopy;  Laterality: N/A;  10/26/23-per Eileen adjust to 60 min case - ERW  10/27-precall complete-pt verbalized understanding of holding Ozempic-MS    ESOPHAGEAL MANOMETRY WITH MEASUREMENT OF IMPEDANCE N/A 11/14/2023    Procedure: MANOMETRY, ESOPHAGUS, WITH IMPEDANCE MEASUREMENT;  Surgeon: Felicita Villafana MD;  Location: [facility] (4TH FLR);  Service: Endoscopy;  Laterality: N/A;  esophageal manometry with dysphagia protocol   followed by 24 hr ph off  PPI  11/8-precall complete-MS    ESOPHAGOGASTRODUODENOSCOPY N/A 11/3/2023    Procedure: EGD (ESOPHAGOGASTRODUODENOSCOPY);  Surgeon: Felicita Villafana MD;  Location: [facility] ENDO (2ND FLR);  Service: Endoscopy;  Laterality: N/A;  egd/endoflip and colon  extended prep  peg prep  prep instructions sent to pt via portal  diabetic -metformin  wl meds *ozempic-hold 7 days prior  02-2liters  2nd floor-Severe pulmonary Disease    RIGHT HEART CATHETERIZATION Right 3/29/2023    Procedure: INSERTION, CATHETER, RIGHT HEART;  Surgeon: Yulisa Yung DO;  Location: [facility] CATH LAB;  Service: Cardiology;  Laterality: Right;    ROTATOR CUFF REPAIR Right 1998    TONSILLECTOMY  1977       Family history is reviewed and has not changed   Social History     Tobacco Use    Smoking status: Former     Current packs/day: 0.00     Average packs/day: 0.5 packs/day for 25.0 years (12.5 ttl pk-yrs)     Types: Cigarettes     Start date: 12/16/1994     Quit date: 12/16/2019     Years since quitting: 3.9     Passive exposure: Past    Smokeless tobacco: Never    Tobacco comments:     Smoked additive free tobacco   Substance Use Topics    Alcohol use: Not Currently    Drug use: Never        Review of Systems:  Review of Systems   Constitutional:  Negative for activity change and appetite change.   Respiratory:  Positive for shortness of breath.    Cardiovascular:  Negative for chest pain and palpitations.   Gastrointestinal:  Negative for abdominal pain.   Genitourinary:  Negative for difficulty urinating.   Musculoskeletal:  Negative for arthralgias.   Skin:  Negative for color change and rash.   Neurological:  Negative for dizziness.   Hematological:  Negative for adenopathy.   Psychiatric/Behavioral:  Negative for agitation. The patient is not nervous/anxious.         OBJECTIVE:     Vital Signs (Most Recent):  Temp: 98.6 °F (37 °C) (11/29/23 0742)  Resp: 20 (11/29/23 0742)  BP: (!) 168/101 (11/29/23 0742)  SpO2: (!) 92 % (11/29/23  0742)    Admission: Weight: 101.6 kg (224 lb) (11/29/23 0742)   Most Recent: Weight: 101.6 kg (224 lb) (11/29/23 0742)    Physical Exam:  Constitutional:       Appearance: Normal appearance.   HENT:      Head: Atraumatic.   Cardiovascular:      Rate and Rhythm: Normal rate and regular rhythm.      Pulses: Normal pulses.      Heart sounds: Normal heart sounds.   Pulmonary:      Effort: Pulmonary effort is normal.      Breath sounds: Normal breath sounds.   Abdominal:      Palpations: Abdomen is soft.   Musculoskeletal:         General: Normal range of motion.      Cervical back: Normal range of motion.   Skin:     General: Skin is warm and dry.   Neurological:      General: No focal deficit present.      Mental Status: She is alert and oriented to person, place, and time.   Psychiatric:         Mood and Affect: Mood normal.         Behavior: Behavior normal.     Laboratory:  I have reviewed all pertinent lab results within the past 24 hours.    Diagnostic Results:  CT: Reviewed      ASSESSMENT/PLAN:      58 y.o. female with DM2, GERD, HTN, HLD, bronchiectasis, ORLIN and chronic hypoxemic resp failure referred for consideration of lung biopsy. Will proceed to OR for bronchoscopy with BAL and right VATS wedge biopsies.      Alexi Maher, DO PGY-6  Cardiothoracic Surgery

## 2023-11-29 NOTE — NURSING
Nurses Note -- 4 Eyes      11/29/2023   2:54 PM      Skin assessed during: Admit      [] No Altered Skin Integrity Present    []Prevention Measures Documented      [x] Yes- Altered Skin Integrity Present or Discovered   [] LDA Added if Not in Epic (Describe Wound)   [x] New Altered Skin Integrity was Present on Admit and Documented in LDA. Surgical incision site   [] Wound Image Taken    Wound Care Consulted? No    Attending Nurse:   Jessica PARKER    Second RN/Staff Member:   Carlos FRANZ

## 2023-11-30 VITALS
DIASTOLIC BLOOD PRESSURE: 72 MMHG | OXYGEN SATURATION: 95 % | RESPIRATION RATE: 16 BRPM | TEMPERATURE: 98 F | BODY MASS INDEX: 33.18 KG/M2 | HEIGHT: 69 IN | SYSTOLIC BLOOD PRESSURE: 131 MMHG | HEART RATE: 74 BPM | WEIGHT: 224 LBS

## 2023-11-30 DIAGNOSIS — J84.9 ILD (INTERSTITIAL LUNG DISEASE): Primary | ICD-10-CM

## 2023-11-30 LAB
FINAL PATHOLOGIC DIAGNOSIS: NORMAL
Lab: NORMAL
POCT GLUCOSE: 192 MG/DL (ref 70–110)
POCT GLUCOSE: 248 MG/DL (ref 70–110)
POCT GLUCOSE: 309 MG/DL (ref 70–110)

## 2023-11-30 PROCEDURE — 25000003 PHARM REV CODE 250: Mod: NTX | Performed by: STUDENT IN AN ORGANIZED HEALTH CARE EDUCATION/TRAINING PROGRAM

## 2023-11-30 PROCEDURE — 99232 PR SUBSEQUENT HOSPITAL CARE,LEVL II: ICD-10-PCS | Mod: NTX,,, | Performed by: STUDENT IN AN ORGANIZED HEALTH CARE EDUCATION/TRAINING PROGRAM

## 2023-11-30 PROCEDURE — 94640 AIRWAY INHALATION TREATMENT: CPT | Mod: NTX

## 2023-11-30 PROCEDURE — 27000221 HC OXYGEN, UP TO 24 HOURS: Mod: NTX

## 2023-11-30 PROCEDURE — 99900035 HC TECH TIME PER 15 MIN (STAT): Mod: NTX

## 2023-11-30 PROCEDURE — 94761 N-INVAS EAR/PLS OXIMETRY MLT: CPT | Mod: NTX

## 2023-11-30 PROCEDURE — 25000242 PHARM REV CODE 250 ALT 637 W/ HCPCS: Mod: NTX | Performed by: STUDENT IN AN ORGANIZED HEALTH CARE EDUCATION/TRAINING PROGRAM

## 2023-11-30 PROCEDURE — 99232 SBSQ HOSP IP/OBS MODERATE 35: CPT | Mod: NTX,,, | Performed by: STUDENT IN AN ORGANIZED HEALTH CARE EDUCATION/TRAINING PROGRAM

## 2023-11-30 RX ORDER — ACETAMINOPHEN 500 MG
1000 TABLET ORAL EVERY 8 HOURS
Qty: 84 TABLET | Refills: 0 | Status: SHIPPED | OUTPATIENT
Start: 2023-11-30 | End: 2023-12-14

## 2023-11-30 RX ORDER — OXYCODONE HYDROCHLORIDE 5 MG/1
5 TABLET ORAL EVERY 4 HOURS PRN
Qty: 20 TABLET | Refills: 0 | Status: SHIPPED | OUTPATIENT
Start: 2023-11-30 | End: 2023-12-20

## 2023-11-30 RX ORDER — METHOCARBAMOL 500 MG/1
500 TABLET, FILM COATED ORAL 4 TIMES DAILY
Qty: 56 TABLET | Refills: 0 | Status: SHIPPED | OUTPATIENT
Start: 2023-11-30 | End: 2023-12-14

## 2023-11-30 RX ORDER — AMOXICILLIN 250 MG
1 CAPSULE ORAL 2 TIMES DAILY
Qty: 20 TABLET | Refills: 0 | Status: SHIPPED | OUTPATIENT
Start: 2023-11-30 | End: 2023-12-20

## 2023-11-30 RX ORDER — GABAPENTIN 300 MG/1
300 CAPSULE ORAL 3 TIMES DAILY
Qty: 42 CAPSULE | Refills: 0 | Status: SHIPPED | OUTPATIENT
Start: 2023-11-30 | End: 2023-12-20

## 2023-11-30 RX ADMIN — INSULIN ASPART 2 UNITS: 100 INJECTION, SOLUTION INTRAVENOUS; SUBCUTANEOUS at 08:11

## 2023-11-30 RX ADMIN — METHOCARBAMOL 500 MG: 500 TABLET ORAL at 08:11

## 2023-11-30 RX ADMIN — GABAPENTIN 300 MG: 300 CAPSULE ORAL at 08:11

## 2023-11-30 RX ADMIN — OXYCODONE HYDROCHLORIDE 5 MG: 5 TABLET ORAL at 12:11

## 2023-11-30 RX ADMIN — ACETAMINOPHEN 1000 MG: 500 TABLET ORAL at 08:11

## 2023-11-30 RX ADMIN — PANTOPRAZOLE SODIUM 40 MG: 40 TABLET, DELAYED RELEASE ORAL at 08:11

## 2023-11-30 RX ADMIN — INSULIN ASPART 8 UNITS: 100 INJECTION, SOLUTION INTRAVENOUS; SUBCUTANEOUS at 12:11

## 2023-11-30 RX ADMIN — SENNOSIDES AND DOCUSATE SODIUM 1 TABLET: 8.6; 5 TABLET ORAL at 08:11

## 2023-11-30 RX ADMIN — IPRATROPIUM BROMIDE AND ALBUTEROL SULFATE 3 ML: .5; 3 SOLUTION RESPIRATORY (INHALATION) at 12:11

## 2023-11-30 RX ADMIN — MUPIROCIN 1 G: 20 OINTMENT TOPICAL at 08:11

## 2023-11-30 RX ADMIN — METHOCARBAMOL 500 MG: 500 TABLET ORAL at 12:11

## 2023-11-30 RX ADMIN — IPRATROPIUM BROMIDE AND ALBUTEROL SULFATE 3 ML: .5; 3 SOLUTION RESPIRATORY (INHALATION) at 07:11

## 2023-11-30 NOTE — DISCHARGE INSTRUCTIONS
At Home Instructions:     Please remove dressings 48 hours after your tube is removed. The only thing that should be left on the incisions are steri strips (look straw paper). Once you remove the bandages, you can shower. Soap and water can run over the incisions. Do not scrub wound when washing or drying it. Gently pat the wound dry. Do not soak in a tub for 3 weeks following surgery.     If you notice any redness, swelling, cloudy or foul-smelling drainage, tenderness or have a fever greater than 100.4, contact our office immediately. If it is not during office hours please send us a message and attach a picture for us to review. If you were discharged with a stitch in place, you can expect some redness around suture and incision. This is normal. If you are concerned, please send us a picture.      You may notice light pink, red or rust-colored drainage from the incision where the chest drain was removed.  If there is drainage, please allow the fluid to drain onto a heavy gauze, wash cloth, bath towel or paper towels.  It only indicates that undrained fluid is spontaneously draining.  If you hear a sucking sound or notice bubbling at the drainage site, please keep incision covered and call our office.    It is normal to cough up blood-tinged mucous following surgery. This typically occurs post-operative day 3-5. It will gradually thin out.     Walking around following lung surgery is the best thing for your lungs to heal. Slowly increase your activity each day. When you are not active, use your incentive spirometer (breathing device) a few times every hour while awake.    Please notify us if you experience persistent nausea, vomiting, diarrhea, lightheadedness, dizziness, palpitations, increased confusion, weakness, or severe uncontrolled pain.     Please notify us if you experience any difficulty breathing or increased cough. A cough is often normal following surgery.        Pain Management:    You will be given  a prescription for pain medication prior to discharge to be taken in combination with over-the-counter medications such as Tylenol and ibuprofen.     You will typically be instructed to take a few different types of medications to treat the multiple causes of pain without high doses of narcotics. Your instructions will be based on what you were requiring in the hospital. This will include a potential combination of Tylenol, Ibuprofen, Gabapentin, muscle relaxer, and narcotic.     It is common to have nerve pain following surgery between your ribs. The pain can manifest in many ways. The most common sensations are numbness, tingling, burning, hot poker, tight band like, and hypersensitivity. These can occur anywhere from the middle of your back wrapping around to your side to the breastbone on the side of the surgery. The discomfort is caused by irritation of the nerve endings near your incisions. The medicine we most commonly use to treat this type of pain is gabapentin.     Pain medication can make you nauseated and/or constipated. Make sure you eat prior to taking any pain medication and drink plenty of water. You may also use stool softeners over the counter, such as Colace (docusate calcium) 100 mg as directed to prevent constipation. If the stool softeners do not help, you can use a laxative of your choice. Discontinue if you have loose stools.       Post Operative Clinic Visit    In most cases, a post operative chest x-ray and clinic appointment will be scheduled prior to your hospital discharge. Please call our office or send a message if you need to reschedule your appointments or you feel you need to be seen sooner.        Pathology Results    Only the providers will discuss the results of your pathology report with you. This discussion is usually done at your post operative visit. The nursing staff is not allowed to give any pathology results over the phone.  Pathology results are often released to the  portal before your provider gets the results and before your clinic visit. We will discuss these with you in person.        FMLA/Disability Paperwork    Please fill out all patient information on your forms and fax them to [phone], email [email or web address] or you may drop them off to our medical records department on the [facility]. The forms will be completed and sent to your surgeon to sign off on. These forms can take up to 7-10 business days to be completed. If you have questions or need to follow up, please call their office at [phone] (select option 2).      Thoracic Surgery Providers   PAUL Reeves Dr., PA-C    Thoracic Surgery Staff  KEITH Galicia RN Santana Joseph, MA    Office Hours: Monday-Friday 8:00 am- 5:00 pm  Office Phone: [phone]  Office Fax: [phone]      If you have any questions or concerns, please do not hesitate to call our office during the above hours. Additionally, you can message us via the portal at any time. Messaging is the most direct way to get to us. Most of your concerns can be addressed by our team member over the phone or we can arrange for an office visit if needed. Please avoid waiting until the evening or weekend to obtain assistance for an issue that may have arisen at an earlier time during the day or week.  We want to help you to avoid the inconvenience of an urgent care or emergency department visit.      On the weekends or after office hours please call the  at [phone] and ask to speak to the Cardiothoracic Surgeon Resident on call.    In the event of a medical emergency call [phone].

## 2023-11-30 NOTE — PROGRESS NOTES
[facility]  Thoracic Surgery  Progress Note    Subjective:     History of Present Illness:  No notes on file    Post-Op Info:  Procedure(s) (LRB):  RIGHT VATS WEDGE (Right)  Bronchoscopy (N/A)  BLOCK, NERVE, INTERCOSTAL, 2 OR MORE   1 Day Post-Op     Interval History: NAEON. Chest tube to suction without leak. Pain controlled. On 2-3L NC.     Medications:  Continuous Infusions:  Scheduled Meds:   acetaminophen  1,000 mg Oral Q8H    albuterol-ipratropium  3 mL Nebulization Q6H    enoxparin  40 mg Subcutaneous Daily    gabapentin  300 mg Oral TID    methocarbamoL  500 mg Oral QID    mupirocin  1 g Nasal BID    pantoprazole  40 mg Oral Daily    senna-docusate 8.6-50 mg  1 tablet Oral BID     PRN Meds:dextrose 10%, dextrose 10%, glucagon (human recombinant), glucose, glucose, hydrALAZINE, HYDROmorphone, insulin aspart U-100, metoclopramide HCl, ondansetron, oxyCODONE, oxyCODONE     Review of patient's allergies indicates:   Allergen Reactions    Atorvastatin Other (See Comments)     Dry cough      Lisinopril      Other reaction(s): Cough    Meperidine hcl Nausea Only     Objective:     Vital Signs (Most Recent):  Temp: 97.4 °F (36.3 °C) (11/30/23 0721)  Pulse: 74 (11/30/23 0743)  Resp: 16 (11/30/23 0743)  BP: 132/79 (11/30/23 0721)  SpO2: 96 % (11/30/23 0743) Vital Signs (24h Range):  Temp:  [97.4 °F (36.3 °C)-99.1 °F (37.3 °C)] 97.4 °F (36.3 °C)  Pulse:  [74-95] 74  Resp:  [15-23] 16  SpO2:  [91 %-98 %] 96 %  BP: (119-136)/(67-89) 132/79     Intake/Output - Last 3 Shifts         11/28 0700 11/29 0659 11/29 0700 11/30 0659 11/30 0700 12/01 0659    IV Piggyback  1500     Total Intake(mL/kg)  1500 (14.8)     Urine (mL/kg/hr)  250 (0.1)     Chest Tube  30     Total Output  280     Net  +1220            Urine Occurrence  3 x     Stool Occurrence  0 x             SpO2: 96 %        Physical Exam  Constitutional:       Appearance: Normal appearance.   HENT:      Head: Atraumatic.   Eyes:      Extraocular Movements:  Extraocular movements intact.   Cardiovascular:      Rate and Rhythm: Normal rate and regular rhythm.   Pulmonary:      Breath sounds: Normal breath sounds.      Comments: Right ct in place. No leak.   Abdominal:      Palpations: Abdomen is soft.   Musculoskeletal:         General: Normal range of motion.      Cervical back: Normal range of motion.   Skin:     General: Skin is warm and dry.   Neurological:      General: No focal deficit present.      Mental Status: She is alert and oriented to person, place, and time.   Psychiatric:         Mood and Affect: Mood normal.         Behavior: Behavior normal.            Significant Labs:  BMP:   Recent Labs   Lab 11/29/23  1214   CREATININE 0.6     CBC:   Recent Labs   Lab 11/29/23  1214   WBC 11.93   RBC 4.53   HGB 13.8   HCT 41.1      MCV 91   MCH 30.5   MCHC 33.6       Significant Diagnostics:  CXR: I have reviewed all pertinent results/findings within the past 24 hours    VTE Risk Mitigation (From admission, onward)           Ordered     enoxaparin injection 40 mg  Daily         11/29/23 1119     IP VTE HIGH RISK PATIENT  Once         11/29/23 1119     Place TRACY hose  Until discontinued         11/29/23 0719     Place sequential compression device  Until discontinued         11/29/23 0719                  Assessment/Plan:     * ILD (interstitial lung disease)  POD 1 right VATS wedge x 3     Chest tube clamp trial this morning. If CXR stable, remove tube and possible home this afternoon.   Regular diet   OOB. Wean oxygen back to home O2  Multimodal pain control   Dvt ppx   Ambulate, IS           Jenny Cerna PA-C  Thoracic Surgery  [facility]

## 2023-11-30 NOTE — DISCHARGE SUMMARY
[facility]  General Surgery  Discharge Summary      Patient Name: Nathalie Krueger  MRN: [id number]  Admission Date: 11/29/2023  Hospital Length of Stay: 1 days  Discharge Date and Time:  11/30/2023 2:56 PM  Attending Physician: Piotr Amezcua MD   Discharging Provider: Delfino Sutton MD  Primary Care Provider: Tyron Flannery MD     HPI: 58 y.o. female with DM2, GERD, HTN, HLD, bronchiectasis, ORLIN and chronic hypoxemic resp failure referred for consideration of lung biopsy. She is following with Dr. Lemon and Dr. Ledesma. Started on oxygen in Aug 2022. Typcially uses 2L at night and 3-4L with activity. Previous CT was concerning for early fibrosis. Underwent bronchoscopy with biopsies with were non diagnostic although concerning for aspiration. Esophagram with dysmotility. On PPI. Recently treated for possible pna vs ILD flare with abxs and steroids.  She has had an extensive autoimmune workup which revealed +DAYTON and low IgM. All other workup unrevealing. RHC with mPAP of 20 and PCWP of 16. lung transplant requesting wedge biopsy.      Former smoker. Quit 4 years ago.   PSH: appendectomy, rotator cuff repair.     Procedure(s) (LRB):  RIGHT VATS WEDGE (Right)  Bronchoscopy (N/A)  BLOCK, NERVE, INTERCOSTAL, 2 OR MORE     Hospital Course: Nathalie Krueger presented to [facility] on the morning of 11/29/2023 for the procedure listed above. The procedure was performed without complication and she was transferred to the floor for further post op care and monitoring. Their postoperative course was uneventful and she progressed according to plan.  Her pain was controlled with a combination of IV and PO narcotic pain medications. Her diet has been advanced throughout her hospital stay. She is now ambulating without assistance, tolerating a regular diet, pain is well controlled with PO pain medication, and she is voiding appropriately. Her chest tube has been removed without any apparent complication or  respiratory decline. She still requires low-volume supplemental oxygen to maintain appropriate oxygenation but this is her baseline. She has established supplemental oxygen therapy at home. She now meets all criteria for discharge.     Significant Diagnostic Studies: N/A    Pending Diagnostic Studies:       Procedure Component Value Units Date/Time    Specimen to Pathology, Surgery Pulmonary and Thoracic [[id number]] Collected: 11/29/23 1039    Order Status: Sent Lab Status: In process Updated: 11/30/23 0718    Specimen: Tissue     X-Ray Chest AP Portable [[id number]]     Order Status: Sent Lab Status: No result           Final Active Diagnoses:    Diagnosis Date Noted POA    PRINCIPAL PROBLEM:  ILD (interstitial lung disease) [J84.9] 07/12/2023 Yes    Chronic hypoxemic respiratory failure [J96.11] 08/09/2022 Yes    Type 2 diabetes mellitus without complication, without long-term current use of insulin [E11.9] 06/22/2020 Yes      Problems Resolved During this Admission:      Discharged Condition: good    Disposition: Home or Self Care    Follow Up:   Follow-up Information       Piotr Amezcua MD Follow up in 2 week(s).    Specialty: Cardiothoracic Surgery  Contact information:  [address] [place] [id number]  [phone]                           Patient Instructions:      Diet Adult Regular     No driving until:   Order Comments: No Driving while taking narcotic pain medication     Notify your health care provider if you experience any of the following:  temperature >100.4     Notify your health care provider if you experience any of the following:  persistent nausea and vomiting or diarrhea     Notify your health care provider if you experience any of the following:  severe uncontrolled pain     Notify your health care provider if you experience any of the following:  redness, tenderness, or signs of infection (pain, swelling, redness, odor or green/yellow discharge around incision site)     Remove  dressing in 48 hours     Activity as tolerated     Medications:  Reconciled Home Medications:      Medication List        START taking these medications      acetaminophen 500 MG tablet  Commonly known as: TYLENOL  Take 2 tablets (1,000 mg total) by mouth every 8 (eight) hours. for 14 days     gabapentin 300 MG capsule  Commonly known as: NEURONTIN  Take 1 capsule (300 mg total) by mouth 3 (three) times daily. for 14 days     methocarbamoL 500 MG Tab  Commonly known as: ROBAXIN  Take 1 tablet (500 mg total) by mouth 4 (four) times daily. for 14 days     oxyCODONE 5 MG immediate release tablet  Commonly known as: ROXICODONE  Take 1 tablet (5 mg total) by mouth every 4 (four) hours as needed for Pain.     senna-docusate 8.6-50 mg 8.6-50 mg per tablet  Commonly known as: PERICOLACE  Take 1 tablet by mouth 2 (two) times daily.            CONTINUE taking these medications      albuterol 90 mcg/actuation inhaler  Commonly known as: PROVENTIL/VENTOLIN HFA  Inhale 1-2 puffs into the lungs every 4 (four) hours as needed for Wheezing or Shortness of Breath. Rescue     albuterol-ipratropium 2.5 mg-0.5 mg/3 mL nebulizer solution  Commonly known as: DUO-NEB  Take 3 mLs by nebulization 2 (two) times daily. Rescue     amLODIPine 5 MG tablet  Commonly known as: NORVASC  TAKE 1 TABLET BY MOUTH EVERY DAY     azelastine 137 mcg (0.1 %) nasal spray  Commonly known as: ASTELIN  SPRAY 1 SPRAY IN EACH NOSTRIL 2 TIMES DAILY.     CALCIUM WITH VITAMIN D ORAL  Take 1 tablet by mouth once daily.     cetirizine 10 MG tablet  Commonly known as: ZYRTEC  Take 1 tablet (10 mg total) by mouth once daily.     metFORMIN 500 MG tablet  Commonly known as: GLUCOPHAGE  TAKE 1 TABLET BY MOUTH EVERY DAY WITH BREAKFAST     multivitamin capsule  Take 1 capsule by mouth once daily.     omeprazole 40 MG capsule  Commonly known as: PRILOSEC  Take 1 capsule (40 mg total) by mouth 2 (two) times daily before meals.     OZEMPIC 1 mg/dose (4 mg/3 mL)  Generic drug:  semaglutide  INJECT 1 MG INTO THE SKIN EVERY 7 DAYS.     rosuvastatin 5 MG tablet  Commonly known as: CRESTOR  Take 1 tablet (5 mg total) by mouth once daily.     TRELEGY ELLIPTA 200-62.5-25 mcg inhaler  Generic drug: fluticasone-umeclidin-vilanter  Inhale 1 puff into the lungs once daily.              Delfino Sutton MD  General Surgery  [facility]

## 2023-11-30 NOTE — SUBJECTIVE & OBJECTIVE
Interval History: NAEON. Chest tube to suction without leak. Pain controlled. On 2-3L NC.     Medications:  Continuous Infusions:  Scheduled Meds:   acetaminophen  1,000 mg Oral Q8H    albuterol-ipratropium  3 mL Nebulization Q6H    enoxparin  40 mg Subcutaneous Daily    gabapentin  300 mg Oral TID    methocarbamoL  500 mg Oral QID    mupirocin  1 g Nasal BID    pantoprazole  40 mg Oral Daily    senna-docusate 8.6-50 mg  1 tablet Oral BID     PRN Meds:dextrose 10%, dextrose 10%, glucagon (human recombinant), glucose, glucose, hydrALAZINE, HYDROmorphone, insulin aspart U-100, metoclopramide HCl, ondansetron, oxyCODONE, oxyCODONE     Review of patient's allergies indicates:   Allergen Reactions    Atorvastatin Other (See Comments)     Dry cough      Lisinopril      Other reaction(s): Cough    Meperidine hcl Nausea Only     Objective:     Vital Signs (Most Recent):  Temp: 97.4 °F (36.3 °C) (11/30/23 0721)  Pulse: 74 (11/30/23 0743)  Resp: 16 (11/30/23 0743)  BP: 132/79 (11/30/23 0721)  SpO2: 96 % (11/30/23 0743) Vital Signs (24h Range):  Temp:  [97.4 °F (36.3 °C)-99.1 °F (37.3 °C)] 97.4 °F (36.3 °C)  Pulse:  [74-95] 74  Resp:  [15-23] 16  SpO2:  [91 %-98 %] 96 %  BP: (119-136)/(67-89) 132/79     Intake/Output - Last 3 Shifts         11/28 0700 11/29 0659 11/29 0700 11/30 0659 11/30 0700 12/01 0659    IV Piggyback  1500     Total Intake(mL/kg)  1500 (14.8)     Urine (mL/kg/hr)  250 (0.1)     Chest Tube  30     Total Output  280     Net  +1220            Urine Occurrence  3 x     Stool Occurrence  0 x             SpO2: 96 %        Physical Exam  Constitutional:       Appearance: Normal appearance.   HENT:      Head: Atraumatic.   Eyes:      Extraocular Movements: Extraocular movements intact.   Cardiovascular:      Rate and Rhythm: Normal rate and regular rhythm.   Pulmonary:      Breath sounds: Normal breath sounds.      Comments: Right ct in place. No leak.   Abdominal:      Palpations: Abdomen is soft.    Musculoskeletal:         General: Normal range of motion.      Cervical back: Normal range of motion.   Skin:     General: Skin is warm and dry.   Neurological:      General: No focal deficit present.      Mental Status: She is alert and oriented to person, place, and time.   Psychiatric:         Mood and Affect: Mood normal.         Behavior: Behavior normal.            Significant Labs:  BMP:   Recent Labs   Lab 11/29/23  1214   CREATININE 0.6     CBC:   Recent Labs   Lab 11/29/23  1214   WBC 11.93   RBC 4.53   HGB 13.8   HCT 41.1      MCV 91   MCH 30.5   MCHC 33.6       Significant Diagnostics:  CXR: I have reviewed all pertinent results/findings within the past 24 hours    VTE Risk Mitigation (From admission, onward)           Ordered     enoxaparin injection 40 mg  Daily         11/29/23 1119     IP VTE HIGH RISK PATIENT  Once         11/29/23 1119     Place TRACY hose  Until discontinued         11/29/23 0719     Place sequential compression device  Until discontinued         11/29/23 0719

## 2023-11-30 NOTE — ASSESSMENT & PLAN NOTE
POD 1 right VATS wedge x 3     Chest tube clamp trial this morning. If CXR stable, remove tube and possible home this afternoon.   Regular diet   OOB. Wean oxygen back to home O2  Multimodal pain control   Dvt ppx   Ambulate, IS

## 2023-11-30 NOTE — PLAN OF CARE
[facility] Plan of Care Note  Dx ILD (Interstitial Lung Disease)    Shift Events none    Goals of Care: Pain management, chest tube care    Neuro: AAO X 4    Vital Signs: VSS    Respiratory: 3L NC    Diet:  Regular    Is patient tolerating current diet? yes    GTTS: none    Urine Output/Bowel Movement: adequate urine output void per toilet/ last bm 11/28/23    Drains/Tubes/Tube Feeds (include total output/shift): R chest tube (45ml)    Lines: R 18 g FA/ Left 18 g FA      Accuchecks: ACHS coverage given    Skin: Surgical incision to R chest lateral wall    Fall Risk Score: 6    Activity level? independent    Any scheduled procedures? none    Any safety concerns? none    Other: none    Problem: Adult Inpatient Plan of Care  Goal: Plan of Care Review  Outcome: Ongoing, Progressing  Goal: Patient-Specific Goal (Individualized)  Outcome: Ongoing, Progressing  Goal: Absence of Hospital-Acquired Illness or Injury  Outcome: Ongoing, Progressing  Goal: Optimal Comfort and Wellbeing  Outcome: Ongoing, Progressing  Goal: Readiness for Transition of Care  Outcome: Ongoing, Progressing     Problem: Diabetes Comorbidity  Goal: Blood Glucose Level Within Targeted Range  Outcome: Ongoing, Progressing     Problem: Infection  Goal: Absence of Infection Signs and Symptoms  Outcome: Ongoing, Progressing     Problem: Fall Injury Risk  Goal: Absence of Fall and Fall-Related Injury  Outcome: Ongoing, Progressing

## 2023-12-01 NOTE — PLAN OF CARE
[facility]  Discharge Assessment    Primary Care Provider: Tyron Flannery MD     Discharge Assessment (most recent)       BRIEF DISCHARGE ASSESSMENT - 12/01/23 1200          Discharge Planning    Resource/Environmental Concerns none     Equipment Currently Used at Home none     Current Living Arrangements home     Patient/Family Anticipates Transition to home     Patient/Family Anticipated Services at Transition none     Discharge Plan A Home with family     Discharge Plan B Home with family        Physical Activity    On average, how many days per week do you engage in moderate to strenuous exercise (like a brisk walk)? 0 days     On average, how many minutes do you engage in exercise at this level? 0 min        Financial Resource Strain    How hard is it for you to pay for the very basics like food, housing, medical care, and heating? Not hard at all        Housing Stability    In the last 12 months, was there a time when you were not able to pay the mortgage or rent on time? No     In the last 12 months, was there a time when you did not have a steady place to sleep or slept in a shelter (including now)? No        Transportation Needs    In the past 12 months, has lack of transportation kept you from medical appointments or from getting medications? No     In the past 12 months, has lack of transportation kept you from meetings, work, or from getting things needed for daily living? No        Food Insecurity    Within the past 12 months, you worried that your food would run out before you got the money to buy more. Never true     Within the past 12 months, the food you bought just didn't last and you didn't have money to get more. Never true        Stress    Do you feel stress - tense, restless, nervous, or anxious, or unable to sleep at night because your mind is troubled all the time - these days? Not at all        Social Connections    In a typical week, how many times do you talk on the phone with  family, friends, or neighbors? More than three times a week     How often do you get together with friends or relatives? More than three times a week     How often do you attend Orthodoxy or Anglican services? Patient refused     Do you belong to any clubs or organizations such as Orthodoxy groups, unions, fraternal or athletic groups, or school groups? Patient refused     How often do you attend meetings of the clubs or organizations you belong to? Patient refused     Are you , , , , never , or living with a partner?         Alcohol Use    Q1: How often do you have a drink containing alcohol? Never     Q2: How many drinks containing alcohol do you have on a typical day when you are drinking? Patient does not drink     Q3: How often do you have six or more drinks on one occasion? Never                     The CM met with the patient at bedside to complete the DPA.  The CM placed name and contact information on the blackboard in the patient's room.  Use preferred pharmacy / bedside delivery for any necessary  medications at the time of discharge.The patient is independent with all ADLs. The patient is not on Dialysis or Coumadin. The patient's  will provide assistance to the patient upon discharge. The patient's  will provide transportation upon discharge .  The CM will continue to follow for course of hospitalization.

## 2023-12-01 NOTE — PLAN OF CARE
[facility]  Discharge Final Note    Primary Care Provider: Tyron Flannery MD  Expected Discharge Date: 11/30/2023    Patient medically ready for discharge to home.    Transportation by family.     Is family/patient aware of discharge: yes     Hospital follow up scheduled: yes       Final Discharge Note (most recent)       Final Note - 12/01/23 1159          Final Note    Assessment Type Final Discharge Note     Anticipated Discharge Disposition Home or Self Care     Hospital Resources/Appts/Education Provided Provided patient/caregiver with written discharge plan information                     Important Message from Medicare         Referral Info (most recent)       Referral Info    No documentation.                 Contact Info       Piotr Amezcua MD   Specialty: Cardiothoracic Surgery    [address]  [facility] [address]   Phone: [phone]       Next Steps: Follow up in 2 week(s)          Future Appointments   Date Time Provider Department Center   12/18/2023  8:30 AM NOM OIC-XRAY [facility]   12/18/2023  9:30 AM Piotr Amezcua MD [facility] THORAC [place]   12/20/2023 12:00 PM SIX, MINUTE WALK [facility] PUL WLK [facility]   12/20/2023 12:30 PM PULMONARY FUNCTION [facility] PULMLAB [facility]   12/20/2023  1:00 PM Lesly Ledesma MD [facility] LUNGTX [facility]   1/2/2024  2:00 PM Tyron Flannery MD [facility] MED [place]   2/28/2024  3:00 PM Sarah Lemon MD [facility] PULM [place]     Shadi Silva RN  Case Management  Ext: [phone]  12/01/2023

## 2023-12-02 LAB
BACTERIA SPEC AEROBE CULT: NO GROWTH

## 2023-12-04 ENCOUNTER — PATIENT MESSAGE (OUTPATIENT)
Dept: CARDIOTHORACIC SURGERY | Facility: CLINIC | Age: 58
End: 2023-12-04
Payer: COMMERCIAL

## 2023-12-05 LAB
FINAL PATHOLOGIC DIAGNOSIS: NORMAL
GROSS: NORMAL
Lab: NORMAL

## 2023-12-06 LAB
BACTERIA SPEC ANAEROBE CULT: NORMAL

## 2023-12-07 NOTE — PHYSICIAN QUERY
PT Name: Nathalie Krueger  MR #: [id number]    DOCUMENTATION CLARIFICATION     CDS/: Helena Raygoza RN              Contact information: [email or web address]    This form is a permanent document in the medical record.     Query Date: December 7, 2023    By submitting this query, we are merely seeking further clarification of documentation.  Please utilize your independent clinical judgment when addressing the question(s) below.    The medical record contains the following:  Pathology Findings Location in Medical Record        Final Pathologic Diagnosis     1. LUNG, RIGHT UPPER LOBE, WEDGE BIOPSY:  Chronic interstitial pneumonitis with changes most consistent with cellular nonspecific interstitial pneumonia-like pattern of injury.  Negative for malignancy.      2. LUNG, RIGHT LOWER LOBES, WEDGE BIOPSY:  Chronic interstitial pneumonitis with changes most consistent with cellular nonspecific interstitial pneumonia-like pattern of injury.  Negative for malignancy.      3. LUNG, RIGHT MIDDLE LOBE, WEDGE BIOPSY:  Chronic interstitial pneumonitis with changes most consistent with cellular nonspecific interstitial pneumonia-like pattern of injury and rare multinucleated giant cells containing cholesterol clefts.  Negative for malignancy.        Comment:  Sample tissue from all segments shows patchy fibrosis, diffuse cellular inflammatory infiltrates, pneumocyte hyperplasia and accumulation of foamy macrophages in airspaces.  The overall changes suggest cellular nonspecific interstitial  pneumonia (NSIP) pattern of injury.  Rare multinucleated giant cells with cholesterol clefts raise concern for hypersensitivity pneumonitis.  Other potential etiologies include connective tissue disease or respiratory bronchiolitis interstitial lung  disease (RB-ILD).  Idiopathic NSIP is a diagnosis of exclusion if all other etiologies have been ruled out.  There is no evidence of acute lung injury, granulomas, viral cytopathic effect  or neoplasia.  Recommend correlation with clinical history and  pertinent lab studies.           Pathology  11/29       Please clarify the pathology findings.    [  ] Pathology findings noted above are ruled in/confirmed as diagnoses   [x ] Pathology findings noted above are not confirmed as diagnoses   [  ] Pathology findings noted above are incidental   [  ] Other diagnosis (please specify): ___________             [  ] Clinically Undetermined     Please document in your progress notes daily for the duration of treatment until resolved and include in your discharge summary.    Form No. [id number]

## 2023-12-11 ENCOUNTER — PATIENT MESSAGE (OUTPATIENT)
Dept: CARDIOTHORACIC SURGERY | Facility: CLINIC | Age: 58
End: 2023-12-11
Payer: COMMERCIAL

## 2023-12-11 NOTE — PROGRESS NOTES
Subjective     Patient ID: Nathalie Krueger is a 58 y.o. female.    Chief Complaint: Post-op Evaluation    Diagnosis:  ILD - Dr. Ledesma     Pre-operative therapy: none    Procedure(s) and date(s): 11/29/23 - R VATS wedge biopsy     Pathology:   LUNG, RIGHT UPPER LOBE, WEDGE BIOPSY:   Chronic interstitial pneumonitis with changes most consistent with cellular nonspecific interstitial pneumonia-like pattern of injury.   Negative for malignancy.    LUNG, RIGHT LOWER LOBES, WEDGE BIOPSY:   Chronic interstitial pneumonitis with changes most consistent with cellular nonspecific interstitial pneumonia-like pattern of injury.   Negative for malignancy.    LUNG, RIGHT MIDDLE LOBE, WEDGE BIOPSY:   Chronic interstitial pneumonitis with changes most consistent with cellular nonspecific interstitial pneumonia-like pattern of injury and rare multinucleated giant cells containing cholesterol clefts.   Negative for malignancy.        HPI  58 y.o. female with DM2, GERD, HTN, HLD, bronchiectasis, ORLIN and chronic hypoxemic resp failure who presents to clinic for 2 week follow up s/p R VATS wedge biopsy. Tolerated procedure well. Post-operative course uncomplicated and discharged home 11/30/23; however, patient messaged following discharge on 12/11 in regard to chest tube site concerns. Thus, patient offered earlier follow up appointment for evaluation. Today patient reports overall doing well. Neurpathic pain continued to improve. She is now off of gabapentin. CT site irritating. Breathing back to presurgery baseline. Ready to get back to activities.     Review of Systems   Constitutional:  Negative for activity change, appetite change and fatigue.   HENT: Negative.     Respiratory:  Negative for cough and shortness of breath.    Gastrointestinal:  Negative for abdominal pain.   Musculoskeletal:  Negative for arthralgias.   Psychiatric/Behavioral: Negative.            Objective     Vitals:    12/14/23 0918   BP: 129/88   Pulse: 92  "  SpO2: (!) 93%   Weight: 103.2 kg (227 lb 8.2 oz)   Height: 5' 9" (1.753 m)   PainSc: 0-No pain         Physical Exam  Constitutional:       Appearance: Normal appearance.   HENT:      Head: Atraumatic.   Eyes:      Extraocular Movements: Extraocular movements intact.   Cardiovascular:      Rate and Rhythm: Regular rhythm.   Pulmonary:      Breath sounds: Normal breath sounds.      Comments: Incision healing. Skin edges  on CT site. Reactive skin edges. No drainage. No tenderness. No fluctuance. - suture removed  Abdominal:      Palpations: Abdomen is soft.   Musculoskeletal:         General: Normal range of motion.      Cervical back: Normal range of motion and neck supple.      Right lower leg: No edema.      Left lower leg: No edema.   Skin:     General: Skin is warm and dry.   Neurological:      General: No focal deficit present.      Mental Status: She is alert and oriented to person, place, and time.   Psychiatric:         Mood and Affect: Mood normal.         Behavior: Behavior normal.         Diagnostics:     CXR 12/13/23: no PTX no effusion        Assessment and Plan   58 y.o. female with DM2, GERD, HTN, HLD, bronchiectasis, ORLIN and chronic hypoxemic resp failure who presents to clinic for 2 week follow up s/p R VATS wedge biopsy    Plan:     CT incision with skin separation. Packed with wet gauze today. Keep open to air. Wash with soap and water. Pat dry. Send portal message with questions regarding incisions and pain.   Continue follow-up with Dr. Ledesma.   "

## 2023-12-14 ENCOUNTER — OFFICE VISIT (OUTPATIENT)
Dept: CARDIOTHORACIC SURGERY | Facility: CLINIC | Age: 58
End: 2023-12-14
Payer: COMMERCIAL

## 2023-12-14 ENCOUNTER — HOSPITAL ENCOUNTER (OUTPATIENT)
Dept: RADIOLOGY | Facility: HOSPITAL | Age: 58
Discharge: HOME | End: 2023-12-14
Attending: PHYSICIAN ASSISTANT
Payer: COMMERCIAL

## 2023-12-14 VITALS
SYSTOLIC BLOOD PRESSURE: 129 MMHG | WEIGHT: 227.5 LBS | OXYGEN SATURATION: 93 % | BODY MASS INDEX: 33.69 KG/M2 | DIASTOLIC BLOOD PRESSURE: 88 MMHG | HEIGHT: 69 IN | HEART RATE: 92 BPM

## 2023-12-14 DIAGNOSIS — J84.9 ILD (INTERSTITIAL LUNG DISEASE): ICD-10-CM

## 2023-12-14 DIAGNOSIS — J84.9 ILD (INTERSTITIAL LUNG DISEASE): Primary | ICD-10-CM

## 2023-12-14 PROCEDURE — 99999 PR PBB SHADOW E&M-EST. PATIENT-LVL IV: CPT | Mod: PBBFAC,TXP,, | Performed by: STUDENT IN AN ORGANIZED HEALTH CARE EDUCATION/TRAINING PROGRAM

## 2023-12-14 PROCEDURE — 71046 X-RAY EXAM CHEST 2 VIEWS: CPT | Mod: 26,NTX,, | Performed by: RADIOLOGY

## 2023-12-14 PROCEDURE — 3074F SYST BP LT 130 MM HG: CPT | Mod: CPTII,NTX,S$GLB, | Performed by: STUDENT IN AN ORGANIZED HEALTH CARE EDUCATION/TRAINING PROGRAM

## 2023-12-14 PROCEDURE — 3044F HG A1C LEVEL LT 7.0%: CPT | Mod: CPTII,NTX,S$GLB, | Performed by: STUDENT IN AN ORGANIZED HEALTH CARE EDUCATION/TRAINING PROGRAM

## 2023-12-14 PROCEDURE — 71046 XR CHEST PA AND LATERAL: ICD-10-PCS | Mod: 26,NTX,, | Performed by: RADIOLOGY

## 2023-12-14 PROCEDURE — 3066F PR DOCUMENTATION OF TREATMENT FOR NEPHROPATHY: ICD-10-PCS | Mod: CPTII,NTX,S$GLB, | Performed by: STUDENT IN AN ORGANIZED HEALTH CARE EDUCATION/TRAINING PROGRAM

## 2023-12-14 PROCEDURE — 3061F PR NEG MICROALBUMINURIA RESULT DOCUMENTED/REVIEW: ICD-10-PCS | Mod: CPTII,NTX,S$GLB, | Performed by: STUDENT IN AN ORGANIZED HEALTH CARE EDUCATION/TRAINING PROGRAM

## 2023-12-14 PROCEDURE — 99024 PR POST-OP FOLLOW-UP VISIT: ICD-10-PCS | Mod: NTX,S$GLB,, | Performed by: STUDENT IN AN ORGANIZED HEALTH CARE EDUCATION/TRAINING PROGRAM

## 2023-12-14 PROCEDURE — 3079F PR MOST RECENT DIASTOLIC BLOOD PRESSURE 80-89 MM HG: ICD-10-PCS | Mod: CPTII,NTX,S$GLB, | Performed by: STUDENT IN AN ORGANIZED HEALTH CARE EDUCATION/TRAINING PROGRAM

## 2023-12-14 PROCEDURE — 71046 X-RAY EXAM CHEST 2 VIEWS: CPT | Mod: TC,NTX

## 2023-12-14 PROCEDURE — 3061F NEG MICROALBUMINURIA REV: CPT | Mod: CPTII,NTX,S$GLB, | Performed by: STUDENT IN AN ORGANIZED HEALTH CARE EDUCATION/TRAINING PROGRAM

## 2023-12-14 PROCEDURE — 99024 POSTOP FOLLOW-UP VISIT: CPT | Mod: NTX,S$GLB,, | Performed by: STUDENT IN AN ORGANIZED HEALTH CARE EDUCATION/TRAINING PROGRAM

## 2023-12-14 PROCEDURE — 3074F PR MOST RECENT SYSTOLIC BLOOD PRESSURE < 130 MM HG: ICD-10-PCS | Mod: CPTII,NTX,S$GLB, | Performed by: STUDENT IN AN ORGANIZED HEALTH CARE EDUCATION/TRAINING PROGRAM

## 2023-12-14 PROCEDURE — 3044F PR MOST RECENT HEMOGLOBIN A1C LEVEL <7.0%: ICD-10-PCS | Mod: CPTII,NTX,S$GLB, | Performed by: STUDENT IN AN ORGANIZED HEALTH CARE EDUCATION/TRAINING PROGRAM

## 2023-12-14 PROCEDURE — 99999 PR PBB SHADOW E&M-EST. PATIENT-LVL IV: ICD-10-PCS | Mod: PBBFAC,TXP,, | Performed by: STUDENT IN AN ORGANIZED HEALTH CARE EDUCATION/TRAINING PROGRAM

## 2023-12-14 PROCEDURE — 3079F DIAST BP 80-89 MM HG: CPT | Mod: CPTII,NTX,S$GLB, | Performed by: STUDENT IN AN ORGANIZED HEALTH CARE EDUCATION/TRAINING PROGRAM

## 2023-12-14 PROCEDURE — 3066F NEPHROPATHY DOC TX: CPT | Mod: CPTII,NTX,S$GLB, | Performed by: STUDENT IN AN ORGANIZED HEALTH CARE EDUCATION/TRAINING PROGRAM

## 2023-12-18 ENCOUNTER — TELEPHONE (OUTPATIENT)
Dept: TRANSPLANT | Facility: CLINIC | Age: 58
End: 2023-12-18
Payer: COMMERCIAL

## 2023-12-20 ENCOUNTER — OFFICE VISIT (OUTPATIENT)
Dept: TRANSPLANT | Facility: CLINIC | Age: 58
End: 2023-12-20
Payer: COMMERCIAL

## 2023-12-20 ENCOUNTER — HOSPITAL ENCOUNTER (OUTPATIENT)
Dept: PULMONOLOGY | Facility: CLINIC | Age: 58
Discharge: HOME | End: 2023-12-20
Payer: COMMERCIAL

## 2023-12-20 VITALS
OXYGEN SATURATION: 95 % | BODY MASS INDEX: 33.67 KG/M2 | HEIGHT: 69 IN | HEART RATE: 99 BPM | RESPIRATION RATE: 20 BRPM | TEMPERATURE: 98 F | DIASTOLIC BLOOD PRESSURE: 94 MMHG | WEIGHT: 227.31 LBS | SYSTOLIC BLOOD PRESSURE: 136 MMHG

## 2023-12-20 VITALS — HEIGHT: 69 IN | BODY MASS INDEX: 33.67 KG/M2 | WEIGHT: 227.31 LBS

## 2023-12-20 DIAGNOSIS — J84.9 ILD (INTERSTITIAL LUNG DISEASE): Primary | ICD-10-CM

## 2023-12-20 DIAGNOSIS — G47.33 OSA (OBSTRUCTIVE SLEEP APNEA): ICD-10-CM

## 2023-12-20 DIAGNOSIS — J43.9 PULMONARY EMPHYSEMA, UNSPECIFIED EMPHYSEMA TYPE: ICD-10-CM

## 2023-12-20 DIAGNOSIS — J84.9 ILD (INTERSTITIAL LUNG DISEASE): ICD-10-CM

## 2023-12-20 DIAGNOSIS — I27.9 CHRONIC PULMONARY HEART DISEASE: ICD-10-CM

## 2023-12-20 LAB
FEF 25 75 LLN: 1.32
FEF 25 75 PRE REF: 106.7 %
FEF 25 75 REF: 2.55
FEV05 LLN: 1.24
FEV05 REF: 2.1
FEV1 FVC LLN: 67
FEV1 FVC PRE REF: 105.3 %
FEV1 FVC REF: 79
FEV1 LLN: 2.23
FEV1 PRE REF: 68.8 %
FEV1 REF: 2.95
FVC LLN: 2.87
FVC PRE REF: 64.8 %
FVC REF: 3.78
PEF LLN: 5.16
PEF PRE REF: 109.6 %
PEF REF: 7.15
PHYSICIAN COMMENT: ABNORMAL
PRE FEF 25 75: 2.73 L/S (ref 1.32–4.21)
PRE FET 100: 6.95 SEC
PRE FEV05 REF: 85.2 %
PRE FEV1 FVC: 82.97 % (ref 66.91–88.87)
PRE FEV1: 2.03 L (ref 2.23–3.64)
PRE FEV5: 1.79 L (ref 1.24–2.95)
PRE FVC: 2.45 L (ref 2.87–4.73)
PRE PEF: 7.83 L/S (ref 5.16–9.14)

## 2023-12-20 PROCEDURE — 3008F BODY MASS INDEX DOCD: CPT | Mod: CPTII,NTX,S$GLB, | Performed by: INTERNAL MEDICINE

## 2023-12-20 PROCEDURE — 3044F HG A1C LEVEL LT 7.0%: CPT | Mod: CPTII,NTX,S$GLB, | Performed by: INTERNAL MEDICINE

## 2023-12-20 PROCEDURE — 99214 OFFICE O/P EST MOD 30 MIN: CPT | Mod: 25,NTX,S$GLB, | Performed by: INTERNAL MEDICINE

## 2023-12-20 PROCEDURE — 3075F PR MOST RECENT SYSTOLIC BLOOD PRESS GE 130-139MM HG: ICD-10-PCS | Mod: CPTII,NTX,S$GLB, | Performed by: INTERNAL MEDICINE

## 2023-12-20 PROCEDURE — 1111F PR DISCHARGE MEDS RECONCILED W/ CURRENT OUTPATIENT MED LIST: ICD-10-PCS | Mod: CPTII,NTX,S$GLB, | Performed by: INTERNAL MEDICINE

## 2023-12-20 PROCEDURE — 1111F DSCHRG MED/CURRENT MED MERGE: CPT | Mod: CPTII,NTX,S$GLB, | Performed by: INTERNAL MEDICINE

## 2023-12-20 PROCEDURE — 99999 PR PBB SHADOW E&M-EST. PATIENT-LVL III: CPT | Mod: PBBFAC,TXP,, | Performed by: INTERNAL MEDICINE

## 2023-12-20 PROCEDURE — 94618 PULMONARY STRESS TESTING: CPT | Mod: NTX,S$GLB,, | Performed by: INTERNAL MEDICINE

## 2023-12-20 PROCEDURE — 3061F NEG MICROALBUMINURIA REV: CPT | Mod: CPTII,NTX,S$GLB, | Performed by: INTERNAL MEDICINE

## 2023-12-20 PROCEDURE — 94010 BREATHING CAPACITY TEST: CPT | Mod: 59,NTX,S$GLB, | Performed by: INTERNAL MEDICINE

## 2023-12-20 PROCEDURE — 3080F PR MOST RECENT DIASTOLIC BLOOD PRESSURE >= 90 MM HG: ICD-10-PCS | Mod: CPTII,NTX,S$GLB, | Performed by: INTERNAL MEDICINE

## 2023-12-20 PROCEDURE — 99999 PR PBB SHADOW E&M-EST. PATIENT-LVL III: ICD-10-PCS | Mod: PBBFAC,TXP,, | Performed by: INTERNAL MEDICINE

## 2023-12-20 PROCEDURE — 3066F PR DOCUMENTATION OF TREATMENT FOR NEPHROPATHY: ICD-10-PCS | Mod: CPTII,NTX,S$GLB, | Performed by: INTERNAL MEDICINE

## 2023-12-20 PROCEDURE — 3080F DIAST BP >= 90 MM HG: CPT | Mod: CPTII,NTX,S$GLB, | Performed by: INTERNAL MEDICINE

## 2023-12-20 PROCEDURE — 3061F PR NEG MICROALBUMINURIA RESULT DOCUMENTED/REVIEW: ICD-10-PCS | Mod: CPTII,NTX,S$GLB, | Performed by: INTERNAL MEDICINE

## 2023-12-20 PROCEDURE — 3008F PR BODY MASS INDEX (BMI) DOCUMENTED: ICD-10-PCS | Mod: CPTII,NTX,S$GLB, | Performed by: INTERNAL MEDICINE

## 2023-12-20 PROCEDURE — 99214 PR OFFICE/OUTPT VISIT, EST, LEVL IV, 30-39 MIN: ICD-10-PCS | Mod: 25,NTX,S$GLB, | Performed by: INTERNAL MEDICINE

## 2023-12-20 PROCEDURE — 94618 PULMONARY STRESS TESTING: ICD-10-PCS | Mod: NTX,S$GLB,, | Performed by: INTERNAL MEDICINE

## 2023-12-20 PROCEDURE — 3066F NEPHROPATHY DOC TX: CPT | Mod: CPTII,NTX,S$GLB, | Performed by: INTERNAL MEDICINE

## 2023-12-20 PROCEDURE — 94010 BREATHING CAPACITY TEST: ICD-10-PCS | Mod: 59,NTX,S$GLB, | Performed by: INTERNAL MEDICINE

## 2023-12-20 PROCEDURE — 3075F SYST BP GE 130 - 139MM HG: CPT | Mod: CPTII,NTX,S$GLB, | Performed by: INTERNAL MEDICINE

## 2023-12-20 PROCEDURE — 3044F PR MOST RECENT HEMOGLOBIN A1C LEVEL <7.0%: ICD-10-PCS | Mod: CPTII,NTX,S$GLB, | Performed by: INTERNAL MEDICINE

## 2023-12-20 RX ORDER — MYCOPHENOLATE MOFETIL 500 MG/1
500 TABLET ORAL 2 TIMES DAILY
Qty: 60 TABLET | Refills: 2 | Status: SHIPPED | OUTPATIENT
Start: 2023-12-20 | End: 2024-01-16

## 2023-12-20 NOTE — PROGRESS NOTES
ADVANCED LUNG DISEASE CLINIC INITIAL EVALUATION                                                                                                                                             Reason for Visit:  ILD-cHP    Referring Physician: Lesly Ledesma MD    History of Present Illness: Nathalie Krueger is a 58 y.o. female who is on 2-4L of oxygen with exertion.  She is on CPAP.  Her New York Heart Association Class is II and a Karnofsky score of 80% - Normal activity with effort: some symptoms of disease. She is diabetic non insulin dependent  Patient with smoking related emphysema, cHP(ILD), ORLIN, chronic hypoxemic respiratory failure, morbid obesity, GERD, DM.  Patient comes in for a routine visit with her wife.  She underwent right lung wedge biopsies on 11/29 and tolerated it well.  She denies any hospitalizations/exacerbations since last visit.  She feels otherwise well and denies any new respiratory concerns or limitations.      Brief History summarized from previous visits  Patient presented in 07/2023 for evaluation of ILD and at that time reported respiratory symptoms first began over three years ago when she lived in [place]. Per patient's wife, patient had developed progressive fatigue and dyspnea with exertion. She was still able to perform her ADLs independently, but noticed she would get breathless with activity very quickly. Previously lived on [employer] in [place]. She states she had an episode of profound fatigue/malaise one week following clean out of family's chicken coup. Frequent exposures to dust, pesticides, fertilizers while living in [place]. Patient and her wife moved to [place] ~two years ago. While living downtown, patient noticed increased cough, worsening fatigue requiring frequent daytime naps. [facility] and found she had worsening cough if other residents smoked within the building. She presented to the ED in early 2022 due to hypoxemia. She  checked her pulse ox at home and reportedly had oxygen saturation of 76%. She did not require hospitalization. No prior chest surgeries, ICU admission, intubations, lung biopsy.     In August 2022, patient had acute worsening of her dyspnea and cough. She was started on 2L oxygen to be used with exertion. 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 Since starting CPAP, notes improvement in daytime fatigue. She completed a trial of steroids in the fall of 2022 after evaluation with Dr. Rodas at [facility] and states her symptoms improved drastically. No recent exacerbations/hospitalizations. She has had an extensive autoimmune workup which revealed +DAYTON and low IgM. All other workup unrevealing. Currently of stiolto and symbicort for management.     Patient is a former 25-30 year smoker and quit 3-4 years ago. Her father had lung disease related to diving in the [employer], but otherwise denies family history of lung disease. No history of frequent infections as a child. She states she did have a history of scarlet fever as a child while living in [place] and was told she should not live there in the future? Also states she used a 20 year old inhaler she bought in [place] while still living in [place] and is concerned she may have aspirated mold. She occasionally has episodes of flushing and rashes. No arthralgias. History of reflux and recently stopped omeprazole in hopes of improving symptoms with diet modification. She recently lost ~10 pounds. Continues to have intermittent reflux despite diet changes. She states she remains very active and walks and performs water aerobics frequently. 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 Remains independent with her ADLs, but does struggle with vacuuming, bending over, and climbing stairs    She was treated for another exacerbation in 10/2023 with prednisone taper in addition to antibiotics for pneumonia and suspected ILD flare. Reported  marked improvement.  The steroids taper was quickly weaned in anticipation of wedge bx.  She reports good compliance with her CPAP. Denies any post-nasal drip.       Past Medical History:   Diagnosis Date    Acute bronchitis 02/01/2022    Adenomatous polyp of ascending colon 06/22/2020    Colon polyp 11/03/2023    3mm in transverse colon    Diabetes mellitus     Gastric polyps 11/03/2023    Interstitial lung disease        Past Surgical History:   Procedure Laterality Date    24 HOUR IMPEDANCE PH MONITORING OF ESOPHAGUS IN PATIENT NOT TAKING ACID REDUCING MEDICATIONS N/A 11/14/2023    Procedure: IMPEDANCE PH STUDY, ESOPHAGEAL, 24 HOUR, IN PATIENT NOT TAKING ACID REDUCING MEDICATION;  Surgeon: Felicita Villafana MD;  Location: [facility] (4TH FLR);  Service: Endoscopy;  Laterality: N/A;    ABLATION, FIBROID, UTERUS, LAPAROSCOPIC, WITH US GUIDANCE      APPENDECTOMY  1982    BRONCHOSCOPY N/A 8/31/2023    Procedure: Bronchoscopy(need fluoro);  Surgeon: Sarah Lemon MD;  Location: [facility];  Service: Pulmonary;  Laterality: N/A;  transbronchial biopsies need fluoro    BRONCHOSCOPY N/A 11/29/2023    Procedure: Bronchoscopy;  Surgeon: Piotr Amezcua MD;  [facility];  Service: Cardiothoracic;  Laterality: N/A;    COLONOSCOPY N/A 11/3/2023    Procedure: COLONOSCOPY;  Surgeon: Felicita Villafana MD;  Location: [facility] (2ND FLR);  Service: Endoscopy;  Laterality: N/A;  10/26/23-per Eileen adjust to 60 min case - ERW  10/27-precall complete-pt verbalized understanding of holding Ozempic-MS    ESOPHAGEAL MANOMETRY WITH MEASUREMENT OF IMPEDANCE N/A 11/14/2023    Procedure: MANOMETRY, ESOPHAGUS, WITH IMPEDANCE MEASUREMENT;  Surgeon: Felicita Villafana MD;  Location: [facility] (4TH FLR);  Service: Endoscopy;  Laterality: N/A;  esophageal manometry with dysphagia protocol   followed by 24 hr ph off PPI  11/8-precall complete-MS    ESOPHAGOGASTRODUODENOSCOPY N/A 11/3/2023    Procedure: EGD  (ESOPHAGOGASTRODUODENOSCOPY);  Surgeon: Felicita Villafana MD;  Location: [facility] ENDO (2ND FLR);  Service: Endoscopy;  Laterality: N/A;  egd/endoflip and colon  extended prep  peg prep  prep instructions sent to pt via portal  diabetic -metformin  wl meds *ozempic-hold 7 days prior  02-2liters  2nd floor-Severe pulmonary Disease    INJECTION OF ANESTHETIC AGENT AROUND MULTIPLE INTERCOSTAL NERVES  11/29/2023    Procedure: BLOCK, NERVE, INTERCOSTAL, 2 OR MORE;  Surgeon: Pitor Amezcua MD;  [facility];  Service: Cardiothoracic;;    RIGHT HEART CATHETERIZATION Right 3/29/2023    Procedure: INSERTION, CATHETER, RIGHT HEART;  Surgeon: Yulisa Yung DO;  Location: [facility] CATH LAB;  Service: Cardiology;  Laterality: Right;    ROTATOR CUFF REPAIR Right 1998    TONSILLECTOMY  1977    VIDEO-ASSISTED THORACOSCOPIC SURGERY (VATS) Right 11/29/2023    Procedure: RIGHT VATS WEDGE;  Surgeon: Piotr Amezcua MD;  Location: [facility] OR [address] [place] [address];  Service: Cardiothoracic;  Laterality: Right;       Allergies: Atorvastatin, Lisinopril, and Meperidine hcl    Current Outpatient Medications   Medication Sig    albuterol (PROVENTIL/VENTOLIN HFA) 90 mcg/actuation inhaler Inhale 1-2 puffs into the lungs every 4 (four) hours as needed for Wheezing or Shortness of Breath. Rescue    albuterol-ipratropium (DUO-NEB) 2.5 mg-0.5 mg/3 mL nebulizer solution Take 3 mLs by nebulization 2 (two) times daily. Rescue    amLODIPine (NORVASC) 5 MG tablet TAKE 1 TABLET BY MOUTH EVERY DAY    azelastine (ASTELIN) 137 mcg (0.1 %) nasal spray SPRAY 1 SPRAY IN EACH NOSTRIL 2 TIMES DAILY.    calcium carbonate/vitamin D3 (CALCIUM WITH VITAMIN D ORAL) Take 1 tablet by mouth once daily.    cetirizine (ZYRTEC) 10 MG tablet Take 1 tablet (10 mg total) by mouth once daily.    fluticasone-umeclidin-vilanter (TRELEGY ELLIPTA) 200-62.5-25 mcg inhaler Inhale 1 puff into the lungs once daily.    metFORMIN (GLUCOPHAGE) 500 MG tablet TAKE 1 TABLET BY MOUTH  EVERY DAY WITH BREAKFAST (Patient taking differently: Take 500 mg by mouth daily with breakfast.)    multivitamin capsule Take 1 capsule by mouth once daily.    mycophenolate (CELLCEPT) 500 mg Tab Take 1 tablet (500 mg total) by mouth 2 (two) times daily.    omeprazole (PRILOSEC) 40 MG capsule Take 1 capsule (40 mg total) by mouth 2 (two) times daily before meals.    OZEMPIC 1 mg/dose (4 mg/3 mL) INJECT 1 MG INTO THE SKIN EVERY 7 DAYS.    rosuvastatin (CRESTOR) 5 MG tablet Take 1 tablet (5 mg total) by mouth once daily.     No current facility-administered medications for this visit.       Immunization History   Administered Date(s) Administered    COVID-19, MRNA, LN-S, PF (Pfizer) (Gray Cap) 04/06/2022    COVID-19, MRNA, LN-S, PF (Pfizer) (Purple Cap) 03/06/2021, 03/30/2021, 09/30/2021    COVID-19, mRNA, LNP-S, PF, jeanette-sucrose, 30 mcg/0.3 mL (Pfizer 2023 Ages 12+) 11/18/2023    COVID-19, mRNA, LNP-S, bivalent booster, PF (PFIZER OMICRON) 09/22/2022    Hepatitis B, Adult 01/16/2018, 02/27/2018, 07/31/2018    Influenza 09/28/2016, 10/04/2017, 10/24/2018, 09/05/2019, 09/08/2022    Influenza - Quadrivalent - PF *Preferred* (6 months and older) 09/30/2021, 09/13/2023    Pneumococcal Conjugate - 20 Valent 09/08/2022    Pneumococcal Polysaccharide - 23 Valent 10/04/2016    Tdap 01/01/2016    Zoster Recombinant 10/29/2020, 01/31/2022     Family History:    Family History   Problem Relation Age of Onset    Diabetes Mother     Hypertension Mother     Heart disease Mother     Diabetes Father     Glaucoma Neg Hx     Macular degeneration Neg Hx     Colon cancer Neg Hx     Esophageal cancer Neg Hx      Social History     Substance and Sexual Activity   Alcohol Use Not Currently      Social History     Substance and Sexual Activity   Drug Use Never      Social History     Socioeconomic History    Marital status:    Occupational History    Occupation: Consultant with non profits   Tobacco Use    Smoking status: Former      Current packs/day: 0.00     Average packs/day: 0.5 packs/day for 25.0 years (12.5 ttl pk-yrs)     Types: Cigarettes     Start date: 1994     Quit date: 2019     Years since quittin.0     Passive exposure: Past    Smokeless tobacco: Never    Tobacco comments:     Smoked additive free tobacco   Substance and Sexual Activity    Alcohol use: Not Currently    Drug use: Never    Sexual activity: Not Currently     Partners: Female     Birth control/protection: Post-menopausal     Comment: spouse -  14 years    Social History Narrative    Lives with her spouse, Aisha. Enjoys live music, painting.      Social Determinants of Health     Financial Resource Strain: Low Risk  (2023)    Overall Financial Resource Strain (CARDIA)     Difficulty of Paying Living Expenses: Not hard at all   Food Insecurity: No Food Insecurity (2023)    Hunger Vital Sign     Worried About Running Out of Food in the Last Year: Never true     Ran Out of Food in the Last Year: Never true   Transportation Needs: No Transportation Needs (2023)    PRAPARE - Transportation     Lack of Transportation (Medical): No     Lack of Transportation (Non-Medical): No   Physical Activity: Inactive (2023)    Exercise Vital Sign     Days of Exercise per Week: 0 days     Minutes of Exercise per Session: 0 min   Stress: No Stress Concern Present (2023)    [employer] [place] [employer] - Occupational Stress Questionnaire     Feeling of Stress : Not at all   Social Connections: Unknown (2023)    Social Connection and Isolation Panel [NHANES]     Frequency of Communication with Friends and Family: More than three times a week     Frequency of Social Gatherings with Friends and Family: More than three times a week     Attends [employer] Services: Patient declined     Active Member of Clubs or Organizations: Patient declined     Attends Club or Organization Meetings: Patient declined     Marital Status:   "  Housing Stability: Low Risk  (12/1/2023)    Housing Stability Vital Sign     Unable to Pay for Housing in the Last Year: No     Number of Places Lived in the Last Year: 1     Unstable Housing in the Last Year: No     Vitals  BP (!) 136/94 (BP Location: Right arm, Patient Position: Sitting, BP Method: Medium (Automatic))   Pulse 99   Temp 98.1 °F (36.7 °C) (Oral)   Resp 20   Ht 5' 9" (1.753 m)   Wt 103.1 kg (227 lb 4.7 oz)   SpO2 95% Comment: Room Air  BMI 33.57 kg/m²   Physical Exam:  GEN: middle-aged woman, resting comfortably in bed  HEENT: face symmetric, conjunctivae anicteric, MMM  CV: regular rhythm, normal rate  PULM: CTAB, no crackles, no wheezing, normal respiratory effort on RA  Abd: non-distended  Ext: no LE edema  Skin: no rashes  Neuro: alert, answering questions appropriately     Labs:  Hospital Outpatient Visit on 12/20/2023   Component Date Value    Physician Comment 12/20/2023                      Value:Spirometry shows mild restriction based on the reduction in FVC.   Â   Notes: Consider lung volume determination to confirm the degree of restriction.       Pre FVC 12/20/2023 2.45 (L)     PRE FEV5 12/20/2023 1.79     Pre FEV1 12/20/2023 2.03 (L)     Pre FEV1 FVC 12/20/2023 82.97     Pre FEF 25 75 12/20/2023 2.73     Pre PEF 12/20/2023 7.83     Pre  12/20/2023 6.95     FVC Ref 12/20/2023 3.78     FVC LLN 12/20/2023 2.87     FVC Pre Ref 12/20/2023 64.8     FEV05 REF 12/20/2023 2.10     FEV05 LLN 12/20/2023 1.24     PRE FEV05 REF 12/20/2023 85.2     FEV1 Ref 12/20/2023 2.95     FEV1 LLN 12/20/2023 2.23     FEV1 Pre Ref 12/20/2023 68.8     FEV1 FVC Ref 12/20/2023 79     FEV1 FVC LLN 12/20/2023 67     FEV1 FVC Pre Ref 12/20/2023 105.3     FEF 25 75 Ref 12/20/2023 2.55     FEF 25 75 LLN 12/20/2023 1.32     FEF 25 75 Pre Ref 12/20/2023 106.7     PEF Ref 12/20/2023 7.15     PEF LLN 12/20/2023 5.16     PEF Pre Ref 12/20/2023 109.6         11/16/2023    10/5/2023    11:03 AM 8/24/2023    10:10 " AM 4/10/2023    10:10 AM   Pulmonary Function Tests    FVC 2.49 2.36 liters 2.51 liters 2.35 liters   FEV1 2.12 2.05 liters 2.12 liters 1.95 liters   TLC (L) 2.95 2.94 liters  3.06 liters   DLCO 7.34 6.89 ml/mmHg sec  7.89 ml/mmHg sec   FVC% 66% 62.3 66.2 61.8   FEV1% 72% 69.3 71.8 65.6   FEF 25-75 3.28 3.23 3.02 2.35   FEF 25-75% 128% 125.9 117.4 90.8   TLC% 51% 50.8  53   RV 0.46 0.58  0.83   RV% 22% 27.3  39.4   DLCO% 28 26.5  30.2         12/20/2023    12:50 PM 11/16/2023     9:34 AM 10/5/2023     9:53 AM 7/12/2023     1:42 PM 3/21/2023    12:40 PM 12/6/2022     2:18 PM   6MW   6MWT Status completed without stopping completed without stopping completed without stopping completed without stopping completed without stopping completed without stopping   Patient Reported No complaints No complaints No complaints No complaints No complaints No complaints   Was O2 used? Yes Yes Yes Yes Yes Yes   Delivery Method Cannula;Pull Tank;Continuous Flow Cannula;Pull Tank;Continuous Flow Cannula;Pull Tank;Continuous Flow Cannula;Pull Tank;Continuous Flow Cannula;Pull Tank;Continuous Flow Cannula;Demand Flow;Shoulder Carry   6MW Distance walked (feet) 1200 feet 1200 feet 1200 feet 1200 feet 1000 feet 1200 feet   Distance walked (meters) 365.76 meters 365.76 meters 365.76 meters 365.76 meters 304.8 meters 365.76 meters   Did patient stop? No No No No No No   Oxygen Saturation 98 % 98 % 96 % 95 % 97 % 98 %   Supplemental Oxygen 3 L/M 4 L/M 3 L/M 2 L/M 2 L/M 2 L/M   Heart Rate 95 bpm 83 bpm 81 bpm 87 bpm 85 bpm 83 bpm   Blood Pressure 134/80 143/90 134/87 122/76 134/81 145/84   Ashlie Dyspnea Rating  nothing at all nothing at all nothing at all very, very light (just noticeable) nothing at all nothing at all   Oxygen Saturation 90 % 92 % 90 % 89 % 86 % 84 %   Supplemental Oxygen 3 L/M 4 L/M 4 L/M 3 L/M 2 L/M 2 L/M   Heart Rate 115 bpm 94 bpm 100 bpm 115 bpm 99 bpm 96 bpm   Blood Pressure 155/84 166/104 159/86 119/76 135/81 160/79    Ashlie Dyspnea Rating  very, very light (just noticeable) very, very light (just noticeable) nothing at all very, very light (just noticeable) light very, very light (just noticeable)   Recovery Time (seconds) 54 seconds 155 seconds 95 seconds 101 seconds 120 seconds 90 seconds   Oxygen Saturation 96 % 98 % 95 % 98 % 97 % 95 %   Supplemental Oxygen 3 L/M 4 L/M 4 L/M 3 L/M 2 L/M 2 L/M   Heart Rate 101 bpm 93 bpm 90 bpm 90 bpm 91 bpm 87 bpm       Imaging:  EXAMINATION:  CT CHEST WITHOUT CONTRAST  Impression:     1. Two small stable solid-appearing pulmonary nodules stable since the prior of 01/26/2022 favoring a benign etiology of less than 6 mm in size.    Cardiodiagnostics:    Echo 3/2023    Interpretation Summary  · The left ventricle is normal in size with normal systolic function.  · The estimated ejection fraction is 65%.  · Normal left ventricular diastolic function.  · Moderate right ventricular enlargement with low normal to mildly reduced right ventricular systolic function.  · The estimated PA systolic pressure is 34 mmHg.  · Normal central venous pressure (3 mmHg).  · The ascending aorta is mildly dilated.    Cardiac catheterization 3/2023    Conclusion  Summary  Filling pressures: RA 10, PCWP 16  PA 36/20, mean PA pressure 20 mmHg  PVR 1.5 DUQUE  PA saturation 70%, Ao saturation 97%  Fletcher CO/CI: 5.9/2.66  /100  with repeat intraprocedure similar results. On recheck post procedure 133/75  HR 71 SR  Hb 13.6    WEDGE BIOPSY 11/29/2023    1. LUNG, RIGHT UPPER LOBE, WEDGE BIOPSY:  Chronic interstitial pneumonitis with changes most consistent with cellular nonspecific interstitial pneumonia-like pattern of injury.  Negative for malignancy.      2. LUNG, RIGHT LOWER LOBES, WEDGE BIOPSY:  Chronic interstitial pneumonitis with changes most consistent with cellular nonspecific interstitial pneumonia-like pattern of injury.  Negative for malignancy.      3. LUNG, RIGHT MIDDLE LOBE, WEDGE BIOPSY:  Chronic  interstitial pneumonitis with changes most consistent with cellular nonspecific interstitial pneumonia-like pattern of injury and rare multinucleated giant cells containing cholesterol clefts.  Negative for malignancy.        Comment:  Sample tissue from all segments shows patchy fibrosis, diffuse cellular inflammatory infiltrates, pneumocyte hyperplasia and accumulation of foamy macrophages in airspaces.  The overall changes suggest cellular nonspecific interstitial  pneumonia (NSIP) pattern of injury.  Rare multinucleated giant cells with cholesterol clefts raise concern for hypersensitivity pneumonitis.  Other potential etiologies include connective tissue disease or respiratory bronchiolitis interstitial lung  disease (RB-ILD).  Idiopathic NSIP is a diagnosis of exclusion if all other etiologies have been ruled out.  There is no evidence of acute lung injury, granulomas, viral cytopathic effect or neoplasia.  Recommend correlation with clinical history and  pertinent lab studies.         Assessment:  # ILD- cHP + Smoking related   # Chronic hypoxemic respiratory failure   # Pulmonary emphysema   # Vasomotor rhinitis     - Pathology and radiographic suggestion of cHP and smoking related ILD.  Stable lung reserve but with cellular features on pathology, would like to try MMF.  One acute exacerbation treated with short course of prednsione with good response.  Start  mg bid and increase to 1000 mg BID within 2 weeks to max of 3g/daily if tolerated.    - GERD precautions counseled.  - Apical pulmonary emphysema.  On Trelegy.    - On nasal decongestants and oral allergy aids.  -RTC in 6 weeks- 8 weeks with karlie

## 2023-12-20 NOTE — LETTER
December 21, 2023        Sarah Lemon  [address]  [address]  [place] [address]  Phone: [phone]  Fax: [phone]             [facility] 1st [facility]  [address]  [facility] [address]  Phone: [phone]   Patient: Nathalie Krueger   MR Number: [id number]   YOB: 1965   Date of Visit: 12/20/2023       Dear Dr. Sarah Lemon    Thank you for referring Nathalie Krueger to me for evaluation. Attached you will find relevant portions of my assessment and plan of care.    If you have questions, please do not hesitate to call me. I look forward to following Nathalie Krueger along with you.    Sincerely,    Lesly Ledesma MD    Enclosure    If you would like to receive this communication electronically, please contact [email or web address] or [phone] to request [employer] Link access.    [employer] Link is a tool which provides read-only access to select patient information with whom you have a relationship. Its easy to use and provides real time access to review your patients record including encounter summaries, notes, results, and demographic information.    If you feel you have received this communication in error or would no longer like to receive these types of communications, please e-mail [email or web address]

## 2023-12-20 NOTE — PROCEDURES
Nathalie Krueger is a 58 y.o.  female patient, who presents for a 6 minute walk test ordered by MD Baltazar.  The diagnosis is Interstitial Lung Disease.  The patient's BMI is 33.6 kg/m2.  Predicted distance (lower limit of normal) is 324.37 meters.      Test Results:    The test was completed without stopping. The total time walked was 360 seconds. During walking, the patient reported:  No complaints. The patient used supplemental oxygen during testing.     12/20/2023---------Distance: 365.76 meters (1200 feet)     Lap Walk Time O2 Sat % Supplemental Oxygen Heart Rate Blood Pressure Ashlie Scale   Pre-exercise  (Resting) 0 0 98 % 3 L/M 95 bpm 134/80 mmHg 0   During Exercise 1 55 sec 96 % 3 L/M 105 bpm     During Exercise 2 113 sec 91 % 3 L/M 106 bpm     During Exercise 3 175 sec 89 % 3 L/M 109 bpm     During Exercise 4 235 sec 90 % 3 L/M 114 bpm     During Exercise 5 308 sec 91 % 3 L/M 123 bpm     End of Exercise 6 360 sec 90 % 3 L/M 115 bpm 155/84 mmHg 0.5   Post-exercise  (Recovery)   96 % 3 L/M  101 bpm       Recovery Time: 54 seconds    Performing nurse/tech: Xavier PARKER      PREVIOUS STUDY:   11/16/2023---------Distance: 365.76 meters (1200 feet)       Lap Walk Time O2 Sat % Supplemental Oxygen Heart Rate Blood Pressure Ashlie Scale   Pre-exercise  (Resting) 0 0 98 % 4 L/M 83 bpm 143/90 mmHg 0   During Exercise 1 49 sec 98 % 4 L/M 98 bpm       During Exercise 2 113 sec 95 % 4 L/M 100 bpm       During Exercise 3 174 sec 92 % 4 L/M 92 bpm       During Exercise 4 236 sec 93 % 4 L/M 96 bpm       During Exercise 5 296 sec 92 % 4 L/M 98 bpm       End of Exercise 6 360 sec 92 % 4 L/M 94 bpm 166/104 mmHg 0.5   Post-exercise  (Recovery)     98 % 4 L/M  93 bpm 153/92 mmHg         CLINICAL INTERPRETATION:  Six minute walk distance is 365.76 meters (1200 feet) with very, very light dyspnea.  During exercise, there was significant desaturation while breathing supplemental oxygen.  Blood pressure remained stable and Heart  rate increased significantly with walking.  The patient did not report non-pulmonary symptoms during exercise.  Since the previous study in November 2023, exercise capacity is unchanged.  Based upon age and body mass index, exercise capacity is normal.

## 2023-12-21 DIAGNOSIS — J96.11 CHRONIC HYPOXEMIC RESPIRATORY FAILURE: ICD-10-CM

## 2023-12-21 DIAGNOSIS — J84.9 ILD (INTERSTITIAL LUNG DISEASE): Primary | ICD-10-CM

## 2023-12-22 ENCOUNTER — CLINICAL SUPPORT (OUTPATIENT)
Dept: INFECTIOUS DISEASES | Facility: CLINIC | Age: 58
End: 2023-12-22
Payer: COMMERCIAL

## 2023-12-22 ENCOUNTER — TELEPHONE (OUTPATIENT)
Dept: TRANSPLANT | Facility: CLINIC | Age: 58
End: 2023-12-22
Payer: COMMERCIAL

## 2023-12-22 ENCOUNTER — OFFICE VISIT (OUTPATIENT)
Dept: INFECTIOUS DISEASES | Facility: CLINIC | Age: 58
End: 2023-12-22
Payer: COMMERCIAL

## 2023-12-22 VITALS
HEIGHT: 69 IN | SYSTOLIC BLOOD PRESSURE: 141 MMHG | DIASTOLIC BLOOD PRESSURE: 86 MMHG | HEART RATE: 98 BPM | BODY MASS INDEX: 33.47 KG/M2 | WEIGHT: 226 LBS | TEMPERATURE: 99 F

## 2023-12-22 DIAGNOSIS — Z79.899 HIGH RISK MEDICATION USE: ICD-10-CM

## 2023-12-22 DIAGNOSIS — Z71.85 VACCINE COUNSELING: ICD-10-CM

## 2023-12-22 DIAGNOSIS — J84.9 ILD (INTERSTITIAL LUNG DISEASE): Primary | ICD-10-CM

## 2023-12-22 DIAGNOSIS — J96.11 CHRONIC HYPOXEMIC RESPIRATORY FAILURE: ICD-10-CM

## 2023-12-22 PROCEDURE — 3066F PR DOCUMENTATION OF TREATMENT FOR NEPHROPATHY: ICD-10-PCS | Mod: CPTII,NTX,S$GLB, | Performed by: INTERNAL MEDICINE

## 2023-12-22 PROCEDURE — 99999 PR PBB SHADOW E&M-EST. PATIENT-LVL I: ICD-10-PCS | Mod: PBBFAC,TXP,,

## 2023-12-22 PROCEDURE — 3079F PR MOST RECENT DIASTOLIC BLOOD PRESSURE 80-89 MM HG: ICD-10-PCS | Mod: CPTII,NTX,S$GLB, | Performed by: INTERNAL MEDICINE

## 2023-12-22 PROCEDURE — 90471 HEPATITIS B (RECOMBINANT) ADJUVANTED, 2 DOSE: ICD-10-PCS | Mod: NTX,S$GLB,, | Performed by: INTERNAL MEDICINE

## 2023-12-22 PROCEDURE — 3044F HG A1C LEVEL LT 7.0%: CPT | Mod: CPTII,NTX,S$GLB, | Performed by: INTERNAL MEDICINE

## 2023-12-22 PROCEDURE — 90471 IMMUNIZATION ADMIN: CPT | Mod: NTX,S$GLB,, | Performed by: INTERNAL MEDICINE

## 2023-12-22 PROCEDURE — 90739 HEPATITIS B (RECOMBINANT) ADJUVANTED, 2 DOSE: ICD-10-PCS | Mod: NTX,S$GLB,, | Performed by: INTERNAL MEDICINE

## 2023-12-22 PROCEDURE — 90472 IMMUNIZATION ADMIN EACH ADD: CPT | Mod: NTX,S$GLB,, | Performed by: INTERNAL MEDICINE

## 2023-12-22 PROCEDURE — 1160F PR REVIEW ALL MEDS BY PRESCRIBER/CLIN PHARMACIST DOCUMENTED: ICD-10-PCS | Mod: CPTII,NTX,S$GLB, | Performed by: INTERNAL MEDICINE

## 2023-12-22 PROCEDURE — 99999 PR PBB SHADOW E&M-EST. PATIENT-LVL I: CPT | Mod: PBBFAC,TXP,,

## 2023-12-22 PROCEDURE — 90739 HEPB VACC 2/4 DOSE ADULT IM: CPT | Mod: NTX,S$GLB,, | Performed by: INTERNAL MEDICINE

## 2023-12-22 PROCEDURE — 99999 PR PBB SHADOW E&M-EST. PATIENT-LVL V: ICD-10-PCS | Mod: PBBFAC,TXP,, | Performed by: INTERNAL MEDICINE

## 2023-12-22 PROCEDURE — 90472 HEPATITIS A VACCINE ADULT IM: ICD-10-PCS | Mod: NTX,S$GLB,, | Performed by: INTERNAL MEDICINE

## 2023-12-22 PROCEDURE — 1111F PR DISCHARGE MEDS RECONCILED W/ CURRENT OUTPATIENT MED LIST: ICD-10-PCS | Mod: CPTII,NTX,S$GLB, | Performed by: INTERNAL MEDICINE

## 2023-12-22 PROCEDURE — 1159F PR MEDICATION LIST DOCUMENTED IN MEDICAL RECORD: ICD-10-PCS | Mod: CPTII,NTX,S$GLB, | Performed by: INTERNAL MEDICINE

## 2023-12-22 PROCEDURE — 99999 PR PBB SHADOW E&M-EST. PATIENT-LVL V: CPT | Mod: PBBFAC,TXP,, | Performed by: INTERNAL MEDICINE

## 2023-12-22 PROCEDURE — 3061F NEG MICROALBUMINURIA REV: CPT | Mod: CPTII,NTX,S$GLB, | Performed by: INTERNAL MEDICINE

## 2023-12-22 PROCEDURE — 1111F DSCHRG MED/CURRENT MED MERGE: CPT | Mod: CPTII,NTX,S$GLB, | Performed by: INTERNAL MEDICINE

## 2023-12-22 PROCEDURE — 3008F BODY MASS INDEX DOCD: CPT | Mod: CPTII,NTX,S$GLB, | Performed by: INTERNAL MEDICINE

## 2023-12-22 PROCEDURE — 3061F PR NEG MICROALBUMINURIA RESULT DOCUMENTED/REVIEW: ICD-10-PCS | Mod: CPTII,NTX,S$GLB, | Performed by: INTERNAL MEDICINE

## 2023-12-22 PROCEDURE — 3079F DIAST BP 80-89 MM HG: CPT | Mod: CPTII,NTX,S$GLB, | Performed by: INTERNAL MEDICINE

## 2023-12-22 PROCEDURE — 90632 HEPA VACCINE ADULT IM: CPT | Mod: NTX,S$GLB,, | Performed by: INTERNAL MEDICINE

## 2023-12-22 PROCEDURE — 3008F PR BODY MASS INDEX (BMI) DOCUMENTED: ICD-10-PCS | Mod: CPTII,NTX,S$GLB, | Performed by: INTERNAL MEDICINE

## 2023-12-22 PROCEDURE — 90632 HEPATITIS A VACCINE ADULT IM: ICD-10-PCS | Mod: NTX,S$GLB,, | Performed by: INTERNAL MEDICINE

## 2023-12-22 PROCEDURE — 3077F PR MOST RECENT SYSTOLIC BLOOD PRESSURE >= 140 MM HG: ICD-10-PCS | Mod: CPTII,NTX,S$GLB, | Performed by: INTERNAL MEDICINE

## 2023-12-22 PROCEDURE — 1159F MED LIST DOCD IN RCRD: CPT | Mod: CPTII,NTX,S$GLB, | Performed by: INTERNAL MEDICINE

## 2023-12-22 PROCEDURE — 3077F SYST BP >= 140 MM HG: CPT | Mod: CPTII,NTX,S$GLB, | Performed by: INTERNAL MEDICINE

## 2023-12-22 PROCEDURE — 99204 OFFICE O/P NEW MOD 45 MIN: CPT | Mod: 25,NTX,S$GLB, | Performed by: INTERNAL MEDICINE

## 2023-12-22 PROCEDURE — 3044F PR MOST RECENT HEMOGLOBIN A1C LEVEL <7.0%: ICD-10-PCS | Mod: CPTII,NTX,S$GLB, | Performed by: INTERNAL MEDICINE

## 2023-12-22 PROCEDURE — 1160F RVW MEDS BY RX/DR IN RCRD: CPT | Mod: CPTII,NTX,S$GLB, | Performed by: INTERNAL MEDICINE

## 2023-12-22 PROCEDURE — 3066F NEPHROPATHY DOC TX: CPT | Mod: CPTII,NTX,S$GLB, | Performed by: INTERNAL MEDICINE

## 2023-12-22 PROCEDURE — 99204 PR OFFICE/OUTPT VISIT, NEW, LEVL IV, 45-59 MIN: ICD-10-PCS | Mod: 25,NTX,S$GLB, | Performed by: INTERNAL MEDICINE

## 2023-12-22 RX ORDER — RESPIRATORY SYNCYTIAL VISUS VACCINE RECOMBINANT, ADJUVANTED 120MCG/0.5
0.5 KIT INTRAMUSCULAR ONCE
Qty: 0.5 ML | Refills: 0 | Status: SHIPPED | OUTPATIENT
Start: 2023-12-22 | End: 2023-12-22

## 2023-12-22 RX ORDER — HEPATITIS B VACCINE (RECOMBINANT) ADJUVANTED 20 UG/.5ML
0.5 INJECTION, SOLUTION INTRAMUSCULAR ONCE
Qty: 0.5 ML | Refills: 0 | Status: SHIPPED | OUTPATIENT
Start: 2024-01-22 | End: 2024-01-22

## 2023-12-22 NOTE — PROGRESS NOTES
Patient received the Hep A #1 and Hep B #1vaccines in the left deltoid. Pt tolerated well. Pt asked to wait in the clinic 15 minutes after injection in the event of an allergic reaction. Pt verbalized understanding. Pt left in NAD.

## 2023-12-22 NOTE — PROGRESS NOTES
Subjective:      Patient ID: Nathalie Krueger is a 58 y.o. female.    Chief Complaint:Immunizations      History of Present Illness    {ID HPI BLOCKS:62067}    Review of Systems   Constitutional: Negative for chills, decreased appetite, fever, malaise/fatigue, night sweats, weight gain and weight loss.   HENT:  Negative for congestion, ear pain, hearing loss, hoarse voice, sore throat and tinnitus.    Eyes:  Negative for blurred vision, redness and visual disturbance.   Cardiovascular:  Negative for chest pain, leg swelling and palpitations.   Respiratory:  Positive for sputum production. Negative for cough, hemoptysis, shortness of breath and wheezing.    Hematologic/Lymphatic: Negative for adenopathy. Does not bruise/bleed easily.   Skin:  Negative for dry skin, itching, rash and suspicious lesions.   Musculoskeletal:  Negative for back pain, joint pain, myalgias and neck pain.   Gastrointestinal:  Negative for abdominal pain, constipation, diarrhea, heartburn, nausea and vomiting.   Genitourinary:  Negative for dysuria, flank pain, frequency, hematuria, hesitancy and urgency.   Neurological:  Negative for dizziness, headaches, numbness, paresthesias and weakness.   Psychiatric/Behavioral:  Negative for depression and memory loss. The patient does not have insomnia and is not nervous/anxious.    Objective:   Physical Exam  Assessment:     1. ILD (interstitial lung disease)        Plan:      ***

## 2023-12-22 NOTE — PROGRESS NOTES
PRE-TRANSPLANT INFECTIOUS DISEASE CONSULT    Reason for Visit:  Pre-transplant evaluation  Referring Provider: Dr. Lesly Ledesma     History of Present Illness:    58 y.o. female with a history of ILD presents for pre-lung transplant evaluation.    Infectious History:  Recent hospital admissions: No  Recent infections: Yes - September / October  Recent or current antibiotic use: Yes  History of recurrent infections *(sinus / pneumonia / UTI / SBP)*: No  History of diabetic foot wound or bone/joint infection: No  Recent dental infections, issues or procedures: Yes - filling October  History of chicken pox: Yes  History of shingles: Yes  History of COVID infection: No    History of Immunosuppression:  Prior chemotherapy / immunosuppression: No  Prior transplant: No  History of splenectomy: No    Tuberculosis:  Prior screening for latent TB: Yes  Prior diagnosis of latent TB: No  Risk factors for TB *known exposure, incarceration, homelessness*: No    Geographical exposures:  Currently lives in [place], [place] with wife  Lived in the following states: [place], [place]  Lived or travelled to the [place] [facility] [place]: No  International travel: Yes   Travel-associated illness: No    Social/Environmental:  Occupation:  Consultant with violence prevention  Pets: Yes - cat, wife cleans the litterbox  Livestock: Yes - peacocks, guineas, goats, horses, chickens  Fishing / hunting: No  Hobbies: outdoor exercise  Water: Filtered  Consumption of raw/undercooked meat or seafood?  Yes - sushi  Tobacco: No  Alcohol: No  Recreational drug use:  No      Past Histories:   Past Medical History:   Diagnosis Date    Acute bronchitis 02/01/2022    Adenomatous polyp of ascending colon 06/22/2020    Colon polyp 11/03/2023    3mm in transverse colon    Diabetes mellitus     Gastric polyps 11/03/2023    Interstitial lung disease      Past Surgical History:   Procedure Laterality Date    24 HOUR IMPEDANCE PH MONITORING OF ESOPHAGUS IN PATIENT NOT  TAKING ACID REDUCING MEDICATIONS N/A 11/14/2023    Procedure: IMPEDANCE PH STUDY, ESOPHAGEAL, 24 HOUR, IN PATIENT NOT TAKING ACID REDUCING MEDICATION;  Surgeon: Felicita Villafana MD;  Location: [facility] (4TH FLR);  Service: Endoscopy;  Laterality: N/A;    ABLATION, FIBROID, UTERUS, LAPAROSCOPIC, WITH US GUIDANCE      APPENDECTOMY  1982    BRONCHOSCOPY N/A 8/31/2023    Procedure: Bronchoscopy(need fluoro);  Surgeon: Sarah Lemon MD;  Location: [facility];  Service: Pulmonary;  Laterality: N/A;  transbronchial biopsies need fluoro    BRONCHOSCOPY N/A 11/29/2023    Procedure: Bronchoscopy;  Surgeon: Piotr Amezcua MD;  [facility];  Service: Cardiothoracic;  Laterality: N/A;    COLONOSCOPY N/A 11/3/2023    Procedure: COLONOSCOPY;  Surgeon: Felicita Villafana MD;  Location: [facility] (2ND FLR);  Service: Endoscopy;  Laterality: N/A;  10/26/23-per Eileen adjust to 60 min case - ERW  10/27-precall complete-pt verbalized understanding of holding Ozempic-MS    ESOPHAGEAL MANOMETRY WITH MEASUREMENT OF IMPEDANCE N/A 11/14/2023    Procedure: MANOMETRY, ESOPHAGUS, WITH IMPEDANCE MEASUREMENT;  Surgeon: Felicita Villafana MD;  Location: [facility] (4TH FLR);  Service: Endoscopy;  Laterality: N/A;  esophageal manometry with dysphagia protocol   followed by 24 hr ph off PPI  11/8-precall complete-MS    ESOPHAGOGASTRODUODENOSCOPY N/A 11/3/2023    Procedure: EGD (ESOPHAGOGASTRODUODENOSCOPY);  Surgeon: Felicita Villafana MD;  Location: [facility] (2ND FLR);  Service: Endoscopy;  Laterality: N/A;  egd/endoflip and colon  extended prep  peg prep  prep instructions sent to pt via portal  diabetic -metformin  wl meds *ozempic-hold 7 days prior  02-2liters  2nd floor-Severe pulmonary Disease    INJECTION OF ANESTHETIC AGENT AROUND MULTIPLE INTERCOSTAL NERVES  11/29/2023    Procedure: BLOCK, NERVE, INTERCOSTAL, 2 OR MORE;  Surgeon: Piotr Amezcua MD;  [facility];  Service: Cardiothoracic;;     RIGHT HEART CATHETERIZATION Right 3/29/2023    Procedure: INSERTION, CATHETER, RIGHT HEART;  Surgeon: Yulisa Yung DO;  Location: [facility] CATH LAB;  Service: Cardiology;  Laterality: Right;    ROTATOR CUFF REPAIR Right 1998    TONSILLECTOMY  1977    VIDEO-ASSISTED THORACOSCOPIC SURGERY (VATS) Right 2023    Procedure: RIGHT VATS WEDGE;  Surgeon: Piotr Amezcua MD;  Location: [facility] OR [address] [place] [address];  Service: Cardiothoracic;  Laterality: Right;     Family History   Problem Relation Age of Onset    Diabetes Mother     Hypertension Mother     Heart disease Mother     Diabetes Father     Glaucoma Neg Hx     Macular degeneration Neg Hx     Colon cancer Neg Hx     Esophageal cancer Neg Hx      Social History     Tobacco Use    Smoking status: Former     Current packs/day: 0.00     Average packs/day: 0.5 packs/day for 25.0 years (12.5 ttl pk-yrs)     Types: Cigarettes     Start date: 1994     Quit date: 2019     Years since quittin.0     Passive exposure: Past    Smokeless tobacco: Never    Tobacco comments:     Smoked additive free tobacco   Substance Use Topics    Alcohol use: Not Currently    Drug use: Never     Review of patient's allergies indicates:   Allergen Reactions    Atorvastatin Other (See Comments)     Dry cough      Lisinopril      Other reaction(s): Cough    Meperidine hcl Nausea Only         Immunization History:  Received all childhood vaccines: Yes  All household members receive annual flu vaccine: Yes  All household members are up to date on COVID vaccine: Yes      Current antibiotics:  Antibiotics (From admission, onward)      None              Review of Systems  Review of Systems   Constitutional: Negative for chills, fever and night sweats.   HENT:  Negative for congestion and sore throat.    Cardiovascular:  Negative for chest pain, dyspnea on exertion and leg swelling.   Respiratory:  Positive for cough. Negative for hemoptysis and shortness of breath.    Skin:  Negative for  rash and suspicious lesions.   Musculoskeletal:  Negative for joint pain and joint swelling.   Gastrointestinal:  Negative for abdominal pain, diarrhea, nausea and vomiting.   Genitourinary:  Negative for dysuria and hematuria.   Neurological:  Negative for headaches and numbness.   Psychiatric/Behavioral:  Negative for depression. The patient is not nervous/anxious.           Objective  Physical Exam  Vitals reviewed.   Constitutional:       General: She is not in acute distress.     Appearance: She is well-developed. She is not diaphoretic.   HENT:      Head: Normocephalic and atraumatic.      Nose: Nose normal.   Eyes:      Conjunctiva/sclera: Conjunctivae normal.   Pulmonary:      Effort: Pulmonary effort is normal. No respiratory distress.   Abdominal:      General: Abdomen is flat. There is no distension.   Musculoskeletal:      Cervical back: Normal range of motion.      Right lower leg: No edema.      Left lower leg: No edema.   Skin:     General: Skin is warm and dry.      Findings: No erythema or rash.   Neurological:      Mental Status: She is alert.   Psychiatric:         Behavior: Behavior normal.           Labs:    CBC:   Lab Results   Component Value Date    WBC 11.93 11/29/2023    HGB 13.8 11/29/2023    HCT 41.1 11/29/2023    MCV 91 11/29/2023     11/29/2023    GRAN 10.5 (H) 11/29/2023    GRAN 88.1 (H) 11/29/2023    LYMPH 1.2 11/29/2023    LYMPH 9.6 (L) 11/29/2023    MONO 0.1 (L) 11/29/2023    MONO 1.1 (L) 11/29/2023    EOSINOPHIL 0.2 11/29/2023       Syphilis screening:   Lab Results   Component Value Date    RPR Non-reactive 08/24/2023        TB screening:   Lab Results   Component Value Date    TBGOLDPLUS Negative 08/24/2023       HIV screening:   Lab Results   Component Value Date    UNY14GVYA Non-reactive 08/24/2023       Strongyloides IgG:   Lab Results   Component Value Date    STRONGANTIGG Negative 08/24/2023       Hepatitis Serologies:   Lab Results   Component Value Date    HEPAIGG  Non-reactive 08/24/2023    HEPBCAB Non-reactive 08/24/2023    HEPBSAB <3.00 08/24/2023    HEPBSAB Non-reactive 08/24/2023    HEPCAB Non-reactive 08/24/2023        Varicella IgG:   Lab Results   Component Value Date    VARICELLAINT Positive 08/24/2023         Immunization History   Administered Date(s) Administered    COVID-19, MRNA, LN-S, PF (Pfizer) (Gray Cap) 04/06/2022    COVID-19, MRNA, LN-S, PF (Pfizer) (Purple Cap) 03/06/2021, 03/30/2021, 09/30/2021    COVID-19, mRNA, LNP-S, PF, jeanette-sucrose, 30 mcg/0.3 mL (Pfizer 2023 Ages 12+) 11/18/2023    COVID-19, mRNA, LNP-S, bivalent booster, PF (PFIZER OMICRON) 09/22/2022    Hepatitis B, Adult 01/16/2018, 02/27/2018, 07/31/2018    Influenza 09/28/2016, 10/04/2017, 10/24/2018, 09/05/2019, 09/08/2022    Influenza - Quadrivalent - PF *Preferred* (6 months and older) 09/30/2021, 09/13/2023    Pneumococcal Conjugate - 20 Valent 09/08/2022    Pneumococcal Polysaccharide - 23 Valent 10/04/2016    Tdap 01/01/2016    Zoster Recombinant 10/29/2020, 01/31/2022          Assessment and Plan  58 y.o. female with a history of ILD presents for pre-lung transplant evaluation.    Lung biopsy with hypersensitivity pneumonitis - will refer patient to allergy for allergy testing to assess for possible trigger      1. Risks of Infection: Available serologies were reviewed. No unusual risks of infection or significant barriers to transplantation were identified from the infectious disease standpoint given the information available at this time.    - Acute infectious issues: None     2. Immunizations:  Based on the patient's immunization history and serologies, the following immunizations are recommended:  - Hepatitis A    Patient does not have immunity to hepatitis A    Vaccination ordered today: Yes   - Hepatitis B    Patient does not have immunity to hepatitis B    Vaccination ordered today: Yes   - COVID    Current CDC vaccination recommendations were discussed with the patient   - Annual  high dose influenza     Vaccination ordered today: No. Reason for not ordering: vaccination up to date   - Prevnar 20    Vaccination ordered today: No. Reason for not ordering: vaccination up to date   - Tdap    Vaccination ordered today: No. Reason for not ordering: vaccination up to date   - Shingrix    Vaccination ordered today: No. Reason for not ordering: vaccination up to date    Recommended Pre-Transplant Immunization Schedule   Vaccine  0m 1m 2m 6m   Pneumococcal conjugate vaccine (Prevnar 20) X      Tetanus-diphtheria-pertussis (Tdap)* X      Hepatitis A Vaccine (Havrix)** X   X   Hepatitis B Vaccine (Heplisav)** X X     Influenza (annual) X      Zoster Recombinant Vaccine (Shingrix) X  X           *Administer booster every 10 years.       **Administer if no immunity demonstrated on serologies               Patient will receive vaccines today in ID clinic, and was provided with a prescription to receive all subsequent vaccine doses at local pharmacy.    3. Counseling:   I discussed with the patient the risk for increased susceptibility to infections following transplantation including increased risk for infection right after transplant and if rejection should occur.  The patient has been counseled on the importance of vaccinations to decrease risk of infection and severe illness. Specific guidance has been provided to the patient regarding the patient's occupation, hobbies and activities to avoid future infectious complications.     4. Transplant Candidacy: Based on available information, there are no identified significant barriers to transplantation from an infectious disease standpoint.  Final determination of transplant candidacy will be made once evaluation is complete and reviewed by the Selection Committee.      45 minutes of total time spent on the encounter, which includes face to face time and non-face to face time preparing to see the patient (eg, review of tests), obtaining and reviewing  separately obtained history, documenting clinical information in the electronic or other health record, independently interpreting results (not separately reported) and communicating results to the patient/family/caregiver, and care coordination (not separately reported).

## 2023-12-22 NOTE — TELEPHONE ENCOUNTER
Contacted patient to see if she started the mycophenolate 500 mg twice a day. She stated that she picked up the medication, but she hasn't started taking it. She stated that she will start it tonight. Informed patient that she will need labs 2 weeks after initiation to ensure that she is tolerating the medication. Inquired as to her availability. She requested labs any time on 1/5/24 at [facility]. Informed patient that I would touch base with her after New Year's Day to see how she is tolerating the medication. Instructed her to send us a message if she has any side effects with the mycophenolate. She verbalized her understanding of all discussed.    Labs to be ordered per mycophenolate guideline.

## 2023-12-27 DIAGNOSIS — E11.9 TYPE 2 DIABETES MELLITUS WITHOUT COMPLICATION, WITHOUT LONG-TERM CURRENT USE OF INSULIN: ICD-10-CM

## 2023-12-27 LAB
FUNGUS SPEC CULT: NORMAL

## 2023-12-27 RX ORDER — SEMAGLUTIDE 1.34 MG/ML
1 INJECTION, SOLUTION SUBCUTANEOUS
Qty: 9 EACH | Refills: 2 | Status: SHIPPED | OUTPATIENT
Start: 2023-12-27

## 2023-12-27 NOTE — TELEPHONE ENCOUNTER
Care Due:                  Date            Visit Type   Department     Provider  --------------------------------------------------------------------------------                                EP -                              PRIMARY      [facility] FAMILY  Last Visit: 09-      CARE ([facility])   MEDICINE       Tyron Flannery  Next Visit: None Scheduled  None         None Found                                                            Last  Test          Frequency    Reason                     Performed    Due Date  --------------------------------------------------------------------------------    HBA1C.......  6 months...  OZEMPIC, metFORMIN.......  09- 03-    [facility] Embedded Care Due Messages. Reference number: [id number].   12/27/2023 7:33:44 AM CST

## 2024-01-04 ENCOUNTER — PATIENT MESSAGE (OUTPATIENT)
Dept: TRANSPLANT | Facility: CLINIC | Age: 59
End: 2024-01-04
Payer: COMMERCIAL

## 2024-01-05 ENCOUNTER — OFFICE VISIT (OUTPATIENT)
Dept: ALLERGY | Facility: CLINIC | Age: 59
End: 2024-01-05
Payer: COMMERCIAL

## 2024-01-05 ENCOUNTER — LAB VISIT (OUTPATIENT)
Dept: LAB | Facility: HOSPITAL | Age: 59
End: 2024-01-05
Attending: INTERNAL MEDICINE
Payer: COMMERCIAL

## 2024-01-05 VITALS — HEIGHT: 69 IN | BODY MASS INDEX: 33.47 KG/M2 | WEIGHT: 226 LBS

## 2024-01-05 DIAGNOSIS — J84.9 ILD (INTERSTITIAL LUNG DISEASE): ICD-10-CM

## 2024-01-05 DIAGNOSIS — D84.9 IMMUNODEFICIENCY: Primary | ICD-10-CM

## 2024-01-05 DIAGNOSIS — Z01.84 IMMUNITY STATUS TESTING: ICD-10-CM

## 2024-01-05 DIAGNOSIS — R76.9 ABNORMAL IMMUNOLOGICAL FINDING IN SERUM: ICD-10-CM

## 2024-01-05 DIAGNOSIS — R76.0 ABNORMAL ANTIBODY TITER: ICD-10-CM

## 2024-01-05 DIAGNOSIS — Z79.899 HIGH RISK MEDICATION USE: ICD-10-CM

## 2024-01-05 LAB
BASOPHILS # BLD AUTO: 0.06 K/UL (ref 0–0.2)
BASOPHILS NFR BLD: 0.7 % (ref 0–1.9)
DIFFERENTIAL METHOD BLD: ABNORMAL
EOSINOPHIL # BLD AUTO: 0.3 K/UL (ref 0–0.5)
EOSINOPHIL NFR BLD: 3.4 % (ref 0–8)
ERYTHROCYTE [DISTWIDTH] IN BLOOD BY AUTOMATED COUNT: 12 % (ref 11.5–14.5)
HCT VFR BLD AUTO: 43 % (ref 37–48.5)
HGB BLD-MCNC: 14.7 G/DL (ref 12–16)
IMM GRANULOCYTES # BLD AUTO: 0.03 K/UL (ref 0–0.04)
IMM GRANULOCYTES NFR BLD AUTO: 0.3 % (ref 0–0.5)
LYMPHOCYTES # BLD AUTO: 2.4 K/UL (ref 1–4.8)
LYMPHOCYTES NFR BLD: 26.6 % (ref 18–48)
MCH RBC QN AUTO: 31.1 PG (ref 27–31)
MCHC RBC AUTO-ENTMCNC: 34.2 G/DL (ref 32–36)
MCV RBC AUTO: 91 FL (ref 82–98)
MONOCYTES # BLD AUTO: 0.7 K/UL (ref 0.3–1)
MONOCYTES NFR BLD: 7.6 % (ref 4–15)
NEUTROPHILS # BLD AUTO: 5.5 K/UL (ref 1.8–7.7)
NEUTROPHILS NFR BLD: 61.4 % (ref 38–73)
NRBC BLD-RTO: 0 /100 WBC
PLATELET # BLD AUTO: 284 K/UL (ref 150–450)
PMV BLD AUTO: 10.2 FL (ref 9.2–12.9)
RBC # BLD AUTO: 4.72 M/UL (ref 4–5.4)
WBC # BLD AUTO: 9.03 K/UL (ref 3.9–12.7)

## 2024-01-05 PROCEDURE — 36415 COLL VENOUS BLD VENIPUNCTURE: CPT | Mod: NTX | Performed by: INTERNAL MEDICINE

## 2024-01-05 PROCEDURE — 1160F RVW MEDS BY RX/DR IN RCRD: CPT | Mod: CPTII,NTX,S$GLB, | Performed by: STUDENT IN AN ORGANIZED HEALTH CARE EDUCATION/TRAINING PROGRAM

## 2024-01-05 PROCEDURE — 85025 COMPLETE CBC W/AUTO DIFF WBC: CPT | Mod: TXP | Performed by: INTERNAL MEDICINE

## 2024-01-05 PROCEDURE — 99999 PR PBB SHADOW E&M-EST. PATIENT-LVL IV: CPT | Mod: PBBFAC,TXP,, | Performed by: STUDENT IN AN ORGANIZED HEALTH CARE EDUCATION/TRAINING PROGRAM

## 2024-01-05 PROCEDURE — 99205 OFFICE O/P NEW HI 60 MIN: CPT | Mod: NTX,S$GLB,, | Performed by: STUDENT IN AN ORGANIZED HEALTH CARE EDUCATION/TRAINING PROGRAM

## 2024-01-05 PROCEDURE — 1159F MED LIST DOCD IN RCRD: CPT | Mod: CPTII,NTX,S$GLB, | Performed by: STUDENT IN AN ORGANIZED HEALTH CARE EDUCATION/TRAINING PROGRAM

## 2024-01-05 PROCEDURE — 3072F LOW RISK FOR RETINOPATHY: CPT | Mod: CPTII,NTX,S$GLB, | Performed by: STUDENT IN AN ORGANIZED HEALTH CARE EDUCATION/TRAINING PROGRAM

## 2024-01-05 PROCEDURE — 3008F BODY MASS INDEX DOCD: CPT | Mod: CPTII,NTX,S$GLB, | Performed by: STUDENT IN AN ORGANIZED HEALTH CARE EDUCATION/TRAINING PROGRAM

## 2024-01-05 NOTE — PROGRESS NOTES
Allergy Clinic Note  [facility]    This note was created by combination of typed  and M-Modal dictation. Transcription errors may be present.  If there are any questions, please contact me.    HISTORY      Patient ID: Nathalie Krueger is a 58 y.o. female.    Chief Complaint: Allergies      Referring Provider: Tran Blackburn       History of Present Illness       Nathalie Krueger is a 58 y.o. female referred from Infectious Disease pre transplant clinic, probably because hepatitis-B serologies were negative despite previous vaccination.      Related medications and other interventions  Trelegy   Albuterol   Omeprazole      01/05/2024:  Client was recently seen in Infectious Disease pre transplant clinic and was found not to be protected against hepatitis-B despite 3 previous vaccinations in 2018.          MEDICAL HISTORY      Significant past medical history:  Bronchiectasis and interstitial lung disease, history respiratory failure, hypertension, diabetes, GERD  Active Problem List reviewed  ENT surgery:  Tonsillectomy (1977)  Significant family history:  Exposures:  Smoking Hx:  Client  reports that she quit smoking about 4 years ago. Her smoking use included cigarettes. She started smoking about 29 years ago. She has a 12.5 pack-year smoking history. She has been exposed to tobacco smoke. She has never used smokeless tobacco.    Meds: MAR reviewed    Asthma:  Eczema:  Rhinitis:  Drug allergy/intolerance:   Venom allergy: No  Latex allergy:  No    Patient Active Problem List   Diagnosis    Essential hypertension    Mixed hyperlipidemia    Type 2 diabetes mellitus without complication, without long-term current use of insulin    Lung nodule    Chronic hypoxemic respiratory failure    Bronchiectasis without complication    Gastroesophageal reflux disease with esophagitis without hemorrhage    Right flank pain    Class 1 obesity due to excess calories without serious comorbidity with body mass  index (BMI) of 33.0 to 33.9 in adult    Mild obstructive sleep apnea    IgM deficiency    ILD (interstitial lung disease)    Diverticulosis     Medication List with Changes/Refills   Current Medications    ALBUTEROL (PROVENTIL/VENTOLIN HFA) 90 MCG/ACTUATION INHALER    Inhale 1-2 puffs into the lungs every 4 (four) hours as needed for Wheezing or Shortness of Breath. Rescue       Start Date: 8/1/2022  End Date: 1/5/2024    ALBUTEROL-IPRATROPIUM (DUO-NEB) 2.5 MG-0.5 MG/3 ML NEBULIZER SOLUTION    Take 3 mLs by nebulization 2 (two) times daily. Rescue       Start Date: 8/8/2022  End Date: 1/5/2024    AMLODIPINE (NORVASC) 5 MG TABLET    TAKE 1 TABLET BY MOUTH EVERY DAY       Start Date: 10/13/2023End Date: --    AZELASTINE (ASTELIN) 137 MCG (0.1 %) NASAL SPRAY    SPRAY 1 SPRAY IN EACH NOSTRIL 2 TIMES DAILY.       Start Date: 8/4/2023  End Date: --    CALCIUM CARBONATE/VITAMIN D3 (CALCIUM WITH VITAMIN D ORAL)    Take 1 tablet by mouth once daily.       Start Date: --        End Date: --    CETIRIZINE (ZYRTEC) 10 MG TABLET    Take 1 tablet (10 mg total) by mouth once daily.       Start Date: 7/12/2023 End Date: 7/11/2024    FLUTICASONE-UMECLIDIN-VILANTER (TRELEGY ELLIPTA) 200-62.5-25 MCG INHALER    Inhale 1 puff into the lungs once daily.       Start Date: 7/12/2023 End Date: --    HEPATITIS A VIRUS VACCINE (ADULT 19YRS UP)(PF) 1440 UNIT/1 ML INJECTION    Inject 1 mL (1,440 Units total) into the muscle once. for 1 dose       Start Date: 6/3/2024  End Date: 6/3/2024    HEPATITIS B VACC-CPG 1018, PF, (HEPLISAV-B, PF,) 20 MCG/0.5 ML SYRG    Inject 0.5 mLs (20 mcg total) into the muscle once. for 1 dose       Start Date: 1/22/2024 End Date: 1/22/2024    METFORMIN (GLUCOPHAGE) 500 MG TABLET    TAKE 1 TABLET BY MOUTH EVERY DAY WITH BREAKFAST       Start Date: 10/2/2023 End Date: --    MULTIVITAMIN CAPSULE    Take 1 capsule by mouth once daily.       Start Date: --        End Date: --    MYCOPHENOLATE (CELLCEPT) 500 MG TAB     "Take 1 tablet (500 mg total) by mouth 2 (two) times daily.       Start Date: 12/20/2023End Date: 1/19/2024    OMEPRAZOLE (PRILOSEC) 40 MG CAPSULE    Take 1 capsule (40 mg total) by mouth 2 (two) times daily before meals.       Start Date: 11/27/2023End Date: 11/26/2024    OZEMPIC 1 MG/DOSE (4 MG/3 ML)    INJECT 1 MG INTO THE SKIN EVERY 7 DAYS.       Start Date: 12/27/2023End Date: --    ROSUVASTATIN (CRESTOR) 5 MG TABLET    Take 1 tablet (5 mg total) by mouth once daily.       Start Date: 9/15/2023 End Date: --           REVIEW OF SYSTEMS      CONST: no F/C/NS, no unintentional weight changes  NEURO:  no tremor, no weakness  EYES: no discharge, no erythema  EARS: no hearing loss, no sensation of fullness  PULM:  no SOB, no wheezing, no cough  CV: no CP, no palpitations  DERM: no rashes, no skin breaks         PHYSICAL EXAM      Ht 5' 9" (1.753 m)   Wt 102.5 kg (225 lb 15.5 oz)   BMI 33.37 kg/m²   GEN: Awake and alert, no distress  DERM:  No flushing, no rashes  EYE:  No ocular discharge, no redness  HENT: No nasal discharge, no hoarseness  PULM: Normal work of breathing, no cough  NEURO:  No focal deficit, speech fluent and logical  PSYCH: appropriate affect, normal behavior          MEDICAL DECISION-MAKING           Data reviewed:      New entries in bold face        Allergy Testing            Lab results      Immune titers (08/24/2023)  Protective varicella titers  Negative hepatitis-B surface antibody (despite complete vaccine series 2018)    Infectious disease testing (08/24/2023)  Negative HIV 1/2  Negative Strongyloides   Negative RPR  Negative QuantiFERON gold  Negative hepatitis-C antibody  Negative hepatitis-B surface antigen     Imaging and other diagnostics            Medical records review   At initial visit reviewed infectious disease pre transplant progress note of 12/22/2023.  Patient is on the list for lung transplant.  Pre transplant serologies showed susceptibility to hepatitis-B.  Patient has " subsequently received 1st dose of hepatitis-B and hepatitis a vaccine.    Immunizations significant for  Pneumococcal:  Pneumovax 2016; Prevnar 202022  Zoster: 2 doses recombinant  Hepatitis-B:  Status post 3 doses 2018; concomitant vaccine 12/22/2023  Hepatitis a 12/22/2023  Most recent tetanus/diphtheria 2016    Diagnoses:     Nathalie Krueger is a 58 y.o. female. with  1. Immunodeficiency    2. Abnormal immunological finding in serum    3. Abnormal Ab:  neg hepatitis B SAb despite vaccination 2018    4. Immunity status testing    5. ILD (interstitial lung disease)          Assessment / Plan / Orders   This pre transplant patient lacks protection against hepatitis-B despite previous vaccination.  Diagnostically, I recommend immune status testing with total immunoglobulins, lymphocyte subsets, and specific antibody titers including to pneumococcus (patient is status post Pneumovax and Prevnar 20).  I also recommend checking response to recently received hepatitis a and hepatitis-B vaccines in March of 2024.  Therapeutically, I recommend she received a RSV vaccine due to her immunodeficiency state.  Plan to follow-up via portal.      Immunodeficiency  -     RSVPreF Recombinant (Arexvy)    Abnormal Ab:  neg hepatitis B SAb despite vaccination 2018  Immunity status testing  -     Diphtheria / Tetanus Antibody Panel; Future; Expected date: 01/05/2024  -     Cancel: IgA; Future; Expected date: 01/05/2024  -     IgG; Future; Expected date: 01/05/2024  -     IgM; Future; Expected date: 01/05/2024  -     S.pneumoniae IgG Serotypes; Future; Expected date: 01/05/2024  -     Cancel: Hepatitis B Surface Antibody, Qual/Quant; Future; Expected date: 02/05/2024  -     Varicella zoster antibody, IgG; Future; Expected date: 01/05/2024  -     Cancel: Hepatitis A antibody, IgG; Future; Expected date: 02/05/2024  -     Lymphocyte Profile II; Future; Expected date: 01/05/2024  -     Hepatitis A antibody, IgG; Future; Expected date:  03/01/2024  -     Hepatitis B Surface Antibody, Qual/Quant; Future; Expected date: 03/01/2024    ILD (interstitial lung disease)  -     Ambulatory referral/consult to Allergy  -     IgE; Future; Expected date: 01/05/2024  -     Dermatophagoides Meridian; Future; Expected date: 01/05/2024  -     Dermatophagoides Pteronyssinus; Future; Expected date: 01/05/2024  -     Bermuda; Future; Expected date: 01/05/2024  -     Los; Future; Expected date: 01/05/2024  -     Cedar; Future; Expected date: 01/05/2024  -     English Plantain; Future; Expected date: 01/05/2024  -     Oak; Future; Expected date: 01/05/2024  -     Pecan; Future; Expected date: 01/05/2024  -     Ragweed; Future; Expected date: 01/05/2024  -     Alternaria; Future; Expected date: 01/05/2024  -     Aspergillus; Future; Expected date: 01/05/2024  -     Cat; Future; Expected date: 01/05/2024  -     Dog; Future; Expected date: 01/05/2024        Comorbidities  Interstitial lung disease due to chronic hypersensitivity pneumonitis  Type 2 diabetes    Patient Instructions and follow up     Patient Instructions   Testing  Blood work for allergy and response to previous vaccines testing today       Check [facility] in one week for results or call [phone]       Contact me with questions or concerns       I will contact you if anything needs immediate attention.    Blood work for response to recent Hepatitis A and Hepatitis B vaccine in March in [place]    Treatment    Recommend RSV vaccine in infectious disease clinic    No follow-ups on file.        Yesi Garcia MD  Allergy, Asthma & Immunology      I spent a total of 60 minutes on the day of the visit.This includes face to face time and non-face to face time preparing to see the patient (eg, review of tests), obtaining and/or reviewing separately obtained history, documenting clinical information in the electronic or other health record, independently interpreting results and communicating results to the  patient/family/caregiver, or care coordinator.

## 2024-01-05 NOTE — PATIENT INSTRUCTIONS
Testing  Blood work for allergy and response to previous vaccines testing today       Check [facility] in one week for results or call [phone]       Contact me with questions or concerns       I will contact you if anything needs immediate attention.    Blood work for response to recent Hepatitis A and Hepatitis B vaccine in March in [place]    Treatment    Recommend RSV vaccine in infectious disease clinic

## 2024-01-08 ENCOUNTER — PATIENT MESSAGE (OUTPATIENT)
Dept: ALLERGY | Facility: CLINIC | Age: 59
End: 2024-01-08
Payer: COMMERCIAL

## 2024-01-14 DIAGNOSIS — J43.9 PULMONARY EMPHYSEMA, UNSPECIFIED EMPHYSEMA TYPE: Primary | ICD-10-CM

## 2024-01-16 DIAGNOSIS — J84.9 ILD (INTERSTITIAL LUNG DISEASE): Primary | ICD-10-CM

## 2024-01-16 RX ORDER — FLUTICASONE FUROATE, UMECLIDINIUM BROMIDE AND VILANTEROL TRIFENATATE 200; 62.5; 25 UG/1; UG/1; UG/1
1 POWDER RESPIRATORY (INHALATION) DAILY
Qty: 60 EACH | Refills: 2 | Status: SHIPPED | OUTPATIENT
Start: 2024-01-16 | End: 2024-02-21 | Stop reason: SDUPTHER

## 2024-01-16 RX ORDER — FLUTICASONE FUROATE, UMECLIDINIUM BROMIDE AND VILANTEROL TRIFENATATE 200; 62.5; 25 UG/1; UG/1; UG/1
1 POWDER RESPIRATORY (INHALATION) DAILY
Qty: 60 EACH | Refills: 5 | Status: SHIPPED | OUTPATIENT
Start: 2024-01-16 | End: 2024-06-12 | Stop reason: SDUPTHER

## 2024-01-16 RX ORDER — MYCOPHENOLATE MOFETIL 500 MG/1
1000 TABLET ORAL 2 TIMES DAILY
Qty: 120 TABLET | Refills: 2 | Status: SHIPPED | OUTPATIENT
Start: 2024-01-16 | End: 2024-01-24 | Stop reason: SDUPTHER

## 2024-01-16 NOTE — TELEPHONE ENCOUNTER
Message received from patient requesting a new prescription for mycophenolate 1000 mg twice a day. Patient is out of mycophenolate due to the recent increase in her dose from 500 mg twice a day to 1000 mg twice a day.

## 2024-01-17 ENCOUNTER — PATIENT MESSAGE (OUTPATIENT)
Dept: ALLERGY | Facility: CLINIC | Age: 59
End: 2024-01-17
Payer: COMMERCIAL

## 2024-01-17 LAB
ACID FAST MOD KINY STN SPEC: NORMAL
MYCOBACTERIUM SPEC QL CULT: NORMAL

## 2024-01-22 ENCOUNTER — LAB VISIT (OUTPATIENT)
Dept: LAB | Facility: HOSPITAL | Age: 59
End: 2024-01-22
Payer: COMMERCIAL

## 2024-01-22 DIAGNOSIS — J84.9 ILD (INTERSTITIAL LUNG DISEASE): ICD-10-CM

## 2024-01-22 DIAGNOSIS — Z79.899 HIGH RISK MEDICATION USE: ICD-10-CM

## 2024-01-22 LAB
BASOPHILS # BLD AUTO: 0.07 K/UL (ref 0–0.2)
BASOPHILS NFR BLD: 0.5 % (ref 0–1.9)
DIFFERENTIAL METHOD BLD: ABNORMAL
EOSINOPHIL # BLD AUTO: 0.2 K/UL (ref 0–0.5)
EOSINOPHIL NFR BLD: 1.3 % (ref 0–8)
ERYTHROCYTE [DISTWIDTH] IN BLOOD BY AUTOMATED COUNT: 12 % (ref 11.5–14.5)
HCT VFR BLD AUTO: 45 % (ref 37–48.5)
HGB BLD-MCNC: 14.7 G/DL (ref 12–16)
IMM GRANULOCYTES # BLD AUTO: 0.04 K/UL (ref 0–0.04)
IMM GRANULOCYTES NFR BLD AUTO: 0.3 % (ref 0–0.5)
LYMPHOCYTES # BLD AUTO: 2.3 K/UL (ref 1–4.8)
LYMPHOCYTES NFR BLD: 18.1 % (ref 18–48)
MCH RBC QN AUTO: 30.3 PG (ref 27–31)
MCHC RBC AUTO-ENTMCNC: 32.7 G/DL (ref 32–36)
MCV RBC AUTO: 93 FL (ref 82–98)
MONOCYTES # BLD AUTO: 0.8 K/UL (ref 0.3–1)
MONOCYTES NFR BLD: 6.2 % (ref 4–15)
NEUTROPHILS # BLD AUTO: 9.4 K/UL (ref 1.8–7.7)
NEUTROPHILS NFR BLD: 73.6 % (ref 38–73)
NRBC BLD-RTO: 0 /100 WBC
PLATELET # BLD AUTO: 336 K/UL (ref 150–450)
PMV BLD AUTO: 10.8 FL (ref 9.2–12.9)
RBC # BLD AUTO: 4.85 M/UL (ref 4–5.4)
WBC # BLD AUTO: 12.77 K/UL (ref 3.9–12.7)

## 2024-01-22 PROCEDURE — 36415 COLL VENOUS BLD VENIPUNCTURE: CPT | Mod: PO,TXP | Performed by: INTERNAL MEDICINE

## 2024-01-22 PROCEDURE — 85025 COMPLETE CBC W/AUTO DIFF WBC: CPT | Mod: TXP | Performed by: INTERNAL MEDICINE

## 2024-01-22 RX ORDER — AZELASTINE 1 MG/ML
SPRAY, METERED NASAL
Qty: 30 ML | Refills: 5 | Status: SHIPPED | OUTPATIENT
Start: 2024-01-22 | End: 2024-06-12 | Stop reason: SDUPTHER

## 2024-01-24 ENCOUNTER — PATIENT MESSAGE (OUTPATIENT)
Dept: TRANSPLANT | Facility: CLINIC | Age: 59
End: 2024-01-24
Payer: COMMERCIAL

## 2024-01-24 ENCOUNTER — PATIENT MESSAGE (OUTPATIENT)
Dept: ALLERGY | Facility: CLINIC | Age: 59
End: 2024-01-24
Payer: COMMERCIAL

## 2024-01-24 DIAGNOSIS — J84.9 ILD (INTERSTITIAL LUNG DISEASE): ICD-10-CM

## 2024-01-24 RX ORDER — MYCOPHENOLATE MOFETIL 500 MG/1
1500 TABLET ORAL 2 TIMES DAILY
Qty: 180 TABLET | Refills: 11 | Status: SHIPPED | OUTPATIENT
Start: 2024-01-24 | End: 2024-06-12 | Stop reason: SDUPTHER

## 2024-01-24 NOTE — TELEPHONE ENCOUNTER
Instructions received from Dr. Barry to increase dose of mycophenolate to 1500 mg BID after review of CBC dated 1/22/24. Patient requesting a new prescription with the increase dose.

## 2024-01-29 ENCOUNTER — PATIENT MESSAGE (OUTPATIENT)
Dept: FAMILY MEDICINE | Facility: CLINIC | Age: 59
End: 2024-01-29
Payer: COMMERCIAL

## 2024-02-07 ENCOUNTER — LAB VISIT (OUTPATIENT)
Dept: LAB | Facility: HOSPITAL | Age: 59
End: 2024-02-07
Payer: COMMERCIAL

## 2024-02-07 DIAGNOSIS — J84.9 ILD (INTERSTITIAL LUNG DISEASE): ICD-10-CM

## 2024-02-07 DIAGNOSIS — Z79.899 HIGH RISK MEDICATION USE: ICD-10-CM

## 2024-02-07 LAB
BASOPHILS # BLD AUTO: 0.05 K/UL (ref 0–0.2)
BASOPHILS NFR BLD: 0.5 % (ref 0–1.9)
DIFFERENTIAL METHOD BLD: NORMAL
EOSINOPHIL # BLD AUTO: 0.1 K/UL (ref 0–0.5)
EOSINOPHIL NFR BLD: 1.4 % (ref 0–8)
ERYTHROCYTE [DISTWIDTH] IN BLOOD BY AUTOMATED COUNT: 12.2 % (ref 11.5–14.5)
HCT VFR BLD AUTO: 43.1 % (ref 37–48.5)
HGB BLD-MCNC: 14.4 G/DL (ref 12–16)
IMM GRANULOCYTES # BLD AUTO: 0.04 K/UL (ref 0–0.04)
IMM GRANULOCYTES NFR BLD AUTO: 0.4 % (ref 0–0.5)
LYMPHOCYTES # BLD AUTO: 2.4 K/UL (ref 1–4.8)
LYMPHOCYTES NFR BLD: 23.8 % (ref 18–48)
MCH RBC QN AUTO: 30.4 PG (ref 27–31)
MCHC RBC AUTO-ENTMCNC: 33.4 G/DL (ref 32–36)
MCV RBC AUTO: 91 FL (ref 82–98)
MONOCYTES # BLD AUTO: 0.6 K/UL (ref 0.3–1)
MONOCYTES NFR BLD: 6.3 % (ref 4–15)
NEUTROPHILS # BLD AUTO: 6.8 K/UL (ref 1.8–7.7)
NEUTROPHILS NFR BLD: 67.6 % (ref 38–73)
NRBC BLD-RTO: 0 /100 WBC
PLATELET # BLD AUTO: 343 K/UL (ref 150–450)
PMV BLD AUTO: 10.4 FL (ref 9.2–12.9)
RBC # BLD AUTO: 4.74 M/UL (ref 4–5.4)
WBC # BLD AUTO: 10.04 K/UL (ref 3.9–12.7)

## 2024-02-07 PROCEDURE — 85025 COMPLETE CBC W/AUTO DIFF WBC: CPT | Mod: TXP | Performed by: INTERNAL MEDICINE

## 2024-02-07 PROCEDURE — 36415 COLL VENOUS BLD VENIPUNCTURE: CPT | Mod: PO,TXP | Performed by: INTERNAL MEDICINE

## 2024-02-08 ENCOUNTER — PATIENT MESSAGE (OUTPATIENT)
Dept: TRANSPLANT | Facility: CLINIC | Age: 59
End: 2024-02-08
Payer: COMMERCIAL

## 2024-02-08 ENCOUNTER — TELEPHONE (OUTPATIENT)
Dept: TRANSPLANT | Facility: CLINIC | Age: 59
End: 2024-02-08
Payer: COMMERCIAL

## 2024-02-08 NOTE — TELEPHONE ENCOUNTER
----- Message from Mary Barry DO sent at 2/8/2024  8:54 AM CST -----    No changes to MMF    Message sent to patient via [facility] regarding lab results and Dr. Barry's instructions. Also informed patient of the next scheduled lab appointment.

## 2024-02-20 ENCOUNTER — LAB VISIT (OUTPATIENT)
Dept: LAB | Facility: HOSPITAL | Age: 59
End: 2024-02-20
Attending: INTERNAL MEDICINE
Payer: COMMERCIAL

## 2024-02-20 ENCOUNTER — OFFICE VISIT (OUTPATIENT)
Dept: TRANSPLANT | Facility: CLINIC | Age: 59
End: 2024-02-20
Payer: COMMERCIAL

## 2024-02-20 ENCOUNTER — HOSPITAL ENCOUNTER (OUTPATIENT)
Dept: PULMONOLOGY | Facility: CLINIC | Age: 59
Discharge: HOME | End: 2024-02-20
Payer: COMMERCIAL

## 2024-02-20 VITALS
HEART RATE: 90 BPM | DIASTOLIC BLOOD PRESSURE: 91 MMHG | SYSTOLIC BLOOD PRESSURE: 136 MMHG | BODY MASS INDEX: 33.24 KG/M2 | HEIGHT: 69 IN | OXYGEN SATURATION: 96 % | WEIGHT: 224.44 LBS

## 2024-02-20 DIAGNOSIS — J84.9 ILD (INTERSTITIAL LUNG DISEASE): ICD-10-CM

## 2024-02-20 DIAGNOSIS — J43.9 PULMONARY EMPHYSEMA, UNSPECIFIED EMPHYSEMA TYPE: ICD-10-CM

## 2024-02-20 DIAGNOSIS — J84.9 ILD (INTERSTITIAL LUNG DISEASE): Primary | ICD-10-CM

## 2024-02-20 DIAGNOSIS — G47.33 OSA (OBSTRUCTIVE SLEEP APNEA): ICD-10-CM

## 2024-02-20 DIAGNOSIS — J96.11 CHRONIC HYPOXEMIC RESPIRATORY FAILURE: ICD-10-CM

## 2024-02-20 DIAGNOSIS — Z79.899 HIGH RISK MEDICATION USE: ICD-10-CM

## 2024-02-20 LAB
BASOPHILS # BLD AUTO: 0.05 K/UL (ref 0–0.2)
BASOPHILS NFR BLD: 0.6 % (ref 0–1.9)
DIFFERENTIAL METHOD BLD: NORMAL
EOSINOPHIL # BLD AUTO: 0.1 K/UL (ref 0–0.5)
EOSINOPHIL NFR BLD: 1.4 % (ref 0–8)
ERYTHROCYTE [DISTWIDTH] IN BLOOD BY AUTOMATED COUNT: 12.3 % (ref 11.5–14.5)
FEF 25 75 LLN: 1.31
FEF 25 75 PRE REF: 150.4 %
FEF 25 75 REF: 2.55
FEV05 LLN: 1.24
FEV05 REF: 2.1
FEV1 FVC LLN: 67
FEV1 FVC PRE REF: 108.3 %
FEV1 FVC REF: 79
FEV1 LLN: 2.23
FEV1 PRE REF: 81 %
FEV1 REF: 2.94
FVC LLN: 2.86
FVC PRE REF: 74.1 %
FVC REF: 3.77
HCT VFR BLD AUTO: 42.8 % (ref 37–48.5)
HGB BLD-MCNC: 14.4 G/DL (ref 12–16)
IMM GRANULOCYTES # BLD AUTO: 0.02 K/UL (ref 0–0.04)
IMM GRANULOCYTES NFR BLD AUTO: 0.2 % (ref 0–0.5)
LYMPHOCYTES # BLD AUTO: 2.4 K/UL (ref 1–4.8)
LYMPHOCYTES NFR BLD: 26.2 % (ref 18–48)
MCH RBC QN AUTO: 30.1 PG (ref 27–31)
MCHC RBC AUTO-ENTMCNC: 33.6 G/DL (ref 32–36)
MCV RBC AUTO: 90 FL (ref 82–98)
MONOCYTES # BLD AUTO: 0.8 K/UL (ref 0.3–1)
MONOCYTES NFR BLD: 8.4 % (ref 4–15)
NEUTROPHILS # BLD AUTO: 5.7 K/UL (ref 1.8–7.7)
NEUTROPHILS NFR BLD: 63.2 % (ref 38–73)
NRBC BLD-RTO: 0 /100 WBC
PEF LLN: 5.16
PEF PRE REF: 128.9 %
PEF REF: 7.15
PHYSICIAN COMMENT: ABNORMAL
PLATELET # BLD AUTO: 296 K/UL (ref 150–450)
PMV BLD AUTO: 9.4 FL (ref 9.2–12.9)
PRE FEF 25 75: 3.83 L/S (ref 1.31–4.2)
PRE FET 100: 6.02 SEC
PRE FEV05 REF: 101 %
PRE FEV1 FVC: 85.3 % (ref 66.87–88.86)
PRE FEV1: 2.38 L (ref 2.23–3.63)
PRE FEV5: 2.12 L (ref 1.24–2.95)
PRE FVC: 2.79 L (ref 2.86–4.72)
PRE PEF: 9.21 L/S (ref 5.16–9.14)
RBC # BLD AUTO: 4.78 M/UL (ref 4–5.4)
WBC # BLD AUTO: 9.07 K/UL (ref 3.9–12.7)

## 2024-02-20 PROCEDURE — 3072F LOW RISK FOR RETINOPATHY: CPT | Mod: CPTII,NTX,S$GLB, | Performed by: INTERNAL MEDICINE

## 2024-02-20 PROCEDURE — 36415 COLL VENOUS BLD VENIPUNCTURE: CPT | Mod: NTX | Performed by: INTERNAL MEDICINE

## 2024-02-20 PROCEDURE — 3079F DIAST BP 80-89 MM HG: CPT | Mod: CPTII,NTX,S$GLB, | Performed by: INTERNAL MEDICINE

## 2024-02-20 PROCEDURE — 1159F MED LIST DOCD IN RCRD: CPT | Mod: CPTII,NTX,S$GLB, | Performed by: INTERNAL MEDICINE

## 2024-02-20 PROCEDURE — 3008F BODY MASS INDEX DOCD: CPT | Mod: CPTII,NTX,S$GLB, | Performed by: INTERNAL MEDICINE

## 2024-02-20 PROCEDURE — 99214 OFFICE O/P EST MOD 30 MIN: CPT | Mod: NTX,S$GLB,, | Performed by: INTERNAL MEDICINE

## 2024-02-20 PROCEDURE — 85025 COMPLETE CBC W/AUTO DIFF WBC: CPT | Mod: TXP | Performed by: INTERNAL MEDICINE

## 2024-02-20 PROCEDURE — 1160F RVW MEDS BY RX/DR IN RCRD: CPT | Mod: CPTII,NTX,S$GLB, | Performed by: INTERNAL MEDICINE

## 2024-02-20 PROCEDURE — 94010 BREATHING CAPACITY TEST: CPT | Mod: NTX,S$GLB,, | Performed by: INTERNAL MEDICINE

## 2024-02-20 PROCEDURE — 99999 PR PBB SHADOW E&M-EST. PATIENT-LVL III: CPT | Mod: PBBFAC,TXP,, | Performed by: INTERNAL MEDICINE

## 2024-02-20 PROCEDURE — 3075F SYST BP GE 130 - 139MM HG: CPT | Mod: CPTII,NTX,S$GLB, | Performed by: INTERNAL MEDICINE

## 2024-02-20 NOTE — LETTER
February 21, 2024        Sarah Lemon  [address]  [address]  [place] [address]  Phone: [phone]  Fax: [phone]             [facility] 1st Fl  [address]  [facility] [address]  Phone: [phone]   Patient: Nathalie Krueger   MR Number: [id number]   YOB: 1965   Date of Visit: 2/20/2024       Dear Dr. Sarah Lemon    Thank you for referring Nathalie Krueger to me for evaluation. Attached you will find relevant portions of my assessment and plan of care.    If you have questions, please do not hesitate to call me. I look forward to following Nathalie Krueger along with you.    Sincerely,    Mary Barry, DO    Enclosure    If you would like to receive this communication electronically, please contact [email or web address] or [phone] to request [facility] access.    [facility] is a tool which provides read-only access to select patient information with whom you have a relationship. Its easy to use and provides real time access to review your patients record including encounter summaries, notes, results, and demographic information.    If you feel you have received this communication in error or would no longer like to receive these types of communications, please e-mail [email or web address]

## 2024-02-20 NOTE — PROGRESS NOTES
ADVANCED LUNG DISEASE CLINIC FOLLOW UP EVALUATION                                                                                                                                             Reason for Visit:  ILD-cHP    Referring Physician: Lesly Ledesma MD    History of Present Illness: Nathalie Krueger is a 58 y.o. female who is on 0-4L of oxygen with exertion.  She is on CPAP.  Her New York Heart Association Class is II and a Karnofsky score of 90% - Able to carry on normal activity: minor symptoms of disease. She is diabetic non insulin dependent  Patient with smoking related emphysema, cHP(ILD), ORLIN, chronic hypoxemic respiratory failure, morbid obesity, GERD, DM.  Patient comes in for a routine visit with her wife.        INTERVAL HISTORY:  Doing well on MMF 1500mg BID. Denies any GI symptoms. Shortness of breath improving. Exercise tolerance improving as well. Recently has been weaning off oxygen. No recent illnesses.  She has been walking everyday and e-biking upto 14 miles during the week. Partner notes improvement overall with mycophenolate.  Plans to travel via air to [place], [place], and [facility] in next 6 months.      Brief History summarized from previous visits  Patient presented in 07/2023 for evaluation of ILD and at that time reported respiratory symptoms first began over three years ago when she lived in [place]. Per patient's wife, patient had developed progressive fatigue and dyspnea with exertion. She was still able to perform her ADLs independently, but noticed she would get breathless with activity very quickly. Previously lived on family's farm in [place]. She states she had an episode of profound fatigue/malaise one week following clean out of family's chicken coup. Frequent exposures to dust, pesticides, fertilizers while living in [place]. Patient and her wife moved to [place] ~two years ago. While living downtown, patient noticed increased cough, worsening fatigue requiring frequent daytime  vance. Briefly lived in high rise apartments [facility] and found she had worsening cough if other residents smoked within the building. She presented to the ED in early 2022 due to hypoxemia. She checked her pulse ox at home and reportedly had oxygen saturation of 76%. She did not require hospitalization. No prior chest surgeries, ICU admission, intubations, lung biopsy.     In August 2022, patient had acute worsening of her dyspnea and cough. She was started on 2L oxygen to be used with exertion. 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 Since starting CPAP, notes improvement in daytime fatigue. She completed a trial of steroids in the fall of 2022 after evaluation with Dr. Rodas at [facility] and states her symptoms improved drastically. No recent exacerbations/hospitalizations. She has had an extensive autoimmune workup which revealed +DAYTON and low IgM. All other workup unrevealing. Currently of stiolto and symbicort for management.     Patient is a former 25-30 year smoker and quit 3-4 years ago. Her father had lung disease related to diving in the [employer], but otherwise denies family history of lung disease. No history of frequent infections as a child. She states she did have a history of scarlet fever as a child while living in [place] and was told she should not live there in the future? Also states she used a 20 year old inhaler she bought in [place] while still living in [place] and is concerned she may have aspirated mold. She occasionally has episodes of flushing and rashes. No arthralgias. History of reflux and recently stopped omeprazole in hopes of improving symptoms with diet modification. She recently lost ~10 pounds. Continues to have intermittent reflux despite diet changes. She states she remains very active and walks and performs water aerobics frequently. 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 Remains independent with her ADLs, but does struggle with  vacuuming, bending over, and climbing stairs    She was treated for another exacerbation in 10/2023 with prednisone taper in addition to antibiotics for pneumonia and suspected ILD flare. Reported marked improvement.  The steroids taper was quickly weaned in anticipation of wedge bx.  She reports good compliance with her CPAP. Denies any post-nasal drip.           Past Medical History:   Diagnosis Date    Acute bronchitis 02/01/2022    Adenomatous polyp of ascending colon 06/22/2020    Colon polyp 11/03/2023    3mm in transverse colon    Diabetes mellitus     Gastric polyps 11/03/2023    Interstitial lung disease        Past Surgical History:   Procedure Laterality Date    24 HOUR IMPEDANCE PH MONITORING OF ESOPHAGUS IN PATIENT NOT TAKING ACID REDUCING MEDICATIONS N/A 11/14/2023    Procedure: IMPEDANCE PH STUDY, ESOPHAGEAL, 24 HOUR, IN PATIENT NOT TAKING ACID REDUCING MEDICATION;  Surgeon: Felicita Villafana MD;  Location: [facility] (4TH FLR);  Service: Endoscopy;  Laterality: N/A;    ABLATION, FIBROID, UTERUS, LAPAROSCOPIC, WITH US GUIDANCE      APPENDECTOMY  1982    BRONCHOSCOPY N/A 8/31/2023    Procedure: Bronchoscopy(need fluoro);  Surgeon: Sarah Lemon MD;  Location: [facility];  Service: Pulmonary;  Laterality: N/A;  transbronchial biopsies need fluoro    BRONCHOSCOPY N/A 11/29/2023    Procedure: Bronchoscopy;  Surgeon: Piotr Amezcua MD;  [facility] [place] [facility];  Service: Cardiothoracic;  Laterality: N/A;    COLONOSCOPY N/A 11/3/2023    Procedure: COLONOSCOPY;  Surgeon: Felicita Villafana MD;  Location: [facility] (2ND FLR);  Service: Endoscopy;  Laterality: N/A;  10/26/23-per Eileen adjust to 60 min case - ERW  10/27-precall complete-pt verbalized understanding of holding Ozempic-MS    ESOPHAGEAL MANOMETRY WITH MEASUREMENT OF IMPEDANCE N/A 11/14/2023    Procedure: MANOMETRY, ESOPHAGUS, WITH IMPEDANCE MEASUREMENT;  Surgeon: Felicita Villafana MD;  Location: [facility] (4TH FLR);  Service:  Endoscopy;  Laterality: N/A;  esophageal manometry with dysphagia protocol   followed by 24 hr ph off PPI  11/8-precall complete-MS    ESOPHAGOGASTRODUODENOSCOPY N/A 11/3/2023    Procedure: EGD (ESOPHAGOGASTRODUODENOSCOPY);  Surgeon: Felicita Villafana MD;  Location: [facility] ENDO (2ND FLR);  Service: Endoscopy;  Laterality: N/A;  egd/endoflip and colon  extended prep  peg prep  prep instructions sent to pt via portal  diabetic -metformin  wl meds *ozempic-hold 7 days prior  02-2liters  2nd floor-Severe pulmonary Disease    INJECTION OF ANESTHETIC AGENT AROUND MULTIPLE INTERCOSTAL NERVES  11/29/2023    Procedure: BLOCK, NERVE, INTERCOSTAL, 2 OR MORE;  Surgeon: Piotr Amezcua MD;  [facility];  Service: Cardiothoracic;;    RIGHT HEART CATHETERIZATION Right 3/29/2023    Procedure: INSERTION, CATHETER, RIGHT HEART;  Surgeon: Yulisa Yung DO;  Location: [facility] CATH LAB;  Service: Cardiology;  Laterality: Right;    ROTATOR CUFF REPAIR Right 1998    TONSILLECTOMY  1977    VIDEO-ASSISTED THORACOSCOPIC SURGERY (VATS) Right 11/29/2023    Procedure: RIGHT VATS WEDGE;  Surgeon: Piotr Amezcua MD;  Location: [facility] OR [address] [place] [address];  Service: Cardiothoracic;  Laterality: Right;       Allergies: Atorvastatin, Lisinopril, and Meperidine hcl    Current Outpatient Medications   Medication Sig    albuterol (PROVENTIL/VENTOLIN HFA) 90 mcg/actuation inhaler Inhale 1-2 puffs into the lungs every 4 (four) hours as needed for Wheezing or Shortness of Breath. Rescue    albuterol-ipratropium (DUO-NEB) 2.5 mg-0.5 mg/3 mL nebulizer solution Take 3 mLs by nebulization 2 (two) times daily. Rescue    amLODIPine (NORVASC) 5 MG tablet TAKE 1 TABLET BY MOUTH EVERY DAY    azelastine (ASTELIN) 137 mcg (0.1 %) nasal spray SPRAY 1 SPRAY IN EACH NOSTRIL 2 TIMES DAILY.    calcium carbonate/vitamin D3 (CALCIUM WITH VITAMIN D ORAL) Take 1 tablet by mouth once daily.    cetirizine (ZYRTEC) 10 MG tablet Take 1 tablet (10 mg total) by mouth  once daily.    fluticasone-umeclidin-vilanter (TRELEGY ELLIPTA) 200-62.5-25 mcg inhaler Inhale 1 puff into the lungs once daily.    metFORMIN (GLUCOPHAGE) 500 MG tablet TAKE 1 TABLET BY MOUTH EVERY DAY WITH BREAKFAST (Patient taking differently: Take 500 mg by mouth daily with breakfast.)    multivitamin capsule Take 1 capsule by mouth once daily.    mycophenolate (CELLCEPT) 500 mg Tab Take 1 tablet (500 mg total) by mouth 2 (two) times daily.    omeprazole (PRILOSEC) 40 MG capsule Take 1 capsule (40 mg total) by mouth 2 (two) times daily before meals.    OZEMPIC 1 mg/dose (4 mg/3 mL) INJECT 1 MG INTO THE SKIN EVERY 7 DAYS.    rosuvastatin (CRESTOR) 5 MG tablet Take 1 tablet (5 mg total) by mouth once daily.     No current facility-administered medications for this visit.       Immunization History   Administered Date(s) Administered    COVID-19, MRNA, LN-S, PF (Pfizer) (Gray Cap) 04/06/2022    COVID-19, MRNA, LN-S, PF (Pfizer) (Purple Cap) 03/06/2021, 03/30/2021, 09/30/2021    COVID-19, mRNA, LNP-S, PF, jeanette-sucrose, 30 mcg/0.3 mL (Pfizer 2023 Ages 12+) 11/18/2023    COVID-19, mRNA, LNP-S, bivalent booster, PF (PFIZER OMICRON) 09/22/2022    Hepatitis B, Adult 01/16/2018, 02/27/2018, 07/31/2018    Influenza 09/28/2016, 10/04/2017, 10/24/2018, 09/05/2019, 09/08/2022    Influenza - Quadrivalent - PF *Preferred* (6 months and older) 09/30/2021, 09/13/2023    Pneumococcal Conjugate - 20 Valent 09/08/2022    Pneumococcal Polysaccharide - 23 Valent 10/04/2016    Tdap 01/01/2016    Zoster Recombinant 10/29/2020, 01/31/2022     Family History:    Family History   Problem Relation Age of Onset    Diabetes Mother     Hypertension Mother     Heart disease Mother     Diabetes Father     Glaucoma Neg Hx     Macular degeneration Neg Hx     Colon cancer Neg Hx     Esophageal cancer Neg Hx      Social History     Substance and Sexual Activity   Alcohol Use Not Currently      Social History     Substance and Sexual Activity   Drug  Use Never      Social History     Socioeconomic History    Marital status:    Occupational History    Occupation: Consultant with non profits   Tobacco Use    Smoking status: Former     Current packs/day: 0.00     Average packs/day: 0.5 packs/day for 25.0 years (12.5 ttl pk-yrs)     Types: Cigarettes     Start date: 1994     Quit date: 2019     Years since quittin.0     Passive exposure: Past    Smokeless tobacco: Never    Tobacco comments:     Smoked additive free tobacco   Substance and Sexual Activity    Alcohol use: Not Currently    Drug use: Never    Sexual activity: Not Currently     Partners: Female     Birth control/protection: Post-menopausal     Comment: spouse -  14 years    Social History Narrative    Lives with her spouse, Aisha. Enjoys live music, painting.      Social Determinants of Health     Financial Resource Strain: Low Risk  (2023)    Overall Financial Resource Strain (CARDIA)     Difficulty of Paying Living Expenses: Not hard at all   Food Insecurity: No Food Insecurity (2023)    Hunger Vital Sign     Worried About Running Out of Food in the Last Year: Never true     Ran Out of Food in the Last Year: Never true   Transportation Needs: No Transportation Needs (2023)    PRAPARE - Transportation     Lack of Transportation (Medical): No     Lack of Transportation (Non-Medical): No   Physical Activity: Inactive (2023)    Exercise Vital Sign     Days of Exercise per Week: 0 days     Minutes of Exercise per Session: 0 min   Stress: No Stress Concern Present (2023)    [employer] [place] [employer] - Occupational Stress Questionnaire     Feeling of Stress : Not at all   Social Connections: Unknown (2023)    Social Connection and Isolation Panel [NHANES]     Frequency of Communication with Friends and Family: More than three times a week     Frequency of Social Gatherings with Friends and Family: More than three times a week      "Attends Rastafari Services: Patient declined     Active Member of Clubs or Organizations: Patient declined     Attends Club or Organization Meetings: Patient declined     Marital Status:    Housing Stability: Low Risk  (12/1/2023)    Housing Stability Vital Sign     Unable to Pay for Housing in the Last Year: No     Number of Places Lived in the Last Year: 1     Unstable Housing in the Last Year: No     Vitals  BP (!) 136/94 (BP Location: Right arm, Patient Position: Sitting, BP Method: Medium (Automatic))   Pulse 99   Temp 98.1 °F (36.7 °C) (Oral)   Resp 20   Ht 5' 9" (1.753 m)   Wt 103.1 kg (227 lb 4.7 oz)   SpO2 95% Comment: Room Air  BMI 33.57 kg/m²     Physical Exam:  GEN: middle-aged woman  HEENT: face symmetric, conjunctivae anicteric, MMM  CV: regular rhythm, normal rate  PULM: CTAB, no crackles, no wheezing, normal respiratory effort on RA  Abd: non-distended  Ext: no LE edema  Skin: no rashes  Neuro: alert, answering questions appropriately     Labs:  Hospital Outpatient Visit on 12/20/2023   Component Date Value    Physician Comment 12/20/2023                      Value:Spirometry shows mild restriction based on the reduction in FVC.   Â   Notes: Consider lung volume determination to confirm the degree of restriction.       Pre FVC 12/20/2023 2.45 (L)     PRE FEV5 12/20/2023 1.79     Pre FEV1 12/20/2023 2.03 (L)     Pre FEV1 FVC 12/20/2023 82.97     Pre FEF 25 75 12/20/2023 2.73     Pre PEF 12/20/2023 7.83     Pre  12/20/2023 6.95     FVC Ref 12/20/2023 3.78     FVC LLN 12/20/2023 2.87     FVC Pre Ref 12/20/2023 64.8     FEV05 REF 12/20/2023 2.10     FEV05 LLN 12/20/2023 1.24     PRE FEV05 REF 12/20/2023 85.2     FEV1 Ref 12/20/2023 2.95     FEV1 LLN 12/20/2023 2.23     FEV1 Pre Ref 12/20/2023 68.8     FEV1 FVC Ref 12/20/2023 79     FEV1 FVC LLN 12/20/2023 67     FEV1 FVC Pre Ref 12/20/2023 105.3     FEF 25 75 Ref 12/20/2023 2.55     FEF 25 75 LLN 12/20/2023 1.32     FEF 25 75 Pre Ref " 12/20/2023 106.7     PEF Ref 12/20/2023 7.15     PEF LLN 12/20/2023 5.16     PEF Pre Ref 12/20/2023 109.6         11/16/2023    10/5/2023    11:03 AM 8/24/2023    10:10 AM 4/10/2023    10:10 AM   Pulmonary Function Tests    FVC 2.49 2.36 liters 2.51 liters 2.35 liters   FEV1 2.12 2.05 liters 2.12 liters 1.95 liters   TLC (L) 2.95 2.94 liters  3.06 liters   DLCO 7.34 6.89 ml/mmHg sec  7.89 ml/mmHg sec   FVC% 66% 62.3 66.2 61.8   FEV1% 72% 69.3 71.8 65.6   FEF 25-75 3.28 3.23 3.02 2.35   FEF 25-75% 128% 125.9 117.4 90.8   TLC% 51% 50.8  53   RV 0.46 0.58  0.83   RV% 22% 27.3  39.4   DLCO% 28 26.5  30.2         12/20/2023    12:50 PM 11/16/2023     9:34 AM 10/5/2023     9:53 AM 7/12/2023     1:42 PM 3/21/2023    12:40 PM 12/6/2022     2:18 PM   6MW   6MWT Status completed without stopping completed without stopping completed without stopping completed without stopping completed without stopping completed without stopping   Patient Reported No complaints No complaints No complaints No complaints No complaints No complaints   Was O2 used? Yes Yes Yes Yes Yes Yes   Delivery Method Cannula;Pull Tank;Continuous Flow Cannula;Pull Tank;Continuous Flow Cannula;Pull Tank;Continuous Flow Cannula;Pull Tank;Continuous Flow Cannula;Pull Tank;Continuous Flow Cannula;Demand Flow;Shoulder Carry   6MW Distance walked (feet) 1200 feet 1200 feet 1200 feet 1200 feet 1000 feet 1200 feet   Distance walked (meters) 365.76 meters 365.76 meters 365.76 meters 365.76 meters 304.8 meters 365.76 meters   Did patient stop? No No No No No No   Oxygen Saturation 98 % 98 % 96 % 95 % 97 % 98 %   Supplemental Oxygen 3 L/M 4 L/M 3 L/M 2 L/M 2 L/M 2 L/M   Heart Rate 95 bpm 83 bpm 81 bpm 87 bpm 85 bpm 83 bpm   Blood Pressure 134/80 143/90 134/87 122/76 134/81 145/84   Ashlie Dyspnea Rating  nothing at all nothing at all nothing at all very, very light (just noticeable) nothing at all nothing at all   Oxygen Saturation 90 % 92 % 90 % 89 % 86 % 84 %    Supplemental Oxygen 3 L/M 4 L/M 4 L/M 3 L/M 2 L/M 2 L/M   Heart Rate 115 bpm 94 bpm 100 bpm 115 bpm 99 bpm 96 bpm   Blood Pressure 155/84 166/104 159/86 119/76 135/81 160/79   Ashlie Dyspnea Rating  very, very light (just noticeable) very, very light (just noticeable) nothing at all very, very light (just noticeable) light very, very light (just noticeable)   Recovery Time (seconds) 54 seconds 155 seconds 95 seconds 101 seconds 120 seconds 90 seconds   Oxygen Saturation 96 % 98 % 95 % 98 % 97 % 95 %   Supplemental Oxygen 3 L/M 4 L/M 4 L/M 3 L/M 2 L/M 2 L/M   Heart Rate 101 bpm 93 bpm 90 bpm 90 bpm 91 bpm 87 bpm       Imaging:  EXAMINATION:  CT CHEST WITHOUT CONTRAST  Impression:     1. Two small stable solid-appearing pulmonary nodules stable since the prior of 01/26/2022 favoring a benign etiology of less than 6 mm in size.    Cardiodiagnostics:    Echo 3/2023    Interpretation Summary  · The left ventricle is normal in size with normal systolic function.  · The estimated ejection fraction is 65%.  · Normal left ventricular diastolic function.  · Moderate right ventricular enlargement with low normal to mildly reduced right ventricular systolic function.  · The estimated PA systolic pressure is 34 mmHg.  · Normal central venous pressure (3 mmHg).  · The ascending aorta is mildly dilated.    Cardiac catheterization 3/2023    Conclusion  Summary  Filling pressures: RA 10, PCWP 16  PA 36/20, mean PA pressure 20 mmHg  PVR 1.5 DUQUE  PA saturation 70%, Ao saturation 97%  Fletcher CO/CI: 5.9/2.66  /100  with repeat intraprocedure similar results. On recheck post procedure 133/75  HR 71 SR  Hb 13.6    WEDGE BIOPSY 11/29/2023    1. LUNG, RIGHT UPPER LOBE, WEDGE BIOPSY:  Chronic interstitial pneumonitis with changes most consistent with cellular nonspecific interstitial pneumonia-like pattern of injury.  Negative for malignancy.      2. LUNG, RIGHT LOWER LOBES, WEDGE BIOPSY:  Chronic interstitial pneumonitis with  changes most consistent with cellular nonspecific interstitial pneumonia-like pattern of injury.  Negative for malignancy.      3. LUNG, RIGHT MIDDLE LOBE, WEDGE BIOPSY:  Chronic interstitial pneumonitis with changes most consistent with cellular nonspecific interstitial pneumonia-like pattern of injury and rare multinucleated giant cells containing cholesterol clefts.  Negative for malignancy.        Comment:  Sample tissue from all segments shows patchy fibrosis, diffuse cellular inflammatory infiltrates, pneumocyte hyperplasia and accumulation of foamy macrophages in airspaces.  The overall changes suggest cellular nonspecific interstitial  pneumonia (NSIP) pattern of injury.  Rare multinucleated giant cells with cholesterol clefts raise concern for hypersensitivity pneumonitis.  Other potential etiologies include connective tissue disease or respiratory bronchiolitis interstitial lung  disease (RB-ILD).  Idiopathic NSIP is a diagnosis of exclusion if all other etiologies have been ruled out.  There is no evidence of acute lung injury, granulomas, viral cytopathic effect or neoplasia.  Recommend correlation with clinical history and  pertinent lab studies.         Assessment:  59 y/o woman with pertinent PMHx of chronic hypersensitivity pneumonitis (started on MMF during last visit) who is here for follow up. She reports clinical improvement since initiation of MMF. She has been using less oxygen and exercising more. She had questions about pulmonary rehab but she is currently exercising everyday. Would recommend pulmonary rehab if she needs further assistance. Had questions about air travel, no issues as long as she has her concentrator and avoiding triggers. Discussed possible triggers with Ms. Krueger and her wife. Also discussed possible signs/symptoms of disease flare.    # ILD- cHP + Smoking related   # Chronic hypoxemic respiratory failure   # Pulmonary emphysema   # Vasomotor rhinitis     - Continue MMF  1500mg BID; tolerating well.  GERD precautions re-iterated.  - Wean O2 as tolerated, pt satting well on RA but desats on exertion. Recommend continued oxygen use during exercise  - Apical pulmonary emphysema.  On Trelegy.    - On nasal decongestants and oral allergy aids.  - RTC in 3 months with PFTs and 6MWT    Roosevelt Turpin MD  [facility] Fellow  Advanced Lung Disease    Attending Note:     I have seen and evaluated the patient with Dr. Turpin. Their note reflects the content of our discussion and my plan of care.  Pt followed for chronic HP.  Doing very well on MMF and inhaler therapy.  FVC improved from previous.  mPAP of 20mmHg on RHC from 3/2023.  O2 needs stable.  Feels very well.  Will continue to monitor closely.  No need for antifibrotics at this time as she has not shown any signs of decline.  Continue with current plan of care.  Follow-up in early June 2024 before she plans to travel to [place].  Will need PFTs and 6MWT.  CBC at that time as part of MMF monitoring.      Mary Barry D.O.  Pulmonary/Critical Care and Lung Transplantation  [facility]

## 2024-02-22 ENCOUNTER — TELEPHONE (OUTPATIENT)
Dept: TRANSPLANT | Facility: CLINIC | Age: 59
End: 2024-02-22
Payer: COMMERCIAL

## 2024-02-22 ENCOUNTER — PATIENT MESSAGE (OUTPATIENT)
Dept: TRANSPLANT | Facility: CLINIC | Age: 59
End: 2024-02-22
Payer: COMMERCIAL

## 2024-02-22 NOTE — TELEPHONE ENCOUNTER
Message sent to patient via [facility] regarding Dr. Barry's instructions and need for repeat labs.    ----- Message from Mary aBrry,  sent at 2/22/2024 12:35 PM CST -----    No changes.  Thanks    ----- Message -----  From: Key Carrillo RN  Sent: 2/22/2024  12:03 PM CST  To: Mary Barry, DO    Any changes to MMF 1500 mg BID?

## 2024-02-28 ENCOUNTER — PATIENT MESSAGE (OUTPATIENT)
Dept: ADMINISTRATIVE | Facility: HOSPITAL | Age: 59
End: 2024-02-28
Payer: COMMERCIAL

## 2024-02-28 DIAGNOSIS — J84.9 ILD (INTERSTITIAL LUNG DISEASE): Primary | ICD-10-CM

## 2024-02-29 ENCOUNTER — PATIENT MESSAGE (OUTPATIENT)
Dept: PULMONOLOGY | Facility: CLINIC | Age: 59
End: 2024-02-29
Payer: COMMERCIAL

## 2024-02-29 ENCOUNTER — PATIENT MESSAGE (OUTPATIENT)
Dept: ADMINISTRATIVE | Facility: HOSPITAL | Age: 59
End: 2024-02-29
Payer: COMMERCIAL

## 2024-02-29 ENCOUNTER — PATIENT OUTREACH (OUTPATIENT)
Dept: ADMINISTRATIVE | Facility: HOSPITAL | Age: 59
End: 2024-02-29
Payer: COMMERCIAL

## 2024-02-29 ENCOUNTER — PATIENT MESSAGE (OUTPATIENT)
Dept: FAMILY MEDICINE | Facility: CLINIC | Age: 59
End: 2024-02-29
Payer: COMMERCIAL

## 2024-02-29 DIAGNOSIS — E11.9 DIABETES MELLITUS WITHOUT COMPLICATION: Primary | ICD-10-CM

## 2024-02-29 NOTE — PROGRESS NOTES
Population Health Chart Review & Patient Outreach Details      Additional Pop Health Notes:      CAMPAIGN- Preventative Care Screening         Updates Requested / Reviewed:        Health Maintenance Topics Overdue:    Health Maintenance Due   Topic Date Due    Hemoglobin A1c  03/14/2024         Health Maintenance Topic(s) Outreach Outcomes & Actions Taken:    Lab(s) - Outreach Outcomes & Actions Taken  : Overdue Lab(s) Ordered and Overdue Lab(s) Scheduled

## 2024-03-04 PROBLEM — J96.11 CHRONIC HYPOXEMIC RESPIRATORY FAILURE: Status: RESOLVED | Noted: 2022-08-09 | Resolved: 2024-03-04

## 2024-03-05 ENCOUNTER — PATIENT MESSAGE (OUTPATIENT)
Dept: FAMILY MEDICINE | Facility: CLINIC | Age: 59
End: 2024-03-05
Payer: COMMERCIAL

## 2024-03-05 ENCOUNTER — TELEPHONE (OUTPATIENT)
Dept: TRANSPLANT | Facility: CLINIC | Age: 59
End: 2024-03-05
Payer: COMMERCIAL

## 2024-03-14 ENCOUNTER — LAB VISIT (OUTPATIENT)
Dept: LAB | Facility: HOSPITAL | Age: 59
End: 2024-03-14
Attending: INTERNAL MEDICINE
Payer: COMMERCIAL

## 2024-03-14 DIAGNOSIS — Z01.84 IMMUNITY STATUS TESTING: ICD-10-CM

## 2024-03-14 DIAGNOSIS — R76.9 ABNORMAL IMMUNOLOGICAL FINDING IN SERUM: ICD-10-CM

## 2024-03-14 DIAGNOSIS — J84.9 ILD (INTERSTITIAL LUNG DISEASE): ICD-10-CM

## 2024-03-14 DIAGNOSIS — R76.0 ABNORMAL ANTIBODY TITER: ICD-10-CM

## 2024-03-14 DIAGNOSIS — E11.9 DIABETES MELLITUS WITHOUT COMPLICATION: ICD-10-CM

## 2024-03-14 DIAGNOSIS — Z79.899 HIGH RISK MEDICATION USE: ICD-10-CM

## 2024-03-14 LAB
BASOPHILS # BLD AUTO: 0.05 K/UL (ref 0–0.2)
BASOPHILS NFR BLD: 0.6 % (ref 0–1.9)
DIFFERENTIAL METHOD BLD: NORMAL
EOSINOPHIL # BLD AUTO: 0.1 K/UL (ref 0–0.5)
EOSINOPHIL NFR BLD: 1.2 % (ref 0–8)
ERYTHROCYTE [DISTWIDTH] IN BLOOD BY AUTOMATED COUNT: 12.3 % (ref 11.5–14.5)
ESTIMATED AVG GLUCOSE: 166 MG/DL (ref 68–131)
HAV IGG SER QL IA: NORMAL
HBA1C MFR BLD: 7.4 % (ref 4–5.6)
HCT VFR BLD AUTO: 43.9 % (ref 37–48.5)
HGB BLD-MCNC: 14.6 G/DL (ref 12–16)
IMM GRANULOCYTES # BLD AUTO: 0.02 K/UL (ref 0–0.04)
IMM GRANULOCYTES NFR BLD AUTO: 0.2 % (ref 0–0.5)
LYMPHOCYTES # BLD AUTO: 2 K/UL (ref 1–4.8)
LYMPHOCYTES NFR BLD: 22.6 % (ref 18–48)
MCH RBC QN AUTO: 30.4 PG (ref 27–31)
MCHC RBC AUTO-ENTMCNC: 33.3 G/DL (ref 32–36)
MCV RBC AUTO: 91 FL (ref 82–98)
MONOCYTES # BLD AUTO: 0.8 K/UL (ref 0.3–1)
MONOCYTES NFR BLD: 8.8 % (ref 4–15)
NEUTROPHILS # BLD AUTO: 5.9 K/UL (ref 1.8–7.7)
NEUTROPHILS NFR BLD: 66.6 % (ref 38–73)
NRBC BLD-RTO: 0 /100 WBC
PLATELET # BLD AUTO: 329 K/UL (ref 150–450)
PMV BLD AUTO: 10.6 FL (ref 9.2–12.9)
RBC # BLD AUTO: 4.81 M/UL (ref 4–5.4)
WBC # BLD AUTO: 8.93 K/UL (ref 3.9–12.7)

## 2024-03-14 PROCEDURE — 83036 HEMOGLOBIN GLYCOSYLATED A1C: CPT | Mod: TXP | Performed by: STUDENT IN AN ORGANIZED HEALTH CARE EDUCATION/TRAINING PROGRAM

## 2024-03-14 PROCEDURE — 86706 HEP B SURFACE ANTIBODY: CPT | Mod: TXP | Performed by: STUDENT IN AN ORGANIZED HEALTH CARE EDUCATION/TRAINING PROGRAM

## 2024-03-14 PROCEDURE — 86790 VIRUS ANTIBODY NOS: CPT | Mod: NTX | Performed by: STUDENT IN AN ORGANIZED HEALTH CARE EDUCATION/TRAINING PROGRAM

## 2024-03-14 PROCEDURE — 85025 COMPLETE CBC W/AUTO DIFF WBC: CPT | Mod: NTX | Performed by: INTERNAL MEDICINE

## 2024-03-16 DIAGNOSIS — E11.9 TYPE 2 DIABETES MELLITUS WITHOUT COMPLICATION, WITHOUT LONG-TERM CURRENT USE OF INSULIN: ICD-10-CM

## 2024-03-16 NOTE — TELEPHONE ENCOUNTER
No care due was identified.  Health [facility] Embedded Care Due Messages. Reference number: [id number].   3/16/2024 3:53:42 PM CDT

## 2024-03-18 ENCOUNTER — PATIENT MESSAGE (OUTPATIENT)
Dept: TRANSPLANT | Facility: CLINIC | Age: 59
End: 2024-03-18
Payer: COMMERCIAL

## 2024-03-18 ENCOUNTER — TELEPHONE (OUTPATIENT)
Dept: TRANSPLANT | Facility: CLINIC | Age: 59
End: 2024-03-18
Payer: COMMERCIAL

## 2024-03-18 ENCOUNTER — PATIENT MESSAGE (OUTPATIENT)
Dept: ALLERGY | Facility: CLINIC | Age: 59
End: 2024-03-18
Payer: COMMERCIAL

## 2024-03-18 DIAGNOSIS — R76.9 ABNORMAL IMMUNOLOGICAL FINDING IN SERUM: Primary | ICD-10-CM

## 2024-03-18 LAB
HBV SURFACE AB SER QL IA: POSITIVE
HBV SURFACE AB SERPL IA-ACNC: 21 MIU/ML

## 2024-03-18 RX ORDER — METFORMIN HYDROCHLORIDE 500 MG/1
500 TABLET ORAL
Qty: 90 TABLET | Refills: 1 | Status: SHIPPED | OUTPATIENT
Start: 2024-03-18 | End: 2024-06-12 | Stop reason: SDUPTHER

## 2024-03-18 NOTE — TELEPHONE ENCOUNTER
Message sent to patient via [facility] [employer] [facility] with Dr. Barry's instructions and need for repeat labs in 1 month.    ----- Message from Mary Barry DO sent at 3/18/2024  3:00 PM CDT -----  No changes  ----- Message -----  From: Key Carrillo RN  Sent: 3/18/2024   1:10 PM CDT  To: Mary Barry, DO    MMF 1500 mg BID - Any changes to labs?

## 2024-03-21 ENCOUNTER — PATIENT MESSAGE (OUTPATIENT)
Dept: FAMILY MEDICINE | Facility: CLINIC | Age: 59
End: 2024-03-21
Payer: COMMERCIAL

## 2024-03-28 ENCOUNTER — OFFICE VISIT (OUTPATIENT)
Dept: FAMILY MEDICINE | Facility: CLINIC | Age: 59
End: 2024-03-28
Payer: COMMERCIAL

## 2024-03-28 VITALS
DIASTOLIC BLOOD PRESSURE: 74 MMHG | HEIGHT: 69 IN | OXYGEN SATURATION: 95 % | BODY MASS INDEX: 33.83 KG/M2 | WEIGHT: 228.38 LBS | SYSTOLIC BLOOD PRESSURE: 132 MMHG | HEART RATE: 80 BPM

## 2024-03-28 DIAGNOSIS — J84.9 ILD (INTERSTITIAL LUNG DISEASE): ICD-10-CM

## 2024-03-28 DIAGNOSIS — D80.4 IGM DEFICIENCY: ICD-10-CM

## 2024-03-28 DIAGNOSIS — E66.09 CLASS 1 OBESITY DUE TO EXCESS CALORIES WITH SERIOUS COMORBIDITY AND BODY MASS INDEX (BMI) OF 33.0 TO 33.9 IN ADULT: ICD-10-CM

## 2024-03-28 DIAGNOSIS — E11.9 TYPE 2 DIABETES MELLITUS WITHOUT COMPLICATION, WITHOUT LONG-TERM CURRENT USE OF INSULIN: Primary | ICD-10-CM

## 2024-03-28 PROCEDURE — 99999 PR PBB SHADOW E&M-EST. PATIENT-LVL V: CPT | Mod: PBBFAC,,, | Performed by: STUDENT IN AN ORGANIZED HEALTH CARE EDUCATION/TRAINING PROGRAM

## 2024-03-28 PROCEDURE — 3051F HG A1C>EQUAL 7.0%<8.0%: CPT | Mod: CPTII,S$GLB,, | Performed by: STUDENT IN AN ORGANIZED HEALTH CARE EDUCATION/TRAINING PROGRAM

## 2024-03-28 PROCEDURE — 99214 OFFICE O/P EST MOD 30 MIN: CPT | Mod: S$GLB,,, | Performed by: STUDENT IN AN ORGANIZED HEALTH CARE EDUCATION/TRAINING PROGRAM

## 2024-03-28 PROCEDURE — 3075F SYST BP GE 130 - 139MM HG: CPT | Mod: CPTII,S$GLB,, | Performed by: STUDENT IN AN ORGANIZED HEALTH CARE EDUCATION/TRAINING PROGRAM

## 2024-03-28 PROCEDURE — 3008F BODY MASS INDEX DOCD: CPT | Mod: CPTII,S$GLB,, | Performed by: STUDENT IN AN ORGANIZED HEALTH CARE EDUCATION/TRAINING PROGRAM

## 2024-03-28 PROCEDURE — 3078F DIAST BP <80 MM HG: CPT | Mod: CPTII,S$GLB,, | Performed by: STUDENT IN AN ORGANIZED HEALTH CARE EDUCATION/TRAINING PROGRAM

## 2024-03-28 PROCEDURE — 1159F MED LIST DOCD IN RCRD: CPT | Mod: CPTII,S$GLB,, | Performed by: STUDENT IN AN ORGANIZED HEALTH CARE EDUCATION/TRAINING PROGRAM

## 2024-03-28 PROCEDURE — 3072F LOW RISK FOR RETINOPATHY: CPT | Mod: CPTII,S$GLB,, | Performed by: STUDENT IN AN ORGANIZED HEALTH CARE EDUCATION/TRAINING PROGRAM

## 2024-03-28 RX ORDER — UBIDECARENONE 30 MG
30 CAPSULE ORAL 3 TIMES DAILY
COMMUNITY

## 2024-03-28 RX ORDER — PNV NO.95/FERROUS FUM/FOLIC AC 28MG-0.8MG
100 TABLET ORAL DAILY
COMMUNITY

## 2024-03-28 NOTE — ASSESSMENT & PLAN NOTE
Confirmed with IgM testing earlier this year after patient was found to have no immunity to Hep A and Hep B as part of her transplant evaluation. She has had two doses of Hep A vaccine and recent antibody testing shows she is still susceptible. We will schedule her for Hep A as well as her 3rd Hep B vaccine in June.

## 2024-03-28 NOTE — ASSESSMENT & PLAN NOTE
Weight is currently stable. However, her exercise tolerance is increasing and she hopes to exercise more. Will monitor.

## 2024-03-28 NOTE — PROGRESS NOTES
Name: Nathalie Krueger  MRN: [id number]  : 1965  PCP: Tyron Flannery MD    Subjective   Patient presents for follow up of diabetes. A1C collected within the last two weeks jumped from 6.4 to 7.4. She has had to hold Ozempic multiple time for various procedures but none since the new year. No recent steroid courses. She thinks her holiday diet may be the most likely culprit.    Of note, her ILD is greatly improved on mycophenolate. She has significantly more exercise capacity and doesn't need her oxygen as much.    Review of Systems   HENT:  Positive for congestion. Negative for postnasal drip and sore throat.         Bleeding from ears about two weeks ago       Patient Active Problem List   Diagnosis    Essential hypertension    Mixed hyperlipidemia    Type 2 diabetes mellitus without complication, without long-term current use of insulin    Lung nodule    Bronchiectasis without complication    Gastroesophageal reflux disease with esophagitis without hemorrhage    Right flank pain    Class 1 obesity due to excess calories with serious comorbidity and body mass index (BMI) of 33.0 to 33.9 in adult    Mild obstructive sleep apnea    IgM deficiency    ILD (interstitial lung disease)    Diverticulosis       There are no preventive care reminders to display for this patient.    Objective   Vitals:    24 1124   BP: 132/74   Pulse: 80       Physical Exam  Constitutional:       General: She is not in acute distress.     Appearance: Normal appearance. She is well-developed.   HENT:      Head: Normocephalic and atraumatic.      Right Ear: External ear normal.      Left Ear: External ear normal.   Eyes:      Conjunctiva/sclera: Conjunctivae normal.   Pulmonary:      Effort: Pulmonary effort is normal. No respiratory distress.   Abdominal:      General: Abdomen is flat. There is no distension.   Musculoskeletal:         General: No swelling or deformity.      Right lower leg: No edema.      Left lower leg: No  edema.   Skin:     General: Skin is warm and dry.      Coloration: Skin is not jaundiced.   Neurological:      Mental Status: She is alert and oriented to person, place, and time. Mental status is at baseline.   Psychiatric:         Attention and Perception: Attention and perception normal.         Mood and Affect: Mood normal.         Speech: Speech normal.         Behavior: Behavior normal. Behavior is cooperative.         Thought Content: Thought content normal.         Cognition and Memory: Cognition normal.         Judgment: Judgment normal.         Assessment & Plan   1. Type 2 diabetes mellitus without complication, without long-term current use of insulin  Assessment & Plan:  After discussion, patient opts to repeat A1C in 3 months. If it is still > 7.0, we will likely change one of her current two medications. Otherwise, continue metformin and semaglutide as is.    Orders:  -     Cancel: Hemoglobin A1C; Future  -     Hemoglobin A1C; Future; Expected date: 06/28/2024    2. ILD (interstitial lung disease)    3. Class 1 obesity due to excess calories with serious comorbidity and body mass index (BMI) of 33.0 to 33.9 in adult  Assessment & Plan:  Weight is currently stable. However, her exercise tolerance is increasing and she hopes to exercise more. Will monitor.      4. IgM deficiency  Assessment & Plan:  Confirmed with IgM testing earlier this year after patient was found to have no immunity to Hep A and Hep B as part of her transplant evaluation. She has had two doses of Hep A vaccine and recent antibody testing shows she is still susceptible. We will schedule her for Hep A as well as her 3rd Hep B vaccine in June.          Follow up in 6 months.     Tyron Flannery MD  03/28/2024

## 2024-03-28 NOTE — ASSESSMENT & PLAN NOTE
After discussion, patient opts to repeat A1C in 3 months. If it is still > 7.0, we will likely change one of her current two medications. Otherwise, continue metformin and semaglutide as is.

## 2024-04-22 ENCOUNTER — LAB VISIT (OUTPATIENT)
Dept: LAB | Facility: HOSPITAL | Age: 59
End: 2024-04-22
Attending: INTERNAL MEDICINE
Payer: COMMERCIAL

## 2024-04-22 DIAGNOSIS — J84.9 ILD (INTERSTITIAL LUNG DISEASE): ICD-10-CM

## 2024-04-22 DIAGNOSIS — Z79.899 HIGH RISK MEDICATION USE: ICD-10-CM

## 2024-04-22 LAB
BASOPHILS # BLD AUTO: 0.04 K/UL (ref 0–0.2)
BASOPHILS NFR BLD: 0.5 % (ref 0–1.9)
DIFFERENTIAL METHOD BLD: NORMAL
EOSINOPHIL # BLD AUTO: 0.2 K/UL (ref 0–0.5)
EOSINOPHIL NFR BLD: 1.7 % (ref 0–8)
ERYTHROCYTE [DISTWIDTH] IN BLOOD BY AUTOMATED COUNT: 12.2 % (ref 11.5–14.5)
HCT VFR BLD AUTO: 43.8 % (ref 37–48.5)
HGB BLD-MCNC: 14.6 G/DL (ref 12–16)
IMM GRANULOCYTES # BLD AUTO: 0.03 K/UL (ref 0–0.04)
IMM GRANULOCYTES NFR BLD AUTO: 0.3 % (ref 0–0.5)
LYMPHOCYTES # BLD AUTO: 2.1 K/UL (ref 1–4.8)
LYMPHOCYTES NFR BLD: 23.7 % (ref 18–48)
MCH RBC QN AUTO: 30.4 PG (ref 27–31)
MCHC RBC AUTO-ENTMCNC: 33.3 G/DL (ref 32–36)
MCV RBC AUTO: 91 FL (ref 82–98)
MONOCYTES # BLD AUTO: 0.7 K/UL (ref 0.3–1)
MONOCYTES NFR BLD: 7.8 % (ref 4–15)
NEUTROPHILS # BLD AUTO: 5.8 K/UL (ref 1.8–7.7)
NEUTROPHILS NFR BLD: 66 % (ref 38–73)
NRBC BLD-RTO: 0 /100 WBC
PLATELET # BLD AUTO: 310 K/UL (ref 150–450)
PMV BLD AUTO: 10.2 FL (ref 9.2–12.9)
RBC # BLD AUTO: 4.81 M/UL (ref 4–5.4)
WBC # BLD AUTO: 8.81 K/UL (ref 3.9–12.7)

## 2024-04-22 PROCEDURE — 36415 COLL VENOUS BLD VENIPUNCTURE: CPT | Mod: PO,TXP | Performed by: INTERNAL MEDICINE

## 2024-04-22 PROCEDURE — 85025 COMPLETE CBC W/AUTO DIFF WBC: CPT | Mod: TXP | Performed by: INTERNAL MEDICINE

## 2024-04-24 ENCOUNTER — PATIENT MESSAGE (OUTPATIENT)
Dept: TRANSPLANT | Facility: CLINIC | Age: 59
End: 2024-04-24
Payer: COMMERCIAL

## 2024-04-24 ENCOUNTER — TELEPHONE (OUTPATIENT)
Dept: TRANSPLANT | Facility: CLINIC | Age: 59
End: 2024-04-24
Payer: COMMERCIAL

## 2024-04-24 NOTE — TELEPHONE ENCOUNTER
Message sent to patient via [facility].    ----- Message from Mary Barry DO sent at 4/24/2024 12:43 PM CDT -----    Labs look great.  No changes     ----- Message -----  From: Key Carrillo RN  Sent: 4/24/2024  11:59 AM CDT  To: Mary Barry, DO    MMF 1500 mg BID - #2 of every 4 week labs  Any changes?

## 2024-04-25 ENCOUNTER — PATIENT MESSAGE (OUTPATIENT)
Dept: TRANSPLANT | Facility: CLINIC | Age: 59
End: 2024-04-25
Payer: COMMERCIAL

## 2024-05-06 ENCOUNTER — TELEPHONE (OUTPATIENT)
Dept: TRANSPLANT | Facility: CLINIC | Age: 59
End: 2024-05-06
Payer: COMMERCIAL

## 2024-05-07 ENCOUNTER — HOSPITAL ENCOUNTER (OUTPATIENT)
Dept: PULMONOLOGY | Facility: CLINIC | Age: 59
Discharge: HOME | End: 2024-05-07
Payer: COMMERCIAL

## 2024-05-07 ENCOUNTER — OFFICE VISIT (OUTPATIENT)
Dept: TRANSPLANT | Facility: CLINIC | Age: 59
End: 2024-05-07
Payer: COMMERCIAL

## 2024-05-07 VITALS
RESPIRATION RATE: 16 BRPM | TEMPERATURE: 98 F | WEIGHT: 225.5 LBS | BODY MASS INDEX: 33.4 KG/M2 | HEART RATE: 79 BPM | SYSTOLIC BLOOD PRESSURE: 137 MMHG | OXYGEN SATURATION: 97 % | HEIGHT: 69 IN | DIASTOLIC BLOOD PRESSURE: 82 MMHG

## 2024-05-07 VITALS — HEIGHT: 69 IN | WEIGHT: 227.5 LBS | BODY MASS INDEX: 33.69 KG/M2

## 2024-05-07 DIAGNOSIS — J84.9 ILD (INTERSTITIAL LUNG DISEASE): ICD-10-CM

## 2024-05-07 DIAGNOSIS — J84.9 ILD (INTERSTITIAL LUNG DISEASE): Primary | ICD-10-CM

## 2024-05-07 DIAGNOSIS — J96.11 CHRONIC HYPOXEMIC RESPIRATORY FAILURE: ICD-10-CM

## 2024-05-07 DIAGNOSIS — I27.20 PULMONARY HTN: ICD-10-CM

## 2024-05-07 DIAGNOSIS — G47.33 OSA (OBSTRUCTIVE SLEEP APNEA): ICD-10-CM

## 2024-05-07 DIAGNOSIS — J43.9 PULMONARY EMPHYSEMA, UNSPECIFIED EMPHYSEMA TYPE: ICD-10-CM

## 2024-05-07 PROCEDURE — 1159F MED LIST DOCD IN RCRD: CPT | Mod: CPTII,NTX,S$GLB, | Performed by: NURSE PRACTITIONER

## 2024-05-07 PROCEDURE — 99214 OFFICE O/P EST MOD 30 MIN: CPT | Mod: 25,NTX,S$GLB, | Performed by: NURSE PRACTITIONER

## 2024-05-07 PROCEDURE — 3008F BODY MASS INDEX DOCD: CPT | Mod: CPTII,NTX,S$GLB, | Performed by: NURSE PRACTITIONER

## 2024-05-07 PROCEDURE — 94618 PULMONARY STRESS TESTING: CPT | Mod: NTX,S$GLB,, | Performed by: INTERNAL MEDICINE

## 2024-05-07 PROCEDURE — 94010 BREATHING CAPACITY TEST: CPT | Mod: 59,NTX,S$GLB, | Performed by: INTERNAL MEDICINE

## 2024-05-07 PROCEDURE — 94729 DIFFUSING CAPACITY: CPT | Mod: NTX,S$GLB,, | Performed by: INTERNAL MEDICINE

## 2024-05-07 PROCEDURE — 3051F HG A1C>EQUAL 7.0%<8.0%: CPT | Mod: CPTII,NTX,S$GLB, | Performed by: NURSE PRACTITIONER

## 2024-05-07 PROCEDURE — 94727 GAS DIL/WSHOT DETER LNG VOL: CPT | Mod: NTX,S$GLB,, | Performed by: INTERNAL MEDICINE

## 2024-05-07 PROCEDURE — 1160F RVW MEDS BY RX/DR IN RCRD: CPT | Mod: CPTII,NTX,S$GLB, | Performed by: NURSE PRACTITIONER

## 2024-05-07 PROCEDURE — 99999 PR PBB SHADOW E&M-EST. PATIENT-LVL V: CPT | Mod: PBBFAC,TXP,, | Performed by: NURSE PRACTITIONER

## 2024-05-07 PROCEDURE — 3072F LOW RISK FOR RETINOPATHY: CPT | Mod: CPTII,NTX,S$GLB, | Performed by: NURSE PRACTITIONER

## 2024-05-07 PROCEDURE — 3075F SYST BP GE 130 - 139MM HG: CPT | Mod: CPTII,NTX,S$GLB, | Performed by: NURSE PRACTITIONER

## 2024-05-07 PROCEDURE — 3079F DIAST BP 80-89 MM HG: CPT | Mod: CPTII,NTX,S$GLB, | Performed by: NURSE PRACTITIONER

## 2024-05-07 NOTE — LETTER
May 7, 2024        Sarah Lemon  [address]  [address]  [facility] [address]  Phone: [phone]  Fax: [phone]             [facility] 1st Fl  [address]  [facility] [address]  Phone: [phone]   Patient: Nathalie Krueger   MR Number: [id number]   YOB: 1965   Date of Visit: 5/7/2024       Dear Dr. Sarah Lemon    Thank you for referring Nathalie Krueger to me for evaluation. Attached you will find relevant portions of my assessment and plan of care.    If you have questions, please do not hesitate to call me. I look forward to following Nathalie Krueger along with you.    Sincerely,    Denton Rojas, [facility]    Enclosure    If you would like to receive this communication electronically, please contact [email or web address] or [phone] to request [facility] access.    [facility] is a tool which provides read-only access to select patient information with whom you have a relationship. Its easy to use and provides real time access to review your patients record including encounter summaries, notes, results, and demographic information.    If you feel you have received this communication in error or would no longer like to receive these types of communications, please e-mail [email or web address]

## 2024-05-07 NOTE — PROGRESS NOTES
ADVANCED LUNG DISEASE FOLLOW-UP    Reason for Visit: ILD.                              ADVANCED LUNG DISEASE CLINIC FOLLOW UP EVALUATION                                                                                                                                             Reason for Visit:  ILD-cHP    Referring Physician: Mary Barry, *    History of Present Illness: Nathalie Krueger is a 58 y.o. female who is on 0-4L of oxygen with exertion.  She is on CPAP, but has not been wearing it based on a home sleep study from [facility].  Her New York Heart Association Class is II and a Karnofsky score of 90% - Able to carry on normal activity: minor symptoms of disease. She is diabetic non insulin dependent  Patient with smoking related emphysema, cHP(ILD), ORLIN, chronic hypoxemic respiratory failure, morbid obesity, GERD, DM.  Patient comes in for a routine visit with her wife.        INTERVAL HISTORY:  Doing well on MMF 1500mg BID. Denies any GI symptoms. Shortness of breath improved. Exercise tolerance improving as well. Recently has been weaning off oxygen. No recent illnesses.  She has been working out 3-4 times per week and remains very active.  Plans to travel to [facility] in July..    Brief History summarized from previous visits  Patient presented in 07/2023 for evaluation of ILD and at that time reported respiratory symptoms first began over three years ago when she lived in [place]. Per patient's wife, patient had developed progressive fatigue and dyspnea with exertion. She was still able to perform her ADLs independently, but noticed she would get breathless with activity very quickly. Previously lived on family's farm in [place]. She states she had an episode of profound fatigue/malaise one week following clean out of family's chicken coup. Frequent exposures to dust, pesticides, fertilizers while living in [place]. Patient and her wife moved to [place] ~two years ago. While living downtown, patient  noticed increased cough, worsening fatigue requiring frequent daytime naps. Briefly lived in high rise apartments [place] and found she had worsening cough if other residents smoked within the building. She presented to the ED in early 2022 due to hypoxemia. She checked her pulse ox at home and reportedly had oxygen saturation of 76%. She did not require hospitalization. No prior chest surgeries, ICU admission, intubations, lung biopsy.     In August 2022, patient had acute worsening of her dyspnea and cough. She was started on 2L oxygen to be used with exertion. 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 Since starting CPAP, notes improvement in daytime fatigue. She completed a trial of steroids in the fall of 2022 after evaluation with Dr. Rodas at [facility] pulm and states her symptoms improved drastically. No recent exacerbations/hospitalizations. She has had an extensive autoimmune workup which revealed +DAYTON and low IgM. All other workup unrevealing. Currently of stiolto and symbicort for management.     Patient is a former 25-30 year smoker and quit 3-4 years ago. Her father had lung disease related to diving in the [employer], but otherwise denies family history of lung disease. No history of frequent infections as a child. She states she did have a history of scarlet fever as a child while living in [place] and was told she should not live there in the future? Also states she used a 20 year old inhaler she bought in [place] while still living in [place] and is concerned she may have aspirated mold. She occasionally has episodes of flushing and rashes. No arthralgias. History of reflux and recently stopped omeprazole in hopes of improving symptoms with diet modification. She recently lost ~10 pounds. Continues to have intermittent reflux despite diet changes. She states she remains very active and walks and performs water aerobics frequently. 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 Remains independent with her ADLs, but does struggle with vacuuming, bending over, and climbing stairs    She was treated for another exacerbation in 10/2023 with prednisone taper in addition to antibiotics for pneumonia and suspected ILD flare. Reported marked improvement.  The steroids taper was quickly weaned in anticipation of wedge bx.  She reports good compliance with her CPAP. Denies any post-nasal drip.                                                                                        History of Present Illness: Nathalie Krueger is a 58 y.o. year old female who is on  0-4 L of oxygen. She is on CPAP., although does not wear.  Her New York Heart Association Class is I and a Karnofsky score of 90% - Able to carry on normal activity: minor symptoms of disease. She is diabetic not treated with insulin    Review of Systems   Constitutional:  Negative for chills and fever.   Respiratory:  Positive for shortness of breath (improving). Negative for cough, hemoptysis, sputum production and wheezing.    Cardiovascular:  Negative for palpitations, orthopnea, claudication, leg swelling and PND.     /82   Pulse 79   Temp 98 °F (36.7 °C) (Oral)   Resp 16   Ht 5' 9" (1.753 m)   Wt 102.3 kg (225 lb 8.5 oz)   SpO2 97%   BMI 33.31 kg/m²     Physical Exam  Constitutional:       Appearance: Normal appearance.   HENT:      Head: Normocephalic and atraumatic.   Eyes:      Extraocular Movements: Extraocular movements intact.   Cardiovascular:      Rate and Rhythm: Normal rate and regular rhythm.   Pulmonary:      Effort: Pulmonary effort is normal. No respiratory distress.      Breath sounds: No stridor. No wheezing or rales.   Chest:      Chest wall: No tenderness.   Abdominal:      General: There is no distension.   Musculoskeletal:      Cervical back: Normal range of motion.      Right lower leg: No edema.      Left lower leg: No edema.   Skin:     General: Skin is warm and dry.   Neurological:      " Mental Status: She is alert and oriented to person, place, and time.   Psychiatric:         Mood and Affect: Mood normal.         Behavior: Behavior normal.         Thought Content: Thought content normal.         Judgment: Judgment normal.       Labs:      Latest Ref Rng & Units 2/20/2024    10:37 AM 3/14/2024    11:43 AM 4/22/2024    11:03 AM   cbc, cmp, tacrolimus   WBC (USE PT. THRESHOLDS) 3.90 - 12.70 K/uL 9.07  8.93  8.81    RBC (USE PT. THRESHOLDS) 4.00 - 5.40 M/uL 4.78  4.81  4.81    Hemoglobin (USE PT. THRESHOLDS) 12.0 - 16.0 g/dL 14.4  14.6  14.6    Hematocrit (USE PT. THRESHOLDS) 37.0 - 48.5 % 42.8  43.9  43.8    MCV (USE PT. THRESHOLDS) 82 - 98 fL 90  91  91    MCH (USE PT. THRESHOLDS) 27.0 - 31.0 pg 30.1  30.4  30.4    MCHC (USE PT. THRESHOLDS) 32.0 - 36.0 g/dL 33.6  33.3  33.3    RDW (USE PT. THRESHOLDS) 11.5 - 14.5 % 12.3  12.3  12.2    Platelet Count (USE PT. THRESHOLDS) 150 - 450 K/uL 296  329  310    MPV (USE PT. THRESHOLDS) 9.2 - 12.9 fL 9.4  10.6  10.2    Gran # (ANC) (USE PT. THRESHOLDS) 1.8 - 7.7 K/uL 5.7  5.9  5.8    Lymph # (USE PT. THRESHOLDS) 1.0 - 4.8 K/uL 2.4  2.0  2.1    Mono # (USE PT. THRESHOLDS) 0.3 - 1.0 K/uL 0.8  0.8  0.7    Eos % (USE PT. THRESHOLDS) 0.0 - 8.0 % 1.4  1.2  1.7    Basophil % (USE PT. THRESHOLDS) 0.0 - 1.9 % 0.6  0.6  0.5    Differential Method (USE PT. THRESHOLDS)  Automated  Automated  Automated          5/7/2024    11:33 AM 12/20/2023     1:36 PM 10/5/2023    11:03 AM 8/24/2023    10:10 AM 4/10/2023    10:10 AM   Pulmonary Function Tests   FVC 2.69 liters 2.45 liters 2.36 liters 2.51 liters 2.35 liters   FEV1 2.31 liters 2.03 liters 2.05 liters 2.12 liters 1.95 liters   TLC (liters) 3.48 liters  2.94 liters  3.06 liters   DLCO (ml/mmHg sec) 10 ml/mmHg sec  6.89 ml/mmHg sec  7.89 ml/mmHg sec   FVC% 71.5 64.8 62.3 66.2 61.8   FEV1% 78.8 69 69.3 71.8 65.6   FEF 25-75 3.36 2.73 3.23 3.02 2.35   FEF 25-75% 132.6 107 125.9 117.4 90.8   TLC% 60.3  50.8  53   RV 0.79  0.58  " 0.83   RV% 37.5  27.3  39.4   DLCO% 38.5  26.5  30.2     6MW Test  Height: 5' 9" (175.3 cm)  Weight: 102.3 kg (225 lb 8.5 oz)  BMI (Calculated): 33.3  Predicted Distance Meters (Calculated): 461.6 meters  Interpretation  Predicted Distance Meters (Calculated): 461.6 meters    Imaging:  Results for orders placed during the hospital encounter of 12/14/23    X-Ray Chest PA And Lateral    Narrative  EXAMINATION:  XR CHEST PA AND LATERAL    CLINICAL HISTORY:  Interstitial pulmonary disease, unspecified    TECHNIQUE:  PA and lateral views of the chest were performed.    COMPARISON:  11/30/2023    FINDINGS:  Heart size is normal, and mediastinal structures are midline.  Lungs are expanded with mild relative elevation of right diaphragm.  The left lung looks clear.  The right lung shows stable rows in the upper lung zone consistent with wedge resection.  Mild pleural thickening is present along the right chest wall in the operative area.  The chest tube has been removed.  Very small pleural effusion may be present, but no pneumothorax detected.  Otherwise, right lung is clear.  History indicates interstitial lung disease, but this is not obvious on current chest radiograph.    Skeletal structures are intact.    Impression  Status post right thoracotomy with wedge resection.  Chest tube removed.      Electronically signed by: Claudy Self MD  Date:    12/14/2023  Time:    08:59    WEDGE BIOPSY 11/29/2023    1. LUNG, RIGHT UPPER LOBE, WEDGE BIOPSY:  Chronic interstitial pneumonitis with changes most consistent with cellular nonspecific interstitial pneumonia-like pattern of injury.  Negative for malignancy.      2. LUNG, RIGHT LOWER LOBES, WEDGE BIOPSY:  Chronic interstitial pneumonitis with changes most consistent with cellular nonspecific interstitial pneumonia-like pattern of injury.  Negative for malignancy.      3. LUNG, RIGHT MIDDLE LOBE, WEDGE BIOPSY:  Chronic interstitial pneumonitis with changes most consistent " with cellular nonspecific interstitial pneumonia-like pattern of injury and rare multinucleated giant cells containing cholesterol clefts.  Negative for malignancy.        Comment:  Sample tissue from all segments shows patchy fibrosis, diffuse cellular inflammatory infiltrates, pneumocyte hyperplasia and accumulation of foamy macrophages in airspaces.  The overall changes suggest cellular nonspecific interstitial  pneumonia (NSIP) pattern of injury.  Rare multinucleated giant cells with cholesterol clefts raise concern for hypersensitivity pneumonitis.  Other potential etiologies include connective tissue disease or respiratory bronchiolitis interstitial lung  disease (RB-ILD).  Idiopathic NSIP is a diagnosis of exclusion if all other etiologies have been ruled out.  There is no evidence of acute lung injury, granulomas, viral cytopathic effect or neoplasia.  Recommend correlation with clinical history and  pertinent lab studies.       Results for orders placed during the hospital encounter of 03/21/23    Echo    Interpretation Summary  · The left ventricle is normal in size with normal systolic function.  · The estimated ejection fraction is 65%.  · Normal left ventricular diastolic function.  · Moderate right ventricular enlargement with low normal to mildly reduced right ventricular systolic function.  · The estimated PA systolic pressure is 34 mmHg.  · Normal central venous pressure (3 mmHg).  · The ascending aorta is mildly dilated.  Results for orders placed during the hospital encounter of 03/29/23    Cardiac catheterization    Conclusion  Summary  Filling pressures: RA 10, PCWP 16  PA 36/20, mean PA pressure 20 mmHg  PVR 1.5 DUQUE  PA saturation 70%, Ao saturation 97%  Fletcher CO/CI: 5.9/2.66  /100  with repeat intraprocedure similar results. On recheck post procedure 133/75  HR 71 SR  Hb 13.6      Assessment:  1. ILD (interstitial lung disease)    2. Pulmonary emphysema, unspecified emphysema type     3. Chronic hypoxemic respiratory failure    4. ORLIN (obstructive sleep apnea)    5. Pulmonary HTN      Plan:   59 y/o woman with pertinent PMHx of chronic hypersensitivity pneumonitis, currently on MMF. She reports clinical improvement since initiation of MMF. She has been using less oxygen and exercising more. Plans on traveling to [place] in July. Patient asked about risk of DVT while flying.  Per Dr. Barry, she is at no increase risk of developing a DVT due to her lung disease. PFTs stable.    Continue MMF 1500mg BID; tolerating well.     2.  Wear O2 as needed.  No hypoxia on 6MWT.      3.  Continue Trelegy.  On nasal decongestants and oral allergy aids.    4.  RTC in 3 months with PFTs and 6MWT     Denton Rojas NP  Advanced Lung Disease

## 2024-05-07 NOTE — PROCEDURES
Nathalie Krueger is a 58 y.o.  female patient, who presents for a 6 minute walk test ordered by DO Asha.  The diagnosis is Interstitial Lung Disease.  The patient's BMI is 33.6 kg/m2.  Predicted distance (lower limit of normal) is 324.37 meters.      Test Results:    The test was completed without stopping.  The total time walked was 360 seconds.  During walking, the patient reported:  No complaints. The patient used no assistive devices during testing.     05/07/2024---------Distance: 328.27 meters (1077 feet)     Lap Walk Time O2 Sat % Supplemental Oxygen Heart Rate Blood Pressure Ashlie Scale   Pre-exercise  (Resting) 0 0 95 % Room Air 92 bpm 149/91 mmHg 0   During Exercise 1 64 sec 94 % Room Air 96 bpm     During Exercise 2 128 sec 91 % Room Air 98 bpm     During Exercise 3 194 sec 90 % Room Air 94 bpm     During Exercise 4 263 sec 90 % Room Air 92 bpm     During Exercise 5 332 sec 90 % Room Air 98 bpm     End of Exercise  360 sec 91 % Room Air 112 bpm 142/101 mmHg 0   Post-exercise  (Recovery)   94 % Room Air  87 bpm 144/94 mmHg      Recovery Time: 121 seconds    Performing nurse/tech: AMADOU Carpenter      PREVIOUS STUDY:   12/20/2023---------Distance: 365.76 meters (1200 feet)       Lap Walk Time O2 Sat % Supplemental Oxygen Heart Rate Blood Pressure Ashlie Scale   Pre-exercise  (Resting) 0 0 98 % 3 L/M 95 bpm 134/80 mmHg 0   During Exercise 1 55 sec 96 % 3 L/M 105 bpm       During Exercise 2 113 sec 91 % 3 L/M 106 bpm       During Exercise 3 175 sec 89 % 3 L/M 109 bpm       During Exercise 4 235 sec 90 % 3 L/M 114 bpm       During Exercise 5 308 sec 91 % 3 L/M 123 bpm       End of Exercise 6 360 sec 90 % 3 L/M 115 bpm 155/84 mmHg 0.5   Post-exercise  (Recovery)     96 % 3 L/M  101 bpm           CLINICAL INTERPRETATION:  Six minute walk distance is 328.27 meters (1077 feet) with no dyspnea.  During exercise, there was significant desaturation while breathing room air.  Blood pressure remained stable and  Heart rate increased significantly with walking.  Hypertension was present prior to exercise.  The patient did not report non-pulmonary symptoms during exercise.  Since the previous study in December 2023, exercise capacity may be somewhat worse.  Based upon age and body mass index, exercise capacity is normal.

## 2024-05-08 ENCOUNTER — PATIENT MESSAGE (OUTPATIENT)
Dept: TRANSPLANT | Facility: CLINIC | Age: 59
End: 2024-05-08
Payer: COMMERCIAL

## 2024-05-08 DIAGNOSIS — J84.9 ILD (INTERSTITIAL LUNG DISEASE): Primary | ICD-10-CM

## 2024-05-08 NOTE — TELEPHONE ENCOUNTER
5/7/24 - Informed per SRUTHI Rojas NP, that patient asked for an oxygen prescription for an upcoming trip during her clinic visit today. Contacted patient to determine what she exactly needed. She stated that she will be going to [place] and she needs a prescription for oxygen in case she needs oxygen while she is there. Informed her that I would send the prescription to her via [facility] once Dr. Barry signs it. She verbalized her understanding.

## 2024-05-10 DIAGNOSIS — J84.9 ILD (INTERSTITIAL LUNG DISEASE): Primary | ICD-10-CM

## 2024-05-27 ENCOUNTER — PATIENT MESSAGE (OUTPATIENT)
Dept: TRANSPLANT | Facility: CLINIC | Age: 59
End: 2024-05-27
Payer: COMMERCIAL

## 2024-05-27 ENCOUNTER — TELEPHONE (OUTPATIENT)
Dept: TRANSPLANT | Facility: CLINIC | Age: 59
End: 2024-05-27
Payer: COMMERCIAL

## 2024-06-10 DIAGNOSIS — J30.9 ALLERGIC RHINITIS, UNSPECIFIED SEASONALITY, UNSPECIFIED TRIGGER: Primary | ICD-10-CM

## 2024-06-10 RX ORDER — CETIRIZINE HYDROCHLORIDE 10 MG/1
10 TABLET ORAL DAILY
Qty: 360 TABLET | Refills: 0 | Status: SHIPPED | OUTPATIENT
Start: 2024-06-10 | End: 2024-06-12 | Stop reason: SDUPTHER

## 2024-06-10 NOTE — TELEPHONE ENCOUNTER
Received a refill request from [facility] for cetirizine 10 mg tablet - take 1 tablet daily. Prescription request sent to Dr. Barry.

## 2024-06-12 ENCOUNTER — PATIENT MESSAGE (OUTPATIENT)
Dept: TRANSPLANT | Facility: CLINIC | Age: 59
End: 2024-06-12
Payer: COMMERCIAL

## 2024-06-12 ENCOUNTER — PATIENT MESSAGE (OUTPATIENT)
Dept: FAMILY MEDICINE | Facility: CLINIC | Age: 59
End: 2024-06-12
Payer: COMMERCIAL

## 2024-06-12 ENCOUNTER — PATIENT MESSAGE (OUTPATIENT)
Dept: GASTROENTEROLOGY | Facility: CLINIC | Age: 59
End: 2024-06-12
Payer: COMMERCIAL

## 2024-06-12 DIAGNOSIS — J43.9 PULMONARY EMPHYSEMA, UNSPECIFIED EMPHYSEMA TYPE: ICD-10-CM

## 2024-06-12 DIAGNOSIS — I10 ESSENTIAL HYPERTENSION: ICD-10-CM

## 2024-06-12 DIAGNOSIS — E11.9 TYPE 2 DIABETES MELLITUS WITHOUT COMPLICATION, WITHOUT LONG-TERM CURRENT USE OF INSULIN: ICD-10-CM

## 2024-06-12 DIAGNOSIS — E78.2 MIXED HYPERLIPIDEMIA: ICD-10-CM

## 2024-06-12 DIAGNOSIS — J30.9 ALLERGIC RHINITIS, UNSPECIFIED SEASONALITY, UNSPECIFIED TRIGGER: Primary | ICD-10-CM

## 2024-06-12 DIAGNOSIS — J84.9 ILD (INTERSTITIAL LUNG DISEASE): ICD-10-CM

## 2024-06-12 DIAGNOSIS — K21.9 GASTROESOPHAGEAL REFLUX DISEASE, UNSPECIFIED WHETHER ESOPHAGITIS PRESENT: ICD-10-CM

## 2024-06-12 RX ORDER — MYCOPHENOLATE MOFETIL 500 MG/1
1500 TABLET ORAL 2 TIMES DAILY
Qty: 540 TABLET | Refills: 3 | Status: SHIPPED | OUTPATIENT
Start: 2024-06-12 | End: 2025-06-12

## 2024-06-12 RX ORDER — CETIRIZINE HYDROCHLORIDE 10 MG/1
10 TABLET ORAL DAILY
Qty: 90 TABLET | Refills: 3 | Status: SHIPPED | OUTPATIENT
Start: 2024-06-12 | End: 2025-06-12

## 2024-06-12 RX ORDER — FLUTICASONE FUROATE, UMECLIDINIUM BROMIDE AND VILANTEROL TRIFENATATE 200; 62.5; 25 UG/1; UG/1; UG/1
1 POWDER RESPIRATORY (INHALATION) DAILY
Qty: 60 EACH | Refills: 3 | Status: SHIPPED | OUTPATIENT
Start: 2024-06-12

## 2024-06-12 RX ORDER — METFORMIN HYDROCHLORIDE 500 MG/1
500 TABLET ORAL
Qty: 90 TABLET | Refills: 1 | Status: SHIPPED | OUTPATIENT
Start: 2024-06-12

## 2024-06-12 RX ORDER — AZELASTINE 1 MG/ML
1 SPRAY, METERED NASAL 2 TIMES DAILY
Qty: 90 ML | Refills: 3 | Status: SHIPPED | OUTPATIENT
Start: 2024-06-12

## 2024-06-12 RX ORDER — OMEPRAZOLE 40 MG/1
40 CAPSULE, DELAYED RELEASE ORAL
Qty: 60 CAPSULE | Refills: 11 | Status: CANCELLED | OUTPATIENT
Start: 2024-06-12 | End: 2025-06-12

## 2024-06-12 RX ORDER — ROSUVASTATIN CALCIUM 5 MG/1
5 TABLET, COATED ORAL DAILY
Qty: 90 TABLET | Refills: 3 | Status: SHIPPED | OUTPATIENT
Start: 2024-06-12

## 2024-06-12 RX ORDER — AMLODIPINE BESYLATE 5 MG/1
5 TABLET ORAL DAILY
Qty: 90 TABLET | Refills: 3 | Status: SHIPPED | OUTPATIENT
Start: 2024-06-12

## 2024-06-12 RX ORDER — SEMAGLUTIDE 1.34 MG/ML
1 INJECTION, SOLUTION SUBCUTANEOUS
Qty: 9 EACH | Refills: 2 | Status: SHIPPED | OUTPATIENT
Start: 2024-06-12

## 2024-06-12 NOTE — TELEPHONE ENCOUNTER
Care Due:                  Date            Visit Type   Department     Provider  --------------------------------------------------------------------------------                                EP -                              [facility] FAMILY  Last Visit: 03-      CARE ([facility])   MEDICINE       Tyron Flannery                              EP -                              PRIMARY      [facility] FAM[facility]  Next Visit: 09-      CARE ([facility])   MEDICINE       Tyron Flannery                                                            Last  Test          Frequency    Reason                     Performed    Due Date  --------------------------------------------------------------------------------    Lipid Panel.  12 months..  rosuvastatin.............  09- 09-    [facility] Embedded Care Due Messages. Reference number: [id number].   6/12/2024 9:57:40 AM CDT

## 2024-06-12 NOTE — TELEPHONE ENCOUNTER
Received a message from patient stating that she is switching her pharmacy to [facility] so she can obtain a 90 day supply for her medications. Patient also stated that she will be out of town 6/21 through mid September.    Received a fax refill request from [facility] for Azelastine NS, mycophenolate, Trelegy Ellipta, and cetirizine.

## 2024-06-13 ENCOUNTER — TELEPHONE (OUTPATIENT)
Dept: GASTROENTEROLOGY | Facility: CLINIC | Age: 59
End: 2024-06-13
Payer: COMMERCIAL

## 2024-06-13 RX ORDER — OMEPRAZOLE 40 MG/1
40 CAPSULE, DELAYED RELEASE ORAL
Qty: 180 CAPSULE | Refills: 3 | Status: SHIPPED | OUTPATIENT
Start: 2024-06-13

## 2024-06-17 ENCOUNTER — LAB VISIT (OUTPATIENT)
Dept: LAB | Facility: HOSPITAL | Age: 59
End: 2024-06-17
Attending: INTERNAL MEDICINE
Payer: COMMERCIAL

## 2024-06-17 DIAGNOSIS — J84.9 ILD (INTERSTITIAL LUNG DISEASE): ICD-10-CM

## 2024-06-17 DIAGNOSIS — Z79.899 HIGH RISK MEDICATION USE: ICD-10-CM

## 2024-06-17 LAB
BASOPHILS # BLD AUTO: 0.06 K/UL (ref 0–0.2)
BASOPHILS NFR BLD: 0.6 % (ref 0–1.9)
DIFFERENTIAL METHOD BLD: NORMAL
EOSINOPHIL # BLD AUTO: 0.2 K/UL (ref 0–0.5)
EOSINOPHIL NFR BLD: 1.6 % (ref 0–8)
ERYTHROCYTE [DISTWIDTH] IN BLOOD BY AUTOMATED COUNT: 12.2 % (ref 11.5–14.5)
HCT VFR BLD AUTO: 45.9 % (ref 37–48.5)
HGB BLD-MCNC: 15.2 G/DL (ref 12–16)
IMM GRANULOCYTES # BLD AUTO: 0.04 K/UL (ref 0–0.04)
IMM GRANULOCYTES NFR BLD AUTO: 0.4 % (ref 0–0.5)
LYMPHOCYTES # BLD AUTO: 2.2 K/UL (ref 1–4.8)
LYMPHOCYTES NFR BLD: 22 % (ref 18–48)
MCH RBC QN AUTO: 30.1 PG (ref 27–31)
MCHC RBC AUTO-ENTMCNC: 33.1 G/DL (ref 32–36)
MCV RBC AUTO: 91 FL (ref 82–98)
MONOCYTES # BLD AUTO: 0.8 K/UL (ref 0.3–1)
MONOCYTES NFR BLD: 7.5 % (ref 4–15)
NEUTROPHILS # BLD AUTO: 6.8 K/UL (ref 1.8–7.7)
NEUTROPHILS NFR BLD: 67.9 % (ref 38–73)
NRBC BLD-RTO: 0 /100 WBC
PLATELET # BLD AUTO: 355 K/UL (ref 150–450)
PMV BLD AUTO: 10.2 FL (ref 9.2–12.9)
RBC # BLD AUTO: 5.05 M/UL (ref 4–5.4)
WBC # BLD AUTO: 9.98 K/UL (ref 3.9–12.7)

## 2024-06-17 PROCEDURE — 36415 COLL VENOUS BLD VENIPUNCTURE: CPT | Mod: PO,TXP | Performed by: INTERNAL MEDICINE

## 2024-06-17 PROCEDURE — 85025 COMPLETE CBC W/AUTO DIFF WBC: CPT | Mod: TXP | Performed by: INTERNAL MEDICINE

## 2024-06-19 ENCOUNTER — TELEPHONE (OUTPATIENT)
Dept: TRANSPLANT | Facility: CLINIC | Age: 59
End: 2024-06-19
Payer: COMMERCIAL

## 2024-06-19 ENCOUNTER — PATIENT MESSAGE (OUTPATIENT)
Dept: TRANSPLANT | Facility: CLINIC | Age: 59
End: 2024-06-19
Payer: COMMERCIAL

## 2024-06-19 NOTE — TELEPHONE ENCOUNTER
Message sent to patient via [facility].    ----- Message from Mary Barry DO sent at 6/19/2024  8:40 AM CDT -----    No changes.  Labs look very good    ----- Message -----  From: Key Carrillo RN  Sent: 6/18/2024   5:49 PM CDT  To: Mary Barry, DO    Labs for MMF monitoring. Patient will be out of town June 21 through Sept 30 and she won't be able to have lab monitoring during that time. Any changes?

## 2024-06-21 ENCOUNTER — DOCUMENTATION ONLY (OUTPATIENT)
Dept: FAMILY MEDICINE | Facility: CLINIC | Age: 59
End: 2024-06-21
Payer: COMMERCIAL

## 2024-06-28 ENCOUNTER — PATIENT MESSAGE (OUTPATIENT)
Dept: FAMILY MEDICINE | Facility: CLINIC | Age: 59
End: 2024-06-28
Payer: COMMERCIAL

## 2024-06-28 DIAGNOSIS — E11.9 TYPE 2 DIABETES MELLITUS WITHOUT COMPLICATION, WITHOUT LONG-TERM CURRENT USE OF INSULIN: Primary | ICD-10-CM

## 2024-07-01 ENCOUNTER — TELEPHONE (OUTPATIENT)
Dept: FAMILY MEDICINE | Facility: CLINIC | Age: 59
End: 2024-07-01
Payer: COMMERCIAL

## 2024-07-01 NOTE — TELEPHONE ENCOUNTER
Spoke with Vishal from [facility] explained orders are electronically signed, vishal confirmed orders were actually signed and they will wait for pt to call to schedule.  Called pt explained she can call to schedule labs.

## 2024-07-01 NOTE — TELEPHONE ENCOUNTER
----- Message from Leopoldo Mason sent at 7/1/2024 10:26 AM CDT -----  Type: Needs Medical Advice  Who Called:  vishal [facility]   Best Call Back Number: [phone]  Additional Information: Vishal is calling the office received some lab orders for pt needs order to be signed by Please call back and advise.  Fax # [phone]

## 2024-07-02 LAB — HBA1C MFR BLD: 7 %

## 2024-07-03 ENCOUNTER — PATIENT MESSAGE (OUTPATIENT)
Dept: GASTROENTEROLOGY | Facility: CLINIC | Age: 59
End: 2024-07-03
Payer: COMMERCIAL

## 2024-07-03 DIAGNOSIS — K21.9 GASTROESOPHAGEAL REFLUX DISEASE, UNSPECIFIED WHETHER ESOPHAGITIS PRESENT: ICD-10-CM

## 2024-07-03 RX ORDER — OMEPRAZOLE 40 MG/1
40 CAPSULE, DELAYED RELEASE ORAL
Qty: 180 CAPSULE | Refills: 3 | Status: SHIPPED | OUTPATIENT
Start: 2024-07-03

## 2024-07-15 ENCOUNTER — PATIENT OUTREACH (OUTPATIENT)
Dept: ADMINISTRATIVE | Facility: HOSPITAL | Age: 59
End: 2024-07-15
Payer: COMMERCIAL

## 2024-07-15 NOTE — PROGRESS NOTES
Population Health Chart Review & Patient Outreach Details      Additional Pop Health Notes:               Updates Requested / Reviewed:      Updated Care Coordination Note         Health Maintenance Topics Overdue:      VBHM Score: 0     Patient is not due for any topics at this time.                       Health Maintenance Topic(s) Outreach Outcomes & Actions Taken:    Lab(s) - Outreach Outcomes & Actions Taken  : External Records Uploaded & Care Team Updated if Applicable

## 2024-07-18 ENCOUNTER — PATIENT MESSAGE (OUTPATIENT)
Dept: TRANSPLANT | Facility: CLINIC | Age: 59
End: 2024-07-18
Payer: COMMERCIAL

## 2024-08-05 ENCOUNTER — PATIENT MESSAGE (OUTPATIENT)
Dept: GASTROENTEROLOGY | Facility: CLINIC | Age: 59
End: 2024-08-05
Payer: COMMERCIAL

## 2024-08-05 DIAGNOSIS — K21.9 GASTROESOPHAGEAL REFLUX DISEASE, UNSPECIFIED WHETHER ESOPHAGITIS PRESENT: Primary | ICD-10-CM

## 2024-08-06 RX ORDER — OMEPRAZOLE 40 MG/1
40 CAPSULE, DELAYED RELEASE ORAL EVERY MORNING
Qty: 30 CAPSULE | Refills: 11 | Status: SHIPPED | OUTPATIENT
Start: 2024-08-06

## 2024-08-08 ENCOUNTER — PATIENT MESSAGE (OUTPATIENT)
Dept: TRANSPLANT | Facility: CLINIC | Age: 59
End: 2024-08-08
Payer: COMMERCIAL

## 2024-08-27 ENCOUNTER — PATIENT MESSAGE (OUTPATIENT)
Dept: FAMILY MEDICINE | Facility: CLINIC | Age: 59
End: 2024-08-27
Payer: COMMERCIAL

## 2024-08-27 ENCOUNTER — PATIENT MESSAGE (OUTPATIENT)
Dept: TRANSPLANT | Facility: CLINIC | Age: 59
End: 2024-08-27
Payer: COMMERCIAL

## 2024-08-28 ENCOUNTER — PATIENT MESSAGE (OUTPATIENT)
Dept: TRANSPLANT | Facility: CLINIC | Age: 59
End: 2024-08-28
Payer: COMMERCIAL

## 2024-09-09 ENCOUNTER — TELEPHONE (OUTPATIENT)
Dept: TRANSPLANT | Facility: CLINIC | Age: 59
End: 2024-09-09
Payer: COMMERCIAL

## 2024-09-09 NOTE — TELEPHONE ENCOUNTER
----- Message from Mary Barry DO sent at 9/7/2024 10:31 AM CDT -----    Regarding: RE: MMF lab monitoring    Yes that is fine.  Can get them at her 10/17 visit    ----- Message -----  From: Key Carrillo RN  Sent: 9/6/2024   5:07 PM CDT  To: Mary Barry DO    Subject: MMF lab monitoring                               Patient last had labs drawn on 6/17/24 prior to her extended vacation. She is scheduled for a follow-up appointment on 10/17/24. Can we wait until her clinic visit on 10/17/24 to check her labs? It will be 4 months from her last labs instead of 3 months.

## 2024-09-13 ENCOUNTER — PATIENT MESSAGE (OUTPATIENT)
Dept: TRANSPLANT | Facility: CLINIC | Age: 59
End: 2024-09-13
Payer: COMMERCIAL

## 2024-09-13 ENCOUNTER — PATIENT MESSAGE (OUTPATIENT)
Dept: FAMILY MEDICINE | Facility: CLINIC | Age: 59
End: 2024-09-13
Payer: COMMERCIAL

## 2024-09-13 ENCOUNTER — TELEPHONE (OUTPATIENT)
Dept: PULMONOLOGY | Facility: CLINIC | Age: 59
End: 2024-09-13
Payer: COMMERCIAL

## 2024-09-13 NOTE — TELEPHONE ENCOUNTER
Pt needs a current appt with documentation that she is benefiting from the O2 for ins to continue to pay

## 2024-09-18 DIAGNOSIS — E11.9 TYPE 2 DIABETES MELLITUS WITHOUT COMPLICATION: ICD-10-CM

## 2024-09-19 DIAGNOSIS — J43.9 PULMONARY EMPHYSEMA, UNSPECIFIED EMPHYSEMA TYPE: ICD-10-CM

## 2024-09-19 RX ORDER — FLUTICASONE FUROATE, UMECLIDINIUM BROMIDE AND VILANTEROL TRIFENATATE 200; 62.5; 25 UG/1; UG/1; UG/1
POWDER RESPIRATORY (INHALATION)
Qty: 60 EACH | Refills: 11 | Status: SHIPPED | OUTPATIENT
Start: 2024-09-19

## 2024-10-15 ENCOUNTER — TELEPHONE (OUTPATIENT)
Dept: TRANSPLANT | Facility: CLINIC | Age: 59
End: 2024-10-15
Payer: COMMERCIAL

## 2024-10-16 ENCOUNTER — HOSPITAL ENCOUNTER (OUTPATIENT)
Dept: PULMONOLOGY | Facility: CLINIC | Age: 59
Discharge: HOME | End: 2024-10-16
Payer: COMMERCIAL

## 2024-10-16 ENCOUNTER — OFFICE VISIT (OUTPATIENT)
Dept: TRANSPLANT | Facility: CLINIC | Age: 59
End: 2024-10-16
Payer: COMMERCIAL

## 2024-10-16 ENCOUNTER — TELEPHONE (OUTPATIENT)
Dept: TRANSPLANT | Facility: CLINIC | Age: 59
End: 2024-10-16

## 2024-10-16 ENCOUNTER — LAB VISIT (OUTPATIENT)
Dept: LAB | Facility: HOSPITAL | Age: 59
End: 2024-10-16
Attending: INTERNAL MEDICINE
Payer: COMMERCIAL

## 2024-10-16 VITALS
DIASTOLIC BLOOD PRESSURE: 88 MMHG | RESPIRATION RATE: 16 BRPM | TEMPERATURE: 98 F | HEIGHT: 69 IN | WEIGHT: 214 LBS | OXYGEN SATURATION: 96 % | HEART RATE: 97 BPM | BODY MASS INDEX: 31.7 KG/M2 | SYSTOLIC BLOOD PRESSURE: 128 MMHG

## 2024-10-16 VITALS — BODY MASS INDEX: 31.7 KG/M2 | WEIGHT: 214 LBS | HEIGHT: 69 IN

## 2024-10-16 DIAGNOSIS — Z79.899 HIGH RISK MEDICATION USE: ICD-10-CM

## 2024-10-16 DIAGNOSIS — J96.11 CHRONIC HYPOXEMIC RESPIRATORY FAILURE: ICD-10-CM

## 2024-10-16 DIAGNOSIS — J84.9 ILD (INTERSTITIAL LUNG DISEASE): Primary | ICD-10-CM

## 2024-10-16 DIAGNOSIS — J84.9 ILD (INTERSTITIAL LUNG DISEASE): ICD-10-CM

## 2024-10-16 DIAGNOSIS — E11.9 TYPE 2 DIABETES MELLITUS WITHOUT COMPLICATION: ICD-10-CM

## 2024-10-16 DIAGNOSIS — J43.9 PULMONARY EMPHYSEMA, UNSPECIFIED EMPHYSEMA TYPE: ICD-10-CM

## 2024-10-16 DIAGNOSIS — G47.33 OSA (OBSTRUCTIVE SLEEP APNEA): ICD-10-CM

## 2024-10-16 LAB
BASOPHILS # BLD AUTO: 0.05 K/UL (ref 0–0.2)
BASOPHILS NFR BLD: 0.5 % (ref 0–1.9)
DIFFERENTIAL METHOD BLD: NORMAL
DLCO SINGLE BREATH LLN: 20.11
DLCO SINGLE BREATH PRE REF: 40.6 %
DLCO SINGLE BREATH REF: 25.85
DLCOC SBVA LLN: 3.22
DLCOC SBVA REF: 4.47
DLCOC SINGLE BREATH LLN: 20.11
DLCOC SINGLE BREATH REF: 25.85
DLCOCSBVAULN: 5.73
DLCOCSINGLEBREATHULN: 31.58
DLCOSINGLEBREATHULN: 31.58
DLCOSINGLEBREATHZSCORE: -4.41
DLCOVA LLN: 3.22
DLCOVA PRE REF: 69.5 %
DLCOVA PRE: 3.11 ML/(MIN*MMHG*L) (ref 3.22–5.73)
DLCOVA REF: 4.47
DLCOVAULN: 5.73
EOSINOPHIL # BLD AUTO: 0.1 K/UL (ref 0–0.5)
EOSINOPHIL NFR BLD: 1.3 % (ref 0–8)
ERV LLN: -16449.15
ERV PRE REF: 126.2 %
ERV REF: 0.85
ERVULN: ABNORMAL
ERYTHROCYTE [DISTWIDTH] IN BLOOD BY AUTOMATED COUNT: 12.4 % (ref 11.5–14.5)
FEF 25 75 LLN: 1.67
FEF 25 75 PRE REF: 114.7 %
FEF 25 75 REF: 3.07
FEV05 LLN: 1.23
FEV05 REF: 2.08
FEV1 FVC LLN: 67
FEV1 FVC PRE REF: 107.5 %
FEV1 FVC REF: 79
FEV1 LLN: 2.21
FEV1 PRE REF: 77 %
FEV1 REF: 2.92
FEV1FVCZSCORE: 0.93
FEV1ZSCORE: -1.55
FRCPLETH LLN: 2.16
FRCPLETH PREREF: 67.1 %
FRCPLETH REF: 2.99
FRCPLETHULN: 3.81
FVC LLN: 2.84
FVC PRE REF: 71 %
FVC REF: 3.75
FVCZSCORE: -1.98
HCT VFR BLD AUTO: 46.4 % (ref 37–48.5)
HGB BLD-MCNC: 15.2 G/DL (ref 12–16)
IMM GRANULOCYTES # BLD AUTO: 0.03 K/UL (ref 0–0.04)
IMM GRANULOCYTES NFR BLD AUTO: 0.3 % (ref 0–0.5)
IVC PRE: 2.6 L (ref 2.84–4.7)
IVC SINGLE BREATH LLN: 2.84
IVC SINGLE BREATH PRE REF: 69.4 %
IVC SINGLE BREATH REF: 3.75
IVCSINGLEBREATHULN: 4.7
LLN IC: -16447.41
LYMPHOCYTES # BLD AUTO: 2.1 K/UL (ref 1–4.8)
LYMPHOCYTES NFR BLD: 21.8 % (ref 18–48)
MCH RBC QN AUTO: 30.2 PG (ref 27–31)
MCHC RBC AUTO-ENTMCNC: 32.8 G/DL (ref 32–36)
MCV RBC AUTO: 92 FL (ref 82–98)
MONOCYTES # BLD AUTO: 0.9 K/UL (ref 0.3–1)
MONOCYTES NFR BLD: 8.9 % (ref 4–15)
NEUTROPHILS # BLD AUTO: 6.6 K/UL (ref 1.8–7.7)
NEUTROPHILS NFR BLD: 67.2 % (ref 38–73)
NRBC BLD-RTO: 0 /100 WBC
PEF LLN: 5.1
PEF PRE REF: 120.6 %
PEF REF: 7.09
PHYSICIAN COMMENT: ABNORMAL
PLATELET # BLD AUTO: 295 K/UL (ref 150–450)
PMV BLD AUTO: 9.7 FL (ref 9.2–12.9)
PRE DLCO: 10.49 ML/(MIN*MMHG) (ref 20.11–31.58)
PRE ERV: 1.08 L (ref -16449.15–16450.85)
PRE FEF 25 75: 3.52 L/S (ref 1.67–4.47)
PRE FET 100: 6.43 SEC
PRE FEV05 REF: 96.6 %
PRE FEV1 FVC: 84.6 % (ref 66.72–88.86)
PRE FEV1: 2.25 L (ref 2.21–3.61)
PRE FEV5: 2.01 L (ref 1.23–2.94)
PRE FRC PL: 2 L (ref 2.16–3.81)
PRE FVC: 2.66 L (ref 2.84–4.7)
PRE IC: 1.58 L (ref -16447.41–16452.59)
PRE PEF: 8.56 L/S (ref 5.1–9.08)
PRE REF IC: 61 %
PRE RV: 0.93 L (ref 1.56–2.71)
PRE TLC: 3.58 L (ref 4.79–6.76)
RAW PRE REF: 63.8 %
RAW PRE: 1.95 CMH2O*S/L (ref 3.06–3.06)
RAW REF: 3.06
RBC # BLD AUTO: 5.04 M/UL (ref 4–5.4)
REF IC: 2.59
RV LLN: 1.56
RV PRE REF: 43.4 %
RV REF: 2.13
RVTLC LLN: 30
RVTLC PRE REF: 65.6 %
RVTLC PRE: 25.84 % (ref 29.77–48.95)
RVTLC REF: 39
RVTLCULN: 49
RVULN: 2.71
SGAW PRE REF: 195.8 %
SGAW PRE: 0.2 1/(CMH2O*S) (ref 0.1–0.1)
SGAW REF: 0.1
TLC LLN: 4.79
TLC PRE REF: 62 %
TLC REF: 5.78
TLC ULN: 6.76
TLCZSCORE: -3.66
ULN IC: ABNORMAL
VA PRE: 3.37 L (ref 5.63–5.63)
VA SINGLE BREATH LLN: 5.63
VA SINGLE BREATH PRE REF: 59.9 %
VA SINGLE BREATH REF: 5.63
VASINGLEBREATHULN: 5.63
VC LLN: 2.84
VC PRE REF: 71 %
VC PRE: 2.66 L (ref 2.84–4.7)
VC REF: 3.75
VC ULN: 4.7
WBC # BLD AUTO: 9.83 K/UL (ref 3.9–12.7)

## 2024-10-16 PROCEDURE — 3072F LOW RISK FOR RETINOPATHY: CPT | Mod: CPTII,NTX,S$GLB, | Performed by: NURSE PRACTITIONER

## 2024-10-16 PROCEDURE — 99999 PR PBB SHADOW E&M-EST. PATIENT-LVL V: CPT | Mod: PBBFAC,TXP,, | Performed by: NURSE PRACTITIONER

## 2024-10-16 PROCEDURE — 3079F DIAST BP 80-89 MM HG: CPT | Mod: CPTII,NTX,S$GLB, | Performed by: NURSE PRACTITIONER

## 2024-10-16 PROCEDURE — 94618 PULMONARY STRESS TESTING: CPT | Mod: NTX,S$GLB,, | Performed by: INTERNAL MEDICINE

## 2024-10-16 PROCEDURE — 94729 DIFFUSING CAPACITY: CPT | Mod: NTX,S$GLB,, | Performed by: INTERNAL MEDICINE

## 2024-10-16 PROCEDURE — 94726 PLETHYSMOGRAPHY LUNG VOLUMES: CPT | Mod: NTX,S$GLB,, | Performed by: INTERNAL MEDICINE

## 2024-10-16 PROCEDURE — 3051F HG A1C>EQUAL 7.0%<8.0%: CPT | Mod: CPTII,NTX,S$GLB, | Performed by: NURSE PRACTITIONER

## 2024-10-16 PROCEDURE — 94010 BREATHING CAPACITY TEST: CPT | Mod: 59,NTX,S$GLB, | Performed by: INTERNAL MEDICINE

## 2024-10-16 PROCEDURE — 1160F RVW MEDS BY RX/DR IN RCRD: CPT | Mod: CPTII,NTX,S$GLB, | Performed by: NURSE PRACTITIONER

## 2024-10-16 PROCEDURE — 3008F BODY MASS INDEX DOCD: CPT | Mod: CPTII,NTX,S$GLB, | Performed by: NURSE PRACTITIONER

## 2024-10-16 PROCEDURE — 99214 OFFICE O/P EST MOD 30 MIN: CPT | Mod: 25,NTX,S$GLB, | Performed by: NURSE PRACTITIONER

## 2024-10-16 PROCEDURE — 36415 COLL VENOUS BLD VENIPUNCTURE: CPT | Mod: NTX | Performed by: INTERNAL MEDICINE

## 2024-10-16 PROCEDURE — 1159F MED LIST DOCD IN RCRD: CPT | Mod: CPTII,NTX,S$GLB, | Performed by: NURSE PRACTITIONER

## 2024-10-16 PROCEDURE — 3074F SYST BP LT 130 MM HG: CPT | Mod: CPTII,NTX,S$GLB, | Performed by: NURSE PRACTITIONER

## 2024-10-16 PROCEDURE — 85025 COMPLETE CBC W/AUTO DIFF WBC: CPT | Mod: TXP | Performed by: INTERNAL MEDICINE

## 2024-10-16 NOTE — PROGRESS NOTES
ADVANCED LUNG DISEASE FOLLOW-UP    Reason for Visit: ILD.                              ADVANCED LUNG DISEASE CLINIC FOLLOW UP EVALUATION                                                                                                                                             Reason for Visit:  ILD-cHP    Referring Physician: Mary Barry, *    History of Present Illness: Nathalie Krueger is a 58 y.o. female who is on 0-4L of oxygen with exertion.  She is on CPAP, but has not been wearing it based on a home sleep study from [facility].  Her New York Heart Association Class is II and a Karnofsky score of 90% - Able to carry on normal activity: minor symptoms of disease. She is diabetic non insulin dependent  Patient with smoking related emphysema, cHP(ILD), ORLIN, chronic hypoxemic respiratory failure, morbid obesity, GERD, DM.  Patient comes in for a routine visit with her wife.        INTERVAL HISTORY:  Doing well on MMF 1500mg BID. Has GERD symptoms when eating certain foods, but has no issues when avoiding those foods. Shortness of breath improved. Exercise tolerance improving as well.  Took an international trip this summer and contracted COVID, but did well with it.  Did not require hospitalizations of treatment. Gets short of breath bending over.  Has exertional dry cough.  Overall doing well and feels well.     Brief History summarized from previous visits  Patient presented in 07/2023 for evaluation of ILD and at that time reported respiratory symptoms first began over three years ago when she lived in [place]. Per patient's wife, patient had developed progressive fatigue and dyspnea with exertion. She was still able to perform her ADLs independently, but noticed she would get breathless with activity very quickly. Previously lived on family's farm in [place]. She states she had an episode of profound fatigue/malaise one week following clean out of family's chicken coup. Frequent exposures to dust,  pesticides, fertilizers while living in [place]. Patient and her wife moved to [place] ~two years ago. While living downtown, patient noticed increased cough, worsening fatigue requiring frequent daytime naps. [place] and found she had worsening cough if other residents smoked within the building. She presented to the ED in early 2022 due to hypoxemia. She checked her pulse ox at home and reportedly had oxygen saturation of 76%. She did not require hospitalization. No prior chest surgeries, ICU admission, intubations, lung biopsy.     In August 2022, patient had acute worsening of her dyspnea and cough. She was started on 2L oxygen to be used with exertion. 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 Since starting CPAP, notes improvement in daytime fatigue. She completed a trial of steroids in the fall of 2022 after evaluation with Dr. Rodas at [facility] pulm and states her symptoms improved drastically. No recent exacerbations/hospitalizations. She has had an extensive autoimmune workup which revealed +DAYTON and low IgM. All other workup unrevealing. Currently of stiolto and symbicort for management.     Patient is a former 25-30 year smoker and quit 3-4 years ago. Her father had lung disease related to diving in the [employer], but otherwise denies family history of lung disease. No history of frequent infections as a child. She states she did have a history of scarlet fever as a child while living in [place] and was told she should not live there in the future? Also states she used a 20 year old inhaler she bought in [place] while still living in [place] and is concerned she may have aspirated mold. She occasionally has episodes of flushing and rashes. No arthralgias. History of reflux and recently stopped omeprazole in hopes of improving symptoms with diet modification. She recently lost ~10 pounds. Continues to have intermittent reflux despite diet changes. She  states she remains very active and walks and performs water aerobics frequently. 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 Remains independent with her ADLs, but does struggle with vacuuming, bending over, and climbing stairs    She was treated for another exacerbation in 10/2023 with prednisone taper in addition to antibiotics for pneumonia and suspected ILD flare. Reported marked improvement.  The steroids taper was quickly weaned in anticipation of wedge bx.  She reports good compliance with her CPAP. Denies any post-nasal drip.                                                                                        History of Present Illness: Nathalie Krueger is a 59 y.o. year old female who is on  0-4 L of oxygen. She is on CPAP., although does not wear.  Her New York Heart Association Class is I and a Karnofsky score of 90% - Able to carry on normal activity: minor symptoms of disease. She is diabetic not treated with insulin    Review of Systems   Constitutional:  Negative for chills and fever.   Respiratory:  Positive for cough (dry, exertional) and shortness of breath (improving). Negative for hemoptysis, sputum production and wheezing.    Cardiovascular:  Negative for palpitations, orthopnea, claudication, leg swelling and PND.   Gastrointestinal:  Positive for heartburn (with certain foods).     There were no vitals taken for this visit.    Physical Exam  Constitutional:       Appearance: Normal appearance.   HENT:      Head: Normocephalic and atraumatic.   Eyes:      Extraocular Movements: Extraocular movements intact.   Cardiovascular:      Rate and Rhythm: Normal rate and regular rhythm.   Pulmonary:      Effort: Pulmonary effort is normal. No respiratory distress.      Breath sounds: No stridor. No wheezing or rales.   Chest:      Chest wall: No tenderness.   Abdominal:      General: There is no distension.   Musculoskeletal:      Cervical back: Normal range of motion.      Right  lower leg: No edema.      Left lower leg: No edema.   Skin:     General: Skin is warm and dry.   Neurological:      Mental Status: She is alert and oriented to person, place, and time.   Psychiatric:         Mood and Affect: Mood normal.         Behavior: Behavior normal.         Thought Content: Thought content normal.         Judgment: Judgment normal.     Labs:      Latest Ref Rng & Units 4/22/2024    11:03 AM 6/17/2024    10:10 AM 10/16/2024    11:36 AM   cbc, cmp, tacrolimus   WBC (USE PT. THRESHOLDS) 3.90 - 12.70 K/uL 8.81  9.98  9.83    RBC (USE PT. THRESHOLDS) 4.00 - 5.40 M/uL 4.81  5.05  5.04    Hemoglobin (USE PT. THRESHOLDS) 12.0 - 16.0 g/dL 14.6  15.2  15.2    Hematocrit (USE PT. THRESHOLDS) 37.0 - 48.5 % 43.8  45.9  46.4    MCV (USE PT. THRESHOLDS) 82 - 98 fL 91  91  92    MCH (USE PT. THRESHOLDS) 27.0 - 31.0 pg 30.4  30.1  30.2    MCHC (USE PT. THRESHOLDS) 32.0 - 36.0 g/dL 33.3  33.1  32.8    RDW (USE PT. THRESHOLDS) 11.5 - 14.5 % 12.2  12.2  12.4    Platelet Count (USE PT. THRESHOLDS) 150 - 450 K/uL 310  355  295    MPV (USE PT. THRESHOLDS) 9.2 - 12.9 fL 10.2  10.2  9.7    Gran # (ANC) (USE PT. THRESHOLDS) 1.8 - 7.7 K/uL 5.8  6.8  6.6    Lymph # (USE PT. THRESHOLDS) 1.0 - 4.8 K/uL 2.1  2.2  2.1    Mono # (USE PT. THRESHOLDS) 0.3 - 1.0 K/uL 0.7  0.8  0.9    Eos % (USE PT. THRESHOLDS) 0.0 - 8.0 % 1.7  1.6  1.3    Basophil % (USE PT. THRESHOLDS) 0.0 - 1.9 % 0.5  0.6  0.5    Differential Method (USE PT. THRESHOLDS)  Automated  Automated  Automated          10/16/2024    12:36 PM 5/7/2024    11:33 AM 12/20/2023     1:36 PM 10/5/2023    11:03 AM 8/24/2023    10:10 AM 4/10/2023    10:10 AM   Pulmonary Function Tests   FVC 2.66 liters 2.69 liters 2.45 liters 2.36 liters 2.51 liters 2.35 liters   FEV1 2.25 liters 2.31 liters 2.03 liters 2.05 liters 2.12 liters 1.95 liters   TLC (liters) 3.58 liters 3.48 liters  2.94 liters  3.06 liters   DLCO (ml/mmHg sec) 10.49 ml/mmHg sec 10 ml/mmHg sec  6.89 ml/mmHg sec   7.89 ml/mmHg sec   FVC% 71 71.5 64.8 62.3 66.2 61.8   FEV1% 77 78.8 69 69.3 71.8 65.6   FEF 25-75 3.52 3.36 2.73 3.23 3.02 2.35   FEF 25-75% 114.7 132.6 107 125.9 117.4 90.8   TLC% 62 60.3  50.8  53   RV 0.93 0.79  0.58  0.83   RV% 43.4 37.5  27.3  39.4   DLCO% 40.6 38.5  26.5  30.2          Imaging:  EXAMINATION:  CT CHEST WITHOUT CONTRAST     CLINICAL HISTORY:  ILD;Solitary pulmonary nodule     TECHNIQUE:  Volumetric data acquisition through the chest (lung apices to adrenals) without intravenous. Sagittal and coronal multiplanar reconstructions (MPR) were performed and pushed to PACS for evaluation. The lack of IV contrast material limits the assessment of the mediastinal structures and abdominal solid organs.     COMPARISON:  CT 04/11/2023     FINDINGS:  Chest wall and lower neck: Normal.     Lungs and pleura: Emphysema.  A few micro nodules in bilateral lungs are stable since CT from 04/11/2023.  microcystic changes in the posterior subpleural lungs, right more than left, slightly progressed compared to the prior study.     Mediastinum and jenny: No mediastinal or hilar adenopathy.     Heart and pericardium: Heart size is normal. No pericardial effusion.     Vessels: Mild atheromatous disease is seen in the aorta.  The coronary arteries show mild atheromatous disease.     Upper abdomen: Normal.     Bones: Normal.     Impression:     1. Stable micro nodules in bilateral lungs.  2. Microcystic changes in the posterior aspect of the bilateral lungs, right more than left, slightly progressed compared to the prior study.  Follow-up high-resolution CT in prone positioning is recommended on follow-up CT in 6-12 months to evaluate for ILD.        Electronically signed by:Ulysses Oropeza  Date:                                            11/17/2023  Time:                                           14:31    WEDGE BIOPSY 11/29/2023    1. LUNG, RIGHT UPPER LOBE, WEDGE BIOPSY:  Chronic interstitial pneumonitis with changes  most consistent with cellular nonspecific interstitial pneumonia-like pattern of injury.  Negative for malignancy.      2. LUNG, RIGHT LOWER LOBES, WEDGE BIOPSY:  Chronic interstitial pneumonitis with changes most consistent with cellular nonspecific interstitial pneumonia-like pattern of injury.  Negative for malignancy.      3. LUNG, RIGHT MIDDLE LOBE, WEDGE BIOPSY:  Chronic interstitial pneumonitis with changes most consistent with cellular nonspecific interstitial pneumonia-like pattern of injury and rare multinucleated giant cells containing cholesterol clefts.  Negative for malignancy.        Comment:  Sample tissue from all segments shows patchy fibrosis, diffuse cellular inflammatory infiltrates, pneumocyte hyperplasia and accumulation of foamy macrophages in airspaces.  The overall changes suggest cellular nonspecific interstitial  pneumonia (NSIP) pattern of injury.  Rare multinucleated giant cells with cholesterol clefts raise concern for hypersensitivity pneumonitis.  Other potential etiologies include connective tissue disease or respiratory bronchiolitis interstitial lung  disease (RB-ILD).  Idiopathic NSIP is a diagnosis of exclusion if all other etiologies have been ruled out.  There is no evidence of acute lung injury, granulomas, viral cytopathic effect or neoplasia.  Recommend correlation with clinical history and  pertinent lab studies.       Results for orders placed during the hospital encounter of 03/21/23    Echo    Interpretation Summary  · The left ventricle is normal in size with normal systolic function.  · The estimated ejection fraction is 65%.  · Normal left ventricular diastolic function.  · Moderate right ventricular enlargement with low normal to mildly reduced right ventricular systolic function.  · The estimated PA systolic pressure is 34 mmHg.  · Normal central venous pressure (3 mmHg).  · The ascending aorta is mildly dilated.  Results for orders placed during the hospital  encounter of 03/29/23    Cardiac catheterization    Conclusion  Summary  Filling pressures: RA 10, PCWP 16  PA 36/20, mean PA pressure 20 mmHg  PVR 1.5 DUQUE  PA saturation 70%, Ao saturation 97%  Fletcher CO/CI: 5.9/2.66  /100  with repeat intraprocedure similar results. On recheck post procedure 133/75  HR 71 SR  Hb 13.6      Assessment:  1. ILD (interstitial lung disease)    2. Pulmonary emphysema, unspecified emphysema type    3. Chronic hypoxemic respiratory failure    4. ORLIN (obstructive sleep apnea)      Plan:   58 y/o woman with pertinent PMHx of chronic hypersensitivity pneumonitis, currently on MMF. She continues to improve overall.  Has been more active recently and traveled to [place] this past summer.  PFTs stable.  No hypoxia on 6 today's 6MWT, sats ranged from 91-98%.     Continue MMF 1500mg BID; tolerating well.     2.  No hypoxia on 6MWT.      3.  Continue Trelegy.      4.  RTC in 6 months with PFTs and 6MWT     Denton Rojas NP  Advanced Lung Disease

## 2024-10-16 NOTE — PROCEDURES
Nathalie Krueger is a 59 y.o.  female patient, who presents for a 6 minute walk test ordered by DO Asha.  The diagnosis is Interstitial Lung Disease.  The patient's BMI is 31.6 kg/m2.  Predicted distance (lower limit of normal) is 331.02 meters.      Test Results:    The test was completed without stopping.  The total time walked was 360 seconds.  During walking, the patient reported:  No complaints.  The patient used no assistive devices during testing.     10/16/2024---------Distance: 328.27 meters (1077 feet)     Lap Walk Time O2 Sat % Supplemental Oxygen Heart Rate Blood Pressure Ashlie Scale Comment   Pre-exercise  (Resting) 0 0 98 % Room Air 83 bpm 133/88 mmHg 0    During Exercise 1 67 sec 95 % Room Air 96 bpm      During Exercise 2 128 sec 91 % Room Air 97 bpm      During Exercise 3 202 sec 92 % Room Air 93 bpm      During Exercise 4 270 sec 93 % Room Air 92 bpm      During Exercise 5 334 sec 92 % Room Air 90 bpm      End of Exercise  360 sec 91 % Room Air 91 bpm 143/93 mmHg 0.5    Post-exercise  (Recovery)   95 % Room Air  81 bpm        Recovery Time: 78 seconds    Performing nurse/tech: Veronica TOBAR      PREVIOUS STUDY:   05/07/2024---------Distance: 328.27 meters (1077 feet)       Lap Walk Time O2 Sat % Supplemental Oxygen Heart Rate Blood Pressure Ashlie Scale   Pre-exercise  (Resting) 0 0 95 % Room Air 92 bpm 149/91 mmHg 0   During Exercise 1 64 sec 94 % Room Air 96 bpm       During Exercise 2 128 sec 91 % Room Air 98 bpm       During Exercise 3 194 sec 90 % Room Air 94 bpm       During Exercise 4 263 sec 90 % Room Air 92 bpm       During Exercise 5 332 sec 90 % Room Air 98 bpm       End of Exercise   360 sec 91 % Room Air 112 bpm 142/101 mmHg 0   Post-exercise  (Recovery)     94 % Room Air  87 bpm 144/94 mmHg         CLINICAL INTERPRETATION:  Six minute walk distance is 328.27 meters (1077 feet) with very, very light dyspnea.  During exercise, there was significant desaturation while breathing  room air.  Both blood pressure and heart rate remained stable with walking.  The patient did not report non-pulmonary symptoms during exercise.  Since the previous study in May 2024, exercise capacity is unchanged.  Based upon age and body mass index, exercise capacity is less than predicted.

## 2024-10-16 NOTE — TELEPHONE ENCOUNTER
----- Message from Mary Barry DO sent at 10/16/2024  1:23 PM CDT -----    No changes to MMF    Message sent to patient via [facility].

## 2024-10-16 NOTE — LETTER
October 16, 2024        Sarah Lemon  [address]  [address]  [place] [address]  Phone: [phone]  Fax: [phone]             [facility] 1st [facility]  [address]  [facility] [address]  Phone: [phone]   Patient: Nathalie Krueger   MR Number: [id number]   YOB: 1965   Date of Visit: 10/16/2024       Dear Dr. Sarah Lemon    Thank you for referring Nathalie Krueger to me for evaluation. Attached you will find relevant portions of my assessment and plan of care.    If you have questions, please do not hesitate to call me. I look forward to following Nathalie Krueger along with you.    Sincerely,    Denton Rojas, [facility]    Enclosure    If you would like to receive this communication electronically, please contact [email or web address] or [phone] to request [employer] access.    [employer] is a tool which provides read-only access to select patient information with whom you have a relationship. Its easy to use and provides real time access to review your patients record including encounter summaries, notes, results, and demographic information.    If you feel you have received this communication in error or would no longer like to receive these types of communications, please e-mail [email or web address]

## 2024-10-17 DIAGNOSIS — E11.9 TYPE 2 DIABETES MELLITUS WITHOUT COMPLICATION: ICD-10-CM

## 2024-10-19 DIAGNOSIS — E11.9 TYPE 2 DIABETES MELLITUS WITHOUT COMPLICATION, WITHOUT LONG-TERM CURRENT USE OF INSULIN: ICD-10-CM

## 2024-10-19 NOTE — TELEPHONE ENCOUNTER
Care Due:                  Date            Visit Type   Department     Provider  --------------------------------------------------------------------------------                                EP -                              [facility]  Last Visit: 03-      CARE ([facility])   MEDICINE       Tyron Flannery                              EP -                              PRIMARY      [facility]  Next Visit: 10-      CARE ([facility])   MEDICINE       Tyron Flannery                                                            Last  Test          Frequency    Reason                     Performed    Due Date  --------------------------------------------------------------------------------    CMP.........  12 months..  metFORMIN, rosuvastatin..  11-   11-    HBA1C.......  6 months...  OZEMPIC, metFORMIN.......  07- 12-    Lipid Panel.  12 months..  rosuvastatin.............  09- 09-    [facility] Embedded Care Due Messages. Reference number: [id number].   10/19/2024 8:04:05 AM CDT

## 2024-10-20 NOTE — TELEPHONE ENCOUNTER
Refill Routing Note   Medication(s) are not appropriate for processing by [facility] for the following reason(s):        Drug-disease interaction    [facility] action(s):  Defer   Requires labs : Yes      Medication Therapy Plan: Chronic hypoxemic respiratory failure      Appointments  past 12m or future 3m with PCP    Date Provider   Last Visit   3/28/2024 Tyron Flannery MD   Next Visit   10/21/2024 Tyron Flannery MD   ED visits in past 90 days: 0        Note composed:10:37 AM 10/20/2024

## 2024-10-21 ENCOUNTER — LAB VISIT (OUTPATIENT)
Dept: LAB | Facility: HOSPITAL | Age: 59
End: 2024-10-21
Attending: STUDENT IN AN ORGANIZED HEALTH CARE EDUCATION/TRAINING PROGRAM
Payer: COMMERCIAL

## 2024-10-21 ENCOUNTER — OFFICE VISIT (OUTPATIENT)
Dept: FAMILY MEDICINE | Facility: CLINIC | Age: 59
End: 2024-10-21
Payer: COMMERCIAL

## 2024-10-21 VITALS
HEART RATE: 96 BPM | SYSTOLIC BLOOD PRESSURE: 124 MMHG | OXYGEN SATURATION: 97 % | WEIGHT: 215.38 LBS | DIASTOLIC BLOOD PRESSURE: 82 MMHG | BODY MASS INDEX: 31.81 KG/M2

## 2024-10-21 DIAGNOSIS — E11.9 TYPE 2 DIABETES MELLITUS WITHOUT COMPLICATION, WITHOUT LONG-TERM CURRENT USE OF INSULIN: ICD-10-CM

## 2024-10-21 DIAGNOSIS — E78.2 MIXED HYPERLIPIDEMIA: ICD-10-CM

## 2024-10-21 DIAGNOSIS — J84.9 ILD (INTERSTITIAL LUNG DISEASE): Primary | ICD-10-CM

## 2024-10-21 DIAGNOSIS — E66.09 CLASS 1 OBESITY DUE TO EXCESS CALORIES WITH SERIOUS COMORBIDITY AND BODY MASS INDEX (BMI) OF 31.0 TO 31.9 IN ADULT: ICD-10-CM

## 2024-10-21 DIAGNOSIS — L98.9 SKIN LESION: ICD-10-CM

## 2024-10-21 DIAGNOSIS — I10 ESSENTIAL HYPERTENSION: ICD-10-CM

## 2024-10-21 DIAGNOSIS — E66.811 CLASS 1 OBESITY DUE TO EXCESS CALORIES WITH SERIOUS COMORBIDITY AND BODY MASS INDEX (BMI) OF 31.0 TO 31.9 IN ADULT: ICD-10-CM

## 2024-10-21 DIAGNOSIS — E11.9 TYPE 2 DIABETES MELLITUS WITHOUT COMPLICATION: ICD-10-CM

## 2024-10-21 DIAGNOSIS — E11.36 DIABETIC CATARACT OF BOTH EYES: ICD-10-CM

## 2024-10-21 LAB
ALBUMIN SERPL BCP-MCNC: 4.1 G/DL (ref 3.5–5.2)
ALP SERPL-CCNC: 123 U/L (ref 40–150)
ALT SERPL W/O P-5'-P-CCNC: 27 U/L (ref 10–44)
ANION GAP SERPL CALC-SCNC: 11 MMOL/L (ref 8–16)
AST SERPL-CCNC: 20 U/L (ref 10–40)
BILIRUB SERPL-MCNC: 0.4 MG/DL (ref 0.1–1)
BUN SERPL-MCNC: 11 MG/DL (ref 6–20)
CALCIUM SERPL-MCNC: 9.6 MG/DL (ref 8.7–10.5)
CHLORIDE SERPL-SCNC: 103 MMOL/L (ref 95–110)
CHOLEST SERPL-MCNC: 152 MG/DL (ref 120–199)
CHOLEST/HDLC SERPL: 3.4 {RATIO} (ref 2–5)
CO2 SERPL-SCNC: 27 MMOL/L (ref 23–29)
CREAT SERPL-MCNC: 0.8 MG/DL (ref 0.5–1.4)
EST. GFR  (NO RACE VARIABLE): >60 ML/MIN/1.73 M^2
ESTIMATED AVG GLUCOSE: 146 MG/DL (ref 68–131)
GLUCOSE SERPL-MCNC: 137 MG/DL (ref 70–110)
HBA1C MFR BLD: 6.7 % (ref 4–5.6)
HDLC SERPL-MCNC: 45 MG/DL (ref 40–75)
HDLC SERPL: 29.6 % (ref 20–50)
LDLC SERPL CALC-MCNC: 82.8 MG/DL (ref 63–159)
NONHDLC SERPL-MCNC: 107 MG/DL
POTASSIUM SERPL-SCNC: 4.1 MMOL/L (ref 3.5–5.1)
PROT SERPL-MCNC: 6.8 G/DL (ref 6–8.4)
SODIUM SERPL-SCNC: 141 MMOL/L (ref 136–145)
TRIGL SERPL-MCNC: 121 MG/DL (ref 30–150)

## 2024-10-21 PROCEDURE — 99999 PR PBB SHADOW E&M-EST. PATIENT-LVL IV: CPT | Mod: PBBFAC,,, | Performed by: STUDENT IN AN ORGANIZED HEALTH CARE EDUCATION/TRAINING PROGRAM

## 2024-10-21 PROCEDURE — 80061 LIPID PANEL: CPT | Mod: TXP | Performed by: STUDENT IN AN ORGANIZED HEALTH CARE EDUCATION/TRAINING PROGRAM

## 2024-10-21 PROCEDURE — 3072F LOW RISK FOR RETINOPATHY: CPT | Mod: CPTII,S$GLB,, | Performed by: STUDENT IN AN ORGANIZED HEALTH CARE EDUCATION/TRAINING PROGRAM

## 2024-10-21 PROCEDURE — 3008F BODY MASS INDEX DOCD: CPT | Mod: CPTII,S$GLB,, | Performed by: STUDENT IN AN ORGANIZED HEALTH CARE EDUCATION/TRAINING PROGRAM

## 2024-10-21 PROCEDURE — 99214 OFFICE O/P EST MOD 30 MIN: CPT | Mod: S$GLB,,, | Performed by: STUDENT IN AN ORGANIZED HEALTH CARE EDUCATION/TRAINING PROGRAM

## 2024-10-21 PROCEDURE — 83036 HEMOGLOBIN GLYCOSYLATED A1C: CPT | Mod: TXP | Performed by: STUDENT IN AN ORGANIZED HEALTH CARE EDUCATION/TRAINING PROGRAM

## 2024-10-21 PROCEDURE — 3051F HG A1C>EQUAL 7.0%<8.0%: CPT | Mod: CPTII,S$GLB,, | Performed by: STUDENT IN AN ORGANIZED HEALTH CARE EDUCATION/TRAINING PROGRAM

## 2024-10-21 PROCEDURE — 3074F SYST BP LT 130 MM HG: CPT | Mod: CPTII,S$GLB,, | Performed by: STUDENT IN AN ORGANIZED HEALTH CARE EDUCATION/TRAINING PROGRAM

## 2024-10-21 PROCEDURE — 80053 COMPREHEN METABOLIC PANEL: CPT | Mod: PO,TXP | Performed by: STUDENT IN AN ORGANIZED HEALTH CARE EDUCATION/TRAINING PROGRAM

## 2024-10-21 PROCEDURE — 1159F MED LIST DOCD IN RCRD: CPT | Mod: CPTII,S$GLB,, | Performed by: STUDENT IN AN ORGANIZED HEALTH CARE EDUCATION/TRAINING PROGRAM

## 2024-10-21 PROCEDURE — 3079F DIAST BP 80-89 MM HG: CPT | Mod: CPTII,S$GLB,, | Performed by: STUDENT IN AN ORGANIZED HEALTH CARE EDUCATION/TRAINING PROGRAM

## 2024-10-21 PROCEDURE — 36415 COLL VENOUS BLD VENIPUNCTURE: CPT | Mod: PO,TXP | Performed by: STUDENT IN AN ORGANIZED HEALTH CARE EDUCATION/TRAINING PROGRAM

## 2024-10-21 RX ORDER — METFORMIN HYDROCHLORIDE 500 MG/1
500 TABLET ORAL
Qty: 90 TABLET | Refills: 1 | Status: SHIPPED | OUTPATIENT
Start: 2024-10-21

## 2024-10-21 NOTE — ASSESSMENT & PLAN NOTE
She is currently doing well with mychophenelate. Continue medication. Of concern to her is the skin lesion on her left neck that she recently noticed. Given that her medication puts her at higher risk of skin cancer, I do think she should establish with a dermatologist.

## 2024-10-21 NOTE — PROGRESS NOTES
Name: Nathalie Krueger  MRN: [id number]  : 1965  PCP: Tyron Flannery MD    Subjective   Patient presents for regular visit.     Review of Systems   Cardiovascular:  Negative for chest pain.   Neurological:  Negative for dizziness and light-headedness.       Patient Active Problem List   Diagnosis    Essential hypertension    Mixed hyperlipidemia    Type 2 diabetes mellitus without complication, without long-term current use of insulin    Lung nodule    Bronchiectasis without complication    Gastroesophageal reflux disease with esophagitis without hemorrhage    Right flank pain    Class 1 obesity due to excess calories with serious comorbidity and body mass index (BMI) of 31.0 to 31.9 in adult    Mild obstructive sleep apnea    IgM deficiency    ILD (interstitial lung disease)    Diverticulosis    Diabetic cataract of both eyes       Health Maintenance Due   Topic Date Due    COVID-19 Vaccine (7 - 2024-25 season) 2024    Diabetes Urine Screening  2024    Lipid Panel  2024    Foot Exam  2024    Eye Exam  10/16/2024       Objective   Vitals:    10/21/24 1304   BP: 124/82   Pulse: 96       Physical Exam  Skin:     Comments: Small, sound, pale, 2-3mm nodule on left neck         Assessment & Plan   1. ILD (interstitial lung disease)  Assessment & Plan:  She is currently doing well with mychophenelate. Continue medication. Of concern to her is the skin lesion on her left neck that she recently noticed. Given that her medication puts her at higher risk of skin cancer, I do think she should establish with a dermatologist.      2. Skin lesion  -     Ambulatory referral/consult to Dermatology; Future; Expected date: 10/28/2024    3. Type 2 diabetes mellitus without complication, without long-term current use of insulin  Assessment & Plan:  A1C 3 months ago was 7.0%. Continue metformin and Ozempic. Lab today. 6.5% may be a good goal for her - consider medication changes if  tolerated.    Orders:  -     Comprehensive Metabolic Panel; Future; Expected date: 10/21/2024  -     Hemoglobin A1C; Future; Expected date: 10/21/2024    4. Diabetic cataract of both eyes  Assessment & Plan:  Encouraged patient to schedule yearly exam with ophthalmology/optometry.      5. Essential hypertension  Assessment & Plan:  Currently controlled. Continue amlodipine.      6. Mixed hyperlipidemia  Assessment & Plan:  Continue rosuvastatin. Lipid panel to be collected today.      7. Class 1 obesity due to excess calories with serious comorbidity and body mass index (BMI) of 31.0 to 31.9 in adult  Assessment & Plan:  Patient has lost weight since last visit. Continue Ozempic. 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          Follow up in 6 months.     Tyron Flannery MD  10/21/2024

## 2024-10-21 NOTE — ASSESSMENT & PLAN NOTE
A1C 3 months ago was 7.0%. Continue metformin and Ozempic. Lab today. 6.5% may be a good goal for her - consider medication changes if tolerated.

## 2024-10-21 NOTE — ASSESSMENT & PLAN NOTE
Patient has lost weight since last visit. Continue Ozempic. 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

## 2024-10-22 DIAGNOSIS — J84.9 ILD (INTERSTITIAL LUNG DISEASE): Primary | ICD-10-CM

## 2024-10-27 DIAGNOSIS — K21.9 GASTROESOPHAGEAL REFLUX DISEASE, UNSPECIFIED WHETHER ESOPHAGITIS PRESENT: ICD-10-CM

## 2024-10-28 RX ORDER — OMEPRAZOLE 40 MG/1
40 CAPSULE, DELAYED RELEASE ORAL EVERY MORNING
Qty: 30 CAPSULE | Refills: 11 | OUTPATIENT
Start: 2024-10-28

## 2024-10-30 RX ORDER — OMEPRAZOLE 40 MG/1
40 CAPSULE, DELAYED RELEASE ORAL EVERY MORNING
Qty: 30 CAPSULE | Refills: 11 | Status: SHIPPED | OUTPATIENT
Start: 2024-10-30

## 2024-11-05 ENCOUNTER — PATIENT MESSAGE (OUTPATIENT)
Dept: FAMILY MEDICINE | Facility: CLINIC | Age: 59
End: 2024-11-05
Payer: COMMERCIAL

## 2024-11-05 DIAGNOSIS — Z78.0 MENOPAUSE: Primary | ICD-10-CM

## 2024-11-08 ENCOUNTER — PATIENT MESSAGE (OUTPATIENT)
Dept: TRANSPLANT | Facility: CLINIC | Age: 59
End: 2024-11-08
Payer: COMMERCIAL

## 2024-11-13 DIAGNOSIS — J30.9 ALLERGIC RHINITIS, UNSPECIFIED SEASONALITY, UNSPECIFIED TRIGGER: ICD-10-CM

## 2024-11-13 RX ORDER — MONTELUKAST SODIUM 10 MG/1
10 TABLET ORAL NIGHTLY
Qty: 90 TABLET | Refills: 3 | Status: SHIPPED | OUTPATIENT
Start: 2024-11-13 | End: 2025-11-08

## 2024-11-13 NOTE — TELEPHONE ENCOUNTER
Received a message from patient via the patient portal stating that her insurance will no longer cover  the cetirizine after 1/1/25. Instructions received from Dr. Barry for Singulair 10 mg daily. Patient requested a 90 day prescription be sent to [facility].

## 2024-11-21 ENCOUNTER — HOSPITAL ENCOUNTER (OUTPATIENT)
Dept: RADIOLOGY | Facility: HOSPITAL | Age: 59
Discharge: HOME | End: 2024-11-21
Attending: STUDENT IN AN ORGANIZED HEALTH CARE EDUCATION/TRAINING PROGRAM
Payer: COMMERCIAL

## 2024-11-21 DIAGNOSIS — Z12.31 ENCOUNTER FOR SCREENING MAMMOGRAM FOR BREAST CANCER: ICD-10-CM

## 2024-11-21 PROCEDURE — 77067 SCR MAMMO BI INCL CAD: CPT | Mod: TC,PN,TXP

## 2024-11-25 ENCOUNTER — PATIENT MESSAGE (OUTPATIENT)
Dept: TRANSPLANT | Facility: CLINIC | Age: 59
End: 2024-11-25
Payer: COMMERCIAL

## 2024-12-31 ENCOUNTER — TELEPHONE (OUTPATIENT)
Dept: TRANSPLANT | Facility: CLINIC | Age: 59
End: 2024-12-31
Payer: COMMERCIAL

## 2024-12-31 DIAGNOSIS — J84.9 ILD (INTERSTITIAL LUNG DISEASE): ICD-10-CM

## 2024-12-31 RX ORDER — MYCOPHENOLATE MOFETIL 500 MG/1
1500 TABLET ORAL 2 TIMES DAILY
Qty: 540 TABLET | Refills: 3 | Status: SHIPPED | OUTPATIENT
Start: 2024-12-31 | End: 2025-12-31

## 2024-12-31 NOTE — TELEPHONE ENCOUNTER
Contacted patient to inform her that she is due for labs next month for mycophenolate lab monitoring. She requested labs on a Thursday or Friday at [facility]. Informed her that labs will be scheduled either on 1/16/25 or 1/17/25 at [facility]. She verbalized her understanding.

## 2025-01-09 ENCOUNTER — PATIENT MESSAGE (OUTPATIENT)
Dept: TRANSPLANT | Facility: CLINIC | Age: 60
End: 2025-01-09
Payer: COMMERCIAL

## 2025-01-09 ENCOUNTER — OFFICE VISIT (OUTPATIENT)
Dept: OPTOMETRY | Facility: CLINIC | Age: 60
End: 2025-01-09
Payer: COMMERCIAL

## 2025-01-09 DIAGNOSIS — H25.13 NUCLEAR SCLEROSIS OF BOTH EYES: ICD-10-CM

## 2025-01-09 DIAGNOSIS — E11.9 TYPE 2 DIABETES MELLITUS WITHOUT RETINOPATHY: Primary | ICD-10-CM

## 2025-01-09 PROCEDURE — 92014 COMPRE OPH EXAM EST PT 1/>: CPT | Mod: S$GLB,TXP,, | Performed by: OPTOMETRIST

## 2025-01-09 PROCEDURE — 1160F RVW MEDS BY RX/DR IN RCRD: CPT | Mod: CPTII,S$GLB,TXP, | Performed by: OPTOMETRIST

## 2025-01-09 PROCEDURE — 1159F MED LIST DOCD IN RCRD: CPT | Mod: CPTII,S$GLB,TXP, | Performed by: OPTOMETRIST

## 2025-01-09 PROCEDURE — 99999 PR PBB SHADOW E&M-EST. PATIENT-LVL III: CPT | Mod: PBBFAC,TXP,, | Performed by: OPTOMETRIST

## 2025-01-09 PROCEDURE — 2023F DILAT RTA XM W/O RTNOPTHY: CPT | Mod: CPTII,S$GLB,TXP, | Performed by: OPTOMETRIST

## 2025-01-09 NOTE — PROGRESS NOTES
HPI    DM eye exam HARRIS 10/16/23    Pt denies any changes in va. Pt uses 1.50 readers to help aid near. Pt   denies floaters, flashes. Pt uses AT prn. DM controlled w aid       Hemoglobin A1C       Date                     Value               Ref Range             Status                10/21/2024               6.7 (H)             4.0 - 5.6 %           Final                            03/14/2024               7.4 (H)             4.0 - 5.6 %           Final                       09/14/2023               6.4 (H)             4.0 - 5.6 %           Final                  ----------   Last edited by Radha Hudson on 1/9/2025  1:07 PM.            Assessment /Plan     For exam results, see Encounter Report.    Type 2 diabetes mellitus without retinopathy    Nuclear sclerosis of both eyes      No diabetic retinopathy, no csme. Return in 1 year for dilated eye exam.  2. Educated pt on presence of cataracts and effects on vision. No surgery at this time. Recheck in one year.

## 2025-01-10 ENCOUNTER — TELEPHONE (OUTPATIENT)
Dept: TRANSPLANT | Facility: CLINIC | Age: 60
End: 2025-01-10
Payer: COMMERCIAL

## 2025-01-10 DIAGNOSIS — J84.9 ILD (INTERSTITIAL LUNG DISEASE): Primary | ICD-10-CM

## 2025-01-10 DIAGNOSIS — Z51.81 THERAPEUTIC DRUG MONITORING: ICD-10-CM

## 2025-01-17 ENCOUNTER — LAB VISIT (OUTPATIENT)
Dept: LAB | Facility: HOSPITAL | Age: 60
End: 2025-01-17
Attending: INTERNAL MEDICINE
Payer: COMMERCIAL

## 2025-01-17 DIAGNOSIS — J84.9 ILD (INTERSTITIAL LUNG DISEASE): ICD-10-CM

## 2025-01-17 DIAGNOSIS — E11.9 TYPE 2 DIABETES MELLITUS WITHOUT COMPLICATION, WITHOUT LONG-TERM CURRENT USE OF INSULIN: ICD-10-CM

## 2025-01-17 DIAGNOSIS — Z79.899 HIGH RISK MEDICATION USE: ICD-10-CM

## 2025-01-17 LAB
BASOPHILS # BLD AUTO: 0.04 K/UL (ref 0–0.2)
BASOPHILS NFR BLD: 0.4 % (ref 0–1.9)
DIFFERENTIAL METHOD BLD: NORMAL
EOSINOPHIL # BLD AUTO: 0.1 K/UL (ref 0–0.5)
EOSINOPHIL NFR BLD: 1 % (ref 0–8)
ERYTHROCYTE [DISTWIDTH] IN BLOOD BY AUTOMATED COUNT: 12.2 % (ref 11.5–14.5)
HCT VFR BLD AUTO: 45.5 % (ref 37–48.5)
HGB BLD-MCNC: 14.7 G/DL (ref 12–16)
IMM GRANULOCYTES # BLD AUTO: 0.04 K/UL (ref 0–0.04)
IMM GRANULOCYTES NFR BLD AUTO: 0.4 % (ref 0–0.5)
LYMPHOCYTES # BLD AUTO: 2.2 K/UL (ref 1–4.8)
LYMPHOCYTES NFR BLD: 21.2 % (ref 18–48)
MCH RBC QN AUTO: 30 PG (ref 27–31)
MCHC RBC AUTO-ENTMCNC: 32.3 G/DL (ref 32–36)
MCV RBC AUTO: 93 FL (ref 82–98)
MONOCYTES # BLD AUTO: 0.8 K/UL (ref 0.3–1)
MONOCYTES NFR BLD: 8 % (ref 4–15)
NEUTROPHILS # BLD AUTO: 7 K/UL (ref 1.8–7.7)
NEUTROPHILS NFR BLD: 69 % (ref 38–73)
NRBC BLD-RTO: 0 /100 WBC
PLATELET # BLD AUTO: 309 K/UL (ref 150–450)
PMV BLD AUTO: 10.1 FL (ref 9.2–12.9)
RBC # BLD AUTO: 4.9 M/UL (ref 4–5.4)
WBC # BLD AUTO: 10.2 K/UL (ref 3.9–12.7)

## 2025-01-17 PROCEDURE — 85025 COMPLETE CBC W/AUTO DIFF WBC: CPT | Mod: NTX | Performed by: INTERNAL MEDICINE

## 2025-01-17 PROCEDURE — 36415 COLL VENOUS BLD VENIPUNCTURE: CPT | Mod: PO,TXP | Performed by: INTERNAL MEDICINE

## 2025-01-17 RX ORDER — METFORMIN HYDROCHLORIDE 500 MG/1
500 TABLET ORAL
Qty: 90 TABLET | Refills: 1 | Status: SHIPPED | OUTPATIENT
Start: 2025-01-17

## 2025-01-17 NOTE — TELEPHONE ENCOUNTER
Refill Decision Note   Nathalie Krueger  is requesting a refill authorization.  Brief Assessment and Rationale for Refill:  Approve     Medication Therapy Plan:        Comments:     Note composed:8:00 AM 01/17/2025

## 2025-01-17 NOTE — TELEPHONE ENCOUNTER
No care due was identified.  [facility] Embedded Care Due Messages. Reference number: [id number].   1/17/2025 7:56:03 AM CST

## 2025-01-23 ENCOUNTER — PATIENT MESSAGE (OUTPATIENT)
Dept: TRANSPLANT | Facility: CLINIC | Age: 60
End: 2025-01-23
Payer: COMMERCIAL

## 2025-02-01 ENCOUNTER — DOCUMENTATION ONLY (OUTPATIENT)
Dept: FAMILY MEDICINE | Facility: CLINIC | Age: 60
End: 2025-02-01
Payer: COMMERCIAL

## 2025-02-09 ENCOUNTER — DOCUMENTATION ONLY (OUTPATIENT)
Dept: FAMILY MEDICINE | Facility: CLINIC | Age: 60
End: 2025-02-09
Payer: COMMERCIAL

## 2025-02-09 NOTE — PROGRESS NOTES
Nathalie Krueger (Hathaway: [id number])  Ozempic (1 MG/DOSE) 4MG/3ML pen-injectors  Form  [employer] Electronic Prior Authorization Form (2017 NCPDP)  Created  8 days ago  Sent to Plan  8 days ago  Plan Response  8 days ago  Submit Clinical Questions  8 days ago  Determination  Favorable  8 days ago  Message from Plan  Request Reference Number: [id number]. OZEMPIC INJ 4MG/3ML is approved through 02/01/2026. Your patient may now fill this prescription and it will be covered.. Authorization Expiration Date: February 1, 2026.

## 2025-02-21 ENCOUNTER — LAB VISIT (OUTPATIENT)
Dept: LAB | Facility: HOSPITAL | Age: 60
End: 2025-02-21
Attending: INTERNAL MEDICINE
Payer: COMMERCIAL

## 2025-02-21 ENCOUNTER — HOSPITAL ENCOUNTER (OUTPATIENT)
Dept: PULMONOLOGY | Facility: CLINIC | Age: 60
Discharge: HOME | End: 2025-02-21
Payer: COMMERCIAL

## 2025-02-21 ENCOUNTER — RESULTS FOLLOW-UP (OUTPATIENT)
Dept: TRANSPLANT | Facility: CLINIC | Age: 60
End: 2025-02-21

## 2025-02-21 ENCOUNTER — OFFICE VISIT (OUTPATIENT)
Dept: TRANSPLANT | Facility: CLINIC | Age: 60
End: 2025-02-21
Payer: COMMERCIAL

## 2025-02-21 VITALS
HEIGHT: 69 IN | WEIGHT: 222 LBS | OXYGEN SATURATION: 97 % | WEIGHT: 222 LBS | BODY MASS INDEX: 32.88 KG/M2 | BODY MASS INDEX: 32.88 KG/M2 | HEART RATE: 81 BPM | HEIGHT: 69 IN | DIASTOLIC BLOOD PRESSURE: 94 MMHG | SYSTOLIC BLOOD PRESSURE: 139 MMHG | TEMPERATURE: 98 F

## 2025-02-21 DIAGNOSIS — J84.9 ILD (INTERSTITIAL LUNG DISEASE): ICD-10-CM

## 2025-02-21 DIAGNOSIS — E78.2 MIXED HYPERLIPIDEMIA: ICD-10-CM

## 2025-02-21 DIAGNOSIS — Z51.81 THERAPEUTIC DRUG MONITORING: ICD-10-CM

## 2025-02-21 DIAGNOSIS — G47.33 OSA (OBSTRUCTIVE SLEEP APNEA): ICD-10-CM

## 2025-02-21 DIAGNOSIS — E11.9 TYPE 2 DIABETES MELLITUS WITHOUT COMPLICATION, WITHOUT LONG-TERM CURRENT USE OF INSULIN: ICD-10-CM

## 2025-02-21 DIAGNOSIS — J96.11 CHRONIC HYPOXEMIC RESPIRATORY FAILURE: ICD-10-CM

## 2025-02-21 DIAGNOSIS — J43.9 PULMONARY EMPHYSEMA, UNSPECIFIED EMPHYSEMA TYPE: ICD-10-CM

## 2025-02-21 DIAGNOSIS — J84.9 ILD (INTERSTITIAL LUNG DISEASE): Primary | ICD-10-CM

## 2025-02-21 DIAGNOSIS — I10 ESSENTIAL HYPERTENSION: ICD-10-CM

## 2025-02-21 LAB
BASOPHILS # BLD AUTO: 0.04 K/UL (ref 0–0.2)
BASOPHILS NFR BLD: 0.4 % (ref 0–1.9)
DIFFERENTIAL METHOD BLD: ABNORMAL
DLCO ADJ PRE: 11.01 ML/(MIN*MMHG) (ref 20.11–31.58)
DLCO SINGLE BREATH LLN: 20.11
DLCO SINGLE BREATH PRE REF: 44.2 %
DLCO SINGLE BREATH REF: 25.85
DLCOC SBVA LLN: 3.22
DLCOC SBVA PRE REF: 71 %
DLCOC SBVA REF: 4.47
DLCOC SINGLE BREATH LLN: 20.11
DLCOC SINGLE BREATH PRE REF: 42.6 %
DLCOC SINGLE BREATH REF: 25.85
DLCOCSBVAULN: 5.73
DLCOCSINGLEBREATHULN: 31.58
DLCOCSINGLEBREATHZSCORE: -4.26
DLCOSINGLEBREATHULN: 31.58
DLCOSINGLEBREATHZSCORE: -4.14
DLCOVA LLN: 3.22
DLCOVA PRE REF: 73.6 %
DLCOVA PRE: 3.29 ML/(MIN*MMHG*L) (ref 3.22–5.73)
DLCOVA REF: 4.47
DLCOVAULN: 5.73
DLVAADJ PRE: 3.17 ML/(MIN*MMHG*L) (ref 3.22–5.73)
EOSINOPHIL # BLD AUTO: 0.1 K/UL (ref 0–0.5)
EOSINOPHIL NFR BLD: 1 % (ref 0–8)
ERV LLN: -16449.15
ERV PRE REF: 75.6 %
ERV REF: 0.85
ERVULN: ABNORMAL
ERYTHROCYTE [DISTWIDTH] IN BLOOD BY AUTOMATED COUNT: 12 % (ref 11.5–14.5)
FEF 25 75 LLN: 1.67
FEF 25 75 PRE REF: 106.9 %
FEF 25 75 REF: 3.07
FEV05 LLN: 1.23
FEV05 REF: 2.08
FEV1 FVC LLN: 67
FEV1 FVC PRE REF: 108.4 %
FEV1 FVC REF: 79
FEV1 LLN: 2.2
FEV1 PRE REF: 76.9 %
FEV1 REF: 2.91
FEV1FVCZSCORE: 1.03
FEV1ZSCORE: -1.55
FRCPLETH LLN: 2.16
FRCPLETH PREREF: 56.8 %
FRCPLETH REF: 2.99
FRCPLETHULN: 3.81
FVC LLN: 2.82
FVC PRE REF: 70.3 %
FVC REF: 3.73
FVCZSCORE: -2.01
HCT VFR BLD AUTO: 44.6 % (ref 37–48.5)
HGB BLD-MCNC: 14.8 G/DL (ref 12–16)
IMM GRANULOCYTES # BLD AUTO: 0.13 K/UL (ref 0–0.04)
IMM GRANULOCYTES NFR BLD AUTO: 1.3 % (ref 0–0.5)
IVC PRE: 2.67 L (ref 2.82–4.68)
IVC SINGLE BREATH LLN: 2.82
IVC SINGLE BREATH PRE REF: 71.5 %
IVC SINGLE BREATH REF: 3.73
IVCSINGLEBREATHULN: 4.68
LLN IC: -16447.41
LYMPHOCYTES # BLD AUTO: 2 K/UL (ref 1–4.8)
LYMPHOCYTES NFR BLD: 19.5 % (ref 18–48)
MCH RBC QN AUTO: 30.3 PG (ref 27–31)
MCHC RBC AUTO-ENTMCNC: 33.2 G/DL (ref 32–36)
MCV RBC AUTO: 91 FL (ref 82–98)
MONOCYTES # BLD AUTO: 0.7 K/UL (ref 0.3–1)
MONOCYTES NFR BLD: 7.1 % (ref 4–15)
NEUTROPHILS # BLD AUTO: 7.1 K/UL (ref 1.8–7.7)
NEUTROPHILS NFR BLD: 70.7 % (ref 38–73)
NRBC BLD-RTO: 0 /100 WBC
PEF LLN: 5.1
PEF PRE REF: 114.2 %
PEF REF: 7.09
PHYSICIAN COMMENT: ABNORMAL
PLATELET # BLD AUTO: 321 K/UL (ref 150–450)
PMV BLD AUTO: 9.8 FL (ref 9.2–12.9)
PRE DLCO: 11.43 ML/(MIN*MMHG) (ref 20.11–31.58)
PRE ERV: 0.65 L (ref -16449.15–16450.85)
PRE FEF 25 75: 3.28 L/S (ref 1.67–4.47)
PRE FET 100: 5.67 SEC
PRE FEV05 REF: 95.2 %
PRE FEV1 FVC: 85.21 % (ref 66.64–88.86)
PRE FEV1: 2.24 L (ref 2.2–3.6)
PRE FEV5: 1.98 L (ref 1.23–2.94)
PRE FRC PL: 1.7 L (ref 2.16–3.81)
PRE FVC: 2.63 L (ref 2.82–4.68)
PRE IC: 2.02 L (ref -16447.41–16452.59)
PRE PEF: 8.1 L/S (ref 5.1–9.08)
PRE REF IC: 78 %
PRE RV: 1.05 L (ref 1.56–2.71)
PRE TLC: 3.72 L (ref 4.79–6.76)
RAW PRE REF: 67.9 %
RAW PRE: 2.08 CMH2O*S/L (ref 3.06–3.06)
RAW REF: 3.06
RBC # BLD AUTO: 4.88 M/UL (ref 4–5.4)
REF IC: 2.59
RV LLN: 1.56
RV PRE REF: 49.3 %
RV REF: 2.13
RVTLC LLN: 30
RVTLC PRE REF: 71.8 %
RVTLC PRE: 28.24 % (ref 29.77–48.95)
RVTLC REF: 39
RVTLCULN: 49
RVULN: 2.71
SGAW PRE REF: 175.3 %
SGAW PRE: 0.18 1/(CMH2O*S) (ref 0.1–0.1)
SGAW REF: 0.1
TLC LLN: 4.79
TLC PRE REF: 64.4 %
TLC REF: 5.78
TLC ULN: 6.76
TLCZSCORE: -3.43
ULN IC: ABNORMAL
VA PRE: 3.47 L (ref 5.63–5.63)
VA SINGLE BREATH LLN: 5.63
VA SINGLE BREATH PRE REF: 61.7 %
VA SINGLE BREATH REF: 5.63
VASINGLEBREATHULN: 5.63
VC LLN: 2.82
VC PRE REF: 71.5 %
VC PRE: 2.67 L (ref 2.82–4.68)
VC REF: 3.73
VC ULN: 4.68
WBC # BLD AUTO: 10.04 K/UL (ref 3.9–12.7)

## 2025-02-21 PROCEDURE — 36415 COLL VENOUS BLD VENIPUNCTURE: CPT | Mod: TXP | Performed by: INTERNAL MEDICINE

## 2025-02-21 PROCEDURE — 94618 PULMONARY STRESS TESTING: CPT | Mod: NTX,S$GLB,, | Performed by: INTERNAL MEDICINE

## 2025-02-21 PROCEDURE — 85025 COMPLETE CBC W/AUTO DIFF WBC: CPT | Mod: TXP | Performed by: INTERNAL MEDICINE

## 2025-02-21 PROCEDURE — 94729 DIFFUSING CAPACITY: CPT | Mod: NTX,S$GLB,, | Performed by: INTERNAL MEDICINE

## 2025-02-21 PROCEDURE — 94726 PLETHYSMOGRAPHY LUNG VOLUMES: CPT | Mod: NTX,S$GLB,, | Performed by: INTERNAL MEDICINE

## 2025-02-21 PROCEDURE — 94010 BREATHING CAPACITY TEST: CPT | Mod: 59,NTX,S$GLB, | Performed by: INTERNAL MEDICINE

## 2025-02-21 RX ORDER — SEMAGLUTIDE 1.34 MG/ML
INJECTION, SOLUTION SUBCUTANEOUS
Qty: 9 ML | Refills: 0 | Status: SHIPPED | OUTPATIENT
Start: 2025-02-21

## 2025-02-21 RX ORDER — AMLODIPINE BESYLATE 5 MG/1
5 TABLET ORAL
Qty: 90 TABLET | Refills: 2 | Status: SHIPPED | OUTPATIENT
Start: 2025-02-21

## 2025-02-21 RX ORDER — ROSUVASTATIN CALCIUM 5 MG/1
5 TABLET, COATED ORAL
Qty: 90 TABLET | Refills: 2 | Status: SHIPPED | OUTPATIENT
Start: 2025-02-21

## 2025-02-21 NOTE — PROGRESS NOTES
ADVANCED LUNG DISEASE FOLLOW-UP    Reason for Visit: ILD.                              ADVANCED LUNG DISEASE CLINIC FOLLOW UP EVALUATION                                                                                                                                             Reason for Visit:  ILD-cHP    Referring Physician: Mary Barry, *    History of Present Illness: Nathalie Krueger is a 58 y.o. female who is on 0-4L of oxygen with exertion.  She is on CPAP, but has not been wearing it based on a home sleep study from [facility].  Her New York Heart Association Class is II and a Karnofsky score of 90% - Able to carry on normal activity: minor symptoms of disease. She is diabetic non insulin dependent  Patient with smoking related emphysema, cHP(ILD), ORLIN, chronic hypoxemic respiratory failure, GERD, DM.  Patient comes in for a routine visit with her wife.        INTERVAL HISTORY:  Doing well on MMF 1500mg BID. Has GERD symptoms when eating certain foods, but has no issues when avoiding those foods. Shortness of breath improved. Exercise tolerance improving as well.  Took an international trip this summer and contracted COVID, but did well with it.  Did not require hospitalizations of treatment. Gets short of breath bending over which she notices improvement with weight loss.  Has exertional dry cough.  Overall doing well and feels well.  Her and her wife are moving to [place] in a couple weeks and would like a referral to the [facility] for further pulmonary care.       Brief History summarized from previous visits  Patient presented in 07/2023 for evaluation of ILD and at that time reported respiratory symptoms first began over three years ago when she lived in [place]. Per patient's wife, patient had developed progressive fatigue and dyspnea with exertion. She was still able to perform her ADLs independently, but noticed she would get breathless with activity very quickly. Previously lived on  family's farm in [place]. She states she had an episode of profound fatigue/malaise one week following clean out of family's chicken coup. Frequent exposures to dust, pesticides, fertilizers while living in [place]. Patient and her wife moved to [place] ~two years ago. While living downtown, patient noticed increased cough, worsening fatigue requiring frequent daytime naps. Briefly lived in high rise apartments [place] and found she had worsening cough if other residents smoked within the building. She presented to the ED in early 2022 due to hypoxemia. She checked her pulse ox at home and reportedly had oxygen saturation of 76%. She did not require hospitalization. No prior chest surgeries, ICU admission, intubations, lung biopsy.     In August 2022, patient had acute worsening of her dyspnea and cough. She was started on 2L oxygen to be used with exertion. 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 Since starting CPAP, notes improvement in daytime fatigue. She completed a trial of steroids in the fall of 2022 after evaluation with Dr. Rodas at [facility] pulm and states her symptoms improved drastically. No recent exacerbations/hospitalizations. She has had an extensive autoimmune workup which revealed +DAYTON and low IgM. All other workup unrevealing. Currently of stiolto and symbicort for management.     Patient is a former 25-30 year smoker and quit 3-4 years ago. Her father had lung disease related to diving in the [employer], but otherwise denies family history of lung disease. No history of frequent infections as a child. She states she did have a history of scarlet fever as a child while living in [place] and was told she should not live there in the future? Also states she used a 20 year old inhaler she bought in [place] while still living in [place] and is concerned she may have aspirated mold. She occasionally has episodes of flushing and rashes. No arthralgias. History of reflux and recently  "stopped omeprazole in hopes of improving symptoms with diet modification. She recently lost ~10 pounds. Continues to have intermittent reflux despite diet changes. She states she remains very active and walks and performs water aerobics frequently. 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 Remains independent with her ADLs, but does struggle with vacuuming, bending over, and climbing stairs    She was treated for another exacerbation in 10/2023 with prednisone taper in addition to antibiotics for pneumonia and suspected ILD flare. Reported marked improvement.  The steroids taper was quickly weaned in anticipation of wedge bx.  She reports good compliance with her CPAP. Denies any post-nasal drip.       Review of Systems   Constitutional:  Negative for chills and fever.   Respiratory:  Positive for cough (dry, exertional) and shortness of breath (improving). Negative for hemoptysis, sputum production and wheezing.    Cardiovascular:  Negative for palpitations, orthopnea, claudication, leg swelling and PND.   Gastrointestinal:  Positive for heartburn (with certain foods).     BP (!) 139/94 (BP Location: Right arm, Patient Position: Sitting)   Pulse 81   Temp 98.2 °F (36.8 °C) (Oral)   Ht 5' 9" (1.753 m)   Wt 100.7 kg (222 lb)   SpO2 97%   BMI 32.78 kg/m²     Physical Exam  Constitutional:       Appearance: Normal appearance.   HENT:      Head: Normocephalic and atraumatic.   Eyes:      Extraocular Movements: Extraocular movements intact.   Cardiovascular:      Rate and Rhythm: Normal rate and regular rhythm.   Pulmonary:      Effort: Pulmonary effort is normal. No respiratory distress.      Breath sounds: No stridor. No wheezing or rales.   Chest:      Chest wall: No tenderness.   Abdominal:      General: There is no distension.   Musculoskeletal:      Cervical back: Normal range of motion.      Right lower leg: No edema.      Left lower leg: No edema.   Skin:     General: Skin is warm and " dry.   Neurological:      Mental Status: She is alert and oriented to person, place, and time.   Psychiatric:         Mood and Affect: Mood normal.         Behavior: Behavior normal.         Thought Content: Thought content normal.         Judgment: Judgment normal.       Labs:      Latest Ref Rng & Units 10/21/2024     1:37 PM 1/17/2025    11:45 AM 2/21/2025     8:41 AM   cbc, cmp, tacrolimus   WBC (USE PT. THRESHOLDS) 3.90 - 12.70 K/uL  10.20  10.04    RBC (USE PT. THRESHOLDS) 4.00 - 5.40 M/uL  4.90  4.88    Hemoglobin (USE PT. THRESHOLDS) 12.0 - 16.0 g/dL  14.7  14.8    Hematocrit (USE PT. THRESHOLDS) 37.0 - 48.5 %  45.5  44.6    MCV (USE PT. THRESHOLDS) 82 - 98 fL  93  91    MCH (USE PT. THRESHOLDS) 27.0 - 31.0 pg  30.0  30.3    MCHC (USE PT. THRESHOLDS) 32.0 - 36.0 g/dL  32.3  33.2    RDW (USE PT. THRESHOLDS) 11.5 - 14.5 %  12.2  12.0    Platelet Count (USE PT. THRESHOLDS) 150 - 450 K/uL  309  321    MPV (USE PT. THRESHOLDS) 9.2 - 12.9 fL  10.1  9.8    Gran # (ANC) (USE PT. THRESHOLDS) 1.8 - 7.7 K/uL  7.0  7.1    Lymph # (USE PT. THRESHOLDS) 1.0 - 4.8 K/uL  2.2  2.0    Mono # (USE PT. THRESHOLDS) 0.3 - 1.0 K/uL  0.8  0.7    Eos % (USE PT. THRESHOLDS) 0.0 - 8.0 %  1.0  1.0    Basophil % (USE PT. THRESHOLDS) 0.0 - 1.9 %  0.4  0.4    Differential Method (USE PT. THRESHOLDS)   Automated  Automated    Sodium (USE PT. THRESHOLDS) 136 - 145 mmol/L 141      Potassium (USE PT. THRESHOLDS) 3.5 - 5.1 mmol/L 4.1      Chloride (USE PT. THRESHOLDS) 95 - 110 mmol/L 103      CO2 (USE PT. THRESHOLDS) 23 - 29 mmol/L 27      Glucose (USE PT. THRESHOLDS) 70 - 110 mg/dL 137      BUN (USE PT. THRESHOLDS) 6 - 20 mg/dL 11      Creatinine (USE PT. THRESHOLDS) 0.5 - 1.4 mg/dL 0.8      Calcium (USE PT. THRESHOLDS) 8.7 - 10.5 mg/dL 9.6      PROTEIN TOTAL (USE PT. THRESHOLDS) 6.0 - 8.4 g/dL 6.8      Albumin (USE PT. THRESHOLDS) 3.5 - 5.2 g/dL 4.1      BILIRUBIN TOTAL (USE PT. THRESHOLDS) 0.1 - 1.0 mg/dL 0.4      ALP (USE PT. THRESHOLDS) 40  - 150 U/L 123      AST (USE PT. THRESHOLDS) 10 - 40 U/L 20      ALT (USE PT. THRESHOLDS) 10 - 44 U/L 27      Anion Gap (USE PT. THRESHOLDS) 8 - 16 mmol/L 11            2/21/2025    10:02 AM 10/16/2024    12:36 PM 5/7/2024    11:33 AM 12/20/2023     1:36 PM 10/5/2023    11:03 AM 8/24/2023    10:10 AM 4/10/2023    10:10 AM   Pulmonary Function Tests   FVC 2.63 liters 2.66 liters 2.69 liters 2.45 liters 2.36 liters 2.51 liters 2.35 liters   FEV1 2.24 liters 2.25 liters 2.31 liters 2.03 liters 2.05 liters 2.12 liters 1.95 liters   TLC (liters) 3.72 liters 3.58 liters 3.48 liters  2.94 liters  3.06 liters   DLCO (ml/mmHg sec) 11.43 ml/mmHg sec 10.49 ml/mmHg sec 10 ml/mmHg sec  6.89 ml/mmHg sec  7.89 ml/mmHg sec   FVC% 70 71 71.5 64.8 62.3 66.2 61.8   FEV1% 76 77 78.8 69 69.3 71.8 65.6   FEF 25-75 3.28 3.52 3.36 2.73 3.23 3.02 2.35   FEF 25-75% 106 114.7 132.6 107 125.9 117.4 90.8   TLC% 64 62 60.3  50.8  53   RV 1.05 0.93 0.79  0.58  0.83   RV% 49 43.4 37.5  27.3  39.4   DLCO% 44 40.6 38.5  26.5  30.2         2/21/2025     9:30 AM 10/16/2024    11:25 AM 5/7/2024    10:10 AM 12/20/2023    12:50 PM 11/16/2023     9:34 AM 10/5/2023     9:53 AM 7/12/2023     1:42 PM   6MW   6MWT Status completed without stopping completed without stopping completed without stopping completed without stopping completed without stopping completed without stopping completed without stopping   Patient Reported No complaints No complaints No complaints  No complaints  No complaints  No complaints  No complaints    Was O2 used? No No No Yes Yes Yes Yes   Delivery Method    Cannula;Pull Tank;Continuous Flow Cannula;Pull Tank;Continuous Flow Cannula;Pull Tank;Continuous Flow Cannula;Pull Tank;Continuous Flow   6MW Distance walked (feet) 1160 feet 1077 feet 1077 feet 1200 feet 1200 feet 1200 feet 1200 feet   Distance walked (meters) 353.57 meters 328.27 meters 328.27 meters 365.76 meters 365.76 meters 365.76 meters 365.76 meters   Did patient stop?  No No No No No No No   Oxygen Saturation 97 % 98 % 95 % 98 % 98 % 96 % 95 %   Supplemental Oxygen Room Air Room Air Room Air  3 L/M  4 L/M  3 L/M  2 L/M    Heart Rate 86 bpm 83 bpm 92 bpm 95 bpm 83 bpm 81 bpm 87 bpm   Blood Pressure 142/93 133/88 149/91 134/80 143/90 134/87 122/76   Ashlie Dyspnea Rating  very, very light (just noticeable) nothing at all nothing at all nothing at all nothing at all nothing at all very, very light (just noticeable)   Oxygen Saturation 90 % 91 % 91 % 90 % 92 % 90 % 89 %   Supplemental Oxygen Room Air Room Air Room Air  3 L/M  4 L/M  4 L/M  3 L/M    Heart Rate 99 bpm 91 bpm 112 bpm 115 bpm 94 bpm 100 bpm 115 bpm   Blood Pressure 146/89 143/93 142/101 155/84 166/104 159/86 119/76   Ashlie Dyspnea Rating  very, very light (just noticeable) very, very light (just noticeable) nothing at all very, very light (just noticeable) very, very light (just noticeable) nothing at all very, very light (just noticeable)   Recovery Time (seconds) 78 seconds 78 seconds 121 seconds 54 seconds 155 seconds 95 seconds 101 seconds   Oxygen Saturation 95 % 95 % 94 % 96 % 98 % 95 % 98 %   Supplemental Oxygen Room Air Room Air Room Air  3 L/M  4 L/M  4 L/M  3 L/M    Heart Rate 91 bpm 81 bpm 87 bpm 101 bpm 93 bpm 90 bpm 90 bpm       Data saved with a previous flowsheet row definition         Imaging:  EXAMINATION:  CT CHEST WITHOUT CONTRAST     CLINICAL HISTORY:  ILD;Solitary pulmonary nodule     TECHNIQUE:  Volumetric data acquisition through the chest (lung apices to adrenals) without intravenous. Sagittal and coronal multiplanar reconstructions (MPR) were performed and pushed to PACS for evaluation. The lack of IV contrast material limits the assessment of the mediastinal structures and abdominal solid organs.     COMPARISON:  CT 04/11/2023     FINDINGS:  Chest wall and lower neck: Normal.     Lungs and pleura: Emphysema.  A few micro nodules in bilateral lungs are stable since CT from 04/11/2023.  microcystic  changes in the posterior subpleural lungs, right more than left, slightly progressed compared to the prior study.     Mediastinum and jenny: No mediastinal or hilar adenopathy.     Heart and pericardium: Heart size is normal. No pericardial effusion.     Vessels: Mild atheromatous disease is seen in the aorta.  The coronary arteries show mild atheromatous disease.     Upper abdomen: Normal.     Bones: Normal.     Impression:     1. Stable micro nodules in bilateral lungs.  2. Microcystic changes in the posterior aspect of the bilateral lungs, right more than left, slightly progressed compared to the prior study.  Follow-up high-resolution CT in prone positioning is recommended on follow-up CT in 6-12 months to evaluate for ILD.        Electronically signed by:Ulysses Oropeza  Date:                                            11/17/2023  Time:                                           14:31    WEDGE BIOPSY 11/29/2023    1. LUNG, RIGHT UPPER LOBE, WEDGE BIOPSY:  Chronic interstitial pneumonitis with changes most consistent with cellular nonspecific interstitial pneumonia-like pattern of injury.  Negative for malignancy.      2. LUNG, RIGHT LOWER LOBES, WEDGE BIOPSY:  Chronic interstitial pneumonitis with changes most consistent with cellular nonspecific interstitial pneumonia-like pattern of injury.  Negative for malignancy.      3. LUNG, RIGHT MIDDLE LOBE, WEDGE BIOPSY:  Chronic interstitial pneumonitis with changes most consistent with cellular nonspecific interstitial pneumonia-like pattern of injury and rare multinucleated giant cells containing cholesterol clefts.  Negative for malignancy.        Comment:  Sample tissue from all segments shows patchy fibrosis, diffuse cellular inflammatory infiltrates, pneumocyte hyperplasia and accumulation of foamy macrophages in airspaces.  The overall changes suggest cellular nonspecific interstitial  pneumonia (NSIP) pattern of injury.  Rare multinucleated giant cells with  cholesterol clefts raise concern for hypersensitivity pneumonitis.  Other potential etiologies include connective tissue disease or respiratory bronchiolitis interstitial lung  disease (RB-ILD).  Idiopathic NSIP is a diagnosis of exclusion if all other etiologies have been ruled out.  There is no evidence of acute lung injury, granulomas, viral cytopathic effect or neoplasia.  Recommend correlation with clinical history and  pertinent lab studies.       Results for orders placed during the hospital encounter of 03/21/23    Echo    Interpretation Summary  · The left ventricle is normal in size with normal systolic function.  · The estimated ejection fraction is 65%.  · Normal left ventricular diastolic function.  · Moderate right ventricular enlargement with low normal to mildly reduced right ventricular systolic function.  · The estimated PA systolic pressure is 34 mmHg.  · Normal central venous pressure (3 mmHg).  · The ascending aorta is mildly dilated.  Results for orders placed during the hospital encounter of 03/29/23    Cardiac catheterization    Conclusion  Summary  Filling pressures: RA 10, PCWP 16  PA 36/20, mean PA pressure 20 mmHg  PVR 1.5 DUQUE  PA saturation 70%, Ao saturation 97%  Fletcher CO/CI: 5.9/2.66  /100  with repeat intraprocedure similar results. On recheck post procedure 133/75  HR 71 SR  Hb 13.6      Assessment:  1. ILD (interstitial lung disease)    2. Pulmonary emphysema, unspecified emphysema type    3. Chronic hypoxemic respiratory failure    4. ORLIN (obstructive sleep apnea)    5. Therapeutic drug monitoring        Plan:   Followed for biopsy proven cHP.  No further exposure history of concern.  FVC and DLCO have improved and are stable following initial treatment with prednisone which was switched to MMF.  Now on MMF 1500mg BID; tolerating well.  CBC reviewed and no cytopenias.  Continue with current regimen.  She is due for CT chest ILD protocol and TTE but given that she is  about to transfer care to [facility], will defer until she is seen there.  Instructed her to obtain a CD with her CT imaging from our institute to bring to her new ILD providers.  PFTs are stable and she is unlikely to have any progression.  She is aware that OFEV is an option should she develop any progressing ILD.  Had RHC in 3/2023 with mPAP of 20.  6MWT today with stable distance >1000ft.  Desats to 89% on RA.  No longer requires supplemental oxygen and she does not use it but still has her home concentrator in case of emergencies.    Continue with Trelegy inhaler.  No exacerbations  Recommend CPAP use.  CBC reviewed given MMF use.  No changes to MMF.  Recommend yearly dermatology skin exams.    Follow up prn.  Transitioning care to [facility].  Place a referral to Dr. Catherine Montanez at [facility] pulmonary group      Mary Barry D.O.  Pulmonary/Critical Care and Lung Transplantation  [facility]

## 2025-02-21 NOTE — TELEPHONE ENCOUNTER
Provider Staff:  Action required for this patient    Requires labs      Please see care gap opportunities below in Care Due Message.    Thanks!  [facility]     Appointments      Date Provider   Last Visit   10/21/2024 Tyron Flannery MD   Next Visit   4/22/2025 Tyron Flannery MD     Refill Decision Note   Nathalie Krueger  is requesting a refill authorization.    Brief Assessment and Rationale for Refill:  Approve       Medication Therapy Plan:  Overridden by Tyron Flannery MD on Sep 15, 2023 10:47 AM      Alert overridden per protocol: Yes   Comments:     Note composed:1:08 PM 02/21/2025

## 2025-02-21 NOTE — TELEPHONE ENCOUNTER
Care Due:                  Date            Visit Type   Department     Provider  --------------------------------------------------------------------------------                                EP -                              [facility] FAMILY  Last Visit: 10-      CARE ([facility])   LUIS ENRIQUE Flannery                              EP -                              PRIMARY      [facility] FAMILY  Next Visit: 04-      CARE ([facility])   LUIS ENRIQUE Flannery                                                            Last  Test          Frequency    Reason                     Performed    Due Date  --------------------------------------------------------------------------------    HBA1C.......  6 months...  OZEMPIC, metFORMIN.......  10-   04-    [facility] Embedded Care Due Messages. Reference number: [id number].   2/21/2025 6:20:41 AM CST

## 2025-02-21 NOTE — LETTER
February 21, 2025        Sarah Lemon  [address]  [address]  [place] [address]  Phone: [phone]  Fax: [phone]             [facility] 1st [facility]  [address]  [facility] [address]  Phone: [phone]   Patient: Nathalie Krueger   MR Number: [id number]   YOB: 1965   Date of Visit: 2/21/2025       Dear Dr. Sarah Lemon    Thank you for referring Nathalie Krueger to me for evaluation. Attached you will find relevant portions of my assessment and plan of care.    If you have questions, please do not hesitate to call me. I look forward to following Nathalie Krueger along with you.    Sincerely,    Mary Barry, DO    Enclosure    If you would like to receive this communication electronically, please contact [email or web address] or [phone] to request [facility] [employer] access.    [facility] is a tool which provides read-only access to select patient information with whom you have a relationship. Its easy to use and provides real time access to review your patients record including encounter summaries, notes, results, and demographic information.    If you feel you have received this communication in error or would no longer like to receive these types of communications, please e-mail [email or web address]

## 2025-02-23 NOTE — PROCEDURES
Nathalie Krueger is a 59 y.o.  female patient, who presents for a 6 minute walk test ordered by DO Asha.  The diagnosis is Interstitial Lung Disease.  The patient's BMI is 32.8 kg/m2.  Predicted distance (lower limit of normal) is 323.53 meters.      Test Results:    The test was completed without stopping.  The total time walked was 360 seconds.  During walking, the patient reported:  No complaints.  The patient used no assistive devices during testing.     02/21/2025---------Distance: 353.57 meters (1160 feet)     Lap Walk Time O2 Sat % Supplemental Oxygen Heart Rate Blood Pressure Ashlie Scale Comment   Pre-exercise  (Resting) 0 0 97 % Room Air 86 bpm 142/93 mmHg 0.5    During Exercise 1 56 sec 94 % Room Air 100 bpm      During Exercise 2 115 sec 90 % Room Air 99 bpm      During Exercise 3 186 sec 89 % Room Air 96 bpm      During Exercise 4 250 sec 90 % Room Air 99 bpm      During Exercise 5 318 sec 90 % Room Air 104 bpm      End of Exercise  360 sec 90 % Room Air 99 bpm 146/89 mmHg 0.5    Post-exercise  (Recovery)   95 % Room Air  91 bpm        Recovery Time: 78 seconds    Performing nurse/tech: Xavier PARKER      PREVIOUS STUDY:   10/16/2024---------Distance: 328.27 meters (1077 feet)       Lap Walk Time O2 Sat % Supplemental Oxygen Heart Rate Blood Pressure Ashlie Scale Comment   Pre-exercise  (Resting) 0 0 98 % Room Air 83 bpm 133/88 mmHg 0     During Exercise 1 67 sec 95 % Room Air 96 bpm         During Exercise 2 128 sec 91 % Room Air 97 bpm         During Exercise 3 202 sec 92 % Room Air 93 bpm         During Exercise 4 270 sec 93 % Room Air 92 bpm         During Exercise 5 334 sec 92 % Room Air 90 bpm         End of Exercise   360 sec 91 % Room Air 91 bpm 143/93 mmHg 0.5     Post-exercise  (Recovery)     95 % Room Air  81 bpm             CLINICAL INTERPRETATION:  Six minute walk distance is 353.57 meters (1160 feet) with very, very light dyspnea.  During exercise, there was significant desaturation  while breathing room air.  Both blood pressure and heart rate remained stable with walking.  Hypertension was present prior to exercise.  The patient did not report non-pulmonary symptoms during exercise.  Since the previous study in October 2024, exercise capacity is unchanged.  Based upon age and body mass index, exercise capacity is normal.

## 2025-02-25 ENCOUNTER — PATIENT MESSAGE (OUTPATIENT)
Dept: TRANSPLANT | Facility: CLINIC | Age: 60
End: 2025-02-25
Payer: COMMERCIAL

## 2025-05-19 DIAGNOSIS — E11.9 TYPE 2 DIABETES MELLITUS WITHOUT COMPLICATION, WITHOUT LONG-TERM CURRENT USE OF INSULIN: ICD-10-CM

## 2025-05-20 RX ORDER — SEMAGLUTIDE 1.34 MG/ML
INJECTION, SOLUTION SUBCUTANEOUS
Qty: 9 ML | Refills: 1 | Status: SHIPPED | OUTPATIENT
Start: 2025-05-20

## 2025-05-20 NOTE — TELEPHONE ENCOUNTER
Refill Routing Note   Medication(s) are not appropriate for processing by [facility] for the following reason(s):        Required labs outdated    [facility] action(s):  Defer     Requires labs : Yes             Appointments  past 12m or future 3m with PCP    Date Provider   Last Visit   10/21/2024 Tyron Flannery MD   Next Visit   Visit date not found Tyron Flannery MD   ED visits in past 90 days: 0        Note composed:5:32 AM 05/20/2025

## 2025-05-20 NOTE — TELEPHONE ENCOUNTER
Care Due:                  Date            Visit Type   Department     Provider  --------------------------------------------------------------------------------                                EP -                              PRIMARY      [facility]  Last Visit: 10-      CARE ([facility])   MEDICINE       Tyron Flannery  Next Visit: None Scheduled  None         None Found                                                            Last  Test          Frequency    Reason                     Performed    Due Date  --------------------------------------------------------------------------------    HBA1C.......  6 months...  OZEMPIC, metFORMIN.......  10-   04-    [facility] Embedded Care Due Messages. Reference number: [id number].   5/19/2025 9:33:33 PM CDT

## 2025-07-07 DIAGNOSIS — J43.9 PULMONARY EMPHYSEMA, UNSPECIFIED EMPHYSEMA TYPE: ICD-10-CM

## 2025-07-08 RX ORDER — FLUTICASONE FUROATE, UMECLIDINIUM BROMIDE AND VILANTEROL TRIFENATATE 200; 62.5; 25 UG/1; UG/1; UG/1
POWDER RESPIRATORY (INHALATION)
Qty: 60 EACH | Refills: 11 | Status: SHIPPED | OUTPATIENT
Start: 2025-07-08

## 2025-07-17 DIAGNOSIS — K21.9 GASTROESOPHAGEAL REFLUX DISEASE, UNSPECIFIED WHETHER ESOPHAGITIS PRESENT: ICD-10-CM

## 2025-07-18 RX ORDER — OMEPRAZOLE 40 MG/1
40 CAPSULE, DELAYED RELEASE ORAL EVERY MORNING
Qty: 30 CAPSULE | Refills: 11 | Status: SHIPPED | OUTPATIENT
Start: 2025-07-18

## 2025-07-20 ENCOUNTER — PATIENT MESSAGE (OUTPATIENT)
Dept: ADMINISTRATIVE | Facility: HOSPITAL | Age: 60
End: 2025-07-20
Payer: COMMERCIAL

## 2025-07-30 DIAGNOSIS — E11.9 TYPE 2 DIABETES MELLITUS WITHOUT COMPLICATION, WITHOUT LONG-TERM CURRENT USE OF INSULIN: ICD-10-CM

## 2025-07-31 RX ORDER — METFORMIN HYDROCHLORIDE 500 MG/1
500 TABLET ORAL
Qty: 90 TABLET | Refills: 0 | Status: SHIPPED | OUTPATIENT
Start: 2025-07-31

## 2025-07-31 NOTE — TELEPHONE ENCOUNTER
Refill Routing Note   Medication(s) are not appropriate for processing by [facility] for the following reason(s):        Required labs outdated    [facility] action(s):  Defer   Requires labs : Yes             Appointments  past 12m or future 3m with PCP    Date Provider   Last Visit   10/21/2024 Tyron Flannery MD   Next Visit   Visit date not found Tyron Flannery MD   ED visits in past 90 days: 0        Note composed:10:39 PM 07/30/2025

## 2025-07-31 NOTE — TELEPHONE ENCOUNTER
Care Due:                  Date            Visit Type   Department     Provider  --------------------------------------------------------------------------------                                EP -                              PRIMARY      [facility] FAM[facility]  Last Visit: 10-      CARE ([facility])   MEDICINE       Tyron Flannery  Next Visit: None Scheduled  None         None Found                                                            Last  Test          Frequency    Reason                     Performed    Due Date  --------------------------------------------------------------------------------    CMP.........  12 months..  metFORMIN, rosuvastatin..  10-   10-    HBA1C.......  6 months...  OZEMPIC, metFORMIN.......  10-   04-    Lipid Panel.  12 months..  rosuvastatin.............  10-   10-    [facility] Embedded Care Due Messages. Reference number: [id number].   7/30/2025 9:37:54 PM CDT

## 2025-09-04 DIAGNOSIS — J30.9 ALLERGIC RHINITIS, UNSPECIFIED SEASONALITY, UNSPECIFIED TRIGGER: ICD-10-CM

## 2025-09-05 RX ORDER — MONTELUKAST SODIUM 10 MG/1
10 TABLET ORAL NIGHTLY
Qty: 90 TABLET | Refills: 1 | Status: SHIPPED | OUTPATIENT
Start: 2025-09-05

## (undated) DEVICE — PAD CURAD NONADH 3X4IN

## (undated) DEVICE — SCISSOR 5MMX35CM DIRECT DRIVE

## (undated) DEVICE — DRAPE INCISE IOBAN 2 23X17IN

## (undated) DEVICE — GLOVE BIOGEL ORTHOPEDIC 7.5

## (undated) DEVICE — DRAPE ABDOMINAL TIBURON 14X11

## (undated) DEVICE — SUT 2/0 30IN SILK BLK BRAI

## (undated) DEVICE — SET MICROPUNCTURE 5FR 501NT

## (undated) DEVICE — SUT 0 30IN ETHIBOND EXCEL G

## (undated) DEVICE — TOWEL OR DISP STRL BLUE 4/PK

## (undated) DEVICE — RELOAD ENDO GIA TRISTAPLE 45MM

## (undated) DEVICE — KIT PROBE COVER WITH GEL

## (undated) DEVICE — SUT 0 VICRYL / CT-1

## (undated) DEVICE — TUBING SUC UNIV W/CONN 12FT

## (undated) DEVICE — CATH SWAN GANZ STND 7FR

## (undated) DEVICE — GOWN POLY REINF BRTH SLV XL

## (undated) DEVICE — ELECTRODE REM PLYHSV RETURN 9

## (undated) DEVICE — TRAY MINOR GEN SURG OMC

## (undated) DEVICE — TAPE MEDIPORE 4IN X 2YDS

## (undated) DEVICE — SPONGE COTTON TRAY 4X4IN

## (undated) DEVICE — DRAIN CHEST DRY SUCTION

## (undated) DEVICE — ELECTRODE BLADE INSULATED 1 IN

## (undated) DEVICE — SUT MCRYL PLUS 4-0 PS2 27IN

## (undated) DEVICE — BLADE ELECTRO EDGE INSULATED

## (undated) DEVICE — TRAY CATH LAB OMC

## (undated) DEVICE — CONTAINER SPECIMEN STRL 4OZ

## (undated) DEVICE — BAG INZII TISS RETRV 12/15MM

## (undated) DEVICE — GAUZE SPONGE 4X4 12PLY

## (undated) DEVICE — STAPLER GIA HANDLE STD

## (undated) DEVICE — GOWN SMART IMP BREATHABLE XXLG

## (undated) DEVICE — DRESSING TEGADERM 2 3/X2.75IN

## (undated) DEVICE — PLEDGET SUT SOFT 3/8X3/16X1/16

## (undated) DEVICE — SHEATH INTRODUCER 7FR 11CM

## (undated) DEVICE — DRESSING TELFA N ADH 3X8

## (undated) DEVICE — DRAPE STERI INSTRUMENT 1018

## (undated) DEVICE — SUT 2-0 VICRYL / CT-1

## (undated) DEVICE — KIT ANTIFOG

## (undated) DEVICE — DRESSING TEGADERM 4.4X5IN